# Patient Record
Sex: FEMALE | Race: WHITE | NOT HISPANIC OR LATINO | Employment: FULL TIME | URBAN - METROPOLITAN AREA
[De-identification: names, ages, dates, MRNs, and addresses within clinical notes are randomized per-mention and may not be internally consistent; named-entity substitution may affect disease eponyms.]

---

## 2017-02-06 ENCOUNTER — HOSPITAL ENCOUNTER (EMERGENCY)
Facility: HOSPITAL | Age: 77
Discharge: HOME | End: 2017-02-06
Attending: EMERGENCY MEDICINE
Payer: MEDICARE

## 2017-02-06 VITALS
OXYGEN SATURATION: 97 % | TEMPERATURE: 98 F | RESPIRATION RATE: 18 BRPM | HEART RATE: 74 BPM | WEIGHT: 150 LBS | SYSTOLIC BLOOD PRESSURE: 127 MMHG | HEIGHT: 66 IN | BODY MASS INDEX: 24.11 KG/M2 | DIASTOLIC BLOOD PRESSURE: 60 MMHG

## 2017-02-06 DIAGNOSIS — T50.901A ACCIDENTAL MEDICATION ERROR, INITIAL ENCOUNTER: Primary | ICD-10-CM

## 2017-02-06 PROCEDURE — 99283 EMERGENCY DEPT VISIT LOW MDM: CPT

## 2017-02-06 NOTE — ED PROVIDER NOTES
Encounter Date: 2/6/2017       History     Chief Complaint   Patient presents with    Over medicated     Took BP medication twice     Review of patient's allergies indicates:  No Known Allergies  HPI Comments: Chief complaint: Accidentally took an extra blood pressure medicine    History of present illness:Sissy Hess is a 76 y.o. female who presents with concerns after she inadvertently took an extra 40 mg lisinopril tablet at 10 PM.  She reports recording a systolic blood pressure of 88 at home.  She denies any symptoms and has no dizziness, shortness of breath, confusion or lightheadedness.     The history is provided by the patient.     Past Medical History   Diagnosis Date    Cancer 2005     renal ca, L kidney removed    Fibromyalgia     Hyperlipidemia     Hypertension      No past medical history pertinent negatives.  Past Surgical History   Procedure Laterality Date    Nephrectomy      Hysterectomy       Family History   Problem Relation Age of Onset    Breast cancer Maternal Aunt     Ovarian cancer Neg Hx      Social History   Substance Use Topics    Smoking status: Never Smoker    Smokeless tobacco: None    Alcohol use No     Review of Systems   Constitutional: Negative for activity change, appetite change and fever.   HENT: Negative for voice change.    Eyes: Negative for visual disturbance.   Respiratory: Negative for apnea and shortness of breath.    Cardiovascular: Negative for chest pain.   Gastrointestinal: Negative for abdominal pain and vomiting.   Genitourinary: Negative for decreased urine volume.   Musculoskeletal: Negative for back pain and neck pain.   Skin: Negative for color change.   Neurological: Negative for weakness and headaches.   Hematological: Does not bruise/bleed easily.   Psychiatric/Behavioral: Negative for confusion.       Physical Exam   Initial Vitals   BP Pulse Resp Temp SpO2   02/06/17 0339 02/06/17 0339 02/06/17 0339 02/06/17 0339 02/06/17 0339   133/61 85 16  98.1 °F (36.7 °C) 98 %     Physical Exam    Nursing note and vitals reviewed.  Constitutional: She appears well-developed and well-nourished.   HENT:   Head: Normocephalic and atraumatic.   Eyes: Conjunctivae are normal.   Neck: Normal range of motion. Neck supple.   Cardiovascular: Normal rate.   Pulmonary/Chest: Effort normal and breath sounds normal. No respiratory distress.   Abdominal: Soft. She exhibits no distension. There is no tenderness.   Musculoskeletal: Normal range of motion.   Neurological: She is alert and oriented to person, place, and time.   Skin: Skin is warm and dry. No erythema.   Psychiatric: She has a normal mood and affect.         ED Course   Procedures  Labs Reviewed - No data to display          Medical Decision Making:   ED Management:  Sissy Hess is a 76 y.o. female who presents with  no symptoms after taking an additional dose of lisinopril.  There is been 6 hours that have elapsed since the pill was taken with a systolic pressure in the 120s.  No further intervention is mandated.                   ED Course     Clinical Impression:   The encounter diagnosis was Accidental medication error, initial encounter.          Jani Piedra III, MD  02/06/17 0435

## 2017-02-06 NOTE — ED NOTES
At D/C, AA & O x 3, feeling better, skin warm and dry, follow up care discussed at length with patient/family to include meds and follow-up with MD; patient/family given discharge instructions along with prescriptions as indicated and care sheets

## 2017-02-06 NOTE — ED NOTES
Patient identifiers for Sissy Hess checked and correct.  LOC:  Patient is awake, alert, and aware of environment with an appropriate affect. Patient is oriented x 3 and speaking appropriately.  APPEARANCE:  Patient resting comfortably and in no acute distress. Patient is clean and well groomed, patient's clothing is properly fastened.  SKIN:  The skin is warm and dry. Patient has normal skin turgor and moist mucus membranes. Skin is intact; no bruising or breakdown noted.  MUSCULOSKELETAL:  Patient is moving all extremities well, no obvious deformities noted. Pulses intact.   RESPIRATORY:  Airway is open and patent. Respirations are spontaneous and non-labored with normal effort and rate.  CARDIAC:  Patient has a normal rate and rhythm. No peripheral edema noted. Capillary refill < 3 seconds.  ABDOMEN:  No distention noted.  Soft and non-tender upon palpation.  NEUROLOGICAL:  PERRL. Facial expression is symmetrical. Hand grasps are equal bilaterally. Normal sensation in all extremities when touched with finger.  Allergies reported: Review of patient's allergies indicates:  No Known Allergies  OTHER NOTES:  Accidentally took 2 doses of her BP medication, 1st dose at 1900 and again at 2215.  Got worried when BP hit 88/51, called [employer] and got scared and came in.

## 2017-02-06 NOTE — ED AVS SNAPSHOT
[facility] [address] [place] [address]               Sissy Hess   2017  3:42 AM   ED    Description:  Female : 1940   Department:  [facility]           Your Care was Coordinated By:     Provider Role From To    Jani Piedra III, MD Attending Provider [phone] --      Reason for Visit     Over medicated           Diagnoses this Visit        Comments    Accidental medication error, initial encounter    -  Primary       ED Disposition     None           To Do List           [facility]     [facility] Care Line -  Assistance  Registered nurses in the [facility] provide clinical advisement, health education, appointment booking, and other advisory services.  Call for this free service at [phone].             Medications           Message regarding Medications     Verify the changes and/or additions to your medication regime listed below are the same as discussed with your clinician today.  If any of these changes or additions are incorrect, please notify your healthcare provider.        STOP taking these medications     cloNIDine (CATAPRES) 0.1 MG tablet Take 1 tablet (0.1 mg total) by mouth 3 (three) times daily. HTN    omeprazole (PRILOSEC) 20 MG capsule Take 20 mg by mouth once daily.             Verify that the below list of medications is an accurate representation of the medications you are currently taking.  If none reported, the list may be blank. If incorrect, please contact your healthcare provider. Carry this list with you in case of emergency.           Current Medications     amlodipine (NORVASC) 5 MG tablet TK ONE T PO ONCE A DAY    duloxetine (CYMBALTA) 60 MG capsule     labetalol (NORMODYNE) 100 MG tablet Take 100 mg by mouth 2 (two) times daily.    lisinopril (PRINIVIL,ZESTRIL) 40 MG tablet Take 20 mg by mouth 2 (two) times daily.    pravastatin (PRAVACHOL) 40 MG tablet      "       Clinical Reference Information           Your Vitals Were     BP Pulse Temp Resp Height Weight    122/60 70 98.1 °F (36.7 °C) (Oral) 18 5' 6" (1.676 m) 68 kg (150 lb)    SpO2 BMI             96% 24.21 kg/m2         Allergies as of 2/6/2017     No Known Allergies      Immunizations Administered on Date of Encounter - 2/6/2017     None      ED Micro, Lab, POCT     None      ED Imaging Orders     None        Discharge Instructions         Accidental Ingestion: Nontoxic (Adult)  You have been evaluated and treated for accidentally taking too much of a medicine or swallowing a chemical product. There is no sign of toxic effect at this time. It is not likely that any new symptoms will appear. To be safe, watch for symptoms during the next 24 hours (see below). The symptom will depend on what was swallowed.  Home care  · If liquid charcoal was given to neutralize what was swallowed, it will give a black color to the stools for 1 to 2 days. Usually, a laxative is given with charcoal. This speeds the removal of any toxins from the intestines. This may cause diarrhea for up to 24 hours.  · If you have been given charcoal but no laxative, you may become constipated. If this occurs, you may take an over-the-counter laxative.  Prevention  · Keep medicines, pesticides, and other household chemicals in their original containers.  · Clearly dana all harmful products if a different bottle is used.  Note: In the future, if you or someone you know takes something possibly harmful, and you are not sure what to do, call the American Association of Poison Control Centers. The phone number is 1-839.781.1737. The phone line is staffed 24 hours a day. If you call, you will be connected to the poison control center closest to you.  Follow-up care  Follow up with your health care provider, or as advised.  Call 911  Contact your local emergency services right away if any of these occur.  · Trouble breathing or swallowing, " wheezing  · Severe confusion  · Extreme drowsiness or trouble awakening  · Fainting or loss of consciousness  · Rapid heart rate  · Very slow heart rate  · Very low or very high blood pressure  · Vomiting blood, or large amounts of blood in stool  · Seizure  When to seek medical advice  Call your health care provider right away if any of these occur.  · Shakiness  · Fast breathing (over 25 breaths per minute) or slow breathing (less than 8 breaths per minute)  · Shortness of breath  · Fever of 100.4ºF (38ºC) or higher, or as directed by your healthcare provider  · Vomiting or diarrhea for more than 24 hours  · Abdominal pain  · Dizziness or weakness  Date Last Reviewed: 4/28/2015  © 1474-8103 [employer]. [address] [place] [address], [place] [address]. All rights reserved. This information is not intended as a substitute for professional medical care. Always follow your healthcare professional's instructions.          [facility] Sign-Up     Activating your [facility] account is as easy as 1-2-3!     1) Visit [email or web address], select Sign Up Now, enter this activation code and your date of birth, then select Next.  [email or web address]  Expires: 3/23/2017  4:26 AM      2) Create a username and password to use when you visit [facility] in the future and select a security question in case you lose your password and select Next.    3) Enter your e-mail address and click Sign Up!    Additional Information  If you have questions, please e-mail [email or web address] or call [phone] to talk to our [facility] staff. Remember, [facility] is NOT to be used for urgent needs. For medical emergencies, dial 911.          [facility] complies with applicable Federal civil rights laws and does not discriminate on the basis of race, color, national origin, age, disability, or sex.        Language Assistance Services     ATTENTION: Language assistance services are available, free of charge. Please call  [phone].      ATENCIÓN: Si habla español, tiene a beach disposición servicios gratuitos de asistencia lingüística. Llame al [phone].     CHÚ Ý: N?u b?n nói Ti?ng Vi?t, có các d?ch v? h? tr? ngôn ng? mi?n phí dành cho b?n. G?i s? [phone].

## 2017-02-06 NOTE — DISCHARGE INSTRUCTIONS
Accidental Ingestion: Nontoxic (Adult)  You have been evaluated and treated for accidentally taking too much of a medicine or swallowing a chemical product. There is no sign of toxic effect at this time. It is not likely that any new symptoms will appear. To be safe, watch for symptoms during the next 24 hours (see below). The symptom will depend on what was swallowed.  Home care  · If liquid charcoal was given to neutralize what was swallowed, it will give a black color to the stools for 1 to 2 days. Usually, a laxative is given with charcoal. This speeds the removal of any toxins from the intestines. This may cause diarrhea for up to 24 hours.  · If you have been given charcoal but no laxative, you may become constipated. If this occurs, you may take an over-the-counter laxative.  Prevention  · Keep medicines, pesticides, and other household chemicals in their original containers.  · Clearly dana all harmful products if a different bottle is used.  Note: In the future, if you or someone you know takes something possibly harmful, and you are not sure what to do, call the American Association of Poison Control Centers. The phone number is 1-570.364.4928. The phone line is staffed 24 hours a day. If you call, you will be connected to the poison control center closest to you.  Follow-up care  Follow up with your health care provider, or as advised.  Call [phone]  Contact your local emergency services right away if any of these occur.  · Trouble breathing or swallowing, wheezing  · Severe confusion  · Extreme drowsiness or trouble awakening  · Fainting or loss of consciousness  · Rapid heart rate  · Very slow heart rate  · Very low or very high blood pressure  · Vomiting blood, or large amounts of blood in stool  · Seizure  When to seek medical advice  Call your health care provider right away if any of these occur.  · Shakiness  · Fast breathing (over 25 breaths per minute) or slow breathing (less than 8 breaths per  minute)  · Shortness of breath  · Fever of 100.4ºF (38ºC) or higher, or as directed by your healthcare provider  · Vomiting or diarrhea for more than 24 hours  · Abdominal pain  · Dizziness or weakness  Date Last Reviewed: 4/28/2015  © 6972-6485 [employer]. [address] [place] [address]. All rights reserved. This information is not intended as a substitute for professional medical care. Always follow your healthcare professional's instructions.

## 2018-04-23 ENCOUNTER — TELEPHONE (OUTPATIENT)
Dept: ORTHOPEDICS | Facility: CLINIC | Age: 78
End: 2018-04-23

## 2018-04-23 NOTE — TELEPHONE ENCOUNTER
There is no pharmacy on file for the patient. She did not answer when I called, voice mail left.     ----- Message from Maryjo Haile sent at 4/23/2018  1:48 PM CDT -----  Patient called needs a rx pain meds tramadol [phone]

## 2019-05-13 ENCOUNTER — OFFICE VISIT (OUTPATIENT)
Dept: PAIN MEDICINE | Facility: CLINIC | Age: 79
End: 2019-05-13
Payer: MEDICARE

## 2019-05-13 VITALS
SYSTOLIC BLOOD PRESSURE: 134 MMHG | BODY MASS INDEX: 24.11 KG/M2 | HEIGHT: 66 IN | HEART RATE: 67 BPM | WEIGHT: 150 LBS | DIASTOLIC BLOOD PRESSURE: 69 MMHG

## 2019-05-13 DIAGNOSIS — M16.11 OSTEOARTHRITIS OF RIGHT HIP, UNSPECIFIED OSTEOARTHRITIS TYPE: Primary | ICD-10-CM

## 2019-05-13 DIAGNOSIS — M25.551 RIGHT HIP PAIN: Primary | ICD-10-CM

## 2019-05-13 DIAGNOSIS — M50.30 DDD (DEGENERATIVE DISC DISEASE), CERVICAL: ICD-10-CM

## 2019-05-13 PROCEDURE — 99204 OFFICE O/P NEW MOD 45 MIN: CPT | Mod: S$PBB,,, | Performed by: ANESTHESIOLOGY

## 2019-05-13 PROCEDURE — 99999 PR PBB SHADOW E&M-EST. PATIENT-LVL III: ICD-10-PCS | Mod: PBBFAC,,, | Performed by: ANESTHESIOLOGY

## 2019-05-13 PROCEDURE — 99999 PR PBB SHADOW E&M-EST. PATIENT-LVL III: CPT | Mod: PBBFAC,,, | Performed by: ANESTHESIOLOGY

## 2019-05-13 PROCEDURE — 99204 PR OFFICE/OUTPT VISIT, NEW, LEVL IV, 45-59 MIN: ICD-10-PCS | Mod: S$PBB,,, | Performed by: ANESTHESIOLOGY

## 2019-05-13 PROCEDURE — 99213 OFFICE O/P EST LOW 20 MIN: CPT | Mod: PBBFAC,PN | Performed by: ANESTHESIOLOGY

## 2019-05-13 NOTE — PROGRESS NOTES
This note was completed with dictation software and grammatical errors may exist.    Referring Physician: Yonatan Lambert MD    PCP: Tao Orozco MD      CC:  Right hip pain, neck pain    HPI:   Karlie Hess is a 78 y.o. female referred to us for right hip pain and neck pain.  Right hip pain is currently more bothersome.  She has had long history of right hip pain for over 10 years.  No recent traumatic incident.  She has intermittent aching, throbbing pain in her right hip.  Pain radiates to her right groin.  Pain worsens standing walking.  She has tried physical therapy with mild benefits.  She has been seen by Orthopedics as well as spine surgery.  She states undergoing hip injections, in 2015 and most recently in 2018 which provided moderate benefit.  She desires repeat hip injection.  She also has posterior neck pain.  Pain radiates to her bilateral shoulders.  She states having recent imaging.  No surgery was recommended by Spine surgery.  She denies any worsening weakness.  No bowel bladder changes.    ROS:  CONSTITUTIONAL: No fevers, chills, night sweats, wt. loss, appetite changes  SKIN: no rashes or itching  ENT: No headaches, head trauma, vision changes, or eye pain  LYMPH NODES: None noticed   CV: No chest pain, palpitations.   RESP: No shortness of breath, dyspnea on exertion, cough, wheezing, or hemoptysis  GI: No nausea, emesis, diarrhea, constipation, melena, hematochezia, pain.    : No dysuria, hematuria, urgency, or frequency   HEME: No easy bruising, bleeding problems  PSYCHIATRIC: No depression, anxiety, psychosis, hallucinations.  NEURO: No seizures, memory loss, dizziness or difficulty sleeping  MSK:  Positive HPI      Past Medical History:   Diagnosis Date    Cancer 2005    renal ca, L kidney removed    Fibromyalgia     Hyperlipidemia     Hypertension      Past Surgical History:   Procedure Laterality Date    HYSTERECTOMY      NEPHRECTOMY       Family History   Problem  "Relation Age of Onset    Breast cancer Maternal Aunt     Ovarian cancer Neg Hx      Social History     Socioeconomic History    Marital status: Single     Spouse name: Not on file    Number of children: Not on file    Years of education: Not on file    Highest education level: Not on file   Occupational History    Not on file   Social Needs    Financial resource strain: Not on file    Food insecurity:     Worry: Not on file     Inability: Not on file    Transportation needs:     Medical: Not on file     Non-medical: Not on file   Tobacco Use    Smoking status: Never Smoker   Substance and Sexual Activity    Alcohol use: No    Drug use: No    Sexual activity: Not on file   Lifestyle    Physical activity:     Days per week: Not on file     Minutes per session: Not on file    Stress: Not on file   Relationships    Social connections:     Talks on phone: Not on file     Gets together: Not on file     Attends Sabianism service: Not on file     Active member of club or organization: Not on file     Attends meetings of clubs or organizations: Not on file     Relationship status: Not on file   Other Topics Concern    Not on file   Social History Narrative    Not on file         Medications/Allergies: See med card    Vitals:    05/13/19 0823   BP: 134/69   Pulse: 67   Weight: 68 kg (150 lb)   Height: 5' 6" (1.676 m)   PainSc:   8   PainLoc: Neck         Physical exam:    GENERAL: A and O x3, the patient appears well groomed and is in no acute distress.  Skin: No rashes or obvious lesions  HEENT: normocephalic, atraumatic  CARDIOVASCULAR:  Palpable peripheral pulses  LUNGS: easy work of breathing  ABDOMEN: soft, nontender   UPPER EXTREMITIES: Normal alignment, normal range of motion, no atrophy, no skin changes,  hair growth and nail growth normal and equal bilaterally. No swelling, no tenderness.    LOWER EXTREMITIES:  Normal alignment, normal range of motion, no atrophy, no skin changes,  hair growth and " nail growth normal and equal bilaterally. No swelling, no tenderness.  CERVICAL SPINE:  Cervical spine: ROM is limited in flexion, extension and lateral rotation with moderate increased pain.  Spurling's maneuver causes no neck pain to either side.  Myofascial exam: No Tenderness to palpation across cervical paraspinous region bilaterally.    LUMBAR SPINE  Lumbar spine: ROM is limited with flexion extension and oblique extension with moderate increased pain.    Bryce's test causes no increased pain on either side.    Supine straight leg raise is negative bilaterally.    Internal and external rotation of the hip causes increased pain on right side.  Myofascial exam: No tenderness to palpation across lumbar paraspinous muscles.      MENTAL STATUS: normal orientation, speech, language, and fund of knowledge for social situation.  Emotional state appropriate.    CRANIAL NERVES:  II:  PERRL bilaterally,   III,IV,VI: EOMI.    V:  Facial sensation equal bilaterally  VII:  Facial motor function normal.  VIII:  Hearing equal to finger rub bilaterally  IX/X: Gag normal, palate symmetric  XI:  Shoulder shrug equal, head turn equal  XII:  Tongue midline without fasciculations      MOTOR: Tone and bulk: normal bilateral upper and lower Strength: normal   Delt Bi Tri WE WF     R 5 5 5 5 5 5   L 5 5 5 5 5 5     IP ADD ABD Quad TA Gas HAM  R 5 5 5 5 5 5 5  L 5 5 5 5 5 5 5    SENSATION: Light touch and pinprick intact bilaterally  REFLEXES: normal, symmetric, nonbrisk.  Toes down, no clonus. No hoffmans.  GAIT: normal rise, base, steps, and arm swing.        Imaging:  Requesting    Assessment:  Patient referred for right hip pain  1. Osteoarthritis of right hip, unspecified osteoarthritis type    2. DDD (degenerative disc disease), cervical            Plan:  1. I have stressed the importance of physical activity and exercise to improve overall health  2. Request past records to review  3.  Schedule a right hip injection to  help with right hip and groin pain.  4.  May consider cervical YVONNE to help with her neck pain in the future.  5.  Follow-up after the procedure    Thank you for referring this interesting patient, and I look forward to continuing to collaborate in her care.

## 2019-05-13 NOTE — H&P (VIEW-ONLY)
This note was completed with dictation software and grammatical errors may exist.    Referring Physician: Yonatan Lambert MD    PCP: Tao Orozco MD      CC:  Right hip pain, neck pain    HPI:   Karlie Hess is a 78 y.o. female referred to us for right hip pain and neck pain.  Right hip pain is currently more bothersome.  She has had long history of right hip pain for over 10 years.  No recent traumatic incident.  She has intermittent aching, throbbing pain in her right hip.  Pain radiates to her right groin.  Pain worsens standing walking.  She has tried physical therapy with mild benefits.  She has been seen by Orthopedics as well as spine surgery.  She states undergoing hip injections, in 2015 and most recently in 2018 which provided moderate benefit.  She desires repeat hip injection.  She also has posterior neck pain.  Pain radiates to her bilateral shoulders.  She states having recent imaging.  No surgery was recommended by Spine surgery.  She denies any worsening weakness.  No bowel bladder changes.    ROS:  CONSTITUTIONAL: No fevers, chills, night sweats, wt. loss, appetite changes  SKIN: no rashes or itching  ENT: No headaches, head trauma, vision changes, or eye pain  LYMPH NODES: None noticed   CV: No chest pain, palpitations.   RESP: No shortness of breath, dyspnea on exertion, cough, wheezing, or hemoptysis  GI: No nausea, emesis, diarrhea, constipation, melena, hematochezia, pain.    : No dysuria, hematuria, urgency, or frequency   HEME: No easy bruising, bleeding problems  PSYCHIATRIC: No depression, anxiety, psychosis, hallucinations.  NEURO: No seizures, memory loss, dizziness or difficulty sleeping  MSK:  Positive HPI      Past Medical History:   Diagnosis Date    Cancer 2005    renal ca, L kidney removed    Fibromyalgia     Hyperlipidemia     Hypertension      Past Surgical History:   Procedure Laterality Date    HYSTERECTOMY      NEPHRECTOMY       Family History   Problem  "Relation Age of Onset    Breast cancer Maternal Aunt     Ovarian cancer Neg Hx      Social History     Socioeconomic History    Marital status: Single     Spouse name: Not on file    Number of children: Not on file    Years of education: Not on file    Highest education level: Not on file   Occupational History    Not on file   Social Needs    Financial resource strain: Not on file    Food insecurity:     Worry: Not on file     Inability: Not on file    Transportation needs:     Medical: Not on file     Non-medical: Not on file   Tobacco Use    Smoking status: Never Smoker   Substance and Sexual Activity    Alcohol use: No    Drug use: No    Sexual activity: Not on file   Lifestyle    Physical activity:     Days per week: Not on file     Minutes per session: Not on file    Stress: Not on file   Relationships    Social connections:     Talks on phone: Not on file     Gets together: Not on file     Attends [employer] service: Not on file     Active member of club or organization: Not on file     Attends meetings of clubs or organizations: Not on file     Relationship status: Not on file   Other Topics Concern    Not on file   Social History Narrative    Not on file         Medications/Allergies: See med card    Vitals:    05/13/19 0823   BP: 134/69   Pulse: 67   Weight: 68 kg (150 lb)   Height: 5' 6" (1.676 m)   PainSc:   8   PainLoc: Neck         Physical exam:    GENERAL: A and O x3, the patient appears well groomed and is in no acute distress.  Skin: No rashes or obvious lesions  HEENT: normocephalic, atraumatic  CARDIOVASCULAR:  Palpable peripheral pulses  LUNGS: easy work of breathing  ABDOMEN: soft, nontender   UPPER EXTREMITIES: Normal alignment, normal range of motion, no atrophy, no skin changes,  hair growth and nail growth normal and equal bilaterally. No swelling, no tenderness.    LOWER EXTREMITIES:  Normal alignment, normal range of motion, no atrophy, no skin changes,  hair growth and " nail growth normal and equal bilaterally. No swelling, no tenderness.  CERVICAL SPINE:  Cervical spine: ROM is limited in flexion, extension and lateral rotation with moderate increased pain.  Spurling's maneuver causes no neck pain to either side.  Myofascial exam: No Tenderness to palpation across cervical paraspinous region bilaterally.    LUMBAR SPINE  Lumbar spine: ROM is limited with flexion extension and oblique extension with moderate increased pain.    Bryce's test causes no increased pain on either side.    Supine straight leg raise is negative bilaterally.    Internal and external rotation of the hip causes increased pain on right side.  Myofascial exam: No tenderness to palpation across lumbar paraspinous muscles.      MENTAL STATUS: normal orientation, speech, language, and fund of knowledge for social situation.  Emotional state appropriate.    CRANIAL NERVES:  II:  PERRL bilaterally,   III,IV,VI: EOMI.    V:  Facial sensation equal bilaterally  VII:  Facial motor function normal.  VIII:  Hearing equal to finger rub bilaterally  IX/X: Gag normal, palate symmetric  XI:  Shoulder shrug equal, head turn equal  XII:  Tongue midline without fasciculations      MOTOR: Tone and bulk: normal bilateral upper and lower Strength: normal   Delt Bi Tri WE WF     R 5 5 5 5 5 5   L 5 5 5 5 5 5     IP ADD ABD Quad TA Gas HAM  R 5 5 5 5 5 5 5  L 5 5 5 5 5 5 5    SENSATION: Light touch and pinprick intact bilaterally  REFLEXES: normal, symmetric, nonbrisk.  Toes down, no clonus. No hoffmans.  GAIT: normal rise, base, steps, and arm swing.        Imaging:  Requesting    Assessment:  Patient referred for right hip pain  1. Osteoarthritis of right hip, unspecified osteoarthritis type    2. DDD (degenerative disc disease), cervical            Plan:  1. I have stressed the importance of physical activity and exercise to improve overall health  2. Request past records to review  3.  Schedule a right hip injection to  help with right hip and groin pain.  4.  May consider cervical YVONNE to help with her neck pain in the future.  5.  Follow-up after the procedure    Thank you for referring this interesting patient, and I look forward to continuing to collaborate in her care.

## 2019-05-27 ENCOUNTER — HOSPITAL ENCOUNTER (OUTPATIENT)
Facility: AMBULARY SURGERY CENTER | Age: 79
Discharge: HOME | End: 2019-05-27
Attending: ANESTHESIOLOGY | Admitting: ANESTHESIOLOGY
Payer: MEDICARE

## 2019-05-27 DIAGNOSIS — M25.559 ARTHRALGIA OF HIP, UNSPECIFIED LATERALITY: Primary | ICD-10-CM

## 2019-05-27 DIAGNOSIS — M25.559 HIP PAIN: ICD-10-CM

## 2019-05-27 PROCEDURE — 20610 DRAIN/INJ JOINT/BURSA W/O US: CPT | Performed by: ANESTHESIOLOGY

## 2019-05-27 PROCEDURE — 77002 PR FLUOROSCOPIC GUIDANCE NEEDLE PLACEMENT: ICD-10-PCS | Mod: 26,,, | Performed by: ANESTHESIOLOGY

## 2019-05-27 PROCEDURE — 77002 NEEDLE LOCALIZATION BY XRAY: CPT | Mod: 26,,, | Performed by: ANESTHESIOLOGY

## 2019-05-27 PROCEDURE — 20610 DRAIN/INJ JOINT/BURSA W/O US: CPT | Mod: RT,,, | Performed by: ANESTHESIOLOGY

## 2019-05-27 PROCEDURE — 77002 NEEDLE LOCALIZATION BY XRAY: CPT | Performed by: ANESTHESIOLOGY

## 2019-05-27 PROCEDURE — 20610 PR DRAIN/INJECT LARGE JOINT/BURSA: ICD-10-PCS | Mod: RT,,, | Performed by: ANESTHESIOLOGY

## 2019-05-27 RX ORDER — FLUMAZENIL 0.1 MG/ML
INJECTION INTRAVENOUS
Status: DISCONTINUED
Start: 2019-05-27 | End: 2019-05-27 | Stop reason: HOSPADM

## 2019-05-27 RX ORDER — METHYLPREDNISOLONE ACETATE 80 MG/ML
INJECTION, SUSPENSION INTRA-ARTICULAR; INTRALESIONAL; INTRAMUSCULAR; SOFT TISSUE
Status: DISCONTINUED | OUTPATIENT
Start: 2019-05-27 | End: 2019-05-27 | Stop reason: HOSPADM

## 2019-05-27 RX ORDER — LIDOCAINE HYDROCHLORIDE 10 MG/ML
INJECTION, SOLUTION EPIDURAL; INFILTRATION; INTRACAUDAL; PERINEURAL
Status: DISCONTINUED | OUTPATIENT
Start: 2019-05-27 | End: 2019-05-27 | Stop reason: HOSPADM

## 2019-05-27 RX ORDER — BUPIVACAINE HYDROCHLORIDE 2.5 MG/ML
INJECTION, SOLUTION EPIDURAL; INFILTRATION; INTRACAUDAL
Status: DISCONTINUED | OUTPATIENT
Start: 2019-05-27 | End: 2019-05-27 | Stop reason: HOSPADM

## 2019-05-27 RX ORDER — LIDOCAINE HYDROCHLORIDE 10 MG/ML
INJECTION, SOLUTION EPIDURAL; INFILTRATION; INTRACAUDAL; PERINEURAL
Status: DISCONTINUED
Start: 2019-05-27 | End: 2019-05-27 | Stop reason: HOSPADM

## 2019-05-27 RX ORDER — BUPIVACAINE HYDROCHLORIDE 2.5 MG/ML
INJECTION, SOLUTION EPIDURAL; INFILTRATION; INTRACAUDAL
Status: DISCONTINUED
Start: 2019-05-27 | End: 2019-05-27 | Stop reason: HOSPADM

## 2019-05-27 RX ORDER — METHYLPREDNISOLONE ACETATE 80 MG/ML
INJECTION, SUSPENSION INTRA-ARTICULAR; INTRALESIONAL; INTRAMUSCULAR; SOFT TISSUE
Status: DISCONTINUED
Start: 2019-05-27 | End: 2019-05-27 | Stop reason: HOSPADM

## 2019-05-27 RX ORDER — SODIUM CHLORIDE, SODIUM LACTATE, POTASSIUM CHLORIDE, CALCIUM CHLORIDE 600; 310; 30; 20 MG/100ML; MG/100ML; MG/100ML; MG/100ML
INJECTION, SOLUTION INTRAVENOUS ONCE AS NEEDED
Status: DISCONTINUED | OUTPATIENT
Start: 2019-05-27 | End: 2019-05-27 | Stop reason: HOSPADM

## 2019-05-27 NOTE — DISCHARGE INSTRUCTIONS
Anesthesia information    Anesthesia Safety      You have been given medicine  to sedate you during your procedure today. This may have included both a pain medicine and sleeping medicine. Most of the effects have worn off; however, you may continue to have some drowsiness for the next  24 hours. Anesthesia and pain medicines can cause nausea, sleepiness, dizziness and  constipation.    HOME CARE:  1) For the next EIGHT HOURS, you should be watched by a responsible adult to look for any worsening of your condition.  2) DO NOT DRINK any ALCOHOL for the next 24 HOURS.  3) DO NOT DRIVE or operate dangerous machinery during the next 24 HOURS.  FOLLOW UP with your doctor or this facility if you are not alert and back to your usual level of activity within 24 hrs.  GET PROMPT MEDICAL ATTENTION if any of the following occur:  -- Increased drowsiness  -- Increased weakness or dizziness  -- Repeated vomiting  -- If you cannot be awakened    Pain injection instructions:     This procedure may take a couple weeks to relieve pain    No driving for 24 hrs.   Activity as tolerated- gradually increase activities.  Dont lift over 10 lbs for 24 hrs   No heat at injection sites x 2 days. No heating pads, hot tubs, saunas, or swimming in any body of water or pool for 2 days.  Use ice pack for mild swelling and for comfort , apply for 20 minutes, remove for 20 minute intervals. No direct contact of ice itself  to skin.  May shower today. No tub baths for two days.      Resume Aspirin, Plavix, or Coumadin the day after the procedure unless otherwise instructed.   If diabetic,monitor your glucose carefully as steroids can increase your glucose level    Seek immediate medical help for:   Severe increase in your usual pain or appearance of new pain.  Prolonged (mor than 8 hours) or increasing weakness or numbness in the legs or arms.  .    Fever above 101 ,Drainage,redness,active bleeding, or increased swelling at the injection  site.  Headache, shortness of breath, chest pain, or breathing problems.

## 2019-05-27 NOTE — PLAN OF CARE
Patient is being driven home by Deana. She was called at [phone]. She does not want anythiong to drink at this time. She plans on going to [facility] after the procedure for lunch.

## 2019-05-27 NOTE — OP NOTE
PROCEDURE DATE: 5/27/2019    PROCEDURE:  Right Hip joint injection under fluoroscopy.      Diagnosis: Right hip pain  Post Op diagnosis: Same       PHYSICIAN: Zach Wilder MD                                                                               Local anesthetic:  Lidocaine 1%, 2 ml total                                                                                                              MEDICATIONS INJECTED:  Methylprednisone 80mg and bupivacaine 0.25% 3 mL                                                                             ESTIMATED BLOOD LOSS:  None                                                                                                                             COMPLICATIONS:  None                                                                                                                                    TECHNIQUE: A time-out taken to identify patient and procedure side prior to starting the procedure.  With the patient lying in the supine position, the right greater trochanter, femoral neck and femoral head were visualized. The area was prepped and draped in the usual sterile fashion.  Local anesthetic was used, given by raising a wheal and going down to the hub of the 25-gauge needle 1.5inch needle.  A 3.5inch 22-gauge needle was introduced under fluoroscopy to the lateral portion of the femoral neck and then walked medially to enter the hip joint capsule.  When the tip of the needle was presumed to be in appropriate position, 1ml contrast was injected and noted to spread throughout the joint space.  After negative aspiration for blood, the medication as noted above was then injected slowly.  The patient tolerated the procedure well.                                                                                                                                                                                                The patient was monitored after the procedure and was  given post procedure and discharge instructions to follow at home. The patient was discharged in a stable condition.

## 2019-05-27 NOTE — INTERVAL H&P NOTE
The patient has been examined and the H&P has been reviewed:    I concur with the findings and no changes have occurred since H&P was written.   This patient has been cleared for surgery in an ambulatory surgical facility    ASA 3,  Mallampatti Score 3  No history of anesthetic complications  Plan for RN IV sedation      Anesthesia/Surgery risks, benefits and alternative options discussed and understood by patient/family.          Active Hospital Problems    Diagnosis  POA    Hip pain [M25.559]  Yes      Resolved Hospital Problems   No resolved problems to display.

## 2019-05-27 NOTE — DISCHARGE SUMMARY
[facility]  Discharge Note  Short Stay    Admit Date: 5/27/2019    Discharge Date and Time: 5/27/2019    Attending Physician: Zach Wilder MD     Discharge Provider: Zach Wilder    Diagnoses:  Active Hospital Problems    Diagnosis  POA    *Hip pain [M25.559]  Yes      Resolved Hospital Problems   No resolved problems to display.       Hospital Course: Hip injection  Discharged Condition: Good    Final Diagnoses:   Active Hospital Problems    Diagnosis  POA    *Hip pain [M25.559]  Yes      Resolved Hospital Problems   No resolved problems to display.       Disposition: Home or Self Care    Follow up/Patient Instructions:    Medications:  Reconciled Home Medications:      Medication List      CONTINUE taking these medications    amLODIPine 5 MG tablet  Commonly known as:  NORVASC  TK ONE T PO ONCE A DAY     DULoxetine 60 MG capsule  Commonly known as:  CYMBALTA     labetalol 100 MG tablet  Commonly known as:  NORMODYNE  Take 100 mg by mouth 2 (two) times daily.     lisinopril 40 MG tablet  Commonly known as:  PRINIVIL,ZESTRIL  Take 20 mg by mouth 2 (two) times daily.     pravastatin 40 MG tablet  Commonly known as:  PRAVACHOL          Discharge Procedure Orders   Call MD for:  temperature >100.4     Call MD for:  persistent nausea and vomiting or diarrhea     Call MD for:  severe uncontrolled pain     Call MD for:  redness, tenderness, or signs of infection (pain, swelling, redness, odor or green/yellow discharge around incision site)     Call MD for:  difficulty breathing or increased cough     Call MD for:  severe persistent headache        Follow up with MD in 2-3 weeks    Discharge Procedure Orders (must include Diet, Follow-up, Activity):   Discharge Procedure Orders (must include Diet, Follow-up, Activity)   Call MD for:  temperature >100.4     Call MD for:  persistent nausea and vomiting or diarrhea     Call MD for:  severe uncontrolled pain     Call MD for:  redness, tenderness, or signs of infection  (pain, swelling, redness, odor or green/yellow discharge around incision site)     Call MD for:  difficulty breathing or increased cough     Call MD for:  severe persistent headache

## 2019-05-28 VITALS
TEMPERATURE: 98 F | HEIGHT: 66 IN | OXYGEN SATURATION: 99 % | WEIGHT: 150 LBS | BODY MASS INDEX: 24.11 KG/M2 | HEART RATE: 73 BPM | DIASTOLIC BLOOD PRESSURE: 65 MMHG | RESPIRATION RATE: 19 BRPM | SYSTOLIC BLOOD PRESSURE: 142 MMHG

## 2019-06-24 ENCOUNTER — OFFICE VISIT (OUTPATIENT)
Dept: PAIN MEDICINE | Facility: CLINIC | Age: 79
End: 2019-06-24
Payer: MEDICARE

## 2019-06-24 VITALS
SYSTOLIC BLOOD PRESSURE: 125 MMHG | DIASTOLIC BLOOD PRESSURE: 70 MMHG | HEIGHT: 66 IN | WEIGHT: 150 LBS | BODY MASS INDEX: 24.11 KG/M2 | HEART RATE: 69 BPM

## 2019-06-24 DIAGNOSIS — M25.551 RIGHT HIP PAIN: ICD-10-CM

## 2019-06-24 DIAGNOSIS — M50.30 DDD (DEGENERATIVE DISC DISEASE), CERVICAL: ICD-10-CM

## 2019-06-24 DIAGNOSIS — M16.11 OSTEOARTHRITIS OF RIGHT HIP, UNSPECIFIED OSTEOARTHRITIS TYPE: Primary | ICD-10-CM

## 2019-06-24 PROCEDURE — 99999 PR PBB SHADOW E&M-EST. PATIENT-LVL III: ICD-10-PCS | Mod: PBBFAC,,, | Performed by: PHYSICIAN ASSISTANT

## 2019-06-24 PROCEDURE — 99214 OFFICE O/P EST MOD 30 MIN: CPT | Mod: S$PBB,,, | Performed by: PHYSICIAN ASSISTANT

## 2019-06-24 PROCEDURE — 99214 PR OFFICE/OUTPT VISIT, EST, LEVL IV, 30-39 MIN: ICD-10-PCS | Mod: S$PBB,,, | Performed by: PHYSICIAN ASSISTANT

## 2019-06-24 PROCEDURE — 99213 OFFICE O/P EST LOW 20 MIN: CPT | Mod: PBBFAC,PN | Performed by: PHYSICIAN ASSISTANT

## 2019-06-24 PROCEDURE — 99999 PR PBB SHADOW E&M-EST. PATIENT-LVL III: CPT | Mod: PBBFAC,,, | Performed by: PHYSICIAN ASSISTANT

## 2019-06-24 NOTE — PROGRESS NOTES
Referring Physician: No ref. provider found    PCP: Tao Orozco MD      CC:  Right hip pain, neck pain    Interval History:  Karlie Hess is a 78 y.o. female with a long history of right hip pain who presents today for f/u s/p right hip intra articular injection. Reports no improvement in symptoms. She has had good benefit in the past with right hip injections. Pain is worse with prolonged walking or sitting and getting up. She does have a history of back pain which resolved with surgery. Denies radicular pain. Does report right groin pain with walking. Denies any worsening weakness or b/b changes. Pain today is rated 4/10.  Pt has been seen in the clinic before, however pt is new to me.     History below per Dr. Wilder    HPI:   Karlie Hess is a 78 y.o. female referred to us for right hip pain and neck pain.  Right hip pain is currently more bothersome.  She has had long history of right hip pain for over 10 years.  No recent traumatic incident.  She has intermittent aching, throbbing pain in her right hip.  Pain radiates to her right groin.  Pain worsens standing walking.  She has tried physical therapy with mild benefits.  She has been seen by Orthopedics as well as spine surgery.  She states undergoing hip injections, in 2015 and most recently in 2018 which provided moderate benefit.  She desires repeat hip injection.  She also has posterior neck pain.  Pain radiates to her bilateral shoulders.  She states having recent imaging.  No surgery was recommended by Spine surgery.  She denies any worsening weakness.  No bowel bladder changes.    ROS:  CONSTITUTIONAL: No fevers, chills, night sweats, wt. loss, appetite changes  SKIN: no rashes or itching  ENT: No headaches, head trauma, vision changes, or eye pain  LYMPH NODES: None noticed   CV: No chest pain, palpitations.   RESP: No shortness of breath, dyspnea on exertion, cough, wheezing, or hemoptysis  GI: No nausea, emesis, diarrhea,  constipation, melena, hematochezia, pain.    : No dysuria, hematuria, urgency, or frequency   HEME: No easy bruising, bleeding problems  PSYCHIATRIC: No depression, anxiety, psychosis, hallucinations.  NEURO: No seizures, memory loss, dizziness or difficulty sleeping  MSK:  Positive HPI      Past Medical History:   Diagnosis Date    Cancer 2005    renal ca, L kidney removed    Fibromyalgia     Hyperlipidemia     Hypertension      Past Surgical History:   Procedure Laterality Date    HYSTERECTOMY      Injection, Joint, Shoulder, Hip, Or Knee Right 5/27/2019    [facility]    NEPHRECTOMY       Family History   Problem Relation Age of Onset    Breast cancer Maternal Aunt     Ovarian cancer Neg Hx      Social History     Socioeconomic History    Marital status: Single     Spouse name: Not on file    Number of children: Not on file    Years of education: Not on file    Highest education level: Not on file   Occupational History    Not on file   Social Needs    Financial resource strain: Not on file    Food insecurity:     Worry: Not on file     Inability: Not on file    Transportation needs:     Medical: Not on file     Non-medical: Not on file   Tobacco Use    Smoking status: Never Smoker   Substance and Sexual Activity    Alcohol use: No    Drug use: No    Sexual activity: Not on file   Lifestyle    Physical activity:     Days per week: Not on file     Minutes per session: Not on file    Stress: Not on file   Relationships    Social connections:     Talks on phone: Not on file     Gets together: Not on file     Attends Shinto service: Not on file     Active member of club or organization: Not on file     Attends meetings of clubs or organizations: Not on file     Relationship status: Not on file   Other Topics Concern    Not on file   Social History Narrative    Not on file         Medications/Allergies: See med card    Vitals:    06/24/19 0840   BP: 125/70  "  Pulse: 69   Weight: 68 kg (150 lb)   Height: 5' 6" (1.676 m)   PainSc:   4   PainLoc: Hip         Physical exam:    GENERAL: A and O x3, the patient appears well groomed and is in no acute distress.  Skin: No rashes or obvious lesions  HEENT: normocephalic, atraumatic  CARDIOVASCULAR:  RRR  LUNGS: non labored breathing  ABDOMEN: soft, nontender   UPPER EXTREMITIES: Normal alignment, normal range of motion, no atrophy, no skin changes,  hair growth and nail growth normal and equal bilaterally. No swelling, no tenderness.    LOWER EXTREMITIES:  Normal alignment, normal range of motion, no atrophy, no skin changes,  hair growth and nail growth normal and equal bilaterally. No swelling, no tenderness.  CERVICAL SPINE:  Cervical spine: ROM is limited in flexion, extension and lateral rotation with moderate increased pain.  Spurling's maneuver causes no neck pain to either side.  Myofascial exam: No Tenderness to palpation across cervical paraspinous region bilaterally.    LUMBAR SPINE  Lumbar spine: ROM is limited with flexion extension and oblique extension with moderate increased pain.    Bryce's test causes no increased pain on either side.    Supine straight leg raise is negative bilaterally.    Internal and external rotation of the hip causes increased pain on right side.  Myofascial exam: No tenderness to palpation across lumbar paraspinous muscles.      MENTAL STATUS: normal orientation, speech, language, and fund of knowledge for social situation.  Emotional state appropriate.    CRANIAL NERVES:  II:  PERRL bilaterally,   III,IV,VI: EOMI.    V:  Facial sensation equal bilaterally  VII:  Facial motor function normal.  VIII:  Hearing equal to finger rub bilaterally  IX/X: Gag normal, palate symmetric  XI:  Shoulder shrug equal, head turn equal  XII:  Tongue midline without fasciculations      MOTOR: Tone and bulk: normal bilateral upper and lower Strength: normal "   Delt Bi Tri WE WF     R 5 5 5 5 5 5   L 5 5 5 5 5 5     IP ADD ABD Quad TA Gas HAM  R 5 5 5 5 5 5 5  L 5 5 5 5 5 5 5    SENSATION: Light touch and pinprick intact bilaterally  REFLEXES: normal, symmetric, nonbrisk.  Toes down, no clonus. No hoffmans.  GAIT: normal rise, base, steps, and arm swing.        Imaging:  Requesting    Assessment:  Karlie Hess is a 78 y.o. female with right hip pain  1. Osteoarthritis of right hip, unspecified osteoarthritis type    2. DDD (degenerative disc disease), cervical      Plan:  1. I have stressed the importance of physical activity and exercise to improve overall health  2. Recommend f/u with orthopedics for continued hip pain  3.  May consider cervical YVONNE to help with her neck pain in the future.  4. F/u prn

## 2019-08-14 ENCOUNTER — OFFICE VISIT (OUTPATIENT)
Dept: PULMONOLOGY | Facility: CLINIC | Age: 79
End: 2019-08-14
Payer: MEDICARE

## 2019-08-14 VITALS
OXYGEN SATURATION: 97 % | HEART RATE: 81 BPM | BODY MASS INDEX: 22.66 KG/M2 | SYSTOLIC BLOOD PRESSURE: 110 MMHG | DIASTOLIC BLOOD PRESSURE: 70 MMHG | HEIGHT: 66 IN | WEIGHT: 141 LBS

## 2019-08-14 DIAGNOSIS — Z85.118 HISTORY OF LUNG CANCER: ICD-10-CM

## 2019-08-14 PROCEDURE — 99214 PR OFFICE/OUTPT VISIT, EST, LEVL IV, 30-39 MIN: ICD-10-PCS | Mod: S$GLB,,, | Performed by: INTERNAL MEDICINE

## 2019-08-14 PROCEDURE — 99214 OFFICE O/P EST MOD 30 MIN: CPT | Mod: S$GLB,,, | Performed by: INTERNAL MEDICINE

## 2019-08-14 RX ORDER — OMEPRAZOLE 20 MG/1
CAPSULE, DELAYED RELEASE ORAL
COMMUNITY
Start: 2019-05-22 | End: 2022-07-13 | Stop reason: CLARIF

## 2019-08-14 NOTE — PATIENT INSTRUCTIONS
CT scan of chest  Will call with results  Flu shot in October   Follow up in about 1 year (around 8/14/2020).

## 2019-08-14 NOTE — PROGRESS NOTES
SUBJECTIVE:    Patient ID: Sissy Hess is a 78 y.o. female.    Chief Complaint: Lung Cancer (1 yr follow up )    HPI   Patient here today feeling well. She is due for her yearly CT.  She denies respiratory issues.  She has been dieting to lose weight secondary to her cholesterol.She is s/p JAX lobectomy.  Past Medical History:   Diagnosis Date    Aortic insufficiency     Cancer 2005    renal ca, L kidney removed    Diastolic heart failure     Diffusion capacity of lung (dl), decreased     Fibromyalgia     GERD (gastroesophageal reflux disease)     Hypercholesterolemia     Hyperlipidemia     Hypertension     Lung cancer     adenocarcinoma     Sinusitis      Past Surgical History:   Procedure Laterality Date    BACK SURGERY      HYSTERECTOMY      Injection, Joint, Shoulder, Hip, Or Knee Right 5/27/2019    Per[facility] by Zach [facility] at [facility]    left upper lobectomy      NEPHRECTOMY       Family History   Problem Relation Age of Onset    Breast cancer Maternal Aunt     Hypertension Mother         Social History:   Marital Status: Single  Occupation: Data Unavailable  Alcohol History:  reports that she does not drink alcohol.  Tobacco History:  reports that she has never smoked. She does not have any smokeless tobacco history on file.  Drug History:  reports that she does not use drugs.    Review of patient's allergies indicates:   Allergen Reactions    Oxycodone-acetaminophen        Current Outpatient Medications   Medication Sig Dispense Refill    amlodipine (NORVASC) 5 MG tablet TK ONE T PO ONCE A DAY  2    duloxetine (CYMBALTA) 60 MG capsule       labetalol (NORMODYNE) 100 MG tablet Take 100 mg by mouth 2 (two) times daily.      lisinopril (PRINIVIL,ZESTRIL) 40 MG tablet Take 20 mg by mouth 2 (two) times daily.      omeprazole (PRILOSEC) 20 MG capsule       pravastatin (PRAVACHOL) 40 MG tablet        No current facility-administered medications for this visit.          Last  "CT:8/2018    Review of Systems  General: Feeling Well.  Eyes: Vision is good.  ENT:  No sinusitis or pharyngitis.   Heart:: No chest pain or palpitations.  Lungs: No cough, sputum, or wheezing.  GI: No Nausea, vomiting, constipation, diarrhea, or reflux.  : No dysuria, hesitancy, or nocturia.  Musculoskeletal: No joint pain or myalgias.  Skin: No lesions or rashes.  Neuro: No headaches or neuropathy.  Lymph:legs swell when she takes in lots of salt   Psych: No anxiety or depression.  Endo: weight loss from diet     OBJECTIVE:      /70 (BP Location: Left arm, Patient Position: Sitting)   Pulse 81   Ht 5' 6" (1.676 m)   Wt 64 kg (141 lb)   SpO2 97%   BMI 22.76 kg/m²     Physical Exam  GENERAL: Older patient in no distress.  HEENT: Pupils equal and reactive. Extraocular movements intact. Nose intact.      Pharynx moist.  NECK: Supple.   HEART: Regular rate and rhythm. No murmur or gallop auscultated.  LUNGS: Clear to auscultation and percussion. Lung excursion symmetrical. No change in fremitus. No adventitial noises.  ABDOMEN: Bowel sounds present. Non-tender, no masses palpated.  EXTREMITIES: Normal muscle tone and joint movement, no cyanosis or clubbing.   LYMPHATICS: No adenopathy palpated, no edema.  SKIN: Dry, intact, no lesions.   NEURO: Cranial nerves II-XII intact. Motor strength 5/5 bilaterally, upper and lower extremities.  PSYCH: Appropriate affect.    Greer Armas NP served in the capacity as a "scribe" for this patient encounter.  A "face to face" encounter occurred with Dr. Schuler on this date  The treatment plan and medical decision making is outlined below:         Assessment:       1. History of lung cancer          Plan:       History of lung cancer  -     CT Chest Without Contrast; Future; Expected date: 08/14/2019      CT scan of chest  Will call with results  Follow up in about 1 year (around 8/14/2020).        "

## 2019-08-15 ENCOUNTER — LAB VISIT (OUTPATIENT)
Dept: LAB | Facility: HOSPITAL | Age: 79
End: 2019-08-15
Attending: FAMILY MEDICINE
Payer: MEDICARE

## 2019-08-15 DIAGNOSIS — I10 ESSENTIAL HYPERTENSION, MALIGNANT: ICD-10-CM

## 2019-08-15 DIAGNOSIS — E78.5 HYPERLIPEMIA: ICD-10-CM

## 2019-08-15 DIAGNOSIS — I10 ESSENTIAL HYPERTENSION, MALIGNANT: Primary | ICD-10-CM

## 2019-08-15 LAB
BILIRUB UR QL STRIP: NEGATIVE
CLARITY UR: CLEAR
COLOR UR: YELLOW
GLUCOSE UR QL STRIP: NEGATIVE
HGB UR QL STRIP: NEGATIVE
KETONES UR QL STRIP: NEGATIVE
LEUKOCYTE ESTERASE UR QL STRIP: NEGATIVE
NITRITE UR QL STRIP: NEGATIVE
PH UR STRIP: 7 [PH] (ref 5–8)
PROT UR QL STRIP: NEGATIVE
SP GR UR STRIP: 1.01 (ref 1–1.03)
URN SPEC COLLECT METH UR: NORMAL
UROBILINOGEN UR STRIP-ACNC: NEGATIVE EU/DL

## 2019-08-15 PROCEDURE — 81003 URINALYSIS AUTO W/O SCOPE: CPT

## 2019-09-12 ENCOUNTER — HOSPITAL ENCOUNTER (OUTPATIENT)
Dept: RADIOLOGY | Facility: HOSPITAL | Age: 79
Discharge: HOME | End: 2019-09-12
Attending: NURSE PRACTITIONER
Payer: MEDICARE

## 2019-09-12 ENCOUNTER — TELEPHONE (OUTPATIENT)
Dept: PULMONOLOGY | Facility: CLINIC | Age: 79
End: 2019-09-12

## 2019-09-12 DIAGNOSIS — Z85.118 HISTORY OF LUNG CANCER: ICD-10-CM

## 2019-09-12 PROCEDURE — 71250 CT THORAX DX C-: CPT | Mod: TC,PO

## 2019-09-12 NOTE — TELEPHONE ENCOUNTER
Patient's CT is stable.  Will repeat in 1 year. No answer, left message to call.    Spoke with her.

## 2019-10-29 DIAGNOSIS — C64.2 MALIGNANT NEOPLASM OF LEFT KIDNEY, EXCEPT RENAL PELVIS: Primary | ICD-10-CM

## 2019-11-07 ENCOUNTER — LAB VISIT (OUTPATIENT)
Dept: LAB | Facility: HOSPITAL | Age: 79
End: 2019-11-07
Attending: SPECIALIST
Payer: MEDICARE

## 2019-11-07 DIAGNOSIS — C64.2 MALIGNANT NEOPLASM OF LEFT KIDNEY, EXCEPT RENAL PELVIS: ICD-10-CM

## 2019-11-07 LAB
ANION GAP SERPL CALC-SCNC: 12 MMOL/L (ref 8–16)
BUN SERPL-MCNC: 20 MG/DL (ref 8–23)
CALCIUM SERPL-MCNC: 8.8 MG/DL (ref 8.7–10.5)
CHLORIDE SERPL-SCNC: 105 MMOL/L (ref 95–110)
CO2 SERPL-SCNC: 28 MMOL/L (ref 23–29)
CREAT SERPL-MCNC: 1.2 MG/DL (ref 0.5–1.4)
EST. GFR  (AFRICAN AMERICAN): 49.3 ML/MIN/1.73 M^2
EST. GFR  (NON AFRICAN AMERICAN): 42.8 ML/MIN/1.73 M^2
GLUCOSE SERPL-MCNC: 94 MG/DL (ref 70–110)
POTASSIUM SERPL-SCNC: 4.4 MMOL/L (ref 3.5–5.1)
SODIUM SERPL-SCNC: 145 MMOL/L (ref 136–145)

## 2019-11-07 PROCEDURE — 80048 BASIC METABOLIC PNL TOTAL CA: CPT

## 2019-11-07 PROCEDURE — 36415 COLL VENOUS BLD VENIPUNCTURE: CPT

## 2019-12-11 ENCOUNTER — HOSPITAL ENCOUNTER (EMERGENCY)
Facility: HOSPITAL | Age: 79
Discharge: HOME | End: 2019-12-11
Attending: EMERGENCY MEDICINE
Payer: MEDICARE

## 2019-12-11 VITALS
BODY MASS INDEX: 22.5 KG/M2 | HEIGHT: 66 IN | SYSTOLIC BLOOD PRESSURE: 190 MMHG | OXYGEN SATURATION: 99 % | WEIGHT: 140 LBS | RESPIRATION RATE: 18 BRPM | HEART RATE: 68 BPM | DIASTOLIC BLOOD PRESSURE: 79 MMHG | TEMPERATURE: 98 F

## 2019-12-11 DIAGNOSIS — M25.561 KNEE PAIN, RIGHT: ICD-10-CM

## 2019-12-11 DIAGNOSIS — M25.561 ACUTE PAIN OF RIGHT KNEE: Primary | ICD-10-CM

## 2019-12-11 PROCEDURE — 99283 EMERGENCY DEPT VISIT LOW MDM: CPT | Mod: 25

## 2019-12-11 NOTE — ED NOTES
Pt states that her BP runs high when she gets anxious. Pt states that she did take her BP meds today.

## 2019-12-11 NOTE — ED PROVIDER NOTES
Encounter Date: 12/11/2019       History     Chief Complaint   Patient presents with    Knee Pain     Right     HPI     The patient is an 79F presenting with right knee pain after mechanical fall from standing. Patient tripped over extension cord in her garage. Did not hit head, lose consciousness, or vomit. Is able to bear weight on knee. Has no knee pain except when she extends knee or ambulates.     Review of patient's allergies indicates:   Allergen Reactions    Oxycodone-acetaminophen      Past Medical History:   Diagnosis Date    Aortic insufficiency     Cancer 2005    renal ca, L kidney removed    Diastolic heart failure     Diffusion capacity of lung (dl), decreased     Fibromyalgia     GERD (gastroesophageal reflux disease)     Hypercholesterolemia     Hyperlipidemia     Hypertension     Lung cancer     adenocarcinoma     Sinusitis      Past Surgical History:   Procedure Laterality Date    BACK SURGERY      HYSTERECTOMY      INJECTION OF JOINT Right 5/27/2019    Procedure: Injection, Joint, Shoulder, Hip, Or Knee;  Surgeon: Zach Wilder MD;  Location: [facility];  Service: Pain Management;  Laterality: Right;  right hip intra-articular injection     left upper lobectomy      NEPHRECTOMY       Family History   Problem Relation Age of Onset    Breast cancer Maternal Aunt     Hypertension Mother      Social History     Tobacco Use    Smoking status: Never Smoker   Substance Use Topics    Alcohol use: No    Drug use: No     Review of Systems   HENT: Negative for drooling and facial swelling.    Eyes: Negative for discharge and redness.   Gastrointestinal: Negative for vomiting.   Musculoskeletal: Positive for arthralgias.   Skin: Negative for rash.   Neurological: Negative for facial asymmetry and speech difficulty.   Psychiatric/Behavioral: Negative for behavioral problems.       Physical Exam     Initial Vitals [12/11/19 1503]   BP Pulse Resp Temp SpO2   (!) 179/77 72 16 97.9 °F (36.6 °C)  99 %      MAP       --         Physical Exam    Constitutional: She appears well-developed and well-nourished.   HENT:   Head: Normocephalic and atraumatic.   Eyes: Right eye exhibits no chemosis, no discharge and no exudate. Left eye exhibits no chemosis, no discharge and no exudate.   Neck: Normal range of motion. No stridor present.   Cardiovascular: Normal rate.   Pulmonary/Chest: No respiratory distress.   Musculoskeletal: Normal range of motion.   Right LE:  Strong PT pulse  Sensation intact   Motor intact  Full ROM of knee  No laxity with valgus, varus stress testing  Negative Jackson's  Patient able to bear full weight   Neurological: She is alert.   Skin: Skin is warm and dry.   Psychiatric: She has a normal mood and affect. Her speech is normal and behavior is normal.         ED Course   Procedures  Labs Reviewed - No data to display       Imaging Results          X-Ray Knee Complete 4 or more Views Right (Final result)  Result time 12/11/19 16:34:41   Procedure changed from X-Ray Knee 3 View Right     Final result by Jase Bergman MD (12/11/19 16:34:41)                 Impression:      Normal right knee.      Electronically signed by: Jase Bergman MD  Date:    12/11/2019  Time:    16:34             Narrative:    EXAMINATION:  XR KNEE COMP 4 OR MORE VIEWS RIGHT    CLINICAL HISTORY:  pain; Pain in right knee    FINDINGS:  Four views of right knee show no fracture, dislocation, or destructive osseous lesion. Soft tissues are unremarkable.                                       APC / Resident Notes:   PGY-3 MDM:     Assessment: Patient is a 79 y.o. presenting with a chief complaint of right knee pain after trip and fall from standing. Xray negative. Low suspicion for ligament injury. Stable for discharge with outpatient follow up.     Nicci Kim  \[facility]\"" Emergency Medicine, PGY3   12/11/2019 5:01 PM                                      Clinical Impression:       ICD-10-CM ICD-9-CM   1. Acute pain of right  knee M25.561 719.46   2. Knee pain, right M25.561 719.46                             Nicci Kim MD  Resident  12/11/19 3171

## 2020-04-19 ENCOUNTER — HOSPITAL ENCOUNTER (EMERGENCY)
Facility: HOSPITAL | Age: 80
Discharge: HOME | End: 2020-04-19
Attending: EMERGENCY MEDICINE
Payer: MEDICARE

## 2020-04-19 VITALS
RESPIRATION RATE: 20 BRPM | DIASTOLIC BLOOD PRESSURE: 69 MMHG | WEIGHT: 135 LBS | BODY MASS INDEX: 21.69 KG/M2 | SYSTOLIC BLOOD PRESSURE: 151 MMHG | TEMPERATURE: 98 F | OXYGEN SATURATION: 99 % | HEART RATE: 79 BPM | HEIGHT: 66 IN

## 2020-04-19 DIAGNOSIS — F41.9 ANXIETY: Primary | ICD-10-CM

## 2020-04-19 DIAGNOSIS — I10 UNCONTROLLED HYPERTENSION: ICD-10-CM

## 2020-04-19 PROCEDURE — 99283 EMERGENCY DEPT VISIT LOW MDM: CPT

## 2020-04-19 RX ORDER — CLONAZEPAM 0.5 MG/1
0.5 TABLET ORAL 3 TIMES DAILY PRN
Qty: 15 TABLET | Refills: 0 | Status: SHIPPED | OUTPATIENT
Start: 2020-04-19 | End: 2022-07-13 | Stop reason: CLARIF

## 2020-04-19 NOTE — ED NOTES
Upon discharge, patient is AAOx4, no cardiac or respiratory complications. Follow up care and  Medications have been reviewed with patient and has been instructed to return to the ER if needed. Patient verbalized understanding and ambulated to the lobby without difficulty. JANES COLMENARES.

## 2020-04-19 NOTE — ED NOTES
Karlie Hess presents to the ED with c/o hypertension. Patient reports that she took all of her nightly BP meds despite her pressure still being high. Associated complaints are headache at home that has resolved since arrival to the ED. AAOx3. Mucous membranes are pink and moist. Skin is warm, dry and intact.

## 2020-04-19 NOTE — ED PROVIDER NOTES
Encounter Date: 4/19/2020       History     Chief Complaint   Patient presents with    Hypertension     Stressed out with family.  Denies symptoms      Chief complaint:  High blood pressure    HPI:  79-year-old female presents with concerns of an elevated blood pressure reading.  She reports that she became upset with the family member and measured her pressure with systolic pressures in excess of 230.  She reports that she took all of her nightly medications and an additional amlodipine.  She had a transient headache which is currently absent.  Otherwise she has no symptoms with no visual changes, weakness or numbness.  She denies any chest pain or shortness of breath.  She reports that she continues to feel anxious.        Review of patient's allergies indicates:   Allergen Reactions    Oxycodone-acetaminophen      Past Medical History:   Diagnosis Date    Aortic insufficiency     Cancer 2005    renal ca, L kidney removed    Diastolic heart failure     Diffusion capacity of lung (dl), decreased     Fibromyalgia     GERD (gastroesophageal reflux disease)     Hypercholesterolemia     Hyperlipidemia     Hypertension     Lung cancer     adenocarcinoma     Sinusitis      Past Surgical History:   Procedure Laterality Date    BACK SURGERY      HYSTERECTOMY      INJECTION OF JOINT Right 5/27/2019    Procedure: Injection, Joint, Shoulder, Hip, Or Knee;  Surgeon: Zach Wilder MD;  Location: [facility] OR;  Service: Pain Management;  Laterality: Right;  right hip intra-articular injection     left upper lobectomy      NEPHRECTOMY       Family History   Problem Relation Age of Onset    Breast cancer Maternal Aunt     Hypertension Mother      Social History     Tobacco Use    Smoking status: Never Smoker   Substance Use Topics    Alcohol use: No    Drug use: No     Review of Systems   Constitutional: Negative for activity change, appetite change, chills, fatigue and fever.   Eyes: Negative for visual  disturbance.   Respiratory: Negative for apnea and shortness of breath.    Cardiovascular: Negative for chest pain and palpitations.   Gastrointestinal: Negative for abdominal distention and abdominal pain.   Genitourinary: Negative for difficulty urinating.   Musculoskeletal: Negative for neck pain.   Skin: Negative for pallor and rash.   Neurological: Negative for headaches.   Hematological: Does not bruise/bleed easily.   Psychiatric/Behavioral: Negative for agitation.       Physical Exam     Initial Vitals [04/19/20 0207]   BP Pulse Resp Temp SpO2   (!) 222/90 79 20 98.4 °F (36.9 °C) 99 %      MAP       --         Physical Exam    Nursing note and vitals reviewed.  Constitutional: She appears well-developed and well-nourished.   HENT:   Head: Normocephalic and atraumatic.   Eyes: Conjunctivae are normal.   Neck: Normal range of motion. Neck supple.   Cardiovascular: Normal rate, regular rhythm and normal heart sounds. Exam reveals no gallop and no friction rub.    No murmur heard.  Pulmonary/Chest: Effort normal and breath sounds normal. No respiratory distress. She has no wheezes. She has no rhonchi. She has no rales.   Abdominal: Soft. She exhibits no distension. There is no tenderness.   Musculoskeletal: Normal range of motion.   Neurological: She is alert and oriented to person, place, and time.   Skin: Skin is warm and dry. No erythema.   Psychiatric: She has a normal mood and affect.         ED Course   Procedures  Labs Reviewed - No data to display       Imaging Results    None          Medical Decision Making:   ED Management:  79-year-old female presents with elevated blood pressure.  The symptoms appear to be related to anxiety.  She reports that ordinarily her blood pressure is well controlled with her current regimen.  With the absence of symptoms and a spontaneous reduction of pressure with a systolic pressure now overly 182 I do not feel that immediate intervention is necessary.  She is given  clonazepam for anxiety and encouraged to return for persistent symptoms.  She is also encouraged to follow up with primary care for persistently elevated blood pressure.                                 Clinical Impression:       ICD-10-CM ICD-9-CM   1. Anxiety F41.9 300.00   2. Uncontrolled hypertension I10 401.9             ED Disposition Condition    Discharge Stable        ED Prescriptions     Medication Sig Dispense Start Date End Date Auth. Provider    clonazePAM (KLONOPIN) 0.5 MG tablet Take 1 tablet (0.5 mg total) by mouth 3 (three) times daily as needed for Anxiety. 15 tablet 4/19/2020 4/19/2021 Jani Piedra III, MD        Follow-up Information     Follow up With Specialties Details Why Contact Info    Tao Orozco MD Family Medicine In 1 week  [address] [facility] [address]  [phone]                                       Jani Piedra III, MD  04/19/20 4223

## 2020-04-20 DIAGNOSIS — Z12.31 ENCOUNTER FOR SCREENING MAMMOGRAM FOR MALIGNANT NEOPLASM OF BREAST: Primary | ICD-10-CM

## 2020-05-04 ENCOUNTER — HOSPITAL ENCOUNTER (OUTPATIENT)
Dept: RADIOLOGY | Facility: HOSPITAL | Age: 80
Discharge: HOME | End: 2020-05-04
Attending: FAMILY MEDICINE
Payer: MEDICARE

## 2020-05-04 DIAGNOSIS — Z12.31 ENCOUNTER FOR SCREENING MAMMOGRAM FOR MALIGNANT NEOPLASM OF BREAST: ICD-10-CM

## 2020-05-04 PROCEDURE — 77067 SCR MAMMO BI INCL CAD: CPT | Mod: TC,PO

## 2020-09-02 ENCOUNTER — LAB VISIT (OUTPATIENT)
Dept: LAB | Facility: HOSPITAL | Age: 80
End: 2020-09-02
Attending: FAMILY MEDICINE
Payer: MEDICARE

## 2020-09-02 DIAGNOSIS — E78.5 HYPERLIPEMIA: Primary | ICD-10-CM

## 2020-09-02 LAB
ALBUMIN SERPL BCP-MCNC: 4.1 G/DL (ref 3.5–5.2)
ALP SERPL-CCNC: 59 U/L (ref 55–135)
ALT SERPL W/O P-5'-P-CCNC: 14 U/L (ref 10–44)
ANION GAP SERPL CALC-SCNC: 7 MMOL/L (ref 8–16)
AST SERPL-CCNC: 16 U/L (ref 10–40)
BILIRUB SERPL-MCNC: 0.7 MG/DL (ref 0.1–1)
BUN SERPL-MCNC: 16 MG/DL (ref 8–23)
CALCIUM SERPL-MCNC: 8.9 MG/DL (ref 8.7–10.5)
CHLORIDE SERPL-SCNC: 107 MMOL/L (ref 95–110)
CHOLEST SERPL-MCNC: 157 MG/DL (ref 120–199)
CHOLEST/HDLC SERPL: 3.7 {RATIO} (ref 2–5)
CO2 SERPL-SCNC: 28 MMOL/L (ref 23–29)
CREAT SERPL-MCNC: 1.2 MG/DL (ref 0.5–1.4)
EST. GFR  (AFRICAN AMERICAN): 49.3 ML/MIN/1.73 M^2
EST. GFR  (NON AFRICAN AMERICAN): 42.8 ML/MIN/1.73 M^2
GLUCOSE SERPL-MCNC: 107 MG/DL (ref 70–110)
HDLC SERPL-MCNC: 42 MG/DL (ref 40–75)
HDLC SERPL: 26.8 % (ref 20–50)
LDLC SERPL CALC-MCNC: 84.8 MG/DL (ref 63–159)
NONHDLC SERPL-MCNC: 115 MG/DL
POTASSIUM SERPL-SCNC: 4 MMOL/L (ref 3.5–5.1)
PROT SERPL-MCNC: 6.4 G/DL (ref 6–8.4)
SODIUM SERPL-SCNC: 142 MMOL/L (ref 136–145)
TRIGL SERPL-MCNC: 151 MG/DL (ref 30–150)

## 2020-09-02 PROCEDURE — 80053 COMPREHEN METABOLIC PANEL: CPT

## 2020-09-02 PROCEDURE — 36415 COLL VENOUS BLD VENIPUNCTURE: CPT

## 2020-09-02 PROCEDURE — 80061 LIPID PANEL: CPT

## 2020-09-21 ENCOUNTER — TELEPHONE (OUTPATIENT)
Dept: PULMONOLOGY | Facility: CLINIC | Age: 80
End: 2020-09-21

## 2020-09-21 DIAGNOSIS — Z85.118 HISTORY OF LUNG CANCER: Primary | ICD-10-CM

## 2020-09-24 ENCOUNTER — HOSPITAL ENCOUNTER (OUTPATIENT)
Dept: RADIOLOGY | Facility: HOSPITAL | Age: 80
Discharge: HOME | End: 2020-09-24
Attending: NURSE PRACTITIONER
Payer: MEDICARE

## 2020-09-24 DIAGNOSIS — Z85.118 HISTORY OF LUNG CANCER: ICD-10-CM

## 2020-09-24 PROCEDURE — 71250 CT THORAX DX C-: CPT | Mod: TC,PO

## 2020-09-28 ENCOUNTER — TELEPHONE (OUTPATIENT)
Dept: PULMONOLOGY | Facility: CLINIC | Age: 80
End: 2020-09-28

## 2020-09-28 NOTE — TELEPHONE ENCOUNTER
CT scan    IMPRESSION:  Prior partial left upper lobectomy with stable noncalcified nodules within the lung bases     Stable tiny pericardial effusion    Will repeat in a year

## 2020-11-18 DIAGNOSIS — C34.90 MALIGNANT NEOPLASM OF BRONCHUS AND LUNG: ICD-10-CM

## 2020-11-18 DIAGNOSIS — C64.2 MALIGNANT NEOPLASM OF LEFT KIDNEY, EXCEPT RENAL PELVIS: Primary | ICD-10-CM

## 2020-11-18 DIAGNOSIS — R31.0 GROSS HEMATURIA: ICD-10-CM

## 2020-11-30 ENCOUNTER — HOSPITAL ENCOUNTER (OUTPATIENT)
Dept: RADIOLOGY | Facility: HOSPITAL | Age: 80
Discharge: HOME | End: 2020-11-30
Attending: SPECIALIST
Payer: MEDICARE

## 2020-11-30 DIAGNOSIS — C34.90 MALIGNANT NEOPLASM OF BRONCHUS AND LUNG: ICD-10-CM

## 2020-11-30 DIAGNOSIS — C64.2 MALIGNANT NEOPLASM OF LEFT KIDNEY, EXCEPT RENAL PELVIS: ICD-10-CM

## 2020-11-30 DIAGNOSIS — R31.0 GROSS HEMATURIA: ICD-10-CM

## 2020-11-30 PROCEDURE — 76770 US EXAM ABDO BACK WALL COMP: CPT | Mod: TC,PO

## 2020-11-30 PROCEDURE — 71045 X-RAY EXAM CHEST 1 VIEW: CPT | Mod: TC,PO

## 2021-01-12 ENCOUNTER — HOSPITAL ENCOUNTER (OUTPATIENT)
Dept: RADIOLOGY | Facility: HOSPITAL | Age: 81
Discharge: HOME | End: 2021-01-12
Attending: NURSE PRACTITIONER
Payer: MEDICARE

## 2021-01-12 DIAGNOSIS — R10.9 STOMACH ACHE: Primary | ICD-10-CM

## 2021-01-12 DIAGNOSIS — R10.9 STOMACH ACHE: ICD-10-CM

## 2021-01-12 PROCEDURE — 74176 CT ABD & PELVIS W/O CONTRAST: CPT | Mod: TC,PO

## 2021-01-14 ENCOUNTER — IMMUNIZATION (OUTPATIENT)
Dept: FAMILY MEDICINE | Facility: CLINIC | Age: 81
End: 2021-01-14
Payer: MEDICARE

## 2021-01-14 DIAGNOSIS — Z23 NEED FOR VACCINATION: ICD-10-CM

## 2021-01-14 PROCEDURE — 0011A COVID-19, MRNA, LNP-S, PF, 100 MCG/0.5 ML DOSE VACCINE: ICD-10-PCS | Mod: ,,, | Performed by: FAMILY MEDICINE

## 2021-01-14 PROCEDURE — 91301 COVID-19, MRNA, LNP-S, PF, 100 MCG/0.5 ML DOSE VACCINE: ICD-10-PCS | Mod: ,,, | Performed by: FAMILY MEDICINE

## 2021-01-14 PROCEDURE — 0011A COVID-19, MRNA, LNP-S, PF, 100 MCG/0.5 ML DOSE VACCINE: CPT | Mod: ,,, | Performed by: FAMILY MEDICINE

## 2021-01-14 PROCEDURE — 91301 COVID-19, MRNA, LNP-S, PF, 100 MCG/0.5 ML DOSE VACCINE: CPT | Mod: ,,, | Performed by: FAMILY MEDICINE

## 2021-02-11 ENCOUNTER — IMMUNIZATION (OUTPATIENT)
Dept: FAMILY MEDICINE | Facility: CLINIC | Age: 81
End: 2021-02-11
Payer: MEDICARE

## 2021-02-11 DIAGNOSIS — Z23 NEED FOR VACCINATION: Primary | ICD-10-CM

## 2021-02-11 PROCEDURE — 91301 COVID-19, MRNA, LNP-S, PF, 100 MCG/0.5 ML DOSE VACCINE: CPT | Mod: S$GLB,,, | Performed by: FAMILY MEDICINE

## 2021-02-11 PROCEDURE — 0012A COVID-19, MRNA, LNP-S, PF, 100 MCG/0.5 ML DOSE VACCINE: CPT | Mod: CV19,S$GLB,, | Performed by: FAMILY MEDICINE

## 2021-02-11 PROCEDURE — 0012A COVID-19, MRNA, LNP-S, PF, 100 MCG/0.5 ML DOSE VACCINE: ICD-10-PCS | Mod: CV19,S$GLB,, | Performed by: FAMILY MEDICINE

## 2021-02-11 PROCEDURE — 91301 COVID-19, MRNA, LNP-S, PF, 100 MCG/0.5 ML DOSE VACCINE: ICD-10-PCS | Mod: S$GLB,,, | Performed by: FAMILY MEDICINE

## 2021-09-02 ENCOUNTER — HOSPITAL ENCOUNTER (EMERGENCY)
Facility: HOSPITAL | Age: 81
Discharge: HOME | End: 2021-09-02
Attending: EMERGENCY MEDICINE
Payer: MEDICARE

## 2021-09-02 VITALS
SYSTOLIC BLOOD PRESSURE: 148 MMHG | BODY MASS INDEX: 24.16 KG/M2 | OXYGEN SATURATION: 100 % | HEART RATE: 81 BPM | WEIGHT: 145 LBS | DIASTOLIC BLOOD PRESSURE: 66 MMHG | RESPIRATION RATE: 18 BRPM | HEIGHT: 65 IN | TEMPERATURE: 98 F

## 2021-09-02 DIAGNOSIS — E86.0 DEHYDRATION: ICD-10-CM

## 2021-09-02 DIAGNOSIS — R11.2 NAUSEA & VOMITING: ICD-10-CM

## 2021-09-02 DIAGNOSIS — K52.9 GASTROENTERITIS: Primary | ICD-10-CM

## 2021-09-02 LAB
ALBUMIN SERPL BCP-MCNC: 3.8 G/DL (ref 3.5–5.2)
ALP SERPL-CCNC: 56 U/L (ref 55–135)
ALT SERPL W/O P-5'-P-CCNC: 17 U/L (ref 10–44)
ANION GAP SERPL CALC-SCNC: 8 MMOL/L (ref 8–16)
AST SERPL-CCNC: 20 U/L (ref 10–40)
BASOPHILS # BLD AUTO: 0.04 K/UL (ref 0–0.2)
BASOPHILS NFR BLD: 0.4 % (ref 0–1.9)
BILIRUB SERPL-MCNC: 0.6 MG/DL (ref 0.1–1)
BILIRUB UR QL STRIP: NEGATIVE
BUN SERPL-MCNC: 24 MG/DL (ref 8–23)
CALCIUM SERPL-MCNC: 8.8 MG/DL (ref 8.7–10.5)
CHLORIDE SERPL-SCNC: 111 MMOL/L (ref 95–110)
CLARITY UR: CLEAR
CO2 SERPL-SCNC: 27 MMOL/L (ref 23–29)
COLOR UR: YELLOW
CREAT SERPL-MCNC: 1.3 MG/DL (ref 0.5–1.4)
DIFFERENTIAL METHOD: ABNORMAL
EOSINOPHIL # BLD AUTO: 0.1 K/UL (ref 0–0.5)
EOSINOPHIL NFR BLD: 0.8 % (ref 0–8)
ERYTHROCYTE [DISTWIDTH] IN BLOOD BY AUTOMATED COUNT: 12.9 % (ref 11.5–14.5)
EST. GFR  (AFRICAN AMERICAN): 44.5 ML/MIN/1.73 M^2
EST. GFR  (NON AFRICAN AMERICAN): 38.6 ML/MIN/1.73 M^2
GLUCOSE SERPL-MCNC: 124 MG/DL (ref 70–110)
GLUCOSE UR QL STRIP: NEGATIVE
HCT VFR BLD AUTO: 36.4 % (ref 37–48.5)
HGB BLD-MCNC: 11.4 G/DL (ref 12–16)
HGB UR QL STRIP: NEGATIVE
IMM GRANULOCYTES # BLD AUTO: 0.05 K/UL (ref 0–0.04)
IMM GRANULOCYTES NFR BLD AUTO: 0.4 % (ref 0–0.5)
KETONES UR QL STRIP: NEGATIVE
LEUKOCYTE ESTERASE UR QL STRIP: NEGATIVE
LIPASE SERPL-CCNC: 50 U/L (ref 4–60)
LYMPHOCYTES # BLD AUTO: 1.3 K/UL (ref 1–4.8)
LYMPHOCYTES NFR BLD: 11.5 % (ref 18–48)
MCH RBC QN AUTO: 28.5 PG (ref 27–31)
MCHC RBC AUTO-ENTMCNC: 31.3 G/DL (ref 32–36)
MCV RBC AUTO: 91 FL (ref 82–98)
MONOCYTES # BLD AUTO: 0.5 K/UL (ref 0.3–1)
MONOCYTES NFR BLD: 4.6 % (ref 4–15)
NEUTROPHILS # BLD AUTO: 9.2 K/UL (ref 1.8–7.7)
NEUTROPHILS NFR BLD: 82.3 % (ref 38–73)
NITRITE UR QL STRIP: NEGATIVE
NRBC BLD-RTO: 0 /100 WBC
PH UR STRIP: 6 [PH] (ref 5–8)
PLATELET # BLD AUTO: 237 K/UL (ref 150–450)
PMV BLD AUTO: 9.6 FL (ref 9.2–12.9)
POTASSIUM SERPL-SCNC: 4.4 MMOL/L (ref 3.5–5.1)
PROT SERPL-MCNC: 6.4 G/DL (ref 6–8.4)
PROT UR QL STRIP: ABNORMAL
RBC # BLD AUTO: 4 M/UL (ref 4–5.4)
SARS-COV-2 RDRP RESP QL NAA+PROBE: NEGATIVE
SODIUM SERPL-SCNC: 146 MMOL/L (ref 136–145)
SP GR UR STRIP: 1.02 (ref 1–1.03)
TROPONIN I SERPL DL<=0.01 NG/ML-MCNC: <0.03 NG/ML
URN SPEC COLLECT METH UR: ABNORMAL
UROBILINOGEN UR STRIP-ACNC: NEGATIVE EU/DL
WBC # BLD AUTO: 11.15 K/UL (ref 3.9–12.7)

## 2021-09-02 PROCEDURE — 80053 COMPREHEN METABOLIC PANEL: CPT | Performed by: EMERGENCY MEDICINE

## 2021-09-02 PROCEDURE — 63600175 PHARM REV CODE 636 W HCPCS: Performed by: EMERGENCY MEDICINE

## 2021-09-02 PROCEDURE — 84484 ASSAY OF TROPONIN QUANT: CPT | Performed by: EMERGENCY MEDICINE

## 2021-09-02 PROCEDURE — 96374 THER/PROPH/DIAG INJ IV PUSH: CPT

## 2021-09-02 PROCEDURE — C9113 INJ PANTOPRAZOLE SODIUM, VIA: HCPCS | Performed by: EMERGENCY MEDICINE

## 2021-09-02 PROCEDURE — 81003 URINALYSIS AUTO W/O SCOPE: CPT | Performed by: EMERGENCY MEDICINE

## 2021-09-02 PROCEDURE — 96375 TX/PRO/DX INJ NEW DRUG ADDON: CPT

## 2021-09-02 PROCEDURE — 96361 HYDRATE IV INFUSION ADD-ON: CPT

## 2021-09-02 PROCEDURE — 93010 EKG 12-LEAD: ICD-10-PCS | Mod: ,,, | Performed by: INTERNAL MEDICINE

## 2021-09-02 PROCEDURE — 83690 ASSAY OF LIPASE: CPT | Performed by: EMERGENCY MEDICINE

## 2021-09-02 PROCEDURE — U0002 COVID-19 LAB TEST NON-CDC: HCPCS | Performed by: EMERGENCY MEDICINE

## 2021-09-02 PROCEDURE — 99284 EMERGENCY DEPT VISIT MOD MDM: CPT | Mod: 25

## 2021-09-02 PROCEDURE — 93005 ELECTROCARDIOGRAM TRACING: CPT | Performed by: INTERNAL MEDICINE

## 2021-09-02 PROCEDURE — 93010 ELECTROCARDIOGRAM REPORT: CPT | Mod: ,,, | Performed by: INTERNAL MEDICINE

## 2021-09-02 PROCEDURE — 85025 COMPLETE CBC W/AUTO DIFF WBC: CPT | Performed by: EMERGENCY MEDICINE

## 2021-09-02 RX ORDER — HYDRALAZINE HYDROCHLORIDE 20 MG/ML
10 INJECTION INTRAMUSCULAR; INTRAVENOUS
Status: COMPLETED | OUTPATIENT
Start: 2021-09-02 | End: 2021-09-02

## 2021-09-02 RX ORDER — ONDANSETRON 2 MG/ML
4 INJECTION INTRAMUSCULAR; INTRAVENOUS
Status: COMPLETED | OUTPATIENT
Start: 2021-09-02 | End: 2021-09-02

## 2021-09-02 RX ORDER — ONDANSETRON 4 MG/1
4 TABLET, FILM COATED ORAL EVERY 6 HOURS PRN
Qty: 20 TABLET | Refills: 1 | Status: SHIPPED | OUTPATIENT
Start: 2021-09-02 | End: 2022-07-13 | Stop reason: CLARIF

## 2021-09-02 RX ORDER — PANTOPRAZOLE SODIUM 40 MG/10ML
40 INJECTION, POWDER, LYOPHILIZED, FOR SOLUTION INTRAVENOUS
Status: COMPLETED | OUTPATIENT
Start: 2021-09-02 | End: 2021-09-02

## 2021-09-02 RX ADMIN — ONDANSETRON 4 MG: 2 INJECTION INTRAMUSCULAR; INTRAVENOUS at 12:09

## 2021-09-02 RX ADMIN — SODIUM CHLORIDE, SODIUM LACTATE, POTASSIUM CHLORIDE, AND CALCIUM CHLORIDE 1000 ML: .6; .31; .03; .02 INJECTION, SOLUTION INTRAVENOUS at 12:09

## 2021-09-02 RX ADMIN — PANTOPRAZOLE SODIUM 40 MG: 40 INJECTION, POWDER, LYOPHILIZED, FOR SOLUTION INTRAVENOUS at 12:09

## 2021-09-02 RX ADMIN — HYDRALAZINE HYDROCHLORIDE 10 MG: 20 INJECTION, SOLUTION INTRAMUSCULAR; INTRAVENOUS at 12:09

## 2021-09-27 ENCOUNTER — TELEPHONE (OUTPATIENT)
Dept: PULMONOLOGY | Facility: CLINIC | Age: 81
End: 2021-09-27

## 2021-09-30 DIAGNOSIS — Z12.31 ENCOUNTER FOR SCREENING MAMMOGRAM FOR MALIGNANT NEOPLASM OF BREAST: Primary | ICD-10-CM

## 2021-10-01 ENCOUNTER — OFFICE VISIT (OUTPATIENT)
Dept: PULMONOLOGY | Facility: CLINIC | Age: 81
End: 2021-10-01
Payer: MEDICARE

## 2021-10-01 VITALS
DIASTOLIC BLOOD PRESSURE: 72 MMHG | OXYGEN SATURATION: 96 % | BODY MASS INDEX: 24.66 KG/M2 | HEIGHT: 65 IN | WEIGHT: 148 LBS | HEART RATE: 82 BPM | SYSTOLIC BLOOD PRESSURE: 175 MMHG

## 2021-10-01 DIAGNOSIS — Z85.118 HISTORY OF LUNG CANCER: Primary | ICD-10-CM

## 2021-10-01 PROCEDURE — 99213 OFFICE O/P EST LOW 20 MIN: CPT | Mod: S$GLB,,, | Performed by: NURSE PRACTITIONER

## 2021-10-01 PROCEDURE — 99213 PR OFFICE/OUTPT VISIT, EST, LEVL III, 20-29 MIN: ICD-10-PCS | Mod: S$GLB,,, | Performed by: NURSE PRACTITIONER

## 2021-10-01 RX ORDER — ROSUVASTATIN CALCIUM 10 MG/1
10 TABLET, COATED ORAL NIGHTLY
COMMUNITY

## 2021-10-04 ENCOUNTER — HOSPITAL ENCOUNTER (OUTPATIENT)
Dept: RADIOLOGY | Facility: HOSPITAL | Age: 81
Discharge: HOME | End: 2021-10-04
Attending: NURSE PRACTITIONER
Payer: MEDICARE

## 2021-10-04 ENCOUNTER — TELEPHONE (OUTPATIENT)
Dept: PULMONOLOGY | Facility: CLINIC | Age: 81
End: 2021-10-04

## 2021-10-04 DIAGNOSIS — R91.8 MULTIPLE PULMONARY NODULES DETERMINED BY COMPUTED TOMOGRAPHY OF LUNG: Primary | ICD-10-CM

## 2021-10-04 DIAGNOSIS — Z85.118 HISTORY OF LUNG CANCER: ICD-10-CM

## 2021-10-04 DIAGNOSIS — Z85.528 HISTORY OF KIDNEY CANCER: ICD-10-CM

## 2021-10-04 DIAGNOSIS — Z85.118 HISTORY OF ADENOCARCINOMA OF LUNG: ICD-10-CM

## 2021-10-04 PROCEDURE — 71250 CT THORAX DX C-: CPT | Mod: TC,PO

## 2021-10-05 ENCOUNTER — HOSPITAL ENCOUNTER (OUTPATIENT)
Dept: RADIOLOGY | Facility: HOSPITAL | Age: 81
Discharge: HOME | End: 2021-10-05
Attending: NURSE PRACTITIONER
Payer: MEDICARE

## 2021-10-05 DIAGNOSIS — Z12.31 ENCOUNTER FOR SCREENING MAMMOGRAM FOR MALIGNANT NEOPLASM OF BREAST: ICD-10-CM

## 2021-10-05 PROCEDURE — 77067 SCR MAMMO BI INCL CAD: CPT | Mod: TC,PO

## 2021-10-07 ENCOUNTER — PATIENT MESSAGE (OUTPATIENT)
Dept: PULMONOLOGY | Facility: CLINIC | Age: 81
End: 2021-10-07

## 2021-10-21 ENCOUNTER — TELEPHONE (OUTPATIENT)
Dept: PULMONOLOGY | Facility: CLINIC | Age: 81
End: 2021-10-21

## 2021-10-21 ENCOUNTER — HOSPITAL ENCOUNTER (OUTPATIENT)
Dept: RADIOLOGY | Facility: HOSPITAL | Age: 81
Discharge: HOME | End: 2021-10-21
Attending: NURSE PRACTITIONER
Payer: MEDICARE

## 2021-10-21 VITALS — HEIGHT: 66 IN | WEIGHT: 145 LBS | BODY MASS INDEX: 23.3 KG/M2

## 2021-10-21 DIAGNOSIS — Z85.118 HISTORY OF ADENOCARCINOMA OF LUNG: ICD-10-CM

## 2021-10-21 DIAGNOSIS — Z85.528 HISTORY OF KIDNEY CANCER: ICD-10-CM

## 2021-10-21 DIAGNOSIS — R91.8 MULTIPLE PULMONARY NODULES DETERMINED BY COMPUTED TOMOGRAPHY OF LUNG: ICD-10-CM

## 2021-10-21 LAB — GLUCOSE SERPL-MCNC: 103 MG/DL (ref 70–110)

## 2021-10-21 PROCEDURE — 78815 PET IMAGE W/CT SKULL-THIGH: CPT | Mod: TC,PO,PS

## 2021-10-22 ENCOUNTER — PATIENT MESSAGE (OUTPATIENT)
Dept: PULMONOLOGY | Facility: CLINIC | Age: 81
End: 2021-10-22
Payer: MEDICARE

## 2022-01-08 ENCOUNTER — LAB VISIT (OUTPATIENT)
Dept: PRIMARY CARE CLINIC | Facility: OTHER | Age: 82
End: 2022-01-08
Attending: INTERNAL MEDICINE
Payer: MEDICARE

## 2022-01-08 DIAGNOSIS — Z20.822 ENCOUNTER FOR LABORATORY TESTING FOR COVID-19 VIRUS: ICD-10-CM

## 2022-01-08 PROCEDURE — U0003 INFECTIOUS AGENT DETECTION BY NUCLEIC ACID (DNA OR RNA); SEVERE ACUTE RESPIRATORY SYNDROME CORONAVIRUS 2 (SARS-COV-2) (CORONAVIRUS DISEASE [COVID-19]), AMPLIFIED PROBE TECHNIQUE, MAKING USE OF HIGH THROUGHPUT TECHNOLOGIES AS DESCRIBED BY CMS-2020-01-R: HCPCS | Performed by: INTERNAL MEDICINE

## 2022-01-12 DIAGNOSIS — U07.1 COVID-19 VIRUS DETECTED: ICD-10-CM

## 2022-01-12 LAB — SARS-COV-2 RNA RESP QL NAA+PROBE: DETECTED

## 2022-01-26 ENCOUNTER — TELEPHONE (OUTPATIENT)
Dept: PULMONOLOGY | Facility: CLINIC | Age: 82
End: 2022-01-26
Payer: MEDICARE

## 2022-01-26 DIAGNOSIS — R91.1 PULMONARY NODULE: Primary | ICD-10-CM

## 2022-02-15 ENCOUNTER — PATIENT MESSAGE (OUTPATIENT)
Dept: PULMONOLOGY | Facility: CLINIC | Age: 82
End: 2022-02-15
Payer: MEDICARE

## 2022-02-15 ENCOUNTER — TELEPHONE (OUTPATIENT)
Dept: PULMONOLOGY | Facility: CLINIC | Age: 82
End: 2022-02-15
Payer: MEDICARE

## 2022-02-15 DIAGNOSIS — R91.1 PULMONARY NODULE: Primary | ICD-10-CM

## 2022-02-24 ENCOUNTER — TELEPHONE (OUTPATIENT)
Dept: PULMONOLOGY | Facility: CLINIC | Age: 82
End: 2022-02-24
Payer: MEDICARE

## 2022-02-24 ENCOUNTER — HOSPITAL ENCOUNTER (OUTPATIENT)
Dept: RADIOLOGY | Facility: HOSPITAL | Age: 82
Discharge: HOME | End: 2022-02-24
Attending: NURSE PRACTITIONER
Payer: MEDICARE

## 2022-02-24 DIAGNOSIS — R91.1 PULMONARY NODULE: ICD-10-CM

## 2022-02-24 PROCEDURE — 71250 CT THORAX DX C-: CPT | Mod: TC,PO

## 2022-02-24 NOTE — TELEPHONE ENCOUNTER
CT chest   IMPRESSION:  Unchanged bilateral pulmonary nodules as discussed above. The perihilar nodules would be best characterized with CT thorax with IV contrast. Continued CT thorax follow-up is recommended in 3 months to document more prolonged stability

## 2022-03-03 ENCOUNTER — PATIENT MESSAGE (OUTPATIENT)
Dept: PULMONOLOGY | Facility: CLINIC | Age: 82
End: 2022-03-03
Payer: MEDICARE

## 2022-04-18 ENCOUNTER — LAB VISIT (OUTPATIENT)
Dept: LAB | Facility: HOSPITAL | Age: 82
End: 2022-04-18
Attending: INTERNAL MEDICINE
Payer: MEDICARE

## 2022-04-18 DIAGNOSIS — R10.9 STOMACH ACHE: ICD-10-CM

## 2022-04-18 DIAGNOSIS — R19.7 DIARRHEA: ICD-10-CM

## 2022-04-18 LAB — WBC #/AREA STL HPF: NORMAL /[HPF]

## 2022-04-18 PROCEDURE — 89055 LEUKOCYTE ASSESSMENT FECAL: CPT | Performed by: INTERNAL MEDICINE

## 2022-04-18 PROCEDURE — 82656 EL-1 FECAL QUAL/SEMIQ: CPT | Performed by: INTERNAL MEDICINE

## 2022-04-18 PROCEDURE — 87046 STOOL CULTR AEROBIC BACT EA: CPT | Mod: 59 | Performed by: INTERNAL MEDICINE

## 2022-04-18 PROCEDURE — 87329 GIARDIA AG IA: CPT | Performed by: INTERNAL MEDICINE

## 2022-04-18 PROCEDURE — 87045 FECES CULTURE AEROBIC BACT: CPT | Performed by: INTERNAL MEDICINE

## 2022-04-18 PROCEDURE — 87209 SMEAR COMPLEX STAIN: CPT | Performed by: INTERNAL MEDICINE

## 2022-04-18 PROCEDURE — 87328 CRYPTOSPORIDIUM AG IA: CPT | Performed by: INTERNAL MEDICINE

## 2022-04-20 LAB
GIARDIA ANTIGEN - EIA: NEGATIVE
GIARDIA LAMBLIA AG SOURCE: NORMAL
STOOL CULTURE: NORMAL
STOOL CULTURE: NORMAL

## 2022-04-21 LAB
CRYPTOSP AG STL QL IA: NEGATIVE
O+P SPEC MICRO: NORMAL
O+P STL CONC: NORMAL

## 2022-04-23 LAB — ELASTASE PANC STL-MCNT: 263 UG ELAST./G

## 2022-05-24 ENCOUNTER — TELEPHONE (OUTPATIENT)
Dept: CARDIOLOGY | Facility: CLINIC | Age: 82
End: 2022-05-24
Payer: MEDICARE

## 2022-05-24 NOTE — TELEPHONE ENCOUNTER
----- Message from Pippa Montemayor sent at 5/24/2022  2:50 PM CDT -----  Regarding: appt request  Contact: sonny  Appointment Request      Name of Caller:  sonny  When is the first available appointment?  N/a  Symptoms:  n/a  Best Call Back Number:  [phone]    Additional Information:  was a pt of dr grossman, asking for a call to schedule appt with new provider

## 2022-06-15 ENCOUNTER — PATIENT MESSAGE (OUTPATIENT)
Dept: CARDIOLOGY | Facility: CLINIC | Age: 82
End: 2022-06-15
Payer: MEDICARE

## 2022-06-15 ENCOUNTER — PATIENT MESSAGE (OUTPATIENT)
Dept: PULMONOLOGY | Facility: CLINIC | Age: 82
End: 2022-06-15

## 2022-06-15 DIAGNOSIS — R91.1 PULMONARY NODULE: Primary | ICD-10-CM

## 2022-06-17 ENCOUNTER — HOSPITAL ENCOUNTER (OUTPATIENT)
Dept: RADIOLOGY | Facility: HOSPITAL | Age: 82
Discharge: HOME | End: 2022-06-17
Attending: NURSE PRACTITIONER
Payer: MEDICARE

## 2022-06-17 DIAGNOSIS — R91.1 PULMONARY NODULE: ICD-10-CM

## 2022-06-17 PROCEDURE — 71250 CT THORAX DX C-: CPT | Mod: TC,PO

## 2022-06-20 ENCOUNTER — TELEPHONE (OUTPATIENT)
Dept: PULMONOLOGY | Facility: HOSPITAL | Age: 82
End: 2022-06-20

## 2022-06-20 NOTE — TELEPHONE ENCOUNTER
Patient's CT shows nodules are slightly larger.  A PET scan is recommended.  Will need to get the patient in the office as she is not been here since October of 2021.

## 2022-06-28 ENCOUNTER — OFFICE VISIT (OUTPATIENT)
Dept: PULMONOLOGY | Facility: CLINIC | Age: 82
End: 2022-06-28
Payer: MEDICARE

## 2022-06-28 VITALS
HEART RATE: 88 BPM | SYSTOLIC BLOOD PRESSURE: 130 MMHG | BODY MASS INDEX: 23.63 KG/M2 | OXYGEN SATURATION: 94 % | HEIGHT: 66 IN | WEIGHT: 147 LBS | DIASTOLIC BLOOD PRESSURE: 60 MMHG

## 2022-06-28 DIAGNOSIS — Z85.118 HISTORY OF LUNG CANCER: Primary | ICD-10-CM

## 2022-06-28 DIAGNOSIS — Z85.528 HISTORY OF RENAL CELL CANCER: ICD-10-CM

## 2022-06-28 DIAGNOSIS — R91.8 PULMONARY NODULES: ICD-10-CM

## 2022-06-28 DIAGNOSIS — Z90.5 HISTORY OF NEPHRECTOMY, LEFT: ICD-10-CM

## 2022-06-28 PROCEDURE — 99214 PR OFFICE/OUTPT VISIT, EST, LEVL IV, 30-39 MIN: ICD-10-PCS | Mod: S$GLB,,, | Performed by: NURSE PRACTITIONER

## 2022-06-28 PROCEDURE — 99214 OFFICE O/P EST MOD 30 MIN: CPT | Mod: S$GLB,,, | Performed by: NURSE PRACTITIONER

## 2022-06-28 RX ORDER — LISINOPRIL 20 MG/1
1 TABLET ORAL 2 TIMES DAILY
COMMUNITY
End: 2023-10-12 | Stop reason: SDUPTHER

## 2022-06-28 RX ORDER — PANTOPRAZOLE SODIUM 40 MG/1
TABLET, DELAYED RELEASE ORAL
COMMUNITY
Start: 2022-04-12 | End: 2022-07-13 | Stop reason: CLARIF

## 2022-06-28 RX ORDER — AMLODIPINE BESYLATE 5 MG/1
1 TABLET ORAL DAILY
COMMUNITY
End: 2022-07-13 | Stop reason: CLARIF

## 2022-06-28 NOTE — PROGRESS NOTES
SUBJECTIVE:    Patient ID: Karlie Hess is a 81 y.o. female.    Chief Complaint: Follow-up    HPI   Patient here today with CT scan showing bilateral pulmonary nodules mildly  increased in size compared to PET in October of 2021.  Patient has a history of renal cancer, had a left nephrectomy many years ago, and adenocarcinoma of lung, s/p left upper lobectomy in 2015.  CT scan shows 19mm nodule along the posterior hilum that was 17mm in October, felt could be vascular but contrasted CT not ordered due to one kidney, it was not hot on PET in October. Follow up CT in Feb was stable, now in June is is slightly larger. She has followed with her renal MD regarding the tissue nodule to the right kidney and she states he did u/s in office and was told it was fine.  She denies weight loss, dyspnea, pain, hemoptysis, or cough.  The patient still currently works and is very active.   Past Medical History:   Diagnosis Date    Aortic insufficiency     Cancer 2005    renal ca, L kidney removed    COVID-19 2022    Diastolic heart failure     Diffusion capacity of lung (dl), decreased     Fibromyalgia     GERD (gastroesophageal reflux disease)     Hypercholesterolemia     Hyperlipidemia     Hypertension     Lung cancer     adenocarcinoma     Sinusitis      Past Surgical History:   Procedure Laterality Date    BACK SURGERY      HYSTERECTOMY      INJECTION OF JOINT Right 5/27/2019    Procedure: Injection, Joint, Shoulder, Hip, Or Knee;  Surgeon: Zach Wilder MD;  Location: [facility];  Service: Pain Management;  Laterality: Right;  right hip intra-articular injection     left upper lobectomy      NEPHRECTOMY       Family History   Problem Relation Age of Onset    Breast cancer Maternal Aunt     Hypertension Mother         Social History:   Marital Status:   Occupation: Data Unavailable  Alcohol History:  reports no history of alcohol use.  Tobacco History:  reports that she has never smoked. She has  never used smokeless tobacco.  Drug History:  reports no history of drug use.    Review of patient's allergies indicates:   Allergen Reactions    Oxycodone-acetaminophen        Current Outpatient Medications   Medication Sig Dispense Refill    amLODIPine (NORVASC) 5 MG tablet Take 1 tablet by mouth once daily.      duloxetine (CYMBALTA) 60 MG capsule       fluocinolone (DERMA-SMOOTHE) 0.01 % external oil Apply twice daily only as needed to affected areas x 2-3 wks then take a break. 118 mL 5    labetalol (NORMODYNE) 100 MG tablet Take 100 mg by mouth 2 (two) times daily.      lisinopriL (PRINIVIL,ZESTRIL) 20 MG tablet Take 1 tablet by mouth 2 (two) times daily.      omeprazole (PRILOSEC) 20 MG capsule       rosuvastatin (CRESTOR) 10 MG tablet Crestor 10 mg tablet   Take 1 tablet every day by oral route.      amlodipine (NORVASC) 5 MG tablet TK ONE T PO ONCE A DAY  2    clonazePAM (KLONOPIN) 0.5 MG tablet Take 1 tablet (0.5 mg total) by mouth 3 (three) times daily as needed for Anxiety. (Patient not taking: Reported on 6/28/2022) 15 tablet 0    lisinopril (PRINIVIL,ZESTRIL) 40 MG tablet Take 20 mg by mouth 2 (two) times daily.      ondansetron (ZOFRAN) 4 MG tablet Take 1 tablet (4 mg total) by mouth every 6 (six) hours as needed. (Patient not taking: Reported on 6/28/2022) 20 tablet 1    pantoprazole (PROTONIX) 40 MG tablet       pravastatin (PRAVACHOL) 40 MG tablet        No current facility-administered medications for this visit.     CT chest 06/2022  IMPRESSION:  Bilateral pulmonary nodules minimally increased in size when compared to the prior study follow-up PET/CT in October 2022 is recommended to document stability     Prior left upper lobectomy    PET 10/2021  Impression:     1. No abnormal FDG avid pulmonary nodule of concern for definitive malignancy or metastatic disease.  2. Pulmonary nodules as discussed above.  Regarding the 18 mm nodularity posterior to left lower lobe bronchus, previous  "recommendation for CT thorax with IV contrast remains unchanged, as this could definitively determine if this represents a vascular structure.  Otherwise, continued CT thorax with out IV contrast follow-up is recommended in 3 months.  3. Slight enlargement of 7 mm soft tissue nodule in the right retroperitoneum inferior to right kidney with no significant FDG activity.  While low-grade malignancy/metastatic disease could give this appearance, benign etiology is also conceivable.  Options for further evaluation include attempted percutaneous biopsy with CT guidance or continued imaging follow-up with CT abdomen with IV contrast in 3-6 months  Review of Systems  General: Feeling Well.  Eyes: Vision is good.  ENT:  No sinusitis or pharyngitis.   Heart:: No chest pain or palpitations.  Lungs: No cough, sputum, or wheezing.  GI: No Nausea, vomiting, constipation, diarrhea, or reflux.  : No dysuria, hesitancy, or nocturia.  Musculoskeletal: No joint pain or myalgias.  Skin: No lesions or rashes.  Neuro: No headaches or neuropathy.  Lymph: No edema or adenopathy.  Psych: No anxiety or depression.  Endo: No weight change.    OBJECTIVE:      /60 (BP Location: Left arm, Patient Position: Sitting, BP Method: Medium (Manual))   Pulse 88   Ht 5' 6" (1.676 m)   Wt 66.7 kg (147 lb)   SpO2 (!) 94%   BMI 23.73 kg/m²     Physical Exam  GENERAL: Older patient in no distress.  HEENT: Pupils equal and reactive. Extraocular movements intact. Nose intact.      Pharynx moist.  NECK: Supple.   HEART: Regular rate and rhythm. No murmur or gallop auscultated.  LUNGS: Clear to auscultation and percussion. Lung excursion symmetrical. No change in fremitus. No adventitial noises.  ABDOMEN: Bowel sounds present. Non-tender, no masses palpated.  EXTREMITIES: Normal muscle tone and joint movement, no cyanosis or clubbing.   LYMPHATICS: No adenopathy palpated, no edema.  SKIN: Dry, intact, no lesions.   NEURO: Cranial nerves II-XII " intact. Motor strength 5/5 bilaterally, upper and lower extremities.  PSYCH: Appropriate affect.    Assessment:       1. History of lung cancer    2. History of renal cell cancer    3. History of nephrectomy, left    4. Pulmonary nodules          Plan:       History of lung cancer    History of renal cell cancer    History of nephrectomy, left    Pulmonary nodules       Will refer to for EBUS of the 19mm left hilum   Call me or portal message me on who you would like me to send referral to either Dr. Barrera or Dr. Freitas    Follow up in about 3 months (around 9/28/2022).

## 2022-06-28 NOTE — PATIENT INSTRUCTIONS
Will refer to for EBUS of the 19mm left hilum   Call me or portal message me on who you would like me to send referral to either Dr. Barrera or Dr. Freitas

## 2022-06-29 ENCOUNTER — TELEPHONE (OUTPATIENT)
Dept: PULMONOLOGY | Facility: CLINIC | Age: 82
End: 2022-06-29

## 2022-06-29 DIAGNOSIS — R91.1 PULMONARY NODULE: Primary | ICD-10-CM

## 2022-07-06 PROBLEM — Z85.528 HISTORY OF RENAL CELL CANCER: Status: ACTIVE | Noted: 2022-07-06

## 2022-07-06 PROBLEM — R91.8 MASS OF LEFT LUNG: Status: ACTIVE | Noted: 2022-07-06

## 2022-07-18 ENCOUNTER — HOSPITAL ENCOUNTER (EMERGENCY)
Facility: HOSPITAL | Age: 82
Discharge: HOME | End: 2022-07-18
Attending: EMERGENCY MEDICINE
Payer: MEDICARE

## 2022-07-18 VITALS
DIASTOLIC BLOOD PRESSURE: 96 MMHG | WEIGHT: 145 LBS | TEMPERATURE: 98 F | RESPIRATION RATE: 18 BRPM | HEART RATE: 76 BPM | OXYGEN SATURATION: 98 % | BODY MASS INDEX: 23.3 KG/M2 | HEIGHT: 66 IN | SYSTOLIC BLOOD PRESSURE: 202 MMHG

## 2022-07-18 DIAGNOSIS — R06.02 SHORTNESS OF BREATH: ICD-10-CM

## 2022-07-18 DIAGNOSIS — J98.01 BRONCHOSPASM: Primary | ICD-10-CM

## 2022-07-18 DIAGNOSIS — Z85.118 HISTORY OF LUNG CANCER: ICD-10-CM

## 2022-07-18 LAB
ALBUMIN SERPL BCP-MCNC: 3.9 G/DL (ref 3.5–5.2)
ALP SERPL-CCNC: 51 U/L (ref 55–135)
ALT SERPL W/O P-5'-P-CCNC: 17 U/L (ref 10–44)
ANION GAP SERPL CALC-SCNC: 6 MMOL/L (ref 8–16)
AST SERPL-CCNC: 20 U/L (ref 10–40)
BASOPHILS # BLD AUTO: 0.04 K/UL (ref 0–0.2)
BASOPHILS NFR BLD: 0.4 % (ref 0–1.9)
BILIRUB SERPL-MCNC: 0.6 MG/DL (ref 0.1–1)
BNP SERPL-MCNC: 57 PG/ML (ref 0–99)
BUN SERPL-MCNC: 22 MG/DL (ref 8–23)
CALCIUM SERPL-MCNC: 8.8 MG/DL (ref 8.7–10.5)
CHLORIDE SERPL-SCNC: 101 MMOL/L (ref 95–110)
CO2 SERPL-SCNC: 32 MMOL/L (ref 23–29)
CREAT SERPL-MCNC: 1 MG/DL (ref 0.5–1.4)
DIFFERENTIAL METHOD: ABNORMAL
EOSINOPHIL # BLD AUTO: 0.2 K/UL (ref 0–0.5)
EOSINOPHIL NFR BLD: 2.1 % (ref 0–8)
ERYTHROCYTE [DISTWIDTH] IN BLOOD BY AUTOMATED COUNT: 12.8 % (ref 11.5–14.5)
EST. GFR  (AFRICAN AMERICAN): >60 ML/MIN/1.73 M^2
EST. GFR  (NON AFRICAN AMERICAN): 52.6 ML/MIN/1.73 M^2
GLUCOSE SERPL-MCNC: 100 MG/DL (ref 70–110)
HCT VFR BLD AUTO: 35.5 % (ref 37–48.5)
HGB BLD-MCNC: 11.3 G/DL (ref 12–16)
IMM GRANULOCYTES # BLD AUTO: 0.02 K/UL (ref 0–0.04)
IMM GRANULOCYTES NFR BLD AUTO: 0.2 % (ref 0–0.5)
INR PPP: 1
LYMPHOCYTES # BLD AUTO: 1.8 K/UL (ref 1–4.8)
LYMPHOCYTES NFR BLD: 20.7 % (ref 18–48)
MCH RBC QN AUTO: 28.3 PG (ref 27–31)
MCHC RBC AUTO-ENTMCNC: 31.8 G/DL (ref 32–36)
MCV RBC AUTO: 89 FL (ref 82–98)
MONOCYTES # BLD AUTO: 0.8 K/UL (ref 0.3–1)
MONOCYTES NFR BLD: 8.5 % (ref 4–15)
NEUTROPHILS # BLD AUTO: 6.1 K/UL (ref 1.8–7.7)
NEUTROPHILS NFR BLD: 68.1 % (ref 38–73)
NRBC BLD-RTO: 0 /100 WBC
PLATELET # BLD AUTO: 240 K/UL (ref 150–450)
PMV BLD AUTO: 9.8 FL (ref 9.2–12.9)
POTASSIUM SERPL-SCNC: 3.9 MMOL/L (ref 3.5–5.1)
PROT SERPL-MCNC: 6.4 G/DL (ref 6–8.4)
PROTHROMBIN TIME: 12.7 SEC (ref 11.4–13.7)
RBC # BLD AUTO: 3.99 M/UL (ref 4–5.4)
SARS-COV-2 RDRP RESP QL NAA+PROBE: NEGATIVE
SODIUM SERPL-SCNC: 139 MMOL/L (ref 136–145)
TROPONIN I SERPL DL<=0.01 NG/ML-MCNC: <0.03 NG/ML
WBC # BLD AUTO: 8.9 K/UL (ref 3.9–12.7)

## 2022-07-18 PROCEDURE — 85610 PROTHROMBIN TIME: CPT | Performed by: NURSE PRACTITIONER

## 2022-07-18 PROCEDURE — 80053 COMPREHEN METABOLIC PANEL: CPT | Performed by: NURSE PRACTITIONER

## 2022-07-18 PROCEDURE — 84484 ASSAY OF TROPONIN QUANT: CPT | Performed by: NURSE PRACTITIONER

## 2022-07-18 PROCEDURE — U0002 COVID-19 LAB TEST NON-CDC: HCPCS | Performed by: NURSE PRACTITIONER

## 2022-07-18 PROCEDURE — 83880 ASSAY OF NATRIURETIC PEPTIDE: CPT | Performed by: NURSE PRACTITIONER

## 2022-07-18 PROCEDURE — 93010 ELECTROCARDIOGRAM REPORT: CPT | Mod: ,,, | Performed by: INTERNAL MEDICINE

## 2022-07-18 PROCEDURE — 93005 ELECTROCARDIOGRAM TRACING: CPT | Performed by: INTERNAL MEDICINE

## 2022-07-18 PROCEDURE — 25000003 PHARM REV CODE 250: Performed by: EMERGENCY MEDICINE

## 2022-07-18 PROCEDURE — 99284 EMERGENCY DEPT VISIT MOD MDM: CPT | Mod: 25

## 2022-07-18 PROCEDURE — 93010 EKG 12-LEAD: ICD-10-PCS | Mod: ,,, | Performed by: INTERNAL MEDICINE

## 2022-07-18 PROCEDURE — 85025 COMPLETE CBC W/AUTO DIFF WBC: CPT | Performed by: NURSE PRACTITIONER

## 2022-07-18 RX ORDER — PANTOPRAZOLE SODIUM 40 MG/1
40 TABLET, DELAYED RELEASE ORAL DAILY
COMMUNITY

## 2022-07-18 RX ORDER — LISINOPRIL 5 MG/1
20 TABLET ORAL
Status: COMPLETED | OUTPATIENT
Start: 2022-07-18 | End: 2022-07-18

## 2022-07-18 RX ADMIN — LISINOPRIL 20 MG: 5 TABLET ORAL at 03:07

## 2022-07-18 NOTE — DISCHARGE INSTRUCTIONS
If trouble wheezing or shortness of breath occurs return to the hospital for re-evaluation.  Otherwise follow up with Dr. Eldridge.

## 2022-07-18 NOTE — ED PROVIDER NOTES
"Encounter Date: 7/18/2022       History     Chief Complaint   Patient presents with    Cough    Wheezing     Pt c/o wheezing this morning that has resolved, a "shallow cough". Pt recently had an US of her lungs and found an inoperable nodule. Pt has a referral for Dr. Schuler. Pt is concerned because of the new onset of wheeze that was relieved with drinking coffee.      81-year-old female who has a history of renal cancer, lung cancer for which she had a left upper lobectomy for in 2015, fibromyalgia, GERD, hypercholesterolemia, CHF, this morning between 615 and 745 stated that she was wheezing and had some occasional coughing.  Cough is nonproductive.  No history any prior bronchospastic disease.  She does not normally use bronchodilators.  No history of CHF.  No history of asthma or COPD.  No associated palpitations.  No history of being anemic.  Patient states she feels if she is at a baseline at this time.  No chest pain.        Review of patient's allergies indicates:   Allergen Reactions    Oxycodone-acetaminophen Hallucinations     Past Medical History:   Diagnosis Date    Aortic insufficiency     Cancer 2005    renal ca, L kidney removed    COVID-19 2022    Diastolic heart failure     Diffusion capacity of lung (dl), decreased     Encounter for blood transfusion 1963    with miscarriage    Fibromyalgia     GERD (gastroesophageal reflux disease)     Hypercholesterolemia     Hyperlipidemia     Hypertension     Lung cancer 2015    adenocarcinoma ; upper left lobe    Renal disorder 2005    Left Kidney cancer    Sinusitis      Past Surgical History:   Procedure Laterality Date    BACK SURGERY  2013    laminectomy    ENDOBRONCHIAL ULTRASOUND Left 7/15/2022    Procedure: ENDOBRONCHIAL ULTRASOUND (EBUS);  Surgeon: Cm Freitas MD;  Location: [facility];  Service: Pulmonary;  Laterality: Left;    HYSTERECTOMY      INJECTION OF JOINT Right 05/27/2019    Procedure: Injection, Joint, Shoulder, " Hip, Or Knee;  Surgeon: Zach Wilder MD;  Location: [facility] OR;  Service: Pain Management;  Laterality: Right;  right hip intra-articular injection     left upper lobectomy      NEPHRECTOMY Left      Family History   Problem Relation Age of Onset    Breast cancer Maternal Aunt     Hypertension Mother      Social History     Tobacco Use    Smoking status: Never Smoker    Smokeless tobacco: Never Used   Substance Use Topics    Alcohol use: No    Drug use: No     Review of Systems   Constitutional: Negative for appetite change, chills, diaphoresis and fever.   HENT: Negative for congestion, drooling, facial swelling, postnasal drip, rhinorrhea, sinus pressure, sinus pain, sore throat and trouble swallowing.    Eyes: Negative for visual disturbance.   Respiratory: Positive for cough and wheezing. Negative for chest tightness and shortness of breath.    Cardiovascular: Negative for chest pain, palpitations and leg swelling.   Gastrointestinal: Negative for abdominal pain, constipation, diarrhea, nausea and vomiting.   Genitourinary: Negative for difficulty urinating, dysuria and hematuria.   Musculoskeletal: Negative for back pain.   Skin: Negative.  Negative for rash.   Neurological: Negative for dizziness and weakness.   Hematological: Does not bruise/bleed easily.   All other systems reviewed and are negative.      Physical Exam     Initial Vitals [07/18/22 0755]   BP Pulse Resp Temp SpO2   (!) 167/88 79 17 98.2 °F (36.8 °C) 97 %      MAP       --         Physical Exam    Vitals reviewed.  Constitutional: She appears well-developed and well-nourished. She is not diaphoretic. No distress.   HENT:   Head: Normocephalic and atraumatic.   Nose: Nose normal.   Mouth/Throat: Oropharynx is clear and moist. No oropharyngeal exudate.   Eyes: Conjunctivae are normal. Pupils are equal, round, and reactive to light.   Neck: Neck supple. No JVD present.   Normal range of motion.  Cardiovascular: Normal rate, regular rhythm,  normal heart sounds and intact distal pulses. Exam reveals no gallop and no friction rub.    No murmur heard.  Pulmonary/Chest: Breath sounds normal. No respiratory distress. She has no wheezes. She has no rhonchi. She has no rales. She exhibits no tenderness.   Abdominal: Abdomen is soft. Bowel sounds are normal. She exhibits no distension. There is no abdominal tenderness. There is no rebound and no guarding.   Musculoskeletal:         General: No tenderness or edema. Normal range of motion.      Cervical back: Normal range of motion and neck supple.     Lymphadenopathy:     She has no cervical adenopathy.   Neurological: She is alert and oriented to person, place, and time. She has normal strength. GCS score is 15. GCS eye subscore is 4. GCS verbal subscore is 5. GCS motor subscore is 6.   Skin: Skin is warm and dry. Capillary refill takes less than 2 seconds. No rash noted. No erythema. No pallor.   Psychiatric: She has a normal mood and affect. Her behavior is normal. Judgment and thought content normal.         ED Course   Procedures  Labs Reviewed   CBC W/ AUTO DIFFERENTIAL - Abnormal; Notable for the following components:       Result Value    RBC 3.99 (*)     Hemoglobin 11.3 (*)     Hematocrit 35.5 (*)     MCHC 31.8 (*)     All other components within normal limits   COMPREHENSIVE METABOLIC PANEL - Abnormal; Notable for the following components:    CO2 32 (*)     Alkaline Phosphatase 51 (*)     Anion Gap 6 (*)     eGFR if non  52.6 (*)     All other components within normal limits   TROPONIN I   B-TYPE NATRIURETIC PEPTIDE   PROTIME-INR   SARS-COV-2 RNA AMPLIFICATION, QUAL        ECG Results          EKG 12-lead (Final result)  Result time 07/18/22 11:41:24    Final result by Interface, Lab In [facility] (07/18/22 11:41:24)                 Narrative:    Test Reason : R06.02,    Vent. Rate : 068 BPM     Atrial Rate : 068 BPM     P-R Int : 144 ms          QRS Dur : 082 ms      QT Int : 416 ms        P-R-T Axes : 067 061 054 degrees     QTc Int : 442 ms    Normal sinus rhythm  Normal ECG  When compared with ECG of 02-SEP-2021 12:36,  No significant change was found  Confirmed by Keenan Sanabria MD ([id number]) on 7/18/2022 11:41:11 AM    Referred By: ALEC   SELF           Confirmed By:Keenan Sanabria MD                            Imaging Results          X-Ray Chest AP Portable (Final result)  Result time 07/18/22 09:27:42    Final result by Adolfo Alberto MD (07/18/22 09:27:42)                 Narrative:    Chest single view    CLINICAL DATA: Cough, shortness of breath    FINDINGS: AP view is compared to November 2020. Heart size is normal. The mediastinum is unremarkable. No active infiltrate or effusion is identified. Osseous structures are unremarkable.    IMPRESSION:  1. No acute process.    Electronically signed by:  Adolfo Alberto MD  7/18/2022 9:27 AM CDT Workstation: [id number]                               Medications - No data to display             Attending Attestation:             Attending ED Notes:   This 81-year-old female who had a history of lung cancer with initial left upper lobe lobectomy in 2015 and who recently underwent endobronchial ultrasound and thought to have had a suspicious lesion again in left lung.  Patient states she had wheezing today that lasted for 1-2 hours but the time my assessment was completely clear.  Chest x-ray was normal.  Screening labs reviewed are normal.  H&H is acceptable and her BNP is normal.  During the ED course I did speak to Dr. Schuler and she agreed office follow-up would be acceptable.  At this time the patient's oxygen saturation is excellent at 99% on room air and she has no evidence of any tachypnea or respiratory distress.    Differential diagnosis includes asthma, COPD exacerbation, pneumonia, upper airway obstruction, CHF.               Clinical Impression:   Final diagnoses:  [R06.02] Shortness of breath  [J98.01] Bronchospasm  (Primary)  [Z85.118] History of lung cancer          ED Disposition Condition    Discharge Stable        ED Prescriptions     None        Follow-up Information     Follow up With Specialties Details Why Contact Info    Eulalia Schuler MD Pulmonary Disease, Sleep Medicine Schedule an appointment as soon as possible for a visit  For recheck [address] [address]  [phone]             Michael Ferrer Jr., MD  07/18/22 3603

## 2022-07-18 NOTE — ED NOTES
"WHEEZING SINCE THIS AM. PRIVATE ROOM. EVEN AND NON LABORED RESPIRATIONS.  AIRWAY CLEAR.  BILATERAL WHEEZING. HIOB ELEVATED FOR BREATHING COMFORT. PULSES REGULAR.  < 3" CAPILLARY REFILL. SKIN WDI.  MAEW.  NON DISTENDED ABDOMEN. ALERT, ORIENTED AND AMBULATORY. NAD AT THIS TIME.  CALL LIGHT IN REACH.  "

## 2022-07-19 ENCOUNTER — NURSE TRIAGE (OUTPATIENT)
Dept: ADMINISTRATIVE | Facility: CLINIC | Age: 82
End: 2022-07-19
Payer: MEDICARE

## 2022-07-20 NOTE — TELEPHONE ENCOUNTER
"Pt reports on Friday had a cancer spot removed from her lung, yesterday was seen in the ED for wheezing and discharged home. Tonight coughing and spitting up a quarter sized amount of  blood, Pt advised to see a MD within 3 days per protocol, Pt informed that a message will be routed to the office and pt plans to call the office in the morning as well. Pt encouraged to call back with any worsening symptoms or questions and verbalized understanding.    Reason for Disposition   [1] More than one episode of coughing up blood AND [2] < 1 tablespoon (15 ml) of pure red blood    Additional Information   Negative: SEVERE difficulty breathing (e.g., struggling for each breath, speaks in single words)   Negative: [1] Chest pain AND [2] difficulty breathing   Negative: Bluish (or gray) lips or face now   Negative: Passed out (i.e., lost consciousness, collapsed and was not responding)   Negative: Shock suspected (e.g., cold/pale/clammy skin, too weak to stand, low BP, rapid pulse)   Negative: Difficult to awaken or acting confused (e.g., disoriented, slurred speech)   Negative: Recent chest injury (i.e., past 24 hours)   Negative: [1] Coughed up blood AND [2] large amount (example: "a cup of blood")   Negative: Sounds like a life-threatening emergency to the triager   Negative: [1] MODERATE difficulty breathing (e.g., speaks in phrases, SOB even at rest, pulse 100-120) AND [2] still present when not coughing   Negative: Chest pain   Negative: Unclear to triager if the patient is coughing up blood or vomiting blood   Negative: History of prior "blood clot" in leg or lungs (i.e., deep vein thrombosis, pulmonary embolism)   Negative: History of inherited increased risk of blood clots (e.g., Factor 5 Leiden, Anti-thrombin 3, Protein C or Protein S deficiency, Prothrombin mutation)   Negative: Pregnant or pregnant in past month   Negative: Hip or leg fracture (broken bone) in past month (or had cast on leg or " ankle in past month)   Negative: Prolonged travel within the last month  (e.g., long bus trip or plane trip)   Negative: Bedridden (e.g., nursing home patient, CVA, chronic illness, recovering from surgery)   Negative: Patient sounds very sick or weak to the triager   Negative: [1] MILD difficulty breathing (e.g., minimal/no SOB at rest, SOB with walking, pulse <100) AND [2] still present when not coughing (Exception: no change from usual, chronic shortness of breath)   Negative: [1] Coughed up blood AND [2] > 1 tablespoon (15 ml) (Exception: blood-tinged sputum)   Negative: Fever > 103 F (39.4 C)   Negative: [1] Fever > 101 F (38.3 C) AND [2] age > 60 years   Negative: [1] Fever > 100.0 F (37.8 C) AND [2] diabetes mellitus or weak immune system (e.g., HIV positive, cancer chemo, splenectomy, organ transplant, chronic steroids)   Negative: [1] Has underlying lung disease (e.g., COPD, chronic bronchitis or emphysema) AND [2] sputum has turned yellow or green in color   Negative: Coughing up nadeem-colored sputum   Negative: [1] Nasal discharge AND [2] present > 10 days   Negative: [1] Coughing up yellow or green sputum AND [2] present > 5 days   Negative: Fever present > 3 days (72 hours)   Negative: Taking Coumadin (warfarin) or other strong blood thinner, or known bleeding disorder (e.g., thrombocytopenia)   Negative: Cough has been present for > 3 weeks    Protocols used: COUGHING UP BLOOD-A-AH

## 2022-08-10 DIAGNOSIS — M85.88 OTHER SPECIFIED DISORDERS OF BONE DENSITY AND STRUCTURE, OTHER SITE: ICD-10-CM

## 2022-08-10 DIAGNOSIS — Z78.0 ASYMPTOMATIC MENOPAUSAL STATE: ICD-10-CM

## 2022-08-10 DIAGNOSIS — Z12.31 ENCOUNTER FOR SCREENING MAMMOGRAM FOR MALIGNANT NEOPLASM OF BREAST: Primary | ICD-10-CM

## 2022-10-06 ENCOUNTER — HOSPITAL ENCOUNTER (OUTPATIENT)
Dept: RADIOLOGY | Facility: HOSPITAL | Age: 82
Discharge: HOME | End: 2022-10-06
Attending: NURSE PRACTITIONER
Payer: MEDICARE

## 2022-10-06 DIAGNOSIS — Z78.0 ASYMPTOMATIC MENOPAUSAL STATE: ICD-10-CM

## 2022-10-06 DIAGNOSIS — M85.88 OTHER SPECIFIED DISORDERS OF BONE DENSITY AND STRUCTURE, OTHER SITE: ICD-10-CM

## 2022-10-06 DIAGNOSIS — Z12.31 ENCOUNTER FOR SCREENING MAMMOGRAM FOR MALIGNANT NEOPLASM OF BREAST: ICD-10-CM

## 2022-10-06 PROCEDURE — 77063 BREAST TOMOSYNTHESIS BI: CPT | Mod: TC,PO

## 2022-10-06 PROCEDURE — 77080 DXA BONE DENSITY AXIAL: CPT | Mod: TC,PO

## 2022-10-11 ENCOUNTER — OFFICE VISIT (OUTPATIENT)
Dept: CARDIOLOGY | Facility: CLINIC | Age: 82
End: 2022-10-11
Payer: MEDICARE

## 2022-10-11 VITALS
WEIGHT: 146.94 LBS | HEART RATE: 73 BPM | OXYGEN SATURATION: 98 % | SYSTOLIC BLOOD PRESSURE: 138 MMHG | BODY MASS INDEX: 23.72 KG/M2 | DIASTOLIC BLOOD PRESSURE: 72 MMHG

## 2022-10-11 DIAGNOSIS — I35.1 NONRHEUMATIC AORTIC VALVE INSUFFICIENCY: ICD-10-CM

## 2022-10-11 DIAGNOSIS — Z85.118 HISTORY OF LUNG CANCER: ICD-10-CM

## 2022-10-11 DIAGNOSIS — R06.09 DOE (DYSPNEA ON EXERTION): Primary | ICD-10-CM

## 2022-10-11 DIAGNOSIS — R06.02 SOB (SHORTNESS OF BREATH): ICD-10-CM

## 2022-10-11 DIAGNOSIS — Z85.528 HISTORY OF RENAL CELL CANCER: ICD-10-CM

## 2022-10-11 DIAGNOSIS — I50.32 CHRONIC DIASTOLIC HEART FAILURE: ICD-10-CM

## 2022-10-11 DIAGNOSIS — I10 PRIMARY HYPERTENSION: ICD-10-CM

## 2022-10-11 DIAGNOSIS — Z90.2 S/P LOBECTOMY OF LUNG: ICD-10-CM

## 2022-10-11 PROCEDURE — 1159F MED LIST DOCD IN RCRD: CPT | Mod: CPTII,S$GLB,, | Performed by: GENERAL PRACTICE

## 2022-10-11 PROCEDURE — 1126F PR PAIN SEVERITY QUANTIFIED, NO PAIN PRESENT: ICD-10-PCS | Mod: CPTII,S$GLB,, | Performed by: GENERAL PRACTICE

## 2022-10-11 PROCEDURE — 99204 PR OFFICE/OUTPT VISIT, NEW, LEVL IV, 45-59 MIN: ICD-10-PCS | Mod: S$PBB,,, | Performed by: GENERAL PRACTICE

## 2022-10-11 PROCEDURE — 1126F AMNT PAIN NOTED NONE PRSNT: CPT | Mod: CPTII,S$GLB,, | Performed by: GENERAL PRACTICE

## 2022-10-11 PROCEDURE — 3078F PR MOST RECENT DIASTOLIC BLOOD PRESSURE < 80 MM HG: ICD-10-PCS | Mod: CPTII,S$GLB,, | Performed by: GENERAL PRACTICE

## 2022-10-11 PROCEDURE — 1159F PR MEDICATION LIST DOCUMENTED IN MEDICAL RECORD: ICD-10-PCS | Mod: CPTII,S$GLB,, | Performed by: GENERAL PRACTICE

## 2022-10-11 PROCEDURE — 3078F DIAST BP <80 MM HG: CPT | Mod: CPTII,S$GLB,, | Performed by: GENERAL PRACTICE

## 2022-10-11 PROCEDURE — 3288F PR FALLS RISK ASSESSMENT DOCUMENTED: ICD-10-PCS | Mod: CPTII,S$GLB,, | Performed by: GENERAL PRACTICE

## 2022-10-11 PROCEDURE — 93000 EKG 12-LEAD: ICD-10-PCS | Mod: S$GLB,,, | Performed by: GENERAL PRACTICE

## 2022-10-11 PROCEDURE — 3075F PR MOST RECENT SYSTOLIC BLOOD PRESS GE 130-139MM HG: ICD-10-PCS | Mod: CPTII,S$GLB,, | Performed by: GENERAL PRACTICE

## 2022-10-11 PROCEDURE — 1101F PT FALLS ASSESS-DOCD LE1/YR: CPT | Mod: CPTII,S$GLB,, | Performed by: GENERAL PRACTICE

## 2022-10-11 PROCEDURE — 1101F PR PT FALLS ASSESS DOC 0-1 FALLS W/OUT INJ PAST YR: ICD-10-PCS | Mod: CPTII,S$GLB,, | Performed by: GENERAL PRACTICE

## 2022-10-11 PROCEDURE — 93000 ELECTROCARDIOGRAM COMPLETE: CPT | Mod: S$GLB,,, | Performed by: GENERAL PRACTICE

## 2022-10-11 PROCEDURE — 3075F SYST BP GE 130 - 139MM HG: CPT | Mod: CPTII,S$GLB,, | Performed by: GENERAL PRACTICE

## 2022-10-11 PROCEDURE — 99204 OFFICE O/P NEW MOD 45 MIN: CPT | Mod: S$PBB,,, | Performed by: GENERAL PRACTICE

## 2022-10-11 PROCEDURE — 3288F FALL RISK ASSESSMENT DOCD: CPT | Mod: CPTII,S$GLB,, | Performed by: GENERAL PRACTICE

## 2022-10-11 NOTE — PROGRESS NOTES
Subjective:    Patient ID:  Karlie Hess is a 82 y.o. female who presents for evaluation of   Chief Complaint   Patient presents with    Shortness of Breath     estab    Establish Care       HPI:  He is self referred for new onset of dyspnea on exertion.  Her sister is a patient in this office..    She has a history of uncontrolled hypertension after laparoscopic nephrectomy of the left kidney secondary to kidney cancer 2005.  She did see Dr. Armendariz around that time and has been on Normodyne 100 mg b.i.d. for since that time.  Her blood pressure runs about 1 20 systolic in the morning 140 in the evening usually.  He had a diagnosis of aortic insufficiency.      He had of lung cancer discovered in 2015 as a spot on her lung and underwent left upper lobe lobectomy with axillary lymph node dissection.  She had several hilar lymph nodes which were being followed for sometime and she requested a PET scan and a had a hilar biopsy which turned out to be negative.  She now complains of new onset of dyspnea on exertion going up her stairs to her house which is about 10 steps.  She gets winded before she gets to the top which is new.  However she is very active with able to go to [place] and walk up and down hills without much difficulty recently.  She has never smoked.  She has positive cholesterol is on Crestor with the LDL level of 84 2020.    The patient has noticed that she had swelling in her lower extremities over the last 2 weeks her so and took a fluid pill belong to her sister.  Which seemed to relieve the edema.    Her EKG today reveals normal sinus rhythm normal EKG.    Review of patient's allergies indicates:   Allergen Reactions    Oxycodone-acetaminophen Hallucinations       Past Medical History:   Diagnosis Date    Aortic insufficiency     Cancer 2005    renal ca, L kidney removed    COVID-19 2022    Diastolic heart failure     Diffusion capacity of lung (dl), decreased     Encounter for blood  transfusion 1963    with miscarriage    Fibromyalgia     GERD (gastroesophageal reflux disease)     Hypercholesterolemia     Hyperlipidemia     Hypertension     Lung cancer 2015    adenocarcinoma ; upper left lobe    Renal disorder 2005    Left Kidney cancer    Sinusitis      Past Surgical History:   Procedure Laterality Date    BACK SURGERY  2013    laminectomy    ENDOBRONCHIAL ULTRASOUND Left 7/15/2022    Procedure: ENDOBRONCHIAL ULTRASOUND (EBUS);  Surgeon: Cm Freitas MD;  Location: [facility];  Service: Pulmonary;  Laterality: Left;    HYSTERECTOMY      INJECTION OF JOINT Right 05/27/2019    Procedure: Injection, Joint, Shoulder, Hip, Or Knee;  Surgeon: Zach Wilder MD;  Location: [facility] OR;  Service: Pain Management;  Laterality: Right;  right hip intra-articular injection     left upper lobectomy      NEPHRECTOMY Left      Social History     Tobacco Use    Smoking status: Never    Smokeless tobacco: Never   Substance Use Topics    Alcohol use: No    Drug use: No     Family History   Problem Relation Age of Onset    Breast cancer Maternal Aunt     Hypertension Mother         Review of Systems:   Constitution: Negative for diaphoresis and fever.   HEENT: Negative for nosebleeds.    Cardiovascular: Negative for chest pain  Positive dyspnea on exertion       Positive leg swelling        No palpitations  Respiratory:  Positive for shortness of breath and no wheezing.    Hematologic/Lymphatic: Negative for bleeding problem. Does not bruise/bleed easily.  Mild edema both extremities  Skin: Negative for color change and rash.   Musculoskeletal: Negative for falls and myalgias.   Gastrointestinal: Negative for hematemesis and hematochezia.   Genitourinary: Negative for hematuria.   Neurological: Negative for dizziness and light-headedness.   Psychiatric/Behavioral: Negative for altered mental status and memory loss.          Objective:        Vitals:    10/11/22 1359   BP: 138/72   Pulse: 73       Lab Results    Component Value Date    WBC 8.90 07/18/2022    HGB 11.3 (L) 07/18/2022    HCT 35.5 (L) 07/18/2022     07/18/2022    CHOL 157 09/02/2020    TRIG 151 (H) 09/02/2020    HDL 42 09/02/2020    ALT 17 07/18/2022    AST 20 07/18/2022     07/18/2022    K 3.9 07/18/2022     07/18/2022    CREATININE 1.0 07/18/2022    BUN 22 07/18/2022    CO2 32 (H) 07/18/2022    INR 1.0 07/18/2022        ECHOCARDIOGRAM RESULTS  No results found for this or any previous visit.        CURRENT/PREVIOUS VISIT EKG  Results for orders placed or performed during the hospital encounter of 07/18/22   EKG 12-lead    Collection Time: 07/18/22  8:33 AM    Narrative    Test Reason : R06.02,    Vent. Rate : 068 BPM     Atrial Rate : 068 BPM     P-R Int : 144 ms          QRS Dur : 082 ms      QT Int : 416 ms       P-R-T Axes : 067 061 054 degrees     QTc Int : 442 ms    Normal sinus rhythm  Normal ECG  When compared with ECG of 02-SEP-2021 12:36,  No significant change was found  Confirmed by Keenan Sanabria MD ([id number]) on 7/18/2022 11:41:11 AM    Referred By: [email or web address]   SELF           Confirmed By:Keenan Sanabria MD     No valid procedures specified.   No results found for this or any previous visit.      Physical Exam:  CONSTITUTIONAL: No fever, no chills  HEENT: Normocephalic, atraumatic,pupils reactive to light                 NECK:  No JVD no carotid bruit  CVS: S1S2+, RRR, no murmurs,   LUNGS: Clear  ABDOMEN: Soft, NT, BS+  EXTREMITIES: No cyanosis, TRACE edema  : No gomez catheter  NEURO: AAO X 3  PSY: Normal affect      Medication List with Changes/Refills   Current Medications    ACETAMINOPHEN (TYLENOL) 500 MG TABLET    Take 1,000 mg by mouth 2 (two) times a day.    AMLODIPINE (NORVASC) 5 MG TABLET    Take 5 mg by mouth nightly.    DULOXETINE (CYMBALTA) 60 MG CAPSULE    Take 60 mg by mouth once daily.    LABETALOL (NORMODYNE) 100 MG TABLET    Take 100 mg by mouth 2 (two) times daily.    LISINOPRIL (PRINIVIL,ZESTRIL) 20 MG  TABLET    Take 1 tablet by mouth 2 (two) times daily.    PANTOPRAZOLE (PROTONIX) 40 MG TABLET    Take 40 mg by mouth once daily.    ROSUVASTATIN (CRESTOR) 10 MG TABLET    Take 10 mg by mouth every evening.    UNABLE TO FIND    Take 1 capsule by mouth once daily. prevagen             Assessment:       1. SLAUGHTER (dyspnea on exertion)    2. SOB (shortness of breath)    3. History of lung cancer    4. History of renal cell cancer    5. Chronic diastolic heart failure    6. Primary hypertension    7. Nonrheumatic aortic valve insufficiency    8. S/P lobectomy of lung         Plan:     Problem List Items Addressed This Visit          Oncology    History of lung cancer    Overview     adenoCA         History of renal cell cancer     Other Visit Diagnoses       SLAUGHTER (dyspnea on exertion)    -  Primary    Relevant Orders    BNP    Echo Saline Bubble? No    Nuclear Stress - Cardiology Interpreted    SOB (shortness of breath)        Relevant Orders    IN OFFICE EKG 12-LEAD (to Muse)    BNP    Echo Saline Bubble? No    Nuclear Stress - Cardiology Interpreted    Chronic diastolic heart failure        Primary hypertension        Relevant Orders    BNP    Echo Saline Bubble? No    Nuclear Stress - Cardiology Interpreted    Nonrheumatic aortic valve insufficiency        Relevant Orders    BNP    Echo Saline Bubble? No    Nuclear Stress - Cardiology Interpreted    S/P lobectomy of lung              Patient has new onset of dyspnea on exertion is going upper stairway of 10 steps.  She has no lung disease except for the mediastinal lymph up with the and the history of upper lobe lobectomy which does not appear to be related.  Hypertension which is controlled..  Been on the same dose of Normodyne 100 mg b.i.d. for more than 15 years.    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    Stress test and echo will be appropriate cardiovascular screening because the new onset of symptoms.  She will have to hold the Normodyne 24 hours before the procedure.  Check the BNP level.  She will discuss the problem with Dr. Schuler next time she has a visit    Follow up in about 4 weeks (around 11/8/2022).    The patients questions were answered, they verbalized understanding, and agreed with the treatment plan.     MALIKA ORTIZ MD  [facility]

## 2022-10-17 ENCOUNTER — TELEPHONE (OUTPATIENT)
Dept: CARDIOLOGY | Facility: HOSPITAL | Age: 82
End: 2022-10-17

## 2022-10-17 NOTE — TELEPHONE ENCOUNTER
Patient advised, test [facility] [facility]   Will need to register on the first floor at the main entrance.   Patient advised that arrival time is 6:20am.  Patient advised that she may be here about 3.5-4 hours, and may want to bring something to occupy their time, as there will be periods of waiting.    Patient advised, may take her medications prior to testing if you need to.  Patient should HOLD Lobetolol. Advised if she needs to eat to take her medications, please keep it light, like toast and juice.    Patient advised to avoid all caffeine 12 hours prior to testing.  This includes decaf tea and coffee.    Will provide peanut butter crackers for a snack after stress test.  If patient would prefer something else, please bring a snack from home.    Wear comfortable clothing.   No lotions, oils, or powders to the upper chest area. May wear deodorant.    No metal jewelry, buttons, or zippers to the upper body.  Patient verbalizes understanding of instructions.

## 2022-10-18 ENCOUNTER — HOSPITAL ENCOUNTER (OUTPATIENT)
Dept: RADIOLOGY | Facility: HOSPITAL | Age: 82
Discharge: HOME | End: 2022-10-18
Attending: GENERAL PRACTICE
Payer: MEDICARE

## 2022-10-18 ENCOUNTER — HOSPITAL ENCOUNTER (OUTPATIENT)
Dept: CARDIOLOGY | Facility: HOSPITAL | Age: 82
Discharge: HOME | End: 2022-10-18
Attending: GENERAL PRACTICE
Payer: COMMERCIAL

## 2022-10-18 ENCOUNTER — HOSPITAL ENCOUNTER (OUTPATIENT)
Dept: CARDIOLOGY | Facility: HOSPITAL | Age: 82
Discharge: HOME | End: 2022-10-18
Attending: GENERAL PRACTICE
Payer: MEDICARE

## 2022-10-18 VITALS — BODY MASS INDEX: 23.46 KG/M2 | WEIGHT: 146 LBS | HEIGHT: 66 IN

## 2022-10-18 DIAGNOSIS — R06.02 SOB (SHORTNESS OF BREATH): ICD-10-CM

## 2022-10-18 DIAGNOSIS — R06.09 DOE (DYSPNEA ON EXERTION): ICD-10-CM

## 2022-10-18 DIAGNOSIS — I10 PRIMARY HYPERTENSION: ICD-10-CM

## 2022-10-18 DIAGNOSIS — I35.1 NONRHEUMATIC AORTIC VALVE INSUFFICIENCY: ICD-10-CM

## 2022-10-18 DIAGNOSIS — I10 PRIMARY HYPERTENSION: Primary | ICD-10-CM

## 2022-10-18 LAB
CV PHARM DOSE: 0.4 MG
CV STRESS BASE HR: 71 BPM
DIASTOLIC BLOOD PRESSURE: 88 MMHG
EJECTION FRACTION- HIGH: 59 %
END DIASTOLIC INDEX-HIGH: 155 ML/M2
END DIASTOLIC INDEX-LOW: 91 ML/M2
END SYSTOLIC INDEX-HIGH: 78 ML/M2
END SYSTOLIC INDEX-LOW: 40 ML/M2
NUC STRESS DIASTOLIC VOLUME INDEX: 54
NUC STRESS EJECTION FRACTION: 85 %
NUC STRESS SYSTOLIC VOLUME INDEX: 8
OHS CV CPX 1 MINUTE RECOVERY HEART RATE: 100 BPM
OHS CV CPX 85 PERCENT MAX PREDICTED HEART RATE MALE: 113
OHS CV CPX MAX PREDICTED HEART RATE: 133
OHS CV CPX PATIENT IS FEMALE: 1
OHS CV CPX PATIENT IS MALE: 0
OHS CV CPX PEAK DIASTOLIC BLOOD PRESSURE: 88 MMHG
OHS CV CPX PEAK HEAR RATE: 118 BPM
OHS CV CPX PEAK RATE PRESSURE PRODUCT: NORMAL
OHS CV CPX PEAK SYSTOLIC BLOOD PRESSURE: 210 MMHG
OHS CV CPX PERCENT MAX PREDICTED HEART RATE ACHIEVED: 89
OHS CV CPX RATE PRESSURE PRODUCT PRESENTING: NORMAL
RETIRED EF AND QEF - SEE NOTES: 47 %
SYSTOLIC BLOOD PRESSURE: 204 MMHG

## 2022-10-18 PROCEDURE — 25000003 PHARM REV CODE 250: Performed by: NURSE PRACTITIONER

## 2022-10-18 PROCEDURE — 78452 NUCLEAR STRESS - CARDIOLOGY INTERPRETED (CUPID ONLY): ICD-10-PCS | Mod: 26,,, | Performed by: GENERAL PRACTICE

## 2022-10-18 PROCEDURE — 93016 CV STRESS TEST SUPVJ ONLY: CPT | Mod: ,,, | Performed by: NURSE PRACTITIONER

## 2022-10-18 PROCEDURE — 78452 HT MUSCLE IMAGE SPECT MULT: CPT | Mod: 26,,, | Performed by: GENERAL PRACTICE

## 2022-10-18 PROCEDURE — 93306 TTE W/DOPPLER COMPLETE: CPT

## 2022-10-18 PROCEDURE — 93016 PR CARDIAC STRESS TST,DR SUPERV ONLY: ICD-10-PCS | Mod: ,,, | Performed by: NURSE PRACTITIONER

## 2022-10-18 PROCEDURE — 93306 ECHO (CUPID ONLY): ICD-10-PCS | Mod: 26,,, | Performed by: GENERAL PRACTICE

## 2022-10-18 PROCEDURE — 93018 NUCLEAR STRESS - CARDIOLOGY INTERPRETED (CUPID ONLY): ICD-10-PCS | Mod: ,,, | Performed by: GENERAL PRACTICE

## 2022-10-18 PROCEDURE — A9502 TC99M TETROFOSMIN: HCPCS

## 2022-10-18 PROCEDURE — 93306 TTE W/DOPPLER COMPLETE: CPT | Mod: 26,,, | Performed by: GENERAL PRACTICE

## 2022-10-18 PROCEDURE — 93018 CV STRESS TEST I&R ONLY: CPT | Mod: ,,, | Performed by: GENERAL PRACTICE

## 2022-10-18 RX ORDER — REGADENOSON 0.08 MG/ML
0.4 INJECTION, SOLUTION INTRAVENOUS ONCE
Status: COMPLETED | OUTPATIENT
Start: 2022-10-18 | End: 2022-10-18

## 2022-10-18 RX ORDER — AMLODIPINE BESYLATE 5 MG/1
5 TABLET ORAL
Status: COMPLETED | OUTPATIENT
Start: 2022-10-18 | End: 2022-10-18

## 2022-10-18 RX ADMIN — REGADENOSON 0.4 MG: 0.08 INJECTION, SOLUTION INTRAVENOUS at 08:10

## 2022-10-18 RX ADMIN — AMLODIPINE BESYLATE 5 MG: 5 TABLET ORAL at 09:10

## 2022-10-18 NOTE — NURSING
Patient here today for stress test.  BP is 220/90.  Per Sherry Dangelo NP, administered Amlodipine 5mg PO at 9:15am.    After 30 minutes observation, BP is 188/80.

## 2022-10-19 LAB
AORTIC ROOT ANNULUS: 3.1 CM
AORTIC VALVE CUSP SEPERATION: 2 CM
AV INDEX (PROSTH): 1.07
AV MEAN GRADIENT: 5 MMHG
AV PEAK GRADIENT: 9 MMHG
AV REGURGITATION PRESSURE HALF TIME: 548 MS
AV VALVE AREA: 3.72 CM2
AV VELOCITY RATIO: 0.83
BSA FOR ECHO PROCEDURE: 1.76 M2
CV ECHO LV RWT: 0.58 CM
DOP CALC AO PEAK VEL: 1.51 M/S
DOP CALC AO VTI: 30 CM
DOP CALC LVOT AREA: 3.5 CM2
DOP CALC LVOT DIAMETER: 2.1 CM
DOP CALC LVOT PEAK VEL: 1.26 M/S
DOP CALC LVOT STROKE VOLUME: 111.47 CM3
DOP CALCLVOT PEAK VEL VTI: 32.2 CM
E WAVE DECELERATION TIME: 253 MS
E/A RATIO: 0.69
E/E' RATIO: 11.64 M/S
ECHO LV POSTERIOR WALL: 1.2 CM (ref 0.6–1.1)
EJECTION FRACTION: 65 %
FRACTIONAL SHORTENING: 36 % (ref 28–44)
INTERVENTRICULAR SEPTUM: 1.36 CM (ref 0.6–1.1)
IVRT: 90 MS
LA MAJOR: 3.9 CM
LEFT ATRIUM SIZE: 4.2 CM
LEFT INTERNAL DIMENSION IN SYSTOLE: 2.65 CM (ref 2.1–4)
LEFT VENTRICLE MASS INDEX: 109 G/M2
LEFT VENTRICULAR INTERNAL DIMENSION IN DIASTOLE: 4.12 CM (ref 3.5–6)
LEFT VENTRICULAR MASS: 190.42 G
LV LATERAL E/E' RATIO: 10.67 M/S
LV SEPTAL E/E' RATIO: 12.8 M/S
MV PEAK A VEL: 0.93 M/S
MV PEAK E VEL: 0.64 M/S
PISA TR MAX VEL: 2.32 M/S
RA PRESSURE: 3 MMHG
RIGHT VENTRICULAR END-DIASTOLIC DIMENSION: 2.28 CM
TDI LATERAL: 0.06 M/S
TDI SEPTAL: 0.05 M/S
TDI: 0.06 M/S
TR MAX PG: 22 MMHG
TV REST PULMONARY ARTERY PRESSURE: 25 MMHG

## 2022-11-11 ENCOUNTER — LAB VISIT (OUTPATIENT)
Dept: LAB | Facility: HOSPITAL | Age: 82
End: 2022-11-11
Attending: SPECIALIST
Payer: MEDICARE

## 2022-11-11 DIAGNOSIS — C64.2 MALIGNANT NEOPLASM OF LEFT KIDNEY, EXCEPT RENAL PELVIS: Primary | ICD-10-CM

## 2022-11-11 LAB
ANION GAP SERPL CALC-SCNC: 7 MMOL/L (ref 8–16)
BUN SERPL-MCNC: 21 MG/DL (ref 8–23)
CALCIUM SERPL-MCNC: 9 MG/DL (ref 8.7–10.5)
CHLORIDE SERPL-SCNC: 106 MMOL/L (ref 95–110)
CO2 SERPL-SCNC: 29 MMOL/L (ref 23–29)
CREAT SERPL-MCNC: 1.1 MG/DL (ref 0.5–1.4)
EST. GFR  (NO RACE VARIABLE): 49.9 ML/MIN/1.73 M^2
GLUCOSE SERPL-MCNC: 73 MG/DL (ref 70–110)
POTASSIUM SERPL-SCNC: 4 MMOL/L (ref 3.5–5.1)
SODIUM SERPL-SCNC: 142 MMOL/L (ref 136–145)

## 2022-11-11 PROCEDURE — 80048 BASIC METABOLIC PNL TOTAL CA: CPT | Performed by: SPECIALIST

## 2022-11-11 PROCEDURE — 36415 COLL VENOUS BLD VENIPUNCTURE: CPT | Performed by: SPECIALIST

## 2022-11-17 ENCOUNTER — OFFICE VISIT (OUTPATIENT)
Dept: CARDIOLOGY | Facility: CLINIC | Age: 82
End: 2022-11-17
Payer: MEDICARE

## 2022-11-17 VITALS
WEIGHT: 148.5 LBS | RESPIRATION RATE: 16 BRPM | DIASTOLIC BLOOD PRESSURE: 80 MMHG | OXYGEN SATURATION: 97 % | SYSTOLIC BLOOD PRESSURE: 144 MMHG | HEIGHT: 66 IN | BODY MASS INDEX: 23.87 KG/M2 | HEART RATE: 62 BPM

## 2022-11-17 DIAGNOSIS — I11.0 HYPERTENSIVE LEFT VENTRICULAR HYPERTROPHY WITH HEART FAILURE: ICD-10-CM

## 2022-11-17 DIAGNOSIS — I10 PRIMARY HYPERTENSION: Primary | ICD-10-CM

## 2022-11-17 DIAGNOSIS — Z90.2 S/P LOBECTOMY OF LUNG: ICD-10-CM

## 2022-11-17 DIAGNOSIS — R06.02 SOB (SHORTNESS OF BREATH): ICD-10-CM

## 2022-11-17 DIAGNOSIS — I35.1 NONRHEUMATIC AORTIC VALVE INSUFFICIENCY: ICD-10-CM

## 2022-11-17 DIAGNOSIS — I51.89 DIASTOLIC DYSFUNCTION: ICD-10-CM

## 2022-11-17 DIAGNOSIS — R06.09 DOE (DYSPNEA ON EXERTION): ICD-10-CM

## 2022-11-17 DIAGNOSIS — Z85.118 HISTORY OF LUNG CANCER: ICD-10-CM

## 2022-11-17 DIAGNOSIS — Z85.528 HISTORY OF RENAL CELL CANCER: ICD-10-CM

## 2022-11-17 PROCEDURE — 99213 OFFICE O/P EST LOW 20 MIN: CPT | Mod: S$PBB,,, | Performed by: GENERAL PRACTICE

## 2022-11-17 PROCEDURE — 99999 PR PBB SHADOW E&M-EST. PATIENT-LVL III: CPT | Mod: PBBFAC,,, | Performed by: GENERAL PRACTICE

## 2022-11-17 PROCEDURE — 99213 PR OFFICE/OUTPT VISIT, EST, LEVL III, 20-29 MIN: ICD-10-PCS | Mod: S$PBB,,, | Performed by: GENERAL PRACTICE

## 2022-11-17 PROCEDURE — 99213 OFFICE O/P EST LOW 20 MIN: CPT | Mod: PBBFAC,PN | Performed by: GENERAL PRACTICE

## 2022-11-17 PROCEDURE — 99999 PR PBB SHADOW E&M-EST. PATIENT-LVL III: ICD-10-PCS | Mod: PBBFAC,,, | Performed by: GENERAL PRACTICE

## 2022-11-17 RX ORDER — LABETALOL 200 MG/1
200 TABLET, FILM COATED ORAL 2 TIMES DAILY
Qty: 180 TABLET | Refills: 3 | Status: SHIPPED | OUTPATIENT
Start: 2022-11-17 | End: 2023-09-29

## 2022-11-17 RX ORDER — LABETALOL 100 MG/1
200 TABLET, FILM COATED ORAL 2 TIMES DAILY
Qty: 120 TABLET | Refills: 11 | Status: SHIPPED | OUTPATIENT
Start: 2022-11-17 | End: 2022-11-17 | Stop reason: SDUPTHER

## 2022-11-17 NOTE — PROGRESS NOTES
Subjective:    Patient ID:  Karlie Hess is a 82 y.o. female who presents for follow-up of   Chief Complaint   Patient presents with    Results     Stress and echo results         HPI:  He is self referred for new onset of dyspnea on exertion.  Her sister is a patient in this office..     She has a history of uncontrolled hypertension after laparoscopic nephrectomy of the left kidney secondary to kidney cancer 2005.  She did see Dr. Armendariz around that time and has been on Normodyne 100 mg b.i.d. for since that time.  Her blood pressure runs about 1 20 systolic in the morning 140 in the evening usually.  He had a diagnosis of aortic insufficiency.        He had of lung cancer discovered in 2015 as a spot on her lung and underwent left upper lobe lobectomy with axillary lymph node dissection.  She had several hilar lymph nodes which were being followed for sometime and she requested a PET scan and a had a hilar biopsy which turned out to be negative.  She now complains of new onset of dyspnea on exertion going up her stairs to her house which is about 10 steps.  She gets winded before she gets to the top which is new.  However she is very active with able to go to [place] and walk up and down hills without much difficulty recently.  She has never smoked.  She has positive cholesterol is on Crestor with the LDL level of 84 2020.     The patient has noticed that she had swelling in her lower extremities over the last 2 weeks her so and took a fluid pill belong to her sister.  Which seemed to relieve the edema.     Her EKG today reveals normal sinus rhythm normal EKG.       Patient has new onset of dyspnea on exertion is going upper stairway of 10 steps.  She has no lung disease except for the mediastinal lymph up with the and the history of upper lobe lobectomy which does not appear to be related.  Hypertension which is controlled..  Been on the same dose of Normodyne 100 mg b.i.d. for more than 15 years.     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     Stress test and echo will be appropriate cardiovascular screening because the new onset of symptoms.  She will have to hold the Normodyne 24 hours before the procedure.  Check the BNP level.  She will discuss the problem with Dr. Schuler next time she has a visit     Follow up in about 4 weeks (around 11/8/2022).     1117/22    Summary    The left ventricle is normal in size with mild concentric hypertrophy and normal systolic function.  Grade I left ventricular diastolic dysfunction.  The estimated PA systolic pressure is 25 mmHg.  Normal right ventricular size with normal right ventricular systolic function.  Normal central venous pressure (3 mmHg).  The estimated ejection fraction is 65%.  Mild tricuspid regurgitation.  Mild mitral regurgitation.  Mild aortic regurgitation.       Normal myocardial perfusion scan. There is no evidence of myocardial ischemia or infarction.    The gated perfusion images showed an ejection fraction of 85% post stress. Normal ejection fraction is greater than 47%.    There is normal wall motion post stress.    The EKG portion of this study is negative for ischemia.    The patient reported no chest pain during the stress test.    There were no arrhythmias during stress.    T.i.d. 0.93     She is here for followup.  Has some SLAUGHTER GOING UP STAIRS., but that's all. Doesn't bother her much.  No edema in legs.  Works as  for [employer].    BP has been running high 166 in evening. Average 140-150 in morning.    Review of patient's allergies indicates:   Allergen Reactions    Oxycodone-acetaminophen Hallucinations       Past Medical History:   Diagnosis Date    Aortic insufficiency     Cancer 2005    renal ca, L kidney removed     COVID-19 2022    Diastolic heart failure     Diffusion capacity of lung (dl), decreased     Encounter for blood transfusion 1963    with miscarriage    Fibromyalgia     GERD (gastroesophageal reflux disease)     Hypercholesterolemia     Hyperlipidemia     Hypertension     Lung cancer 2015    adenocarcinoma ; upper left lobe    Renal disorder 2005    Left Kidney cancer    Sinusitis      Past Surgical History:   Procedure Laterality Date    BACK SURGERY  2013    laminectomy    ENDOBRONCHIAL ULTRASOUND Left 7/15/2022    Procedure: ENDOBRONCHIAL ULTRASOUND (EBUS);  Surgeon: Cm Freitas MD;  Location: [facility];  Service: Pulmonary;  Laterality: Left;    HYSTERECTOMY      INJECTION OF JOINT Right 05/27/2019    Procedure: Injection, Joint, Shoulder, Hip, Or Knee;  Surgeon: Zach Wilder MD;  [facility];  Service: Pain Management;  Laterality: Right;  right hip intra-articular injection     left upper lobectomy      NEPHRECTOMY Left      Social History     Tobacco Use    Smoking status: Never    Smokeless tobacco: Never   Substance Use Topics    Alcohol use: No    Drug use: No     Family History   Problem Relation Age of Onset    Breast cancer Maternal Aunt     Hypertension Mother         Review of Systems:   Constitution: Negative for diaphoresis and fever.   HEENT: Negative for nosebleeds.    Cardiovascular: Negative for chest pain       No dyspnea on exertion       No leg swelling        No palpitations  Respiratory: Negative for shortness of breath and wheezing.    Hematologic/Lymphatic: Negative for bleeding problem. Does not bruise/bleed easily.   Skin: Negative for color change and rash.   Musculoskeletal: Negative for falls and myalgias.   Gastrointestinal: Negative for hematemesis and hematochezia.   Genitourinary: Negative for hematuria.   Neurological: Negative for dizziness and light-headedness.   Psychiatric/Behavioral: Negative for altered mental status and memory loss.          Objective:         Vitals:    11/17/22 1028   BP: (!) 144/80   Pulse: 62   Resp: 16       Lab Results   Component Value Date    WBC 8.90 07/18/2022    HGB 11.3 (L) 07/18/2022    HCT 35.5 (L) 07/18/2022     07/18/2022    CHOL 157 09/02/2020    TRIG 151 (H) 09/02/2020    HDL 42 09/02/2020    ALT 17 07/18/2022    AST 20 07/18/2022     11/11/2022    K 4.0 11/11/2022     11/11/2022    CREATININE 1.1 11/11/2022    BUN 21 11/11/2022    CO2 29 11/11/2022    INR 1.0 07/18/2022        ECHOCARDIOGRAM RESULTS  Results for orders placed during the hospital encounter of 10/18/22    Echo Saline Bubble? No    Interpretation Summary  · The left ventricle is normal in size with mild concentric hypertrophy and normal systolic function.  · Grade I left ventricular diastolic dysfunction.  · The estimated PA systolic pressure is 25 mmHg.  · Normal right ventricular size with normal right ventricular systolic function.  · Normal central venous pressure (3 mmHg).  · The estimated ejection fraction is 65%.  · Mild tricuspid regurgitation.  · Mild mitral regurgitation.  · Mild aortic regurgitation.        CURRENT/PREVIOUS VISIT EKG  Results for orders placed or performed in visit on 10/11/22   IN OFFICE EKG 12-LEAD (to Rosamond)    Collection Time: 10/11/22  2:03 PM    Narrative    Test Reason : R06.02,    Vent. Rate : 076 BPM     Atrial Rate : 076 BPM     P-R Int : 144 ms          QRS Dur : 086 ms      QT Int : 398 ms       P-R-T Axes : 083 085 067 degrees     QTc Int : 447 ms    Normal sinus rhythm  Normal ECG  When compared with ECG of 18-JUL-2022 08:33,  No significant change was found  Confirmed by Yovana AZUL, Niraj GUALLPA ([id number]) on 10/23/2022 8:33:45 PM    Referred By: [email or web address]   SELF           Confirmed By:Niraj Yen MD     No valid procedures specified.   Results for orders placed during the hospital encounter of 10/18/22    Nuclear Stress - Cardiology Interpreted    Interpretation Summary    Normal myocardial perfusion scan.  There is no evidence of myocardial ischemia or infarction.    The gated perfusion images showed an ejection fraction of 85% post stress. Normal ejection fraction is greater than 47%.    There is normal wall motion post stress.    The EKG portion of this study is negative for ischemia.    The patient reported no chest pain during the stress test.    There were no arrhythmias during stress.    T.i.d. 0.93      Physical Exam:  CONSTITUTIONAL: No fever, no chills  HEENT: Normocephalic, atraumatic,pupils reactive to light                 NECK:  No JVD no carotid bruit  CVS: S1S2+, RRR, no murmurs,   LUNGS: Clear  ABDOMEN: Soft, NT, BS+  EXTREMITIES: No cyanosis, edema  : No gomez catheter  NEURO: AAO X 3  PSY: Normal affect      Medication List with Changes/Refills   Current Medications    ACETAMINOPHEN (TYLENOL) 500 MG TABLET    Take 1,000 mg by mouth 2 (two) times a day.    AMLODIPINE (NORVASC) 5 MG TABLET    Take 5 mg by mouth nightly.    DULOXETINE (CYMBALTA) 60 MG CAPSULE    Take 60 mg by mouth once daily.    LISINOPRIL (PRINIVIL,ZESTRIL) 20 MG TABLET    Take 1 tablet by mouth 2 (two) times daily.    PANTOPRAZOLE (PROTONIX) 40 MG TABLET    Take 40 mg by mouth once daily.    ROSUVASTATIN (CRESTOR) 10 MG TABLET    Take 10 mg by mouth every evening.    UNABLE TO FIND    Take 1 capsule by mouth once daily. prevagen   Changed and/or Refilled Medications    Modified Medication Previous Medication    LABETALOL (NORMODYNE) 100 MG TABLET labetalol (NORMODYNE) 100 MG tablet       Take 2 tablets (200 mg total) by mouth 2 (two) times daily.    Take 100 mg by mouth 2 (two) times daily.             Assessment:       1. Primary hypertension    2. Diastolic dysfunction    3. Nonrheumatic aortic valve insufficiency    4. History of lung cancer    5. History of renal cell cancer    6. S/P lobectomy of lung    7. SOB (shortness of breath)    8. Hypertensive left ventricular hypertrophy with heart failure    9. 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  This could contribute to her dyspnea on exertion 1 flight of stairs.  Will the labetalol to 200 mg b.i.d. for better blood pressure control and see if it helps her symptoms.  This is not a life-threatening problem but we do want to prevent further left ventricular hypertrophy with the blood pressure control.      Follow up in about 6 months (around 5/17/2023).    The patients questions were answered, they verbalized understanding, and agreed with the treatment plan.     MALIKA ORTIZ MD  [facility]

## 2022-12-08 ENCOUNTER — HOSPITAL ENCOUNTER (EMERGENCY)
Facility: HOSPITAL | Age: 82
Discharge: HOME | End: 2022-12-08
Attending: EMERGENCY MEDICINE
Payer: MEDICARE

## 2022-12-08 VITALS
RESPIRATION RATE: 19 BRPM | HEART RATE: 74 BPM | DIASTOLIC BLOOD PRESSURE: 70 MMHG | TEMPERATURE: 98 F | OXYGEN SATURATION: 99 % | SYSTOLIC BLOOD PRESSURE: 160 MMHG

## 2022-12-08 DIAGNOSIS — S05.01XA ABRASION OF RIGHT CORNEA, INITIAL ENCOUNTER: Primary | ICD-10-CM

## 2022-12-08 DIAGNOSIS — M47.22 CERVICAL RADICULOPATHY DUE TO DEGENERATIVE JOINT DISEASE OF SPINE: ICD-10-CM

## 2022-12-08 DIAGNOSIS — I10 UNCONTROLLED HYPERTENSION: ICD-10-CM

## 2022-12-08 PROCEDURE — 25000003 PHARM REV CODE 250: Performed by: EMERGENCY MEDICINE

## 2022-12-08 PROCEDURE — 99284 EMERGENCY DEPT VISIT MOD MDM: CPT | Mod: 25

## 2022-12-08 RX ORDER — ERYTHROMYCIN 5 MG/G
OINTMENT OPHTHALMIC
Qty: 1 EACH | Refills: 0 | Status: SHIPPED | OUTPATIENT
Start: 2022-12-08 | End: 2023-11-08 | Stop reason: CLARIF

## 2022-12-08 RX ORDER — GABAPENTIN 300 MG/1
300 CAPSULE ORAL
Status: COMPLETED | OUTPATIENT
Start: 2022-12-08 | End: 2022-12-08

## 2022-12-08 RX ORDER — ACETAMINOPHEN 500 MG
1000 TABLET ORAL
Status: COMPLETED | OUTPATIENT
Start: 2022-12-08 | End: 2022-12-08

## 2022-12-08 RX ORDER — TETRACAINE HYDROCHLORIDE 5 MG/ML
2 SOLUTION OPHTHALMIC
Status: COMPLETED | OUTPATIENT
Start: 2022-12-08 | End: 2022-12-08

## 2022-12-08 RX ORDER — ERYTHROMYCIN 5 MG/G
OINTMENT OPHTHALMIC
Status: DISCONTINUED | OUTPATIENT
Start: 2022-12-08 | End: 2022-12-08 | Stop reason: HOSPADM

## 2022-12-08 RX ADMIN — GABAPENTIN 300 MG: 300 CAPSULE ORAL at 07:12

## 2022-12-08 RX ADMIN — ACETAMINOPHEN 1000 MG: 500 TABLET ORAL at 07:12

## 2022-12-08 RX ADMIN — FLUORESCEIN SODIUM 1 EACH: 1 STRIP OPHTHALMIC at 07:12

## 2022-12-08 RX ADMIN — TETRACAINE HYDROCHLORIDE 2 DROP: 5 SOLUTION OPHTHALMIC at 07:12

## 2022-12-08 NOTE — DISCHARGE INSTRUCTIONS
Please use eye ointment as prescribed.  See your eye doctor by tomorrow.      Your blood pressure is extremely elevated in the emergency department.  Take her blood pressure medication as prescribed and follow a low-sodium diet.  Follow up closely with your cardiologist.  Additionally you have degenerative changes in your neck.  Keep your appointment with the [facility] Clinic.  Return for worsening symptoms.

## 2022-12-08 NOTE — ED PROVIDER NOTES
Encounter Date: 12/8/2022       History     Chief Complaint   Patient presents with    Eye Pain     Right eye started today.      82-year-old female with a history of hypertension, hyperlipidemia, fibromyalgia, adenocarcinoma of left upper lobe of lung, left renal cell carcinoma presents emergency department with right eye pain.  The patient woke up in the middle the night with right eye pain.  She endorses tearing and a greatest sensation.  She also has chronic neck pain that she reports has been present since she is 34 years old.  She states that the pain typically radiates into her head or her ear.  She is concerned that her neck pain is causing her eye pain.  She denies any dale trauma.  She does not wear contact lens. The patient denies fevers or chills, no history of IVDU, no extremity weakness or numbness, no saddle anesthesia, no urinary retention or incontinence        Review of patient's allergies indicates:   Allergen Reactions    Oxycodone-acetaminophen Hallucinations     Past Medical History:   Diagnosis Date    Aortic insufficiency     Cancer 2005    renal ca, L kidney removed    COVID-19 2022    Diastolic heart failure     Diffusion capacity of lung (dl), decreased     Encounter for blood transfusion 1963    with miscarriage    Fibromyalgia     GERD (gastroesophageal reflux disease)     Hypercholesterolemia     Hyperlipidemia     Hypertension     Lung cancer 2015    adenocarcinoma ; upper left lobe    Renal disorder 2005    Left Kidney cancer    Sinusitis      Past Surgical History:   Procedure Laterality Date    BACK SURGERY  2013    laminectomy    ENDOBRONCHIAL ULTRASOUND Left 7/15/2022    Procedure: ENDOBRONCHIAL ULTRASOUND (EBUS);  Surgeon: Cm Freitas MD;  Location: [facility];  Service: Pulmonary;  Laterality: Left;    HYSTERECTOMY      INJECTION OF JOINT Right 05/27/2019    Procedure: Injection, Joint, Shoulder, Hip, Or Knee;  Surgeon: Zach Wilder MD;  [facility];  Service: Pain  Management;  Laterality: Right;  right hip intra-articular injection     left upper lobectomy      NEPHRECTOMY Left      Family History   Problem Relation Age of Onset    Breast cancer Maternal Aunt     Hypertension Mother      Social History     Tobacco Use    Smoking status: Never    Smokeless tobacco: Never   Substance Use Topics    Alcohol use: No    Drug use: No     Review of Systems   Constitutional:  Negative for fever.   HENT:  Negative for sore throat.    Eyes:  Positive for pain and redness. Negative for visual disturbance.   Respiratory:  Negative for shortness of breath.    Cardiovascular:  Negative for chest pain.   Gastrointestinal:  Negative for nausea.   Genitourinary:  Negative for dysuria.   Musculoskeletal:  Negative for back pain.   Skin:  Negative for rash.   Neurological:  Negative for weakness, light-headedness, numbness and headaches.   Hematological:  Does not bruise/bleed easily.   All other systems reviewed and are negative.    Physical Exam     Initial Vitals   BP Pulse Resp Temp SpO2   12/08/22 0515 12/08/22 0514 12/08/22 0514 12/08/22 0514 12/08/22 0514   (!) 207/95 83 19 97.9 °F (36.6 °C) 98 %      MAP       --                Physical Exam    Nursing note and vitals reviewed.  Constitutional: She appears well-developed and well-nourished. No distress.   HENT:   Head: Normocephalic and atraumatic.   Eyes: EOM are normal.   [facility] note reviewed  OD: PERRL, Lids/lashes intact, no foreign body, injected conjunctiva, 4 mm superficial conjunctival abrasion at 6:00 a.m. position, intra-ocular pressure 10  OS: PERRL, Lids/lashes intact, no foreign body, no uptake fluorescein, normal appearing conj   Neck:   Normal range of motion.  Cardiovascular:  Normal rate, regular rhythm, normal heart sounds and intact distal pulses.           Pulmonary/Chest: Breath sounds normal. She has no wheezes. She has no rhonchi. She has no rales.   Abdominal: Abdomen is soft. There is no abdominal tenderness.    Musculoskeletal:         General: Normal range of motion.      Cervical back: Normal range of motion.     Neurological: She is alert and oriented to person, place, and time. She has normal strength. No cranial nerve deficit or sensory deficit. GCS score is 15. GCS eye subscore is 4. GCS verbal subscore is 5. GCS motor subscore is 6.   5/5 strength in all 4 extremities with sensation to light touch diffusely intact   Skin: Skin is warm.   Psychiatric: Thought content normal.       ED Course   Procedures  Labs Reviewed - No data to display       Imaging Results              CT Cervical Spine Without Contrast (Final result)  Result time 12/08/22 07:01:09      Final result by Jase Bergman MD (12/08/22 07:01:09)                   Narrative:    CMS MANDATED QUALITY DATA - CT RADIATION - 436    All CT scans at this facility utilize dose modulation, iterative reconstruction, and/or weight based dosing when appropriate to reduce radiation dose to as low as reasonably achievable.        Reason: Cervical radiculopathy, known malignancy    TECHNIQUE: Cervical spine CT without IV contrast obtained with coronal and sagittal reformations.    COMPARISON:None    FINDINGS:  Negative for fracture. No epidural hematoma or prevertebral soft tissue swelling.    Right carotid artery calcifications are present. Visualized lung apices are clear.    At C2-C3, mild left facet joint osteoarthrosis.    At C3-C4, 3 mm anterolisthesis is due to severe left and moderate right facet joint osteoarthrosis causing moderate bilateral neural foramen narrowing.    At C4-C5, 4 mm anterolisthesis is due to severe bilateral facet joint osteoarthrosis.    At C5-C6, 4 mm anterolisthesis is due to moderate left and severe right facet joint osteoarthrosis. Moderate loss of disc space height is evident.    At C6-C7, moderate posterior osteophytic ridge causes no significant narrowing. Moderate loss of disc space height is evident.    At C7-T1, no  narrowing.    T2-T3 facet joint osteoarthrosis occurs bilaterally.    Coronal and sagittal reformations show findings as discussed above, without abnormal facet widening.    IMPRESSION:  Cervical spine degenerative changes, without acute abnormality.    Electronically signed by:  Jase Bergman MD  12/8/2022 7:01 AM [facility] Workstation: [id number]                                     Medications   erythromycin 5 mg/gram (0.5 %) ophthalmic ointment (has no administration in time range)   fluorescein ophthalmic strip 1 each (1 each Both Eyes Given by Provider 12/8/22 0705)   TETRAcaine HCl (PF) 0.5 % Drop 2 drop (2 drops Both Eyes Given by Provider 12/8/22 0705)   gabapentin capsule 300 mg (300 mg Oral Given 12/8/22 0705)   acetaminophen tablet 1,000 mg (1,000 mg Oral Given 12/8/22 0704)     Medical Decision Making:   ED Management:  82-year-old female presents emergency department with acute onset of right eye pain with acute on chronic neck pain.  She is a normal neurological exam but she does have history of malignancy and as such as CT scan was obtained.  She has extensive degenerative joint disease but no acute process.  Specifically no cord involvement at this time.  She already has follow-up in the bone and joint Clinic.  On her ophthalmological exam she does have a right corneal abrasion.  Will cover with erythromycin ointment.  The patient reports she has an ophthalmologist she can follow up with closely and has been encouraged to do so. The patient's blood pressure is elevated here in the emergency department.  The patient has a history of hypertension and and this is likely long-standing uncontrolled hypertension.  Based on the patient's presentation today I do not see any signs of endorgan damage representing hypertensive urgency or emergency.  I do not think the patient's presentation necessitates further intervention in the Emergency Department to acutely decrease their blood pressure.  I have given the  patient and/or their family instructions to monitor their blood pressure at home, follow up with their regular physician as well as specific return precautions.  Furthermore she has reported that she is not increased her labetalol dose as Dr. Yen advised several weeks ago.  I have encouraged her to take labetalol as prescribed and to schedule a close follow-up appointment with Dr. Yen.    The patient is aware of and agrees with the plan of care. Detailed return precautions discussed.    Sena Griffith MD  Emergency Medicine  12/08/2022 7:40 AM                                    Clinical Impression:   Final diagnoses:  [M47.22] Cervical radiculopathy due to degenerative joint disease of spine  [S05.01XA] Abrasion of right cornea, initial encounter (Primary)  [I10] Uncontrolled hypertension      ED Disposition Condition    Discharge Stable          ED Prescriptions       Medication Sig Dispense Start Date End Date Auth. Provider    erythromycin (ROMYCIN) ophthalmic ointment Place a 1/2 inch ribbon of ointment into the lower eyelid 4 times daily for 7 days 1 each 12/8/2022 -- Sena Griffith MD          Follow-up Information       Follow up With Specialties Details Why Contact Info Additional Information    [facility] hospital - Emergency Dept Emergency Medicine  As needed, If symptoms worsen [address]  [facility] [id number] [phone] 1st floor             Sena Griffith MD  12/08/22 6480

## 2023-01-27 ENCOUNTER — TELEPHONE (OUTPATIENT)
Dept: CARDIOLOGY | Facility: CLINIC | Age: 83
End: 2023-01-27
Payer: MEDICARE

## 2023-02-15 ENCOUNTER — OFFICE VISIT (OUTPATIENT)
Dept: CARDIOLOGY | Facility: CLINIC | Age: 83
End: 2023-02-15
Payer: MEDICARE

## 2023-02-15 VITALS
HEART RATE: 81 BPM | DIASTOLIC BLOOD PRESSURE: 67 MMHG | HEIGHT: 66 IN | OXYGEN SATURATION: 96 % | BODY MASS INDEX: 23.95 KG/M2 | WEIGHT: 149.06 LBS | SYSTOLIC BLOOD PRESSURE: 126 MMHG

## 2023-02-15 DIAGNOSIS — Z90.2 S/P LOBECTOMY OF LUNG: ICD-10-CM

## 2023-02-15 DIAGNOSIS — R06.02 SOB (SHORTNESS OF BREATH): ICD-10-CM

## 2023-02-15 DIAGNOSIS — N18.30 STAGE 3 CHRONIC KIDNEY DISEASE, UNSPECIFIED WHETHER STAGE 3A OR 3B CKD: ICD-10-CM

## 2023-02-15 DIAGNOSIS — I50.32 CHRONIC DIASTOLIC HEART FAILURE: ICD-10-CM

## 2023-02-15 DIAGNOSIS — I10 PRIMARY HYPERTENSION: ICD-10-CM

## 2023-02-15 DIAGNOSIS — Z85.528 HISTORY OF RENAL CELL CANCER: ICD-10-CM

## 2023-02-15 DIAGNOSIS — I11.0 HYPERTENSIVE LEFT VENTRICULAR HYPERTROPHY WITH HEART FAILURE: ICD-10-CM

## 2023-02-15 DIAGNOSIS — I35.1 NONRHEUMATIC AORTIC VALVE INSUFFICIENCY: ICD-10-CM

## 2023-02-15 DIAGNOSIS — I51.89 DIASTOLIC DYSFUNCTION: Primary | ICD-10-CM

## 2023-02-15 DIAGNOSIS — R06.09 DOE (DYSPNEA ON EXERTION): ICD-10-CM

## 2023-02-15 DIAGNOSIS — Z85.118 HISTORY OF LUNG CANCER: ICD-10-CM

## 2023-02-15 PROCEDURE — 99999 PR PBB SHADOW E&M-EST. PATIENT-LVL III: CPT | Mod: PBBFAC,,, | Performed by: GENERAL PRACTICE

## 2023-02-15 PROCEDURE — 99999 PR PBB SHADOW E&M-EST. PATIENT-LVL III: ICD-10-PCS | Mod: PBBFAC,,, | Performed by: GENERAL PRACTICE

## 2023-02-15 PROCEDURE — 99214 PR OFFICE/OUTPT VISIT, EST, LEVL IV, 30-39 MIN: ICD-10-PCS | Mod: S$PBB,,, | Performed by: GENERAL PRACTICE

## 2023-02-15 PROCEDURE — 99214 OFFICE O/P EST MOD 30 MIN: CPT | Mod: S$PBB,,, | Performed by: GENERAL PRACTICE

## 2023-02-15 PROCEDURE — 99213 OFFICE O/P EST LOW 20 MIN: CPT | Mod: PBBFAC,PN | Performed by: GENERAL PRACTICE

## 2023-02-15 RX ORDER — AMLODIPINE BESYLATE 5 MG/1
2.5 TABLET ORAL 2 TIMES DAILY
Qty: 90 TABLET | Refills: 30 | Status: SHIPPED | OUTPATIENT
Start: 2023-02-15 | End: 2023-10-12 | Stop reason: SDUPTHER

## 2023-02-15 NOTE — PROGRESS NOTES
Subjective:    Patient ID:  Karlie Hess is a 82 y.o. female who presents for follow-up of   Chief Complaint   Patient presents with    Hypertension       HPI:    2/15/22  SLAUGHTER IMPROVED  UP AND DOWN STEPS BETTER      Did not tolerate normodyne 200 mg BID.  NOW TAKES 150 bid.    BP RUNS HIGH ONLY WHEN GET HOME FROM WORK.AROUND 5 pm  Has to take amlodipine at 10PM OR DROPS TOO LOW      11/7/22  Discussed the echo results which reveals left ventricular hypertrophy and mild diastolic dysfunction which is probably related to hypertension.  This could contribute to her dyspnea on exertion 1 flight of stairs.  Will the labetalol to 200 mg b.i.d. for better blood pressure control and see if it helps her symptoms.  This is not a life-threatening problem but we do want to prevent further left ventricular hypertrophy with the blood pressure control.       10/11/22  He is self referred for new onset of dyspnea on exertion.  Her sister is a patient in this office..     She has a history of uncontrolled hypertension after laparoscopic nephrectomy of the left kidney secondary to kidney cancer 2005.  She did see Dr. Armendariz around that time and has been on Normodyne 100 mg b.i.d. for since that time.  Her blood pressure runs about 1 20 systolic in the morning 140 in the evening usually.  He had a diagnosis of aortic insufficiency.        He had of lung cancer discovered in 2015 as a spot on her lung and underwent left upper lobe lobectomy with axillary lymph node dissection.  She had several hilar lymph nodes which were being followed for sometime and she requested a PET scan and a had a hilar biopsy which turned out to be negative.  She now complains of new onset of dyspnea on exertion going up her stairs to her house which is about 10 steps.  She gets winded before she gets to the top which is new.  However she is very active with able to go to [place] and walk up and down hills without much difficulty recently.  She has  never smoked.  She has positive cholesterol is on Crestor with the LDL level of 84 2020.     The patient has noticed that she had swelling in her lower extremities over the last 2 weeks her so and took a fluid pill belong to her sister.  Which seemed to relieve the edema.     Her EKG today reveals normal sinus rhythm normal EKG.       Patient has new onset of dyspnea on exertion is going upper stairway of 10 steps.  She has no lung disease except for the mediastinal lymph up with the and the history of upper lobe lobectomy which does not appear to be related.  Hypertension which is controlled..  Been on the same dose of Normodyne 100 mg b.i.d. for more than 15 years.     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     Stress test and echo will be appropriate cardiovascular screening because the new onset of symptoms.  She will have to hold the Normodyne 24 hours before the procedure.  Check the BNP level.  She will discuss the problem with Dr. Schuler next time she has a visit     Follow up in about 4 weeks (around 11/8/2022).       Review of patient's allergies indicates:   Allergen Reactions    Oxycodone-acetaminophen Hallucinations and Other (See Comments)       Past Medical History:   Diagnosis Date    Aortic insufficiency     Cancer 2005    renal ca, L kidney removed    COVID-19 2022    Diastolic heart failure     Diffusion capacity of lung (dl), decreased     Encounter for blood transfusion 1963    with miscarriage    Fibromyalgia     GERD (gastroesophageal reflux disease)     Hypercholesterolemia     Hyperlipidemia     Hypertension     Lung cancer 2015    adenocarcinoma ; upper left lobe    Renal disorder 2005    Left Kidney cancer    Sinusitis      Past Surgical History:   Procedure Laterality Date    BACK  SURGERY  2013    laminectomy    ENDOBRONCHIAL ULTRASOUND Left 7/15/2022    Procedure: ENDOBRONCHIAL ULTRASOUND (EBUS);  Surgeon: Cm Freitas MD;  Location: [facility];  Service: Pulmonary;  Laterality: Left;    HYSTERECTOMY      INJECTION OF JOINT Right 05/27/2019    Procedure: Injection, Joint, Shoulder, Hip, Or Knee;  Surgeon: Zach Wilder MD;  Location: [facility] OR;  Service: Pain Management;  Laterality: Right;  right hip intra-articular injection     left upper lobectomy      NEPHRECTOMY Left      Social History     Tobacco Use    Smoking status: Never    Smokeless tobacco: Never   Substance Use Topics    Alcohol use: No    Drug use: No     Family History   Problem Relation Age of Onset    Breast cancer Maternal Aunt     Hypertension Mother         Review of Systems:   Constitution: Negative for diaphoresis and fever.   HEENT: Negative for nosebleeds.    Cardiovascular: Negative for chest pain       No dyspnea on exertion       No leg swelling        No palpitations  Respiratory: Negative for shortness of breath and wheezing.    Hematologic/Lymphatic: Negative for bleeding problem. Does not bruise/bleed easily.   Skin: Negative for color change and rash.   Musculoskeletal: Negative for falls and myalgias.   Gastrointestinal: Negative for hematemesis and hematochezia.   Genitourinary: Negative for hematuria.   Neurological: Negative for dizziness and light-headedness.   Psychiatric/Behavioral: Negative for altered mental status and memory loss.          Objective:        Vitals:    02/15/23 1100   BP: 126/67   Pulse: 81       Lab Results   Component Value Date    WBC 8.90 07/18/2022    HGB 11.3 (L) 07/18/2022    HCT 35.5 (L) 07/18/2022     07/18/2022    CHOL 157 09/02/2020    TRIG 151 (H) 09/02/2020    HDL 42 09/02/2020    ALT 17 07/18/2022    AST 20 07/18/2022     11/11/2022    K 4.0 11/11/2022     11/11/2022    CREATININE 1.1 11/11/2022    BUN 21 11/11/2022    CO2 29 11/11/2022    INR 1.0  07/18/2022        ECHOCARDIOGRAM RESULTS  Results for orders placed during the hospital encounter of 10/18/22    Echo Saline Bubble? No    Interpretation Summary  · The left ventricle is normal in size with mild concentric hypertrophy and normal systolic function.  · Grade I left ventricular diastolic dysfunction.  · The estimated PA systolic pressure is 25 mmHg.  · Normal right ventricular size with normal right ventricular systolic function.  · Normal central venous pressure (3 mmHg).  · The estimated ejection fraction is 65%.  · Mild tricuspid regurgitation.  · Mild mitral regurgitation.  · Mild aortic regurgitation.        CURRENT/PREVIOUS VISIT EKG  Results for orders placed or performed in visit on 10/11/22   IN OFFICE EKG 12-LEAD (to [facility])    Collection Time: 10/11/22  2:03 PM    Narrative    Test Reason : R06.02,    Vent. Rate : 076 BPM     Atrial Rate : 076 BPM     P-R Int : 144 ms          QRS Dur : 086 ms      QT Int : 398 ms       P-R-T Axes : 083 085 067 degrees     QTc Int : 447 ms    Normal sinus rhythm  Normal ECG  When compared with ECG of 18-JUL-2022 08:33,  No significant change was found  Confirmed by Yovana AZUL, Niraj GUALLPA ([id number]) on 10/23/2022 8:33:45 PM    Referred By: [email or web address]   SELF           Confirmed By:Niraj Yen MD     No valid procedures specified.   Results for orders placed during the hospital encounter of 10/18/22    Nuclear Stress - Cardiology Interpreted    Interpretation Summary    Normal myocardial perfusion scan. There is no evidence of myocardial ischemia or infarction.    The gated perfusion images showed an ejection fraction of 85% post stress. Normal ejection fraction is greater than 47%.    There is normal wall motion post stress.    The EKG portion of this study is negative for ischemia.    The patient reported no chest pain during the stress test.    There were no arrhythmias during stress.    T.i.d. 0.93      Physical Exam:  CONSTITUTIONAL: No fever, no  chills  HEENT: Normocephalic, atraumatic,pupils reactive to light                 NECK:  No JVD no carotid bruit  CVS: S1S2+, RRR, no murmurs,   LUNGS: Clear  ABDOMEN: Soft, NT, BS+  EXTREMITIES: No cyanosis, edema  : No gomez catheter  NEURO: AAO X 3  PSY: Normal affect      Medication List with Changes/Refills   Current Medications    ACETAMINOPHEN (TYLENOL) 500 MG TABLET    Take 1,000 mg by mouth 2 (two) times a day.    ALENDRONATE (FOSAMAX) 70 MG TABLET    1 tablet 30 minutes before the first food, beverage or medicine of the day dissolved in 4 ounces of water    DULOXETINE (CYMBALTA) 60 MG CAPSULE    Take 60 mg by mouth once daily.    ERYTHROMYCIN (ROMYCIN) OPHTHALMIC OINTMENT    Place a 1/2 inch ribbon of ointment into the lower eyelid 4 times daily for 7 days    LABETALOL (NORMODYNE) 200 MG TABLET    Take 1 tablet (200 mg total) by mouth 2 (two) times daily.    LISINOPRIL (PRINIVIL,ZESTRIL) 20 MG TABLET    Take 1 tablet by mouth 2 (two) times daily.    PANTOPRAZOLE (PROTONIX) 40 MG TABLET    Take 40 mg by mouth once daily.    ROSUVASTATIN (CRESTOR) 10 MG TABLET    Take 10 mg by mouth every evening.    UNABLE TO FIND    Take 1 capsule by mouth once daily. prevagen   Changed and/or Refilled Medications    Modified Medication Previous Medication    AMLODIPINE (NORVASC) 5 MG TABLET amlodipine (NORVASC) 5 MG tablet       Take 0.5 tablets (2.5 mg total) by mouth 2 (two) times daily.    Take 5 mg by mouth nightly.             Assessment:       1. Diastolic dysfunction    2. Primary hypertension    3. SLAUGHTER (dyspnea on exertion)    4. History of lung cancer    5. History of renal cell cancer    6. S/P lobectomy of lung    7. Nonrheumatic aortic valve insufficiency    8. SOB (shortness of breath)    9. Hypertensive left ventricular hypertrophy with heart failure    10. Chronic diastolic heart failure         Plan:     Problem List Items Addressed This Visit          Pulmonary    SOB (shortness of breath)        Cardiac/Vascular    Primary hypertension    Nonrheumatic aortic valve insufficiency    SLAUGHTER (dyspnea on exertion)       Oncology    History of lung cancer    Overview     adenoCA         History of renal cell cancer     Other Visit Diagnoses       Diastolic dysfunction    -  Primary    S/P lobectomy of lung        Hypertensive left ventricular hypertrophy with heart failure        Chronic diastolic heart failure              Continue to moniter BP.  Take amlodipine 2.5 at noon, and at HS.    Cont BP DIARY  Symptoms of SLAUGHTER. PROBABLY from HTN.    IMPRESSION:  Bilateral pulmonary nodules minimally increased in size when compared to the prior study follow-up PET/CT in October 2022 is recommended to document stability     Prior left upper lobectomy     Electronically signed by:  Patti Gandhi MD  6/17/2022 1:19 PM CDT Workstation: [id number]    Follow up in about 3 months (around 5/15/2023).    The patients questions were answered, they verbalized understanding, and agreed with the treatment plan.     MALIKA ORTIZ MD  [facility]

## 2023-05-16 ENCOUNTER — OFFICE VISIT (OUTPATIENT)
Dept: OBSTETRICS AND GYNECOLOGY | Facility: CLINIC | Age: 83
End: 2023-05-16
Payer: MEDICARE

## 2023-05-16 VITALS
WEIGHT: 147.5 LBS | DIASTOLIC BLOOD PRESSURE: 76 MMHG | SYSTOLIC BLOOD PRESSURE: 175 MMHG | BODY MASS INDEX: 23.7 KG/M2 | HEIGHT: 66 IN | HEART RATE: 75 BPM

## 2023-05-16 DIAGNOSIS — N95.2 VAGINAL ATROPHY: ICD-10-CM

## 2023-05-16 DIAGNOSIS — L72.3 SEBACEOUS CYST: Primary | ICD-10-CM

## 2023-05-16 PROCEDURE — 99213 OFFICE O/P EST LOW 20 MIN: CPT | Mod: PBBFAC,PO | Performed by: STUDENT IN AN ORGANIZED HEALTH CARE EDUCATION/TRAINING PROGRAM

## 2023-05-16 PROCEDURE — 99203 OFFICE O/P NEW LOW 30 MIN: CPT | Mod: S$PBB,,, | Performed by: STUDENT IN AN ORGANIZED HEALTH CARE EDUCATION/TRAINING PROGRAM

## 2023-05-16 PROCEDURE — 99999 PR PBB SHADOW E&M-EST. PATIENT-LVL III: ICD-10-PCS | Mod: PBBFAC,,, | Performed by: STUDENT IN AN ORGANIZED HEALTH CARE EDUCATION/TRAINING PROGRAM

## 2023-05-16 PROCEDURE — 99999 PR PBB SHADOW E&M-EST. PATIENT-LVL III: CPT | Mod: PBBFAC,,, | Performed by: STUDENT IN AN ORGANIZED HEALTH CARE EDUCATION/TRAINING PROGRAM

## 2023-05-16 PROCEDURE — 99203 PR OFFICE/OUTPT VISIT, NEW, LEVL III, 30-44 MIN: ICD-10-PCS | Mod: S$PBB,,, | Performed by: STUDENT IN AN ORGANIZED HEALTH CARE EDUCATION/TRAINING PROGRAM

## 2023-05-16 NOTE — PROGRESS NOTES
[facility] Note    Subjective:     Chief Complaint: Gynecologic Exam (Itching and bumps)      HPI:  2023    Year old female presents as a new patient with complaints of external pruritic rash associated with small white pimple appearing bumps.  Denies any recent sexual activity.  She reports external and internal itching.  Denies any abnormal vaginal discharge.  She had a total abdominal hysterectomy and bilateral salpingo-oophorectomy due to endometriosis in .  She reports seeing Dr. Garber previously and had biopsies performed but patient was never made aware of results.  There is no way to obtain records from Dr. Garber told practice.  She reports that the appearance of these white small pimple-like bumps come and go.  She reports that some white bumps are still present but not very many.  She reports that she gets quite a few of them and they seem to last for a few days but then go away on their own.  Denies any pelvic pain or blood sugar issues.  She is unsure if they were transmitted sexually or how she got them.  Denies any fever, recent travel, douching, or any recent illnesses.      GYN & OB History  No LMP recorded. Patient has had a hysterectomy.     Date of Last Pap: No result found    OB History    Para Term  AB Living   2 2 2     2   SAB IAB Ectopic Multiple Live Births           2      # Outcome Date GA Lbr Francois/2nd Weight Sex Delivery Anes PTL Lv   2 Term     M Vag-Spont   ANA   1 Term     F Vag-Spont   ANA       Past Medical History:   Diagnosis Date    Aortic insufficiency     Cancer 2005    renal ca, L kidney removed    COVID-19     Diastolic heart failure     Diffusion capacity of lung (dl), decreased     Encounter for blood transfusion     with miscarriage    Fibromyalgia     GERD (gastroesophageal reflux disease)     Hypercholesterolemia     Hyperlipidemia     Hypertension     Lung cancer 2015    adenocarcinoma ; upper left  lobe    Renal disorder 2005    Left Kidney cancer    Sinusitis      Past Surgical History:   Procedure Laterality Date    BACK SURGERY  2013    laminectomy    ENDOBRONCHIAL ULTRASOUND Left 07/15/2022    Procedure: ENDOBRONCHIAL ULTRASOUND (EBUS);  Surgeon: Cm Freitas MD;  Location: [facility];  Service: Pulmonary;  Laterality: Left;    HYSTERECTOMY  1975    CARY/BSO. Due to endometriosis.    INJECTION OF JOINT Right 05/27/2019    Procedure: Injection, Joint, Shoulder, Hip, Or Knee;  Surgeon: Zach Wilder MD;  Location: [facility] OR;  Service: Pain Management;  Laterality: Right;  right hip intra-articular injection     left upper lobectomy      NEPHRECTOMY Left 2005    Entire kidney due to cancer.     Review of patient's allergies indicates:   Allergen Reactions    Oxycodone-acetaminophen Hallucinations and Other (See Comments)       Social History     Socioeconomic History    Marital status:    Tobacco Use    Smoking status: Never    Smokeless tobacco: Never   Substance and Sexual Activity    Alcohol use: No    Drug use: No    Sexual activity: Not Currently       Family History   Problem Relation Age of Onset    Hypertension Mother     Breast cancer Maternal Aunt     Breast cancer Maternal Aunt        Medications  Current Outpatient Medications on File Prior to Visit   Medication Sig Dispense Refill Last Dose    acetaminophen (TYLENOL) 500 MG tablet Take 1,000 mg by mouth 2 (two) times a day.   Taking    amLODIPine (NORVASC) 5 MG tablet Take 0.5 tablets (2.5 mg total) by mouth 2 (two) times daily. 90 tablet 30 Taking    duloxetine (CYMBALTA) 60 MG capsule Take 60 mg by mouth once daily.   Taking    labetaloL (NORMODYNE) 200 MG tablet Take 1 tablet (200 mg total) by mouth 2 (two) times daily. 180 tablet 3 Taking    lisinopriL (PRINIVIL,ZESTRIL) 40 MG tablet Take 40 mg by mouth.   Taking    pantoprazole (PROTONIX) 40 MG tablet Take 40 mg by mouth once daily.   Taking    rosuvastatin (CRESTOR) 10 MG tablet Take 10  "mg by mouth every evening.   Taking    UNABLE TO FIND Take 1 capsule by mouth once daily. prevagen   Taking    alendronate (FOSAMAX) 70 MG tablet 1 tablet 30 minutes before the first food, beverage or medicine of the day dissolved in 4 ounces of water   Not Taking    erythromycin (ROMYCIN) ophthalmic ointment Place a 1/2 inch ribbon of ointment into the lower eyelid 4 times daily for 7 days (Patient not taking: Reported on 2/15/2023) 1 each 0 Not Taking    lisinopriL (PRINIVIL,ZESTRIL) 20 MG tablet Take 1 tablet by mouth 2 (two) times daily.   Not Taking    omeprazole (PRILOSEC) 20 MG capsule Take 20 mg by mouth.   Not Taking       Review of Systems   Constitutional: Negative for appetite change, fever and unexpected weight change.   Respiratory: Negative for cough and shortness of breath.    Cardiovascular: Negative for chest pain and palpitations.   Gastrointestinal: Negative for abdominal distention, constipation, nausea and vomiting.   Genitourinary: Negative for dyspareunia, dysuria, hematuria and pelvic pain.        GYN ROS per HPI.   Musculoskeletal: Negative for gait problem and myalgias.   Skin: Negative for rash.   Neurological: Negative for dizziness, light-headedness and headaches.   Psychiatric/Behavioral: The patient is not nervous/anxious.      Objective:     BP (!) 175/76 (BP Location: Left arm, Patient Position: Sitting, BP Method: Medium (Automatic))   Pulse 75   Ht 5' 6" (1.676 m)   Wt 66.9 kg (147 lb 7.8 oz)   BMI 23.81 kg/m²     Physical Exam  Exam conducted with a chaperone present.   Constitutional:       General: She is not in acute distress.  HENT:      Head: Normocephalic.   Eyes:      General: No scleral icterus.  Cardiovascular:      Rate and Rhythm: Normal rate and regular rhythm.   Genitourinary:     General: Normal vulva.      Pubic Area: No rash.       Labia:         Right: No rash or tenderness.         Left: No rash or tenderness.       Vagina: No tenderness or bleeding.      " Cervix: No cervical motion tenderness.      Uterus: Not tender.       Adnexa:         Right: No mass or tenderness.          Left: No mass or tenderness.        Comments: Vaginal atrophy.    Approximally 5 small pinpoint size white bumps that appear like sebaceous cyst on labia bilaterally.  No masses or area of fluctuance noted.  No extreme tenderness or warmth appreciated.  No discharge appreciated.  Neurological:      General: No focal deficit present.      Mental Status: She is alert.   Psychiatric:         Mood and Affect: Mood normal.       Assessment:     1. Sebaceous cyst    2. Vaginal atrophy      Plan:     1. Sebaceous cyst    2. Vaginal atrophy      Follow up if symptoms worsen or fail to improve.  Follow-up sooner if needed.   Patient will be notified when labs return and further recommendations will be given at that time.     Discussed physical exam findings with the patient.  I suspect the whitish bumps she is talking about could be just sebaceous cyst.  Consider doing a biopsy today but decided to wait until she has the appearance of more of these bumps if she has anymore breakouts.  They do not appear cancerous or contagious.      Discussed the physical exam findings with the patient.  We discussed the etiology of atrophic vaginitis and potential issues including vaginal discomfort and potential findings of hematuria on urinalysis if inappropriate clean-catch was not performed.  Conservative versus medical management discussed and at this time, she declined further treatment. She was instructed to contact the clinic if she becomes symptomatic or changes her mind.        She was instructed to contact the clinic or use patient portal to schedule an appointment if she experiences a breakout of these lesions again.  We may consider a biopsy next visit if necessary.    Abi Kent PA-C  05/16/2023

## 2023-08-20 ENCOUNTER — HOSPITAL ENCOUNTER (EMERGENCY)
Facility: HOSPITAL | Age: 83
Discharge: HOME | End: 2023-08-20
Attending: EMERGENCY MEDICINE
Payer: MEDICARE

## 2023-08-20 VITALS
SYSTOLIC BLOOD PRESSURE: 170 MMHG | BODY MASS INDEX: 23.08 KG/M2 | HEART RATE: 72 BPM | TEMPERATURE: 99 F | RESPIRATION RATE: 17 BRPM | DIASTOLIC BLOOD PRESSURE: 77 MMHG | WEIGHT: 143 LBS | OXYGEN SATURATION: 97 %

## 2023-08-20 DIAGNOSIS — U07.1 COVID-19 VIRUS INFECTION: Primary | ICD-10-CM

## 2023-08-20 DIAGNOSIS — U07.1 COVID-19 VIRUS DETECTED: ICD-10-CM

## 2023-08-20 DIAGNOSIS — R79.89 ELEVATED D-DIMER: ICD-10-CM

## 2023-08-20 DIAGNOSIS — R05.9 COUGH: ICD-10-CM

## 2023-08-20 LAB
ADENOVIRUS: NOT DETECTED
ALBUMIN SERPL BCP-MCNC: 3.6 G/DL (ref 3.5–5.2)
ALP SERPL-CCNC: 49 U/L (ref 55–135)
ALT SERPL W/O P-5'-P-CCNC: 12 U/L (ref 10–44)
ANION GAP SERPL CALC-SCNC: 7 MMOL/L (ref 8–16)
APTT PPP: 24.8 SEC (ref 21–32)
AST SERPL-CCNC: 16 U/L (ref 10–40)
BASOPHILS # BLD AUTO: 0.03 K/UL (ref 0–0.2)
BASOPHILS NFR BLD: 0.4 % (ref 0–1.9)
BILIRUB SERPL-MCNC: 0.6 MG/DL (ref 0.1–1)
BNP SERPL-MCNC: 59 PG/ML (ref 0–99)
BORDETELLA PARAPERTUSSIS (IS1001): NOT DETECTED
BORDETELLA PERTUSSIS (PTXP): NOT DETECTED
BUN SERPL-MCNC: 18 MG/DL (ref 8–23)
CALCIUM SERPL-MCNC: 8.4 MG/DL (ref 8.7–10.5)
CHLAMYDIA PNEUMONIAE: NOT DETECTED
CHLORIDE SERPL-SCNC: 109 MMOL/L (ref 95–110)
CK SERPL-CCNC: 74 U/L (ref 20–180)
CO2 SERPL-SCNC: 25 MMOL/L (ref 23–29)
CORONAVIRUS 229E, COMMON COLD VIRUS: NOT DETECTED
CORONAVIRUS HKU1, COMMON COLD VIRUS: NOT DETECTED
CORONAVIRUS NL63, COMMON COLD VIRUS: NOT DETECTED
CORONAVIRUS OC43, COMMON COLD VIRUS: NOT DETECTED
CREAT SERPL-MCNC: 1.3 MG/DL (ref 0.5–1.4)
CREAT SERPL-MCNC: 1.5 MG/DL (ref 0.5–1.4)
D DIMER PPP IA.FEU-MCNC: 1.78 MG/L FEU
DIFFERENTIAL METHOD: ABNORMAL
EOSINOPHIL # BLD AUTO: 0.2 K/UL (ref 0–0.5)
EOSINOPHIL NFR BLD: 2.2 % (ref 0–8)
ERYTHROCYTE [DISTWIDTH] IN BLOOD BY AUTOMATED COUNT: 12.5 % (ref 11.5–14.5)
EST. GFR  (NO RACE VARIABLE): 40.8 ML/MIN/1.73 M^2
FLUBV RNA NPH QL NAA+NON-PROBE: NOT DETECTED
GLUCOSE SERPL-MCNC: 92 MG/DL (ref 70–110)
GROUP A STREP, MOLECULAR: NEGATIVE
HCT VFR BLD AUTO: 33.3 % (ref 37–48.5)
HGB BLD-MCNC: 11 G/DL (ref 12–16)
HPIV1 RNA NPH QL NAA+NON-PROBE: NOT DETECTED
HPIV2 RNA NPH QL NAA+NON-PROBE: NOT DETECTED
HPIV3 RNA NPH QL NAA+NON-PROBE: NOT DETECTED
HPIV4 RNA NPH QL NAA+NON-PROBE: NOT DETECTED
HUMAN METAPNEUMOVIRUS: NOT DETECTED
IMM GRANULOCYTES # BLD AUTO: 0.02 K/UL (ref 0–0.04)
IMM GRANULOCYTES NFR BLD AUTO: 0.3 % (ref 0–0.5)
INFLUENZA A (SUBTYPES H1,H1-2009,H3): NOT DETECTED
INFLUENZA A, MOLECULAR: NEGATIVE
INFLUENZA B, MOLECULAR: NEGATIVE
INR PPP: 1 (ref 0.8–1.2)
LACTATE SERPL-SCNC: 0.8 MMOL/L (ref 0.5–1.9)
LYMPHOCYTES # BLD AUTO: 1.6 K/UL (ref 1–4.8)
LYMPHOCYTES NFR BLD: 22.7 % (ref 18–48)
MAGNESIUM SERPL-MCNC: 1.9 MG/DL (ref 1.6–2.6)
MCH RBC QN AUTO: 29.4 PG (ref 27–31)
MCHC RBC AUTO-ENTMCNC: 33 G/DL (ref 32–36)
MCV RBC AUTO: 89 FL (ref 82–98)
MONOCYTES # BLD AUTO: 0.8 K/UL (ref 0.3–1)
MONOCYTES NFR BLD: 11.2 % (ref 4–15)
MYCOPLASMA PNEUMONIAE: NOT DETECTED
NEUTROPHILS # BLD AUTO: 4.5 K/UL (ref 1.8–7.7)
NEUTROPHILS NFR BLD: 63.2 % (ref 38–73)
NRBC BLD-RTO: 0 /100 WBC
PLATELET # BLD AUTO: 233 K/UL (ref 150–450)
PMV BLD AUTO: 9.2 FL (ref 9.2–12.9)
POTASSIUM SERPL-SCNC: 3.7 MMOL/L (ref 3.5–5.1)
PROT SERPL-MCNC: 6.3 G/DL (ref 6–8.4)
PROTHROMBIN TIME: 10.7 SEC (ref 9–12.5)
RBC # BLD AUTO: 3.74 M/UL (ref 4–5.4)
RESPIRATORY INFECTION PANEL SOURCE: ABNORMAL
RSV RNA NPH QL NAA+NON-PROBE: NOT DETECTED
RV+EV RNA NPH QL NAA+NON-PROBE: NOT DETECTED
SAMPLE: ABNORMAL
SARS-COV-2 RDRP RESP QL NAA+PROBE: NEGATIVE
SARS-COV-2 RNA RESP QL NAA+PROBE: DETECTED
SODIUM SERPL-SCNC: 141 MMOL/L (ref 136–145)
SPECIMEN SOURCE: NORMAL
TROPONIN I SERPL HS-MCNC: 11.9 PG/ML (ref 0–14.9)
TROPONIN I SERPL HS-MCNC: 7.2 PG/ML (ref 0–14.9)
TSH SERPL DL<=0.005 MIU/L-ACNC: 2.66 UIU/ML (ref 0.34–5.6)
WBC # BLD AUTO: 7.17 K/UL (ref 3.9–12.7)

## 2023-08-20 PROCEDURE — 83735 ASSAY OF MAGNESIUM: CPT | Performed by: EMERGENCY MEDICINE

## 2023-08-20 PROCEDURE — 87798 DETECT AGENT NOS DNA AMP: CPT | Performed by: EMERGENCY MEDICINE

## 2023-08-20 PROCEDURE — 83880 ASSAY OF NATRIURETIC PEPTIDE: CPT | Performed by: EMERGENCY MEDICINE

## 2023-08-20 PROCEDURE — 82550 ASSAY OF CK (CPK): CPT | Performed by: EMERGENCY MEDICINE

## 2023-08-20 PROCEDURE — U0002 COVID-19 LAB TEST NON-CDC: HCPCS | Performed by: EMERGENCY MEDICINE

## 2023-08-20 PROCEDURE — 85610 PROTHROMBIN TIME: CPT | Performed by: EMERGENCY MEDICINE

## 2023-08-20 PROCEDURE — 36415 COLL VENOUS BLD VENIPUNCTURE: CPT | Performed by: EMERGENCY MEDICINE

## 2023-08-20 PROCEDURE — 85730 THROMBOPLASTIN TIME PARTIAL: CPT | Performed by: EMERGENCY MEDICINE

## 2023-08-20 PROCEDURE — 87502 INFLUENZA DNA AMP PROBE: CPT | Mod: 59 | Performed by: EMERGENCY MEDICINE

## 2023-08-20 PROCEDURE — 80053 COMPREHEN METABOLIC PANEL: CPT | Performed by: EMERGENCY MEDICINE

## 2023-08-20 PROCEDURE — 83605 ASSAY OF LACTIC ACID: CPT | Performed by: EMERGENCY MEDICINE

## 2023-08-20 PROCEDURE — 84443 ASSAY THYROID STIM HORMONE: CPT | Performed by: EMERGENCY MEDICINE

## 2023-08-20 PROCEDURE — 25000003 PHARM REV CODE 250: Performed by: EMERGENCY MEDICINE

## 2023-08-20 PROCEDURE — 87040 BLOOD CULTURE FOR BACTERIA: CPT | Mod: 59 | Performed by: EMERGENCY MEDICINE

## 2023-08-20 PROCEDURE — 87651 STREP A DNA AMP PROBE: CPT | Performed by: EMERGENCY MEDICINE

## 2023-08-20 PROCEDURE — 85025 COMPLETE CBC W/AUTO DIFF WBC: CPT | Performed by: EMERGENCY MEDICINE

## 2023-08-20 PROCEDURE — 82565 ASSAY OF CREATININE: CPT | Mod: 59

## 2023-08-20 PROCEDURE — 99285 EMERGENCY DEPT VISIT HI MDM: CPT | Mod: 25

## 2023-08-20 PROCEDURE — 85379 FIBRIN DEGRADATION QUANT: CPT | Performed by: EMERGENCY MEDICINE

## 2023-08-20 PROCEDURE — 84484 ASSAY OF TROPONIN QUANT: CPT | Performed by: EMERGENCY MEDICINE

## 2023-08-20 RX ORDER — AMLODIPINE BESYLATE 5 MG/1
5 TABLET ORAL
Status: COMPLETED | OUTPATIENT
Start: 2023-08-20 | End: 2023-08-20

## 2023-08-20 RX ORDER — FLUTICASONE PROPIONATE 50 MCG
2 SPRAY, SUSPENSION (ML) NASAL 2 TIMES DAILY
Qty: 15 G | Refills: 0 | Status: SHIPPED | OUTPATIENT
Start: 2023-08-20 | End: 2023-08-30

## 2023-08-20 RX ORDER — ACETAMINOPHEN 500 MG
1000 TABLET ORAL
Status: COMPLETED | OUTPATIENT
Start: 2023-08-20 | End: 2023-08-20

## 2023-08-20 RX ADMIN — AMLODIPINE BESYLATE 5 MG: 5 TABLET ORAL at 04:08

## 2023-08-20 RX ADMIN — ACETAMINOPHEN 1000 MG: 500 TABLET ORAL at 04:08

## 2023-08-20 NOTE — DISCHARGE INSTRUCTIONS
Please read and follow discharge instructions and return precautions.  Rest, avoid any strenuous activity, over exertion or overheating.  Keep well hydrated.  Alternate water and Pedialyte for hydration.  Mucinex over-the-counter as directed if tolerated.  Please make certain that the preparation your buying is safe with hypertension.  Tylenol or ibuprofen over-the-counter as directed if needed for fever body aches or chills.  Flonase nasal spray as directed.  Important to see your physician in the next 1-3 days.  Return immediately if you develop new or worsening symptoms or if you have new problems or concerns.

## 2023-08-20 NOTE — ED PROVIDER NOTES
"Encounter Date: 8/20/2023       History     Chief Complaint   Patient presents with    Cough     X3 weeks     This is an 82-year-old female who presents with complaints of cough and congestion.  The patient reports that about 3 weeks ago she had rhinorrhea and a dry cough.  She did not have any fever.  She was seen at an urgent care clinic and was treated with a steroid injection and an antibiotic medication.  She is not sure what it was.  She was also prescribed oral steroid medications.  She did not take the oral steroid medications as directed.  She states that she took the antibiotic but had some small "spots" on her leg and was not certain if this was an allergic reaction.  She went back to the clinic and they stated they did not think it was an allergic reaction but her symptoms had improved and she did not continue the antibiotics.  She presents now as the cough which never did quite go away has worsened over the past 2-3 days and has become more frequent.  The cough is nonproductive.  She is also had increasing rhinorrhea, sinus congestion and postnasal drip.  She denies fever, headache, neck pain or stiffness.  She denies chest pain or shortness of breath.  She does feel that she has been wheezing at times.  She is not have any known history of COPD.  She is never been a smoker.  She is had some malaise and fatigue but denies lightheadedness syncope or near-syncope.  She denies orthopnea, PND, dyspnea with exertion or any leg pain swelling or edema.  She denies any other problems or complaints.  Her granddaughter who lives with her has been diagnosed with COVID a week ago.        Review of patient's allergies indicates:   Allergen Reactions    Oxycodone-acetaminophen Hallucinations and Other (See Comments)    Amoxicillin Rash     Past Medical History:   Diagnosis Date    Aortic insufficiency     Cancer 2005    renal ca, L kidney removed    COVID-19 2022    Diastolic heart failure     Diffusion capacity of " lung (dl), decreased     Encounter for blood transfusion 1963    with miscarriage    Fibromyalgia     GERD (gastroesophageal reflux disease)     Hypercholesterolemia     Hyperlipidemia     Hypertension     Lung cancer 2015    adenocarcinoma ; upper left lobe    Renal disorder 2005    Left Kidney cancer    Sinusitis      Past Surgical History:   Procedure Laterality Date    BACK SURGERY  2013    laminectomy    ENDOBRONCHIAL ULTRASOUND Left 07/15/2022    Procedure: ENDOBRONCHIAL ULTRASOUND (EBUS);  Surgeon: Cm Freitas MD;  Location: [facility];  Service: Pulmonary;  Laterality: Left;    HYSTERECTOMY  1975    CARY/BSO. Due to endometriosis.    INJECTION OF JOINT Right 05/27/2019    Procedure: Injection, Joint, Shoulder, Hip, Or Knee;  Surgeon: Zach Wilder MD;  Location: [facility];  Service: Pain Management;  Laterality: Right;  right hip intra-articular injection     left upper lobectomy      NEPHRECTOMY Left 2005    Entire kidney due to cancer.     Family History   Problem Relation Age of Onset    Hypertension Mother     Breast cancer Maternal Aunt     Breast cancer Maternal Aunt      Social History     Tobacco Use    Smoking status: Never    Smokeless tobacco: Never   Substance Use Topics    Alcohol use: No    Drug use: No     Review of Systems   Constitutional:  Positive for fatigue. Negative for activity change, appetite change, chills and fever.   HENT:  Positive for congestion, postnasal drip, rhinorrhea, sinus pressure, sinus pain and sore throat. Negative for dental problem, drooling, ear discharge, ear pain, facial swelling, mouth sores, nosebleeds, trouble swallowing and voice change.    Eyes: Negative.  Negative for photophobia, pain, redness and visual disturbance.   Respiratory:  Positive for cough and wheezing. Negative for chest tightness and shortness of breath.    Cardiovascular: Negative.  Negative for chest pain, palpitations and leg swelling.   Gastrointestinal: Negative.  Negative for abdominal  distention, abdominal pain, blood in stool, constipation, diarrhea, nausea and vomiting.   Endocrine: Negative.    Genitourinary: Negative.  Negative for decreased urine volume, difficulty urinating, dysuria, flank pain, frequency, pelvic pain and urgency.   Musculoskeletal: Negative.  Negative for arthralgias, back pain, gait problem, myalgias, neck pain and neck stiffness.   Skin: Negative.  Negative for rash.   Neurological: Negative.  Negative for dizziness, syncope, facial asymmetry, speech difficulty, weakness, light-headedness, numbness and headaches.   Hematological:  Does not bruise/bleed easily.   Psychiatric/Behavioral: Negative.  Negative for confusion.    All other systems reviewed and are negative.      Physical Exam     Initial Vitals [08/20/23 1108]   BP Pulse Resp Temp SpO2   135/64 77 18 98.5 °F (36.9 °C) 99 %      MAP       --         Physical Exam    Nursing note and vitals reviewed.  Constitutional: She is not diaphoretic. She is active and cooperative.  Non-toxic appearance. She does not have a sickly appearance. She does not appear ill. No distress.   HENT:   Head: Normocephalic and atraumatic.   Right Ear: No tenderness. No mastoid tenderness. Tympanic membrane is not erythematous. A middle ear effusion is present.   Left Ear: No tenderness. No mastoid tenderness. Tympanic membrane is not erythematous. A middle ear effusion is present.   Nose: Mucosal edema and rhinorrhea present. Right sinus exhibits no maxillary sinus tenderness and no frontal sinus tenderness. Left sinus exhibits no maxillary sinus tenderness.   Mouth/Throat: Uvula is midline and mucous membranes are normal. No oral lesions. No trismus in the jaw. No uvula swelling. Posterior oropharyngeal erythema present. No oropharyngeal exudate, posterior oropharyngeal edema or tonsillar abscesses.   Eyes: Conjunctivae, EOM and lids are normal. Pupils are equal, round, and reactive to light. No scleral icterus.   Neck: Trachea normal  and phonation normal. Neck supple. No thyroid mass and no thyromegaly present. No stridor present. No JVD present.   Normal range of motion.   Full passive range of motion without pain.     Cardiovascular:  Normal rate, regular rhythm, normal heart sounds, intact distal pulses and normal pulses.     Exam reveals no gallop, no distant heart sounds, no friction rub and no decreased pulses.       No murmur heard.  Pulmonary/Chest: Effort normal and breath sounds normal. No accessory muscle usage or stridor. No tachypnea. No respiratory distress. She has no wheezes. She has no rhonchi. She has no rales.   Abdominal: Abdomen is soft. Bowel sounds are normal. She exhibits no distension, no pulsatile midline mass and no mass. There is no abdominal tenderness. There is no rigidity and no guarding.   Musculoskeletal:         General: No tenderness or edema. Normal range of motion.      Right hand: Normal. Normal range of motion. Normal strength. Normal sensation. Normal capillary refill. Normal pulse.      Left hand: Normal. Normal range of motion. Normal strength. Normal sensation. Normal capillary refill. Normal pulse.      Cervical back: Normal, full passive range of motion without pain, normal range of motion and neck supple. No edema, erythema, rigidity or bony tenderness. No spinous process tenderness or muscular tenderness. Normal range of motion.      Thoracic back: Normal. No bony tenderness. Normal range of motion.      Lumbar back: Normal. No bony tenderness. Normal range of motion.      Right foot: Normal. Normal range of motion and normal capillary refill. No tenderness or bony tenderness. Normal pulse.      Left foot: Normal. Normal range of motion and normal capillary refill. No tenderness or bony tenderness. Normal pulse.      Comments: Pulses are 2+ throughout, cap refill is less than 2 sec throughout, extremities are nontender throughout with full range of motion. There is no spinal tenderness to palpation.      Neurological: She is alert and oriented to person, place, and time. She has normal strength. She displays normal reflexes. No cranial nerve deficit or sensory deficit. Gait normal.   No focal deficits.   Skin: Skin is warm, dry and intact. Capillary refill takes less than 2 seconds. No ecchymosis, no petechiae and no rash noted. No erythema. No pallor.   Psychiatric: She has a normal mood and affect. Her speech is normal and behavior is normal. Judgment and thought content normal. Cognition and memory are normal.         ED Course   Procedures  Labs Reviewed   RESPIRATORY INFECTION PANEL (PCR), NASOPHARYNGEAL - Abnormal; Notable for the following components:       Result Value    SARS-CoV2 (COVID-19) Qualitative PCR Detected (*)     All other components within normal limits    Narrative:     Respiratory Infection Panel source->NP Swab   CBC W/ AUTO DIFFERENTIAL - Abnormal; Notable for the following components:    RBC 3.74 (*)     Hemoglobin 11.0 (*)     Hematocrit 33.3 (*)     All other components within normal limits   COMPREHENSIVE METABOLIC PANEL - Abnormal; Notable for the following components:    Calcium 8.4 (*)     Alkaline Phosphatase 49 (*)     eGFR 40.8 (*)     Anion Gap 7 (*)     All other components within normal limits   D DIMER, QUANTITATIVE - Abnormal; Notable for the following components:    D-Dimer 1.78 (*)     All other components within normal limits    Narrative:     Ddimr critical result(s) repeated. Called and verbal readback   obtained from Indu Kapadia RN/ed by GANESH 08/20/2023 14:14   ISTAT CREATININE - Abnormal; Notable for the following components:    POC Creatinine 1.5 (*)     All other components within normal limits   GROUP A STREP, MOLECULAR   CULTURE, BLOOD    Narrative:     Collection has been rescheduled by [facility] at 08/20/2023 13:20 Reason:   Done  Collection has been rescheduled by [facility] at 08/20/2023 13:20 Reason:   Done   CULTURE, BLOOD    Narrative:     Collection has been  rescheduled by [facility] at 08/20/2023 13:20 Reason:   Done  Collection has been rescheduled by [facility] at 08/20/2023 13:20 Reason:   Done   SARS-COV-2 RNA AMPLIFICATION, QUAL   INFLUENZA A AND B ANTIGEN    Narrative:     Specimen Source->Nasopharyngeal Swab   MAGNESIUM   TROPONIN I HIGH SENSITIVITY   TSH   APTT   PROTIME-INR   CK   LACTIC ACID, PLASMA   B-TYPE NATRIURETIC PEPTIDE   D DIMER, QUANTITATIVE   D DIMER, QUANTITATIVE   TROPONIN I HIGH SENSITIVITY          Imaging Results              NM Lung Scan Perfusion Particulate (Final result)  Result time 08/20/23 16:05:52   Procedure changed from NM Lung Scan Ventilation Perfusion     Final result by Donald Russo MD (08/20/23 16:05:52)                   Narrative:    HISTORY: Shortness of breath. Positive d-dimer. History of lung cancer. Covid positive. History of previous left lower lobectomy.    TECHNIQUE: Ventilation imaging was not performed. Following intravenous administration of 5.2 mCi of technetium-99m macroaggregated albumin, planar images of the lungs were obtained in multiple projections.    COMPARISON: There is no prior study available for comparison. Correlation is made with chest x-ray dated August 20, 2023.    FINDINGS:  Ventilation imaging was not performed. Perfusion imaging only demonstrates no evidence of perfusion defect to suggest pulmonary embolus. Volume loss of the left lung is noted consistent with history of previous left lower lobectomy.    IMPRESSION:  1. No evidence of pulmonary embolus on perfusion only imaging.    Electronically signed by:  Donald Russo MD  8/20/2023 4:05 PM CDT Workstation: [id number]                                      Lower Extremity Veins Bilateral (Final result)  Result time 08/20/23 15:13:44      Final result by Bradley Holden Jr., MD (08/20/23 15:13:44)                   Impression:      No evidence of deep venous thrombosis bilaterally.      Electronically signed by: Bradley Holden  MD  Date:    08/20/2023  Time:    15:13               Narrative:    EXAMINATION:  US LOWER EXTREMITY VEINS BILATERAL    CLINICAL HISTORY:  Other specified abnormal findings of blood chemistry    TECHNIQUE:  Duplex and color flow Doppler evaluation of the bilateral lower extremity veins was performed.    COMPARISON:  None    FINDINGS:  Right - There is no evidence of acute venous thrombosis in the common femoral, superficial femoral, greater saphenous, popliteal, peroneal, anterior tibial and posterior tibial veins.  There is no reflux to suggest valvular incompetence.    Left - There is no evidence of acute venous thrombosis in the common femoral, superficial femoral, greater saphenous, popliteal, peroneal, anterior tibial and posterior tibial veins.  There is no reflux to suggest valvular incompetence.                                       CT Chest Without Contrast (Final result)  Result time 08/20/23 14:41:52   Procedure changed from CTA Chest Non-Coronary (PE Studies)     Final result by Bradley Holden Jr., MD (08/20/23 14:41:52)                   Narrative:    CT CHEST WITHOUT CONTRAST    HISTORY: Dyspnea of unclear etiology.    Technical factors:   Helical images through the neck from the thoracic inlet to the upper abdomen were performed at increments of 1.0 mm in the absence of intravenous contrast. Coronal and sagittal reconstruction images were also utilized. The study was accomplished in the unenhanced and enhanced setting.    FINDINGS:  Stable appearing mild apical pleural scarring is again redemonstrated with no evidence of definable change upon comparison, minimal subpleural scarring arising from the posterior edge the right upper lobe.    Well-defined rounded nodule in the anterior right hilum is stable in appearance measuring 1.2 x 1.3 cm prior measurements at 1.2 x 1.2 cm.  There is evidence of a large cyst/mass along the posterior hilar region measuring 2.4 x 1.9 cm causing significant narrowing  of the origin point of the left mainstem bronchus without evidence of any further change since previous.  There is a small 2 mm pulmonary nodule in the periphery of the right lower lobe is noted on series 4 image #207.  Small subpleural nodular irregular density in the patient's left upper lobe lingular division near the diaphragmatic surface is likewise stable approaching 4 mm contacting the superior diaphragmatic surface.    There is no evidence of significant adenopathy within the mediastinum.    There is minimal anterior pericardial thickening/fluid reaching 1.1 cm in size.    The thoracic aorta is tortuous with extensive atheromatous calcifications.    There is a small sliding-type hiatus hernia.    There is normal in appearance involving the bilateral adrenal glands in the upper aspects of the abdominal cavity. Unremarkable.    Mild degenerative changes of the thoracic spine are noted.    IMPRESSION: Stable of exam with evidence of bilateral hilar nodules show no evidence of the significant change upon comparison. Recommend continued follow-up within a 6 month interval to establish continuity of care.    Electronically signed by:  Bradley Holden MD  8/20/2023 2:41 PM CDT Workstation: [id number]                                     X-Ray Chest PA And Lateral (Final result)  Result time 08/20/23 13:08:22      Final result by Bradley Holden Jr., MD (08/20/23 13:08:22)                   Impression:      No acute abnormality.      Electronically signed by: Bradley Holden MD  Date:    08/20/2023  Time:    13:08               Narrative:    EXAMINATION:  XR CHEST PA AND LATERAL    CLINICAL HISTORY:  Cough, unspecified    TECHNIQUE:  PA and lateral views of the chest were performed.    COMPARISON:  07/18/2022    FINDINGS:  The lungs are clear, with normal appearance of pulmonary vasculature and no pleural effusion or pneumothorax.    The cardiac silhouette is normal in size. The hilar and mediastinal contours are  unremarkable.    Bones are intact.                                       Medications   amLODIPine tablet 5 mg (5 mg Oral Given 23)   acetaminophen tablet 1,000 mg (1,000 mg Oral Given 23)     Medical Decision Making  Amount and/or Complexity of Data Reviewed  Labs: ordered.  Radiology: ordered.    Risk  OTC drugs.  Prescription drug management.                          Medical Decision Making:   Clinical Tests:   Lab Tests: Ordered and Reviewed  Radiological Study: Ordered and Reviewed  Medical Tests: Ordered and Reviewed  ED Management:  Emergent evaluation of an 82-year-old female with complaints as per HPI.  The patient is hemodynamically stable.  She is not exhibiting any evidence of sepsis shock or hypoperfusion.  She is not hypoxic.  D-dimer was elevated.  Ultrasound of bilateral lower extremities demonstrates no evidence of DVT and lung perfusion scan is negative for any evidence to suggest pulmonary embolism.  Symptomatic supportive care discussed, any incidental findings have been discussed with need for follow-up regarding these findings discussed in detail.  Return precautions have been discussed in detail and strict importance of close outpatient evaluation has been discussed.  Hypertension precautions given.  Patient voices understanding, feels better and feels ready to go home.      Clinical Impression:   Final diagnoses:  [R05.9] Cough  [R79.89] Elevated d-dimer  [U07.1] COVID-19 virus infection (Primary)        ED Disposition Condition    Discharge Stable          ED Prescriptions       Medication Sig Dispense Start Date End Date Auth. Provider    fluticasone propionate (FLONASE) 50 mcg/actuation nasal spray () 2 sprays (100 mcg total) by Each Nostril route 2 (two) times a day. for 10 days 15 g 2023 Laverne Hansen MD          Follow-up Information       Follow up With Specialties Details Why Contact Tao Salter MD Family Medicine Schedule an  appointment as soon as possible for a visit in 1 day  [address] [facility] [address]  [phone]               Laverne Hansen MD  09/22/23 [id number]

## 2023-08-25 LAB
BACTERIA BLD CULT: NORMAL
BACTERIA BLD CULT: NORMAL

## 2023-09-16 ENCOUNTER — HOSPITAL ENCOUNTER (EMERGENCY)
Facility: HOSPITAL | Age: 83
Discharge: HOME | End: 2023-09-16
Attending: EMERGENCY MEDICINE
Payer: MEDICARE

## 2023-09-16 VITALS
DIASTOLIC BLOOD PRESSURE: 63 MMHG | SYSTOLIC BLOOD PRESSURE: 142 MMHG | HEART RATE: 75 BPM | BODY MASS INDEX: 22.98 KG/M2 | TEMPERATURE: 98 F | HEIGHT: 66 IN | OXYGEN SATURATION: 98 % | WEIGHT: 143 LBS | RESPIRATION RATE: 17 BRPM

## 2023-09-16 DIAGNOSIS — I35.1 NONRHEUMATIC AORTIC VALVE INSUFFICIENCY: Primary | ICD-10-CM

## 2023-09-16 DIAGNOSIS — I10 PRIMARY HYPERTENSION: ICD-10-CM

## 2023-09-16 DIAGNOSIS — K57.92 ACUTE DIVERTICULITIS: ICD-10-CM

## 2023-09-16 DIAGNOSIS — R19.7 DIARRHEA, UNSPECIFIED TYPE: Primary | ICD-10-CM

## 2023-09-16 LAB
ALBUMIN SERPL BCP-MCNC: 3.5 G/DL (ref 3.5–5.2)
ALP SERPL-CCNC: 53 U/L (ref 55–135)
ALT SERPL W/O P-5'-P-CCNC: 9 U/L (ref 10–44)
ANION GAP SERPL CALC-SCNC: 2 MMOL/L (ref 8–16)
AST SERPL-CCNC: 14 U/L (ref 10–40)
BASOPHILS # BLD AUTO: 0.01 K/UL (ref 0–0.2)
BASOPHILS NFR BLD: 0.1 % (ref 0–1.9)
BILIRUB SERPL-MCNC: 0.8 MG/DL (ref 0.1–1)
BILIRUB UR QL STRIP: NEGATIVE
BUN SERPL-MCNC: 17 MG/DL (ref 8–23)
C DIFF GDH STL QL: NEGATIVE
C DIFF TOX A+B STL QL IA: NEGATIVE
CALCIUM SERPL-MCNC: 8.3 MG/DL (ref 8.7–10.5)
CHLORIDE SERPL-SCNC: 110 MMOL/L (ref 95–110)
CLARITY UR: CLEAR
CO2 SERPL-SCNC: 24 MMOL/L (ref 23–29)
COLOR UR: YELLOW
CREAT SERPL-MCNC: 1.3 MG/DL (ref 0.5–1.4)
CREAT SERPL-MCNC: 1.4 MG/DL (ref 0.5–1.4)
DIFFERENTIAL METHOD: ABNORMAL
EOSINOPHIL # BLD AUTO: 0.2 K/UL (ref 0–0.5)
EOSINOPHIL NFR BLD: 1.8 % (ref 0–8)
ERYTHROCYTE [DISTWIDTH] IN BLOOD BY AUTOMATED COUNT: 13.1 % (ref 11.5–14.5)
EST. GFR  (NO RACE VARIABLE): 40.8 ML/MIN/1.73 M^2
GLUCOSE SERPL-MCNC: 99 MG/DL (ref 70–110)
GLUCOSE UR QL STRIP: NEGATIVE
HCT VFR BLD AUTO: 35.2 % (ref 37–48.5)
HGB BLD-MCNC: 11 G/DL (ref 12–16)
HGB UR QL STRIP: NEGATIVE
IMM GRANULOCYTES # BLD AUTO: 0.02 K/UL (ref 0–0.04)
IMM GRANULOCYTES NFR BLD AUTO: 0.2 % (ref 0–0.5)
KETONES UR QL STRIP: NEGATIVE
LEUKOCYTE ESTERASE UR QL STRIP: NEGATIVE
LIPASE SERPL-CCNC: 33 U/L (ref 4–60)
LYMPHOCYTES # BLD AUTO: 1.2 K/UL (ref 1–4.8)
LYMPHOCYTES NFR BLD: 13.1 % (ref 18–48)
MCH RBC QN AUTO: 28.6 PG (ref 27–31)
MCHC RBC AUTO-ENTMCNC: 31.3 G/DL (ref 32–36)
MCV RBC AUTO: 91 FL (ref 82–98)
MONOCYTES # BLD AUTO: 0.8 K/UL (ref 0.3–1)
MONOCYTES NFR BLD: 9.4 % (ref 4–15)
NEUTROPHILS # BLD AUTO: 6.7 K/UL (ref 1.8–7.7)
NEUTROPHILS NFR BLD: 75.4 % (ref 38–73)
NITRITE UR QL STRIP: NEGATIVE
NRBC BLD-RTO: 0 /100 WBC
OB PNL STL: NEGATIVE
PH UR STRIP: 5 [PH] (ref 5–8)
PLATELET # BLD AUTO: 196 K/UL (ref 150–450)
PMV BLD AUTO: 9.6 FL (ref 9.2–12.9)
POTASSIUM SERPL-SCNC: 3.5 MMOL/L (ref 3.5–5.1)
PROT SERPL-MCNC: 6.2 G/DL (ref 6–8.4)
PROT UR QL STRIP: NEGATIVE
RBC # BLD AUTO: 3.85 M/UL (ref 4–5.4)
SAMPLE: NORMAL
SODIUM SERPL-SCNC: 136 MMOL/L (ref 136–145)
SP GR UR STRIP: 1 (ref 1–1.03)
URN SPEC COLLECT METH UR: NORMAL
UROBILINOGEN UR STRIP-ACNC: NEGATIVE EU/DL
WBC # BLD AUTO: 8.92 K/UL (ref 3.9–12.7)
WBC #/AREA STL HPF: ABNORMAL /[HPF]

## 2023-09-16 PROCEDURE — 96360 HYDRATION IV INFUSION INIT: CPT

## 2023-09-16 PROCEDURE — 87449 NOS EACH ORGANISM AG IA: CPT | Performed by: EMERGENCY MEDICINE

## 2023-09-16 PROCEDURE — 25000003 PHARM REV CODE 250: Performed by: EMERGENCY MEDICINE

## 2023-09-16 PROCEDURE — 83690 ASSAY OF LIPASE: CPT | Performed by: EMERGENCY MEDICINE

## 2023-09-16 PROCEDURE — 99284 EMERGENCY DEPT VISIT MOD MDM: CPT | Mod: 25

## 2023-09-16 PROCEDURE — 96361 HYDRATE IV INFUSION ADD-ON: CPT

## 2023-09-16 PROCEDURE — 89055 LEUKOCYTE ASSESSMENT FECAL: CPT | Performed by: EMERGENCY MEDICINE

## 2023-09-16 PROCEDURE — 87209 SMEAR COMPLEX STAIN: CPT | Performed by: EMERGENCY MEDICINE

## 2023-09-16 PROCEDURE — 87177 OVA AND PARASITES SMEARS: CPT | Performed by: EMERGENCY MEDICINE

## 2023-09-16 PROCEDURE — 87046 STOOL CULTR AEROBIC BACT EA: CPT | Mod: 59 | Performed by: EMERGENCY MEDICINE

## 2023-09-16 PROCEDURE — 81003 URINALYSIS AUTO W/O SCOPE: CPT | Performed by: EMERGENCY MEDICINE

## 2023-09-16 PROCEDURE — 82565 ASSAY OF CREATININE: CPT

## 2023-09-16 PROCEDURE — 87449 NOS EACH ORGANISM AG IA: CPT | Mod: 91 | Performed by: EMERGENCY MEDICINE

## 2023-09-16 PROCEDURE — 80053 COMPREHEN METABOLIC PANEL: CPT | Performed by: EMERGENCY MEDICINE

## 2023-09-16 PROCEDURE — 87045 FECES CULTURE AEROBIC BACT: CPT | Performed by: EMERGENCY MEDICINE

## 2023-09-16 PROCEDURE — 82272 OCCULT BLD FECES 1-3 TESTS: CPT | Performed by: EMERGENCY MEDICINE

## 2023-09-16 PROCEDURE — 85025 COMPLETE CBC W/AUTO DIFF WBC: CPT | Performed by: EMERGENCY MEDICINE

## 2023-09-16 RX ORDER — CIPROFLOXACIN 500 MG/1
500 TABLET ORAL 2 TIMES DAILY
Qty: 20 TABLET | Refills: 0 | Status: SHIPPED | OUTPATIENT
Start: 2023-09-16 | End: 2023-09-26

## 2023-09-16 RX ORDER — ONDANSETRON 4 MG/1
4 TABLET, ORALLY DISINTEGRATING ORAL ONCE
Qty: 1 TABLET | Refills: 0 | Status: SHIPPED | OUTPATIENT
Start: 2023-09-16 | End: 2023-09-16

## 2023-09-16 RX ORDER — SODIUM CHLORIDE 9 MG/ML
1000 INJECTION, SOLUTION INTRAVENOUS
Status: COMPLETED | OUTPATIENT
Start: 2023-09-16 | End: 2023-09-16

## 2023-09-16 RX ORDER — METRONIDAZOLE 500 MG/1
500 TABLET ORAL 3 TIMES DAILY
Qty: 30 TABLET | Refills: 0 | Status: SHIPPED | OUTPATIENT
Start: 2023-09-16 | End: 2023-09-26

## 2023-09-16 RX ADMIN — SODIUM CHLORIDE 1000 ML: 0.9 INJECTION, SOLUTION INTRAVENOUS at 12:09

## 2023-09-16 NOTE — ED PROVIDER NOTES
Encounter Date: 9/16/2023       History     Chief Complaint   Patient presents with    Diarrhea     X 2-3 DAYS, WATERY. NO ABD PAIN     Patient presents complaining of diarrhea that has been ongoing for the last 3 days.  She has some crampy abdominal discomfort but no real pain.  No bloody diarrhea.  No hematemesis.  No recent travel.  No recent antibiotic therapy.  No recent illness.  At the worst symptoms are mild-to-moderate.      Review of patient's allergies indicates:   Allergen Reactions    Oxycodone-acetaminophen Hallucinations and Other (See Comments)    Amoxicillin Rash     Past Medical History:   Diagnosis Date    Aortic insufficiency     Cancer 2005    renal ca, L kidney removed    COVID-19 2022    Diastolic heart failure     Diffusion capacity of lung (dl), decreased     Encounter for blood transfusion 1963    with miscarriage    Fibromyalgia     GERD (gastroesophageal reflux disease)     Hypercholesterolemia     Hyperlipidemia     Hypertension     Lung cancer 2015    adenocarcinoma ; upper left lobe    Renal disorder 2005    Left Kidney cancer    Sinusitis      Past Surgical History:   Procedure Laterality Date    BACK SURGERY  2013    laminectomy    ENDOBRONCHIAL ULTRASOUND Left 07/15/2022    Procedure: ENDOBRONCHIAL ULTRASOUND (EBUS);  Surgeon: Cm Freitas MD;  Location: [facility];  Service: Pulmonary;  Laterality: Left;    HYSTERECTOMY  1975    CARY/BSO. Due to endometriosis.    INJECTION OF JOINT Right 05/27/2019    Procedure: Injection, Joint, Shoulder, Hip, Or Knee;  Surgeon: Zach Wilder MD;  Location: [facility];  Service: Pain Management;  Laterality: Right;  right hip intra-articular injection     left upper lobectomy      NEPHRECTOMY Left 2005    Entire kidney due to cancer.     Family History   Problem Relation Age of Onset    Hypertension Mother     Breast cancer Maternal Aunt     Breast cancer Maternal Aunt      Social History     Tobacco Use    Smoking status: Never    Smokeless tobacco:  Never   Substance Use Topics    Alcohol use: No    Drug use: No     Review of Systems   All other systems reviewed and are negative.      Physical Exam     Initial Vitals [09/16/23 1118]   BP Pulse Resp Temp SpO2   (!) 175/72 76 18 98.8 °F (37.1 °C) 100 %      MAP       --         Physical Exam    Nursing note and vitals reviewed.  Constitutional: She appears well-developed and well-nourished.   Pleasant, polite   HENT:   Head: Normocephalic and atraumatic.   Eyes: EOM are normal.   Neck: Neck supple.   Normal range of motion.  Cardiovascular:  Normal rate, regular rhythm, normal heart sounds and intact distal pulses.           Pulmonary/Chest: Breath sounds normal. No respiratory distress.   Abdominal: Abdomen is soft.   Musculoskeletal:         General: Normal range of motion.      Cervical back: Normal range of motion and neck supple.     Neurological: She is alert and oriented to person, place, and time. She has normal strength.   Skin: Skin is warm and dry. Capillary refill takes less than 2 seconds.   Psychiatric: She has a normal mood and affect. Her behavior is normal. Judgment and thought content normal.         ED Course   Procedures  Labs Reviewed   CBC W/ AUTO DIFFERENTIAL - Abnormal; Notable for the following components:       Result Value    RBC 3.85 (*)     Hemoglobin 11.0 (*)     Hematocrit 35.2 (*)     MCHC 31.3 (*)     Gran % 75.4 (*)     Lymph % 13.1 (*)     All other components within normal limits   COMPREHENSIVE METABOLIC PANEL - Abnormal; Notable for the following components:    Calcium 8.3 (*)     Alkaline Phosphatase 53 (*)     ALT 9 (*)     eGFR 40.8 (*)     Anion Gap 2 (*)     All other components within normal limits   WBC, STOOL - Abnormal; Notable for the following components:    Stool WBC Mod neutrophils seen (*)     All other components within normal limits   CLOSTRIDIUM DIFFICILE   CULTURE, STOOL   LIPASE   URINALYSIS, REFLEX TO URINE CULTURE    Narrative:     Specimen Source->Urine    OCCULT BLOOD X 1, STOOL   STOOL EXAM-OVA,CYSTS,PARASITES   ISTAT CREATININE   POCT CREATININE          Imaging Results              CT Abdomen Pelvis  Without Contrast (Final result)  Result time 09/16/23 14:39:37   Procedure changed from CT Abdomen Pelvis With Contrast     Final result by Adolfo Alberto MD (09/16/23 14:39:37)                   Narrative:    CT ABDOMEN AND PELVIS WITHOUT CONTRAST    HISTORY: Abdominal pain, diarrhea    CMS MANDATED QUALITY DATA - CT RADIATION  436    All CT scans at this facility utilize dose modulation, iterative reconstruction, and/or weight based dosing when appropriate to reduce radiation dose to as low as reasonably achievable    FINDINGS: Noncontrast axial images were obtained. The lack of intravenous contrast limits assessment, most notably in regards to solid organs and vascular structures.    Comparison is made to January 2021.    There is mild linear scarring at the left lung base. The right lung base is normal.    The liver has a normal noncontrast appearance. The gallbladder and biliary tree are unremarkable. The spleen is normal in size and appearance. The pancreas and adrenal glands are normal. The right kidney and collecting system are within normal limits. The left kidney is surgically absent. Mild aortic atherosclerosis is noted without evidence of aneurysm.    Multiple colonic diverticula are noted. In the left lower quadrant, the sigmoid colon appears slightly thick-walled with mild surrounding inflammatory reaction, consistent with mild or early acute diverticulitis. There is no free air or free fluid.    1.3 cm nodular opacity is noted in the posterior pararenal space on the right. This was present on the prior study from 2021, however has increased in size, previously measuring 6 mm. This was not present on a prior study from 2013. Most likely possibility is a prominent lymph node. Short-term CT follow-up is recommended.    Images of the pelvis  demonstrate multiple sigmoid diverticula. The urinary bladder is normal. There is no free fluid.    No acute osseous abnormality is identified.    IMPRESSION:      1. Mild or early acute sigmoid diverticulitis without evidence of perforation or abscess.  2. Gradually enlarging soft tissue nodule in the posterior pararenal space on the right, possibly a lymph node. Short-term follow-up with repeat CT abdomen in 3-6 months is recommended.  3. Additional chronic findings as above.    Electronically signed by:  Adolfo Alberto MD  9/16/2023 2:39 PM CDT Workstation: [id number]                                     Medications   0.9%  NaCl infusion (0 mLs Intravenous Stopped 9/16/23 2232)     Medical Decision Making  No apparent distress     Considerations include but are not limited to diarrhea illness, electrolyte abnormalities, dehydration, diverticulitis, colitis    [facility]    Patient presents for emergent evaluation of acute diarrhea that poses a threat to life and/or bodily function.    In the ED patient found to have acute mild diverticulitis.    I ordered labs and personally reviewed them.  Labs significant for normal labs.    I ordered CT scan and personally reviewed it and reviewed the radiologist interpretation.  CT significant for no acute intra-abdominal abnormality.      Discharge MDM    Patient was managed in the ED with IV normal saline.    The response to treatment was improved.  Patient with findings of mild diverticulitis with a nonacute abdomen and normal white blood cell count good patient candidate for outpatient therapy  Patient was discharged in stable condition.  Detailed return precautions discussed.    Patient previously aware of abnormal lymph node and is following this up with her doctor.    Amount and/or Complexity of Data Reviewed  Labs: ordered. Decision-making details documented in ED Course.  Radiology: ordered.    Risk  Prescription drug management.               ED Course as of 09/16/23  1554   Sat Sep 16, 2023   1536 Stool WBC(!): Mod neutrophils seen [AP]   1536 C. diff Antigen: Negative [AP]   1536 POC Creatinine: 1.4 [AP]   1536 Sodium: 136 [AP]   1536 Potassium: 3.5 [AP]   1536 CO2: 24 [AP]   1536 Glucose: 99 [AP]   1536 BUN: 17 [AP]   1536 Creatinine: 1.3 [AP]   1536 PROTEIN TOTAL: 6.2 [AP]   1536 Albumin: 3.5 [AP]   1536 ALT(!): 9 [AP]   1536 eGFR(!): 40.8 [AP]   1536 Anion Gap(!): 2 [AP]   1536 Hemoglobin(!): 11.0 [AP]   1536 Hematocrit(!): 35.2 [AP]   1536 Lipase: 33 [AP]      ED Course User Index  [AP] Dawson Ziegler MD                    Clinical Impression:   Final diagnoses:  [R19.7] Diarrhea, unspecified type (Primary)  [K57.92] Acute diverticulitis        ED Disposition Condition    Discharge Stable          ED Prescriptions       Medication Sig Dispense Start Date End Date Auth. Provider    metroNIDAZOLE (FLAGYL) 500 MG tablet Take 1 tablet (500 mg total) by mouth 3 (three) times daily. for 10 days 30 tablet 9/16/2023 9/26/2023 Dawson Ziegler MD    ciprofloxacin HCl (CIPRO) 500 MG tablet Take 1 tablet (500 mg total) by mouth 2 (two) times daily. for 10 days 20 tablet 9/16/2023 9/26/2023 Dawson Ziegler MD    ondansetron (ZOFRAN-ODT) 4 MG TbDL (Expires today) Take 1 tablet (4 mg total) by mouth once. for 1 dose 1 tablet 9/16/2023 9/16/2023 Dawson Ziegler MD          Follow-up Information       Follow up With Specialties Details Why Contact Info    Tao Orozco MD Family Medicine Schedule an appointment as soon as possible for a visit in 2 days  [address] [facility] [address]  [phone]               Dawson Ziegler MD  09/16/23 2522       Dawson Ziegler MD  09/16/23 3774

## 2023-09-18 LAB
STOOL CULTURE: NORMAL
STOOL CULTURE: NORMAL

## 2023-09-22 LAB
O+P SPEC MICRO: NORMAL
O+P STL CONC: NORMAL

## 2023-09-27 DIAGNOSIS — R59.0 LOCALIZED ENLARGED LYMPH NODES: ICD-10-CM

## 2023-09-27 DIAGNOSIS — Z85.528 HISTORY OF KIDNEY CANCER: ICD-10-CM

## 2023-09-27 DIAGNOSIS — K57.32 DIVERTICULITIS LARGE INTESTINE: Primary | ICD-10-CM

## 2023-09-29 RX ORDER — LABETALOL 200 MG/1
200 TABLET, FILM COATED ORAL 2 TIMES DAILY
Qty: 180 TABLET | Refills: 3 | Status: SHIPPED | OUTPATIENT
Start: 2023-09-29

## 2023-10-12 ENCOUNTER — OFFICE VISIT (OUTPATIENT)
Dept: CARDIOLOGY | Facility: CLINIC | Age: 83
End: 2023-10-12
Payer: MEDICARE

## 2023-10-12 VITALS
BODY MASS INDEX: 23.06 KG/M2 | OXYGEN SATURATION: 99 % | DIASTOLIC BLOOD PRESSURE: 78 MMHG | HEART RATE: 85 BPM | HEIGHT: 66 IN | SYSTOLIC BLOOD PRESSURE: 130 MMHG | WEIGHT: 143.5 LBS

## 2023-10-12 DIAGNOSIS — R06.02 SOB (SHORTNESS OF BREATH): ICD-10-CM

## 2023-10-12 DIAGNOSIS — R60.0 LEG EDEMA: ICD-10-CM

## 2023-10-12 DIAGNOSIS — Z90.2 S/P LOBECTOMY OF LUNG: ICD-10-CM

## 2023-10-12 DIAGNOSIS — I51.89 DIASTOLIC DYSFUNCTION: Primary | ICD-10-CM

## 2023-10-12 DIAGNOSIS — Z85.118 HISTORY OF LUNG CANCER: ICD-10-CM

## 2023-10-12 DIAGNOSIS — Z85.528 HISTORY OF RENAL CELL CANCER: ICD-10-CM

## 2023-10-12 DIAGNOSIS — I10 PRIMARY HYPERTENSION: ICD-10-CM

## 2023-10-12 PROCEDURE — 99213 OFFICE O/P EST LOW 20 MIN: CPT | Mod: PBBFAC,PN | Performed by: INTERNAL MEDICINE

## 2023-10-12 PROCEDURE — 99214 OFFICE O/P EST MOD 30 MIN: CPT | Mod: S$PBB,,, | Performed by: INTERNAL MEDICINE

## 2023-10-12 PROCEDURE — 99214 PR OFFICE/OUTPT VISIT, EST, LEVL IV, 30-39 MIN: ICD-10-PCS | Mod: S$PBB,,, | Performed by: INTERNAL MEDICINE

## 2023-10-12 PROCEDURE — 99999 PR PBB SHADOW E&M-EST. PATIENT-LVL III: ICD-10-PCS | Mod: PBBFAC,,, | Performed by: INTERNAL MEDICINE

## 2023-10-12 PROCEDURE — 99999 PR PBB SHADOW E&M-EST. PATIENT-LVL III: CPT | Mod: PBBFAC,,, | Performed by: INTERNAL MEDICINE

## 2023-10-12 RX ORDER — AMLODIPINE BESYLATE 5 MG/1
5 TABLET ORAL NIGHTLY
Qty: 90 TABLET | Refills: 3 | Status: SHIPPED | OUTPATIENT
Start: 2023-10-12 | End: 2024-10-06

## 2023-10-12 RX ORDER — LISINOPRIL 20 MG/1
20 TABLET ORAL 2 TIMES DAILY
Qty: 180 TABLET | Refills: 3 | Status: SHIPPED | OUTPATIENT
Start: 2023-10-12

## 2023-10-12 RX ORDER — HYDROCHLOROTHIAZIDE 12.5 MG/1
12.5 TABLET ORAL DAILY PRN
Qty: 30 TABLET | Refills: 11 | Status: SHIPPED | OUTPATIENT
Start: 2023-10-12 | End: 2024-10-11

## 2023-10-12 NOTE — PROGRESS NOTES
Patient ID:  Karlie Hess is a 83 y.o. female who presents for follow-up of Edema (Legs,ankle, & feet)      She has been doing fairly well.  She noted that she was having swelling of her feet.  They were quite impressive.  She took 20 mg of Lasix for 2 days with good diuresing and the swelling went away.  She denies any chest pains any shortness of breath.  She had a noninvasive cardiac workup done a year ago by Dr. Yen.  She has history of lung cancer she had a left lower lobe lobectomy done she also had kidney cancer and had a left nephrectomy.        Past Medical History:   Diagnosis Date    Aortic insufficiency     Cancer 2005    renal ca, L kidney removed    COVID-19 2022    Diastolic heart failure     Diffusion capacity of lung (dl), decreased     Encounter for blood transfusion 1963    with miscarriage    Fibromyalgia     GERD (gastroesophageal reflux disease)     Hypercholesterolemia     Hyperlipidemia     Hypertension     Lung cancer 2015    adenocarcinoma ; upper left lobe    Renal disorder 2005    Left Kidney cancer    Sinusitis         Past Surgical History:   Procedure Laterality Date    BACK SURGERY  2013    laminectomy    ENDOBRONCHIAL ULTRASOUND Left 07/15/2022    Procedure: ENDOBRONCHIAL ULTRASOUND (EBUS);  Surgeon: Cm Freitas MD;  Location: [facility];  Service: Pulmonary;  Laterality: Left;    HYSTERECTOMY  1975    CARY/BSO. Due to endometriosis.    INJECTION OF JOINT Right 05/27/2019    Procedure: Injection, Joint, Shoulder, Hip, Or Knee;  Surgeon: Zach Wilder MD;  [facility]  Service: Pain Management;  Laterality: Right;  right hip intra-articular injection     left upper lobectomy      NEPHRECTOMY Left 2005    Entire kidney due to cancer.          Current Outpatient Medications   Medication Instructions    acetaminophen (TYLENOL) 1,000 mg, Oral, 2 times daily    alendronate (FOSAMAX) 70 MG tablet 1 tablet 30 minutes before the first food, beverage or medicine of the day  "dissolved in 4 ounces of water    amLODIPine (NORVASC) 5 mg, Oral, Nightly    DULoxetine (CYMBALTA) 60 mg, Oral, Daily    erythromycin (ROMYCIN) ophthalmic ointment Place a 1/2 inch ribbon of ointment into the lower eyelid 4 times daily for 7 days    hydroCHLOROthiazide (HYDRODIURIL) 12.5 mg, Oral, Daily PRN    labetaloL (NORMODYNE) 200 mg, Oral, 2 times daily    lisinopriL (PRINIVIL,ZESTRIL) 20 mg, Oral, 2 times daily    pantoprazole (PROTONIX) 40 mg, Oral, Daily    rosuvastatin (CRESTOR) 10 mg, Oral, Nightly    UNABLE TO FIND 1 capsule, Oral, Daily, prevagen        Review of patient's allergies indicates:   Allergen Reactions    Hydrocodone Hallucinations    Oxycodone-acetaminophen Hallucinations and Other (See Comments)    Amoxicillin Rash        Review of Systems   Cardiovascular:  Positive for leg swelling. Negative for chest pain and dyspnea on exertion.   Respiratory:  Negative for cough and shortness of breath.         Objective:     Vitals:    10/12/23 1402   BP: 130/78   BP Location: Left arm   Patient Position: Sitting   BP Method: Medium (Manual)   Pulse: 85   SpO2: 99%   Weight: 65.1 kg (143 lb 8.3 oz)   Height: 5' 6" (1.676 m)       Physical Exam  Vitals and nursing note reviewed.   Constitutional:       Appearance: She is well-developed.   HENT:      Head: Normocephalic and atraumatic.   Eyes:      Conjunctiva/sclera: Conjunctivae normal.   Cardiovascular:      Rate and Rhythm: Normal rate and regular rhythm.      Heart sounds: Normal heart sounds.   Pulmonary:      Effort: Pulmonary effort is normal.      Breath sounds: Normal breath sounds.   Abdominal:      General: Bowel sounds are normal.      Palpations: Abdomen is soft.   Musculoskeletal:         General: Normal range of motion.   Skin:     General: Skin is warm and dry.   Neurological:      Mental Status: She is alert and oriented to person, place, and time.   Psychiatric:         Behavior: Behavior normal.         Thought Content: Thought " content normal.         Judgment: Judgment normal.       CMP  Sodium   Date Value Ref Range Status   09/16/2023 136 136 - 145 mmol/L Final     Potassium   Date Value Ref Range Status   09/16/2023 3.5 3.5 - 5.1 mmol/L Final     Chloride   Date Value Ref Range Status   09/16/2023 110 95 - 110 mmol/L Final     CO2   Date Value Ref Range Status   09/16/2023 24 23 - 29 mmol/L Final     Glucose   Date Value Ref Range Status   09/16/2023 99 70 - 110 mg/dL Final     BUN   Date Value Ref Range Status   09/16/2023 17 8 - 23 mg/dL Final     Creatinine   Date Value Ref Range Status   09/16/2023 1.3 0.5 - 1.4 mg/dL Final   12/31/2012 1.2 0.5 - 1.4 mg/dL Final     Calcium   Date Value Ref Range Status   09/16/2023 8.3 (L) 8.7 - 10.5 mg/dL Final   12/31/2012 9.7 8.7 - 10.5 mg/dL Final     Total Protein   Date Value Ref Range Status   09/16/2023 6.2 6.0 - 8.4 g/dL Final     Albumin   Date Value Ref Range Status   09/16/2023 3.5 3.5 - 5.2 g/dL Final     Total Bilirubin   Date Value Ref Range Status   09/16/2023 0.8 0.1 - 1.0 mg/dL Final     Comment:     For infants and newborns, interpretation of results should be based  on gestational age, weight and in agreement with clinical  observations.    Premature Infant recommended reference ranges:  Up to 24 hours.............<8.0 mg/dL  Up to 48 hours............<12.0 mg/dL  3-5 days..................<15.0 mg/dL  6-29 days.................<15.0 mg/dL       Alkaline Phosphatase   Date Value Ref Range Status   09/16/2023 53 (L) 55 - 135 U/L Final     AST   Date Value Ref Range Status   09/16/2023 14 10 - 40 U/L Final     ALT   Date Value Ref Range Status   09/16/2023 9 (L) 10 - 44 U/L Final     Anion Gap   Date Value Ref Range Status   09/16/2023 2 (L) 8 - 16 mmol/L Final   12/31/2012 14 5 - 15 meq/L Final     eGFR if    Date Value Ref Range Status   07/18/2022 >60.0 >60 mL/min/1.73 m^2 Final     eGFR if non    Date Value Ref Range Status   07/18/2022 52.6  "(A) >60 mL/min/1.73 m^2 Final     Comment:     Calculation used to obtain the estimated glomerular filtration  rate (eGFR) is the CKD-EPI equation.         BMP  Lab Results   Component Value Date     09/16/2023    K 3.5 09/16/2023     09/16/2023    CO2 24 09/16/2023    BUN 17 09/16/2023    CREATININE 1.3 09/16/2023    CALCIUM 8.3 (L) 09/16/2023    ANIONGAP 2 (L) 09/16/2023    ESTGFRAFRICA >60.0 07/18/2022    EGFRNONAA 52.6 (A) 07/18/2022      BNP  @LABRCNTIP(BNP,BNPTRIAGEBLO)@   Lab Results   Component Value Date    CHOL 157 09/02/2020    CHOL 285 (H) 05/04/2020    CHOL 264 (H) 08/15/2019     Lab Results   Component Value Date    HDL 42 09/02/2020    HDL 37 (L) 05/04/2020    HDL 47 08/15/2019     Lab Results   Component Value Date    LDLCALC 84.8 09/02/2020    LDLCALC 192.0 (H) 05/04/2020    LDLCALC 186.6 (H) 08/15/2019     Lab Results   Component Value Date    TRIG 151 (H) 09/02/2020    TRIG 280 (H) 05/04/2020    TRIG 152 (H) 08/15/2019     Lab Results   Component Value Date    CHOLHDL 26.8 09/02/2020    CHOLHDL 13.0 (L) 05/04/2020    CHOLHDL 17.8 (L) 08/15/2019      Lab Results   Component Value Date    TSH 2.660 08/20/2023     No results found for: "LABA1C", "HGBA1C"  Lab Results   Component Value Date    WBC 8.92 09/16/2023    HGB 11.0 (L) 09/16/2023    HCT 35.2 (L) 09/16/2023    MCV 91 09/16/2023     09/16/2023         Results for orders placed during the hospital encounter of 10/18/22    Echo Saline Bubble? No    Interpretation Summary  · The left ventricle is normal in size with mild concentric hypertrophy and normal systolic function.  · Grade I left ventricular diastolic dysfunction.  · The estimated PA systolic pressure is 25 mmHg.  · Normal right ventricular size with normal right ventricular systolic function.  · Normal central venous pressure (3 mmHg).  · The estimated ejection fraction is 65%.  · Mild tricuspid regurgitation.  · Mild mitral regurgitation.  · Mild aortic " regurgitation.     No results found for this or any previous visit.   10/18/22    Normal myocardial perfusion scan. There is no evidence of myocardial ischemia or infarction.    The gated perfusion images showed an ejection fraction of 85% post stress. Normal ejection fraction is greater than 47%.    There is normal wall motion post stress.    The EKG portion of this study is negative for ischemia.    The patient reported no chest pain during the stress test.    There were no arrhythmias during stress.    T.i.d. 0.93         Assessment:       Primary hypertension  Controlled on labetalol, amlodipine 5 mg, lisinopril 20 mg    History of lung cancer  Aware status post left lower lobe lobectomy    History of renal cell cancer  Aware.  GFR of 40.8 for solitary kidney    Leg edema  She had bilateral ankle edema that resolved with 20 mg of Lasix       Plan:       HCTZ 12.5 mg p.o. daily p.r.n..  She is to be seen in the office in 3 months with a BMP and a BNP

## 2023-10-16 DIAGNOSIS — Z85.528 HISTORY OF KIDNEY CANCER: ICD-10-CM

## 2023-10-16 DIAGNOSIS — R59.0 LOCALIZED ENLARGED LYMPH NODES: ICD-10-CM

## 2023-10-16 DIAGNOSIS — K57.32 DIVERTICULITIS LARGE INTESTINE: Primary | ICD-10-CM

## 2023-10-16 DIAGNOSIS — R93.5 ABNORMAL FINDINGS ON DIAGNOSTIC IMAGING OF OTHER ABDOMINAL REGIONS, INCLUDING RETROPERITONEUM: ICD-10-CM

## 2023-10-18 ENCOUNTER — HOSPITAL ENCOUNTER (OUTPATIENT)
Dept: RADIOLOGY | Facility: HOSPITAL | Age: 83
Discharge: HOME | End: 2023-10-18
Attending: INTERNAL MEDICINE
Payer: MEDICARE

## 2023-10-18 VITALS — BODY MASS INDEX: 23.32 KG/M2 | WEIGHT: 140 LBS | HEIGHT: 65 IN

## 2023-10-18 DIAGNOSIS — Z85.528 HISTORY OF KIDNEY CANCER: ICD-10-CM

## 2023-10-18 DIAGNOSIS — R59.0 LOCALIZED ENLARGED LYMPH NODES: ICD-10-CM

## 2023-10-18 DIAGNOSIS — K57.32 DIVERTICULITIS LARGE INTESTINE: ICD-10-CM

## 2023-10-18 DIAGNOSIS — R93.5 ABNORMAL FINDINGS ON DIAGNOSTIC IMAGING OF OTHER ABDOMINAL REGIONS, INCLUDING RETROPERITONEUM: ICD-10-CM

## 2023-10-18 LAB — GLUCOSE SERPL-MCNC: 105 MG/DL (ref 70–110)

## 2023-10-18 PROCEDURE — A9552 F18 FDG: HCPCS | Mod: PO

## 2023-10-18 PROCEDURE — 78816 PET IMAGE W/CT FULL BODY: CPT | Mod: TC,PS,PO

## 2023-11-01 PROBLEM — R91.8 RIGHT LOWER LOBE LUNG MASS: Status: ACTIVE | Noted: 2023-11-01

## 2023-11-13 ENCOUNTER — TELEPHONE (OUTPATIENT)
Dept: HEMATOLOGY/ONCOLOGY | Facility: CLINIC | Age: 83
End: 2023-11-13
Payer: MEDICARE

## 2023-11-13 NOTE — TELEPHONE ENCOUNTER
----- Message from Vivian Washington sent at 11/13/2023  1:22 PM CST -----  Contact: Pt's grand daughter @ [phone]  Type:  Needs Medical Advice    Who Called: Deana  Symptoms (please be specific): Lung Cancer   How long has patient had these symptoms:  11/13/2023    Would the patient rather a call back or a response via [facility]? Call pt's grand daughterDeana  Best Call Back Number: [phone]  Additional Information: Pt was diagnosed with Cancer this morning 11/13/2023. Pt's grand daughter would like to schedule pt with an oncologist. Please call pt's grand daughter back to advise.

## 2023-11-13 NOTE — NURSING
Granddaughter called to inquire about appointment in [place].  Informed her the RN Navigator in [place] would be notified to contact her.  She agreed and verbalized understanding of plan.

## 2023-11-20 ENCOUNTER — OUTPATIENT CASE MANAGEMENT (OUTPATIENT)
Dept: ADMINISTRATIVE | Facility: OTHER | Age: 83
End: 2023-11-20
Payer: MEDICARE

## 2023-11-21 ENCOUNTER — OFFICE VISIT (OUTPATIENT)
Dept: HEMATOLOGY/ONCOLOGY | Facility: CLINIC | Age: 83
End: 2023-11-21
Payer: MEDICARE

## 2023-11-21 VITALS
DIASTOLIC BLOOD PRESSURE: 68 MMHG | BODY MASS INDEX: 23.65 KG/M2 | WEIGHT: 142.13 LBS | TEMPERATURE: 98 F | SYSTOLIC BLOOD PRESSURE: 157 MMHG | OXYGEN SATURATION: 97 % | HEART RATE: 81 BPM

## 2023-11-21 DIAGNOSIS — C78.01 SECONDARY RENAL CELL CARCINOMA OF RIGHT LUNG: ICD-10-CM

## 2023-11-21 DIAGNOSIS — C64.9 METASTATIC RENAL CELL CARCINOMA TO LUNG, RIGHT: Primary | ICD-10-CM

## 2023-11-21 DIAGNOSIS — C78.01 METASTATIC RENAL CELL CARCINOMA TO LUNG, RIGHT: Primary | ICD-10-CM

## 2023-11-21 DIAGNOSIS — C64.9 SECONDARY RENAL CELL CARCINOMA OF RIGHT LUNG: ICD-10-CM

## 2023-11-21 PROCEDURE — 99205 PR OFFICE/OUTPT VISIT, NEW, LEVL V, 60-74 MIN: ICD-10-PCS | Mod: S$PBB,,, | Performed by: INTERNAL MEDICINE

## 2023-11-21 PROCEDURE — 99205 OFFICE O/P NEW HI 60 MIN: CPT | Mod: S$PBB,,, | Performed by: INTERNAL MEDICINE

## 2023-11-21 PROCEDURE — 99213 OFFICE O/P EST LOW 20 MIN: CPT | Performed by: INTERNAL MEDICINE

## 2023-11-21 NOTE — PROGRESS NOTES
INITIAL [facility] HEM/ONC CONSULTATION      Subjective:       Patient ID: Karlie Hess is a 83 y.o. female.    History of Left RCC, treated [id number] further treatment    History of JAX lung cancer-treated with lobectomy-7/2/2015-no chemo/xrt-Path c/w mucin producing adenocarcinoma. [id number]    History of left hilar mass, 9/2020 EBUS negative    PET 10/18/2023:  Nodular densities are as outlined below:  -21 x 17 mm right infrahilar nodule with SUV max 2.5 (image 119), previously measuring 17 x 16 mm (August 20, 2023)  -27 x 27 mm marginated nodule in the posterior inferior left hilum with SUV max 2.7 (image 109)  -13 x 7 mm nodular density along the right posterior pararenal space with SUV max 0.8 (image 179), seen on studies dating back to 1/12/2021.    11/9/2023-EBUS  1. LUNG, RIGHT LOWER LOBE MASS, FINE NEEDLE ASPIRATION   - NEGATIVE FOR MALIGNANT CELLS.     2. LUNG, RIGHT LOWER LOBE MASS, BRUSHING   - RARE ATYPICAL CELLS PRESENT     3. LUNG, RIGHT LOWER LOBE MASS, BIOPSY TOUCH PREP   - POSITIVE FOR CARCINOMA.   - SEE CONCURRENT SURGICAL BIOPSY ([id number])     4. LUNG, RIGHT LOWER LOBE, BRONCHOALVEOLAR LAVAGE   - RARE ATYPICAL CELLS PRESENT.     5. LUNG, LEFT LOWER LOBE MASS, FINE NEEDLE ASPIRATION   - RARE ATYPICAL CELLS PRESENT     6. LYMPH NODE, STATION 11RS, FINE NEEDLE ASPIRATION   - NEGATIVE FOR MALIGNANT CELLS.   - SCANT KATARZYNA MATERIAL PRESENT     Karnofsky performance status score <80   Time from original diagnosis to initiation of targeted therapy <1 year   Hemoglobin less than the lower limit of normal   Serum calcium greater than the upper limit of normal   Neutrophil count greater than the upper limit of normal   Platelet count greater than the upper limit of normal   Favorable risk: None of the above risk factors present.  Intermediate risk: 1 or 2 of the above risk factors present.  Poor risk: 3 or more risk factors present.    Chief Complaint: No chief complaint on file.  Stage IV renal  cell carcinoma    Ms. Hess is a 84yo female who was treated for left RCC carcinoma in 2005 with nephrectomy.  She did well until 2015 when a JAX nodule, which has been observed for a few years, began to increase in size.  She underwent JAX lobectomy which showed this as a primary lung cancer-Adenocarcinoma.  She received no further chemo or radiation for this.      More recently, she was found to have a growing lesion in the right hilum of the lung.  Scan results shown above.  She underwent EBUS which demonstrated this area as metastatic RCC.  It should be stated that she does have an apparent non-avid LN near the right kidney, the cause of which is not clear.          Past Medical History:   Diagnosis Date    Aortic insufficiency     Cancer 2005    renal ca, L kidney removed    COVID-19 2022    Diastolic heart failure     Diffusion capacity of lung (dl), decreased     Encounter for blood transfusion 1963    with miscarriage    Fibromyalgia     GERD (gastroesophageal reflux disease)     Hypercholesterolemia     Hyperlipidemia     Hypertension     Lung cancer 2015    adenocarcinoma ; upper left lobe    Renal disorder 2005    Left Kidney cancer    Sinusitis        Past Surgical History:   Procedure Laterality Date    BACK SURGERY  2013    laminectomy    ENDOBRONCHIAL ULTRASOUND Left 07/15/2022    Procedure: ENDOBRONCHIAL ULTRASOUND (EBUS);  Surgeon: Cm Freitas MD;  Location: [facility];  Service: Pulmonary;  Laterality: Left;    ENDOBRONCHIAL ULTRASOUND Bilateral 11/9/2023    Procedure: ENDOBRONCHIAL ULTRASOUND (EBUS);  Surgeon: Cm Freitas MD;  Location: [facility] OR;  Service: Pulmonary;  Laterality: Bilateral;    HYSTERECTOMY  1975    CARY/BSO. Due to endometriosis.    INJECTION OF JOINT Right 05/27/2019    Procedure: Injection, Joint, Shoulder, Hip, Or Knee;  Surgeon: Zach Wilder MD;  Location: [facility] OR;  Service: Pain Management;  Laterality: Right;  right hip intra-articular injection     LUNG LOBECTOMY  Left     LOWER LOBE    NEPHRECTOMY Left 2005    Entire kidney due to cancer.    ROBOTIC BRONCHOSCOPY Bilateral 11/9/2023    Procedure: ROBOTIC BRONCHOSCOPY;  Surgeon: Cm Freitas MD;  Location: [facility] OR;  Service: Pulmonary;  Laterality: Bilateral;       Social History     Socioeconomic History    Marital status:    Tobacco Use    Smoking status: Never    Smokeless tobacco: Never   Substance and Sexual Activity    Alcohol use: No    Drug use: No    Sexual activity: Not Currently       Family History   Problem Relation Age of Onset    Hypertension Mother     Breast cancer Maternal Aunt     Breast cancer Maternal Aunt        Review of patient's allergies indicates:   Allergen Reactions    Hydrocodone Hallucinations    Oxycodone-acetaminophen Hallucinations and Other (See Comments)    Amoxicillin Rash       Current Outpatient Medications:     acetaminophen (TYLENOL) 500 MG tablet, Take 1,000 mg by mouth 2 (two) times daily as needed., Disp: , Rfl:     amLODIPine (NORVASC) 5 MG tablet, Take 1 tablet (5 mg total) by mouth every evening., Disp: 90 tablet, Rfl: 3    aspirin (ECOTRIN) 81 MG EC tablet, Take 81 mg by mouth once daily., Disp: , Rfl:     cyanocobalamin (VITAMIN B-12) 1000 MCG tablet, Take 100 mcg by mouth once daily., Disp: , Rfl:     duloxetine (CYMBALTA) 60 MG capsule, Take 60 mg by mouth once daily., Disp: , Rfl:     hydroCHLOROthiazide (HYDRODIURIL) 12.5 MG Tab, Take 1 tablet (12.5 mg total) by mouth daily as needed (Leg swelling)., Disp: 30 tablet, Rfl: 11    ipratropium (ATROVENT) 42 mcg (0.06 %) nasal spray, by Each Nostril route., Disp: , Rfl:     labetaloL (NORMODYNE) 200 MG tablet, TAKE 1 TABLET BY MOUTH TWICE  DAILY, Disp: 180 tablet, Rfl: 3    lisinopriL (PRINIVIL,ZESTRIL) 20 MG tablet, Take 1 tablet (20 mg total) by mouth 2 (two) times daily., Disp: 180 tablet, Rfl: 3    pantoprazole (PROTONIX) 40 MG tablet, Take 40 mg by mouth once daily., Disp: , Rfl:     rosuvastatin (CRESTOR) 10 MG  tablet, Take 10 mg by mouth every evening., Disp: , Rfl:     UNABLE TO FIND, Take 1 capsule by mouth once daily. prevagen, Disp: , Rfl:   No current facility-administered medications for this visit.    Facility-Administered Medications Ordered in Other Visits:     LIDOcaine (PF) 10 mg/ml (1%) injection 10 mg, 1 mL, Intradermal, Once, Gabriel Loaiza MD    All medications and past history have been reviewed.    Review of Systems    Objective:        BP (!) 157/68   Pulse 81   Temp 98.4 °F (36.9 °C)   Wt 64.5 kg (142 lb 1.6 oz)   SpO2 97%   BMI 23.65 kg/m²     Physical Exam  Constitutional:       Appearance: Normal appearance.   HENT:      Head: Normocephalic and atraumatic.   Eyes:      General: No scleral icterus.     Conjunctiva/sclera: Conjunctivae normal.   Cardiovascular:      Rate and Rhythm: Normal rate.   Pulmonary:      Effort: Pulmonary effort is normal.   Abdominal:      General: Abdomen is flat.   Neurological:      General: No focal deficit present.      Mental Status: She is alert and oriented to person, place, and time.   Psychiatric:         Mood and Affect: Mood normal.         Behavior: Behavior normal.           Lab  No results found for this or any previous visit (from the past 336 hour(s)).  CMP  Sodium   Date Value Ref Range Status   11/09/2023 143 136 - 145 mmol/L Final     Potassium   Date Value Ref Range Status   11/09/2023 4.0 3.5 - 5.1 mmol/L Final     Comment:     Anion Gap reference range revised on 4/28/2023     Chloride   Date Value Ref Range Status   11/09/2023 107 95 - 110 mmol/L Final     CO2   Date Value Ref Range Status   11/09/2023 30 22 - 31 mmol/L Final     Glucose   Date Value Ref Range Status   11/09/2023 112 (H) 70 - 110 mg/dL Final     Comment:     The ADA recommends the following guidelines for fasting glucose:    Normal:       less than 100 mg/dL    Prediabetes:  100 mg/dL to 125 mg/dL    Diabetes:     126 mg/dL or higher       BUN   Date Value Ref Range Status    11/09/2023 20 (H) 7 - 18 mg/dL Final     Creatinine   Date Value Ref Range Status   11/09/2023 1.11 0.50 - 1.40 mg/dL Final   12/31/2012 1.2 0.5 - 1.4 mg/dL Final     Calcium   Date Value Ref Range Status   11/09/2023 8.8 8.4 - 10.2 mg/dL Final   12/31/2012 9.7 8.7 - 10.5 mg/dL Final     Total Protein   Date Value Ref Range Status   09/16/2023 6.2 6.0 - 8.4 g/dL Final     Albumin   Date Value Ref Range Status   09/16/2023 3.5 3.5 - 5.2 g/dL Final     Total Bilirubin   Date Value Ref Range Status   09/16/2023 0.8 0.1 - 1.0 mg/dL Final     Comment:     For infants and newborns, interpretation of results should be based  on gestational age, weight and in agreement with clinical  observations.    Premature Infant recommended reference ranges:  Up to 24 hours.............<8.0 mg/dL  Up to 48 hours............<12.0 mg/dL  3-5 days..................<15.0 mg/dL  6-29 days.................<15.0 mg/dL       Alkaline Phosphatase   Date Value Ref Range Status   09/16/2023 53 (L) 55 - 135 U/L Final     AST   Date Value Ref Range Status   09/16/2023 14 10 - 40 U/L Final     ALT   Date Value Ref Range Status   09/16/2023 9 (L) 10 - 44 U/L Final     Anion Gap   Date Value Ref Range Status   11/09/2023 6 5 - 12 mmol/L Final     Comment:     Anion Gap reference range revised on 4/28/2023 12/31/2012 14 5 - 15 meq/L Final     eGFR if    Date Value Ref Range Status   07/18/2022 >60.0 >60 mL/min/1.73 m^2 Final     eGFR if non    Date Value Ref Range Status   07/18/2022 52.6 (A) >60 mL/min/1.73 m^2 Final     Comment:     Calculation used to obtain the estimated glomerular filtration  rate (eGFR) is the CKD-EPI equation.            Specimen (24h ago, onward)      None                  All lab results and imaging results have been reviewed and discussed with the patient.     Assessment:       1. Metastatic renal cell carcinoma to lung, right    2. Secondary renal cell carcinoma of right lung      Problem  List Items Addressed This Visit       Metastatic renal cell carcinoma to lung, right - Primary     I had a long discussion with Ms. Hess about this unique situation.  She had kidney cancer 18 years ago and is only now being diagnosed with metastatic disease.  I also evaluated her risk profile and find there to have only anemia as a dana against her.  Given this anemia is likely caused by something else, she appears to be low risk.      I reviewed the options of surveillance of this disease process but one concern I have if this is truly oligometastatic disease.  The LN near the kidney concerns me but it is hard to know what this really is and quite difficulty to biopsy it.  Would consider SBRT to lung lesion but a surgery seems challenging at best given the location and her prior lobectomy.      I will present her case at  to see what the board thinks of this situation and will have her back with me after to discuss. Spent over 60 min in total care today including pre-visit review and charting, visit and post-visit planning.             Other Visit Diagnoses       Secondary renal cell carcinoma of right lung               Cancer Staging   No matching staging information was found for the patient.      Plan:         Follow up in about 3 weeks (around 12/12/2023).       The plan was discussed with the patient and all questions/concerns have been answered to the patient's satisfaction.

## 2023-11-21 NOTE — ASSESSMENT & PLAN NOTE
I had a long discussion with Ms. Hess about this unique situation.  She had kidney cancer 18 years ago and is only now being diagnosed with metastatic disease.  I also evaluated her risk profile and find there to have only anemia as a dana against her.  Given this anemia is likely caused by something else, she appears to be low risk.      I reviewed the options of surveillance of this disease process but one concern I have if this is truly oligometastatic disease.  The LN near the kidney concerns me but it is hard to know what this really is and quite difficulty to biopsy it.  Would consider SBRT to lung lesion but a surgery seems challenging at best given the location and her prior lobectomy.      I will present her case at  to see what the board thinks of this situation and will have her back with me after to discuss. Spent over 60 min in total care today including pre-visit review and charting, visit and post-visit planning.

## 2023-11-28 ENCOUNTER — TUMOR BOARD CONFERENCE (OUTPATIENT)
Dept: ONCOLOGY | Facility: CLINIC | Age: 83
End: 2023-11-28

## 2023-11-28 DIAGNOSIS — C78.01 METASTATIC RENAL CELL CARCINOMA TO LUNG, RIGHT: ICD-10-CM

## 2023-11-28 DIAGNOSIS — R91.8 MASS OF LEFT LUNG: Primary | ICD-10-CM

## 2023-11-28 DIAGNOSIS — C64.9 METASTATIC RENAL CELL CARCINOMA TO LUNG, RIGHT: ICD-10-CM

## 2023-12-05 DIAGNOSIS — Z12.31 BREAST CANCER SCREENING BY MAMMOGRAM: Primary | ICD-10-CM

## 2023-12-11 ENCOUNTER — HOSPITAL ENCOUNTER (OUTPATIENT)
Dept: RADIOLOGY | Facility: HOSPITAL | Age: 83
Discharge: HOME | End: 2023-12-11
Attending: FAMILY MEDICINE
Payer: MEDICARE

## 2023-12-11 DIAGNOSIS — Z12.31 BREAST CANCER SCREENING BY MAMMOGRAM: ICD-10-CM

## 2023-12-11 PROCEDURE — 77067 SCR MAMMO BI INCL CAD: CPT | Mod: TC,PO

## 2023-12-11 NOTE — PROGRESS NOTES
[facility] HEM/ONC CONSULTATION      Subjective:       Patient ID: Karlie Hess is a 83 y.o. female.    History of Left RCC, treated [id number] further treatment    History of JAX lung cancer-treated with lobectomy-7/2/2015-no chemo/xrt-Path c/w mucin producing adenocarcinoma. [id number]    History of left hilar mass, 9/2020 EBUS negative    PET 10/18/2023:  Nodular densities are as outlined below:  -21 x 17 mm right infrahilar nodule with SUV max 2.5 (image 119), previously measuring 17 x 16 mm (August 20, 2023)  -27 x 27 mm marginated nodule in the posterior inferior left hilum with SUV max 2.7 (image 109)  -13 x 7 mm nodular density along the right posterior pararenal space with SUV max 0.8 (image 179), seen on studies dating back to 1/12/2021.    11/9/2023-EBUS  1. LUNG, RIGHT LOWER LOBE MASS, FINE NEEDLE ASPIRATION   - NEGATIVE FOR MALIGNANT CELLS.     2. LUNG, RIGHT LOWER LOBE MASS, BRUSHING   - RARE ATYPICAL CELLS PRESENT     3. LUNG, RIGHT LOWER LOBE MASS, BIOPSY TOUCH PREP   - POSITIVE FOR CARCINOMA.   - SEE CONCURRENT SURGICAL BIOPSY ([id number])     4. LUNG, RIGHT LOWER LOBE, BRONCHOALVEOLAR LAVAGE   - RARE ATYPICAL CELLS PRESENT.     5. LUNG, LEFT LOWER LOBE MASS, FINE NEEDLE ASPIRATION   - RARE ATYPICAL CELLS PRESENT     6. LYMPH NODE, STATION 11RS, FINE NEEDLE ASPIRATION   - NEGATIVE FOR MALIGNANT CELLS.   - SCANT KATARZYNA MATERIAL PRESENT     Karnofsky performance status score <80   Time from original diagnosis to initiation of targeted therapy <1 year   Hemoglobin less than the lower limit of normal   Serum calcium greater than the upper limit of normal   Neutrophil count greater than the upper limit of normal   Platelet count greater than the upper limit of normal   Favorable risk: None of the above risk factors present.  Intermediate risk: 1 or 2 of the above risk factors present.  Poor risk: 3 or more risk factors present.    Chief Complaint: Stage IV renal cell carcinoma    Met with the  patient today to discuss tumor board recommendations. Patient met with radiation oncology this morning and is getting her CT Sim on Friday. She will be getting radiation 5 Fractions.    Discussed the possibility of a pararenal biopsy with Dr. Bergman and he is able to biopsy that lesion. Discussed with the patient and she is agreeable. Also recs from tumor board include MRI Brain.    Previously per Dr. Wasserman:  Ms. Hess is a 84yo female who was treated for left RCC carcinoma in 2005 with nephrectomy.  She did well until 2015 when a JAX nodule, which has been observed for a few years, began to increase in size.  She underwent JAX lobectomy which showed this as a primary lung cancer-Adenocarcinoma.  She received no further chemo or radiation for this.      More recently, she was found to have a growing lesion in the right hilum of the lung.  Scan results shown above.  She underwent EBUS which demonstrated this area as metastatic RCC.  It should be stated that she does have an apparent non-avid LN near the right kidney, the cause of which is not clear.          Past Medical History:   Diagnosis Date    Aortic insufficiency     Cancer 2005    renal ca, L kidney removed    COVID-19 2022    Diastolic heart failure     Diffusion capacity of lung (dl), decreased     Encounter for blood transfusion 1963    with miscarriage    Fibromyalgia     GERD (gastroesophageal reflux disease)     Hypercholesterolemia     Hyperlipidemia     Hypertension     Lung cancer 2015    adenocarcinoma ; upper left lobe    Renal disorder 2005    Left Kidney cancer    Sinusitis        Past Surgical History:   Procedure Laterality Date    BACK SURGERY  2013    laminectomy    ENDOBRONCHIAL ULTRASOUND Left 07/15/2022    Procedure: ENDOBRONCHIAL ULTRASOUND (EBUS);  Surgeon: Cm Freitas MD;  Location: [facility];  Service: Pulmonary;  Laterality: Left;    ENDOBRONCHIAL ULTRASOUND Bilateral 11/9/2023    Procedure: ENDOBRONCHIAL ULTRASOUND (EBUS);   Surgeon: Cm Freitas MD;  Location: [facility] OR;  Service: Pulmonary;  Laterality: Bilateral;    HYSTERECTOMY  1975    CARY/BSO. Due to endometriosis.    INJECTION OF JOINT Right 05/27/2019    Procedure: Injection, Joint, Shoulder, Hip, Or Knee;  Surgeon: Zach Wilder MD;  Location: [facility] OR;  Service: Pain Management;  Laterality: Right;  right hip intra-articular injection     LUNG LOBECTOMY Left     LOWER LOBE    NEPHRECTOMY Left 2005    Entire kidney due to cancer.    ROBOTIC BRONCHOSCOPY Bilateral 11/9/2023    Procedure: ROBOTIC BRONCHOSCOPY;  Surgeon: Cm Freitas MD;  Location: [facility] OR;  Service: Pulmonary;  Laterality: Bilateral;       Social History     Socioeconomic History    Marital status:    Tobacco Use    Smoking status: Never    Smokeless tobacco: Never   Substance and Sexual Activity    Alcohol use: No    Drug use: No    Sexual activity: Not Currently       Family History   Problem Relation Age of Onset    Hypertension Mother     Breast cancer Maternal Aunt     Breast cancer Maternal Aunt        Review of patient's allergies indicates:   Allergen Reactions    Hydrocodone Hallucinations    Oxycodone-acetaminophen Hallucinations and Other (See Comments)    Amoxicillin Rash       Current Outpatient Medications:     acetaminophen (TYLENOL) 500 MG tablet, Take 1,000 mg by mouth 2 (two) times daily as needed., Disp: , Rfl:     amLODIPine (NORVASC) 5 MG tablet, Take 1 tablet (5 mg total) by mouth every evening., Disp: 90 tablet, Rfl: 3    aspirin (ECOTRIN) 81 MG EC tablet, Take 81 mg by mouth once daily., Disp: , Rfl:     cyanocobalamin (VITAMIN B-12) 1000 MCG tablet, Take 100 mcg by mouth once daily., Disp: , Rfl:     duloxetine (CYMBALTA) 60 MG capsule, Take 60 mg by mouth once daily., Disp: , Rfl:     hydroCHLOROthiazide (HYDRODIURIL) 12.5 MG Tab, Take 1 tablet (12.5 mg total) by mouth daily as needed (Leg swelling)., Disp: 30 tablet, Rfl: 11    ipratropium (ATROVENT) 42 mcg (0.06 %) nasal  "spray, by Each Nostril route., Disp: , Rfl:     labetaloL (NORMODYNE) 200 MG tablet, TAKE 1 TABLET BY MOUTH TWICE  DAILY, Disp: 180 tablet, Rfl: 3    lisinopriL (PRINIVIL,ZESTRIL) 20 MG tablet, Take 1 tablet (20 mg total) by mouth 2 (two) times daily., Disp: 180 tablet, Rfl: 3    pantoprazole (PROTONIX) 40 MG tablet, Take 40 mg by mouth once daily., Disp: , Rfl:     rosuvastatin (CRESTOR) 10 MG tablet, Take 10 mg by mouth every evening., Disp: , Rfl:     UNABLE TO FIND, Take 1 capsule by mouth once daily. prevagen, Disp: , Rfl:   No current facility-administered medications for this visit.    Facility-Administered Medications Ordered in Other Visits:     LIDOcaine (PF) 10 mg/ml (1%) injection 10 mg, 1 mL, Intradermal, Once, Gabriel Loaiza MD    All medications and past history have been reviewed.    Review of Systems   Constitutional:  Negative for fever and unexpected weight change.   HENT:  Negative for postnasal drip and sore throat.    Eyes:  Negative for visual disturbance.   Respiratory:  Negative for cough and shortness of breath.    Cardiovascular:  Negative for chest pain and leg swelling.   Gastrointestinal:  Negative for abdominal pain, blood in stool, constipation, diarrhea, nausea and vomiting.   Genitourinary:  Negative for dysuria and hematuria.   Musculoskeletal:  Negative for neck stiffness.   Skin:  Negative for color change and rash.   Neurological:  Negative for headaches.   Hematological:  Does not bruise/bleed easily.       Objective:        BP (!) 173/73   Pulse 75   Temp 97.6 °F (36.4 °C)   Resp 16   Ht 5' 6" (1.676 m)   Wt 64.4 kg (142 lb)   BMI 22.92 kg/m²     Physical Exam  Constitutional:       General: She is not in acute distress.     Appearance: Normal appearance. She is well-developed.   HENT:      Head: Normocephalic and atraumatic.      Right Ear: Hearing, tympanic membrane, ear canal and external ear normal.      Left Ear: Hearing, tympanic membrane, ear canal and " external ear normal.      Nose: Nose normal.      Mouth/Throat:      Pharynx: Uvula midline.   Eyes:      General: No scleral icterus.     Conjunctiva/sclera: Conjunctivae normal.      Pupils: Pupils are equal, round, and reactive to light.   Neck:      Thyroid: No thyroid mass or thyromegaly.      Trachea: Trachea normal.   Cardiovascular:      Rate and Rhythm: Normal rate and regular rhythm.      Pulses: Normal pulses.      Heart sounds: Normal heart sounds, S1 normal and S2 normal.   Pulmonary:      Effort: Pulmonary effort is normal.      Breath sounds: Normal breath sounds. No wheezing, rhonchi or rales.   Abdominal:      General: Abdomen is flat. Bowel sounds are normal. There is no distension.      Tenderness: There is no guarding or rebound.   Musculoskeletal:      Right shoulder: No deformity or crepitus. Normal range of motion.      Left shoulder: No deformity or crepitus. Normal range of motion.      Cervical back: Neck supple.   Lymphadenopathy:      Cervical: No cervical adenopathy.      Upper Body:      Right upper body: No supraclavicular adenopathy.      Left upper body: No supraclavicular adenopathy.   Skin:     General: Skin is warm and dry.      Capillary Refill: Capillary refill takes less than 2 seconds.      Findings: No lesion or rash.      Nails: There is no clubbing.   Neurological:      General: No focal deficit present.      Mental Status: She is alert and oriented to person, place, and time.      Sensory: No sensory deficit.      Motor: No tremor.   Psychiatric:         Mood and Affect: Mood normal.         Speech: Speech normal.         Behavior: Behavior normal.           Lab  No results found for this or any previous visit (from the past 336 hour(s)).  CMP  Sodium   Date Value Ref Range Status   11/09/2023 143 136 - 145 mmol/L Final     Potassium   Date Value Ref Range Status   11/09/2023 4.0 3.5 - 5.1 mmol/L Final     Comment:     Anion Gap reference range revised on 4/28/2023      Chloride   Date Value Ref Range Status   11/09/2023 107 95 - 110 mmol/L Final     CO2   Date Value Ref Range Status   11/09/2023 30 22 - 31 mmol/L Final     Glucose   Date Value Ref Range Status   11/09/2023 112 (H) 70 - 110 mg/dL Final     Comment:     The ADA recommends the following guidelines for fasting glucose:    Normal:       less than 100 mg/dL    Prediabetes:  100 mg/dL to 125 mg/dL    Diabetes:     126 mg/dL or higher       BUN   Date Value Ref Range Status   11/09/2023 20 (H) 7 - 18 mg/dL Final     Creatinine   Date Value Ref Range Status   11/09/2023 1.11 0.50 - 1.40 mg/dL Final   12/31/2012 1.2 0.5 - 1.4 mg/dL Final     Calcium   Date Value Ref Range Status   11/09/2023 8.8 8.4 - 10.2 mg/dL Final   12/31/2012 9.7 8.7 - 10.5 mg/dL Final     Total Protein   Date Value Ref Range Status   09/16/2023 6.2 6.0 - 8.4 g/dL Final     Albumin   Date Value Ref Range Status   09/16/2023 3.5 3.5 - 5.2 g/dL Final     Total Bilirubin   Date Value Ref Range Status   09/16/2023 0.8 0.1 - 1.0 mg/dL Final     Comment:     For infants and newborns, interpretation of results should be based  on gestational age, weight and in agreement with clinical  observations.    Premature Infant recommended reference ranges:  Up to 24 hours.............<8.0 mg/dL  Up to 48 hours............<12.0 mg/dL  3-5 days..................<15.0 mg/dL  6-29 days.................<15.0 mg/dL       Alkaline Phosphatase   Date Value Ref Range Status   09/16/2023 53 (L) 55 - 135 U/L Final     AST   Date Value Ref Range Status   09/16/2023 14 10 - 40 U/L Final     ALT   Date Value Ref Range Status   09/16/2023 9 (L) 10 - 44 U/L Final     Anion Gap   Date Value Ref Range Status   11/09/2023 6 5 - 12 mmol/L Final     Comment:     Anion Gap reference range revised on 4/28/2023 12/31/2012 14 5 - 15 meq/L Final     eGFR if    Date Value Ref Range Status   07/18/2022 >60.0 >60 mL/min/1.73 m^2 Final     eGFR if non    Date  Value Ref Range Status   07/18/2022 52.6 (A) >60 mL/min/1.73 m^2 Final     Comment:     Calculation used to obtain the estimated glomerular filtration  rate (eGFR) is the CKD-EPI equation.            Specimen (24h ago, onward)      None            All lab results and imaging results have been reviewed and discussed with the patient.     Assessment:       1. Metastatic renal cell carcinoma to lung, right    2. Secondary renal cell carcinoma of right lung    3. Right upper quadrant abdominal mass        Problem List Items Addressed This Visit          Oncology    Metastatic renal cell carcinoma to lung, right - Primary    Relevant Orders    MRI BRAIN W WO CONTRAST     Other Visit Diagnoses       Secondary renal cell carcinoma of right lung        Relevant Orders    MRI BRAIN W WO CONTRAST    CT Biopsy Retroperitoneal    Right upper quadrant abdominal mass        Relevant Orders    CT Biopsy Retroperitoneal             Cancer Staging   No matching staging information was found for the patient.        Lung Lesions- Radiation and MRI brain; Continue with radiations as scheduled.  2. Pararenal lesion- Biopsy lesion  Plan:         Follow up in about 4 weeks (around 1/10/2024) for with Dr. Wasserman.       The plan was discussed with the patient and all questions/concerns have been answered to the patient's satisfaction.    Electronically signed by Chiquita sanon, MSN, APRN, AGNP-C, OCN

## 2023-12-11 NOTE — H&P (VIEW-ONLY)
[facility], formerly [facility] HEM/ONC CONSULTATION      Subjective:       Patient ID: Karlie Hess is a 83 y.o. female.    History of Left RCC, treated [id number] further treatment    History of JAX lung cancer-treated with lobectomy-7/2/2015-no chemo/xrt-Path c/w mucin producing adenocarcinoma. [id number]    History of left hilar mass, 9/2020 EBUS negative    PET 10/18/2023:  Nodular densities are as outlined below:  -21 x 17 mm right infrahilar nodule with SUV max 2.5 (image 119), previously measuring 17 x 16 mm (August 20, 2023)  -27 x 27 mm marginated nodule in the posterior inferior left hilum with SUV max 2.7 (image 109)  -13 x 7 mm nodular density along the right posterior pararenal space with SUV max 0.8 (image 179), seen on studies dating back to 1/12/2021.    11/9/2023-EBUS  1. LUNG, RIGHT LOWER LOBE MASS, FINE NEEDLE ASPIRATION   - NEGATIVE FOR MALIGNANT CELLS.     2. LUNG, RIGHT LOWER LOBE MASS, BRUSHING   - RARE ATYPICAL CELLS PRESENT     3. LUNG, RIGHT LOWER LOBE MASS, BIOPSY TOUCH PREP   - POSITIVE FOR CARCINOMA.   - SEE CONCURRENT SURGICAL BIOPSY ([id number])     4. LUNG, RIGHT LOWER LOBE, BRONCHOALVEOLAR LAVAGE   - RARE ATYPICAL CELLS PRESENT.     5. LUNG, LEFT LOWER LOBE MASS, FINE NEEDLE ASPIRATION   - RARE ATYPICAL CELLS PRESENT     6. LYMPH NODE, STATION 11RS, FINE NEEDLE ASPIRATION   - NEGATIVE FOR MALIGNANT CELLS.   - SCANT KATARZYNA MATERIAL PRESENT     Karnofsky performance status score <80   Time from original diagnosis to initiation of targeted therapy <1 year   Hemoglobin less than the lower limit of normal   Serum calcium greater than the upper limit of normal   Neutrophil count greater than the upper limit of normal   Platelet count greater than the upper limit of normal   Favorable risk: None of the above risk factors present.  Intermediate risk: 1 or 2 of the above risk factors present.  Poor risk: 3 or more risk factors present.    Chief Complaint: Stage IV renal cell carcinoma    Met with the  patient today to discuss tumor board recommendations. Patient met with radiation oncology this morning and is getting her CT Sim on Friday. She will be getting radiation 5 Fractions.    Discussed the possibility of a pararenal biopsy with Dr. Bergman and he is able to biopsy that lesion. Discussed with the patient and she is agreeable. Also recs from tumor board include MRI Brain.    Previously per Dr. Wasserman:  Ms. Hess is a 84yo female who was treated for left RCC carcinoma in 2005 with nephrectomy.  She did well until 2015 when a JAX nodule, which has been observed for a few years, began to increase in size.  She underwent JAX lobectomy which showed this as a primary lung cancer-Adenocarcinoma.  She received no further chemo or radiation for this.      More recently, she was found to have a growing lesion in the right hilum of the lung.  Scan results shown above.  She underwent EBUS which demonstrated this area as metastatic RCC.  It should be stated that she does have an apparent non-avid LN near the right kidney, the cause of which is not clear.          Past Medical History:   Diagnosis Date    Aortic insufficiency     Cancer 2005    renal ca, L kidney removed    COVID-19 2022    Diastolic heart failure     Diffusion capacity of lung (dl), decreased     Encounter for blood transfusion 1963    with miscarriage    Fibromyalgia     GERD (gastroesophageal reflux disease)     Hypercholesterolemia     Hyperlipidemia     Hypertension     Lung cancer 2015    adenocarcinoma ; upper left lobe    Renal disorder 2005    Left Kidney cancer    Sinusitis        Past Surgical History:   Procedure Laterality Date    BACK SURGERY  2013    laminectomy    ENDOBRONCHIAL ULTRASOUND Left 07/15/2022    Procedure: ENDOBRONCHIAL ULTRASOUND (EBUS);  Surgeon: Cm Freitas MD;  Location: [facility];  Service: Pulmonary;  Laterality: Left;    ENDOBRONCHIAL ULTRASOUND Bilateral 11/9/2023    Procedure: ENDOBRONCHIAL ULTRASOUND (EBUS);   Surgeon: Cm Freitas MD;  Location: [facility] OR;  Service: Pulmonary;  Laterality: Bilateral;    HYSTERECTOMY  1975    CARY/BSO. Due to endometriosis.    INJECTION OF JOINT Right 05/27/2019    Procedure: Injection, Joint, Shoulder, Hip, Or Knee;  Surgeon: Zach Wilder MD;  Location: [facility] OR;  Service: Pain Management;  Laterality: Right;  right hip intra-articular injection     LUNG LOBECTOMY Left     LOWER LOBE    NEPHRECTOMY Left 2005    Entire kidney due to cancer.    ROBOTIC BRONCHOSCOPY Bilateral 11/9/2023    Procedure: ROBOTIC BRONCHOSCOPY;  Surgeon: Cm Freitas MD;  Location: [facility] OR;  Service: Pulmonary;  Laterality: Bilateral;       Social History     Socioeconomic History    Marital status:    Tobacco Use    Smoking status: Never    Smokeless tobacco: Never   Substance and Sexual Activity    Alcohol use: No    Drug use: No    Sexual activity: Not Currently       Family History   Problem Relation Age of Onset    Hypertension Mother     Breast cancer Maternal Aunt     Breast cancer Maternal Aunt        Review of patient's allergies indicates:   Allergen Reactions    Hydrocodone Hallucinations    Oxycodone-acetaminophen Hallucinations and Other (See Comments)    Amoxicillin Rash       Current Outpatient Medications:     acetaminophen (TYLENOL) 500 MG tablet, Take 1,000 mg by mouth 2 (two) times daily as needed., Disp: , Rfl:     amLODIPine (NORVASC) 5 MG tablet, Take 1 tablet (5 mg total) by mouth every evening., Disp: 90 tablet, Rfl: 3    aspirin (ECOTRIN) 81 MG EC tablet, Take 81 mg by mouth once daily., Disp: , Rfl:     cyanocobalamin (VITAMIN B-12) 1000 MCG tablet, Take 100 mcg by mouth once daily., Disp: , Rfl:     duloxetine (CYMBALTA) 60 MG capsule, Take 60 mg by mouth once daily., Disp: , Rfl:     hydroCHLOROthiazide (HYDRODIURIL) 12.5 MG Tab, Take 1 tablet (12.5 mg total) by mouth daily as needed (Leg swelling)., Disp: 30 tablet, Rfl: 11    ipratropium (ATROVENT) 42 mcg (0.06 %) nasal  "spray, by Each Nostril route., Disp: , Rfl:     labetaloL (NORMODYNE) 200 MG tablet, TAKE 1 TABLET BY MOUTH TWICE  DAILY, Disp: 180 tablet, Rfl: 3    lisinopriL (PRINIVIL,ZESTRIL) 20 MG tablet, Take 1 tablet (20 mg total) by mouth 2 (two) times daily., Disp: 180 tablet, Rfl: 3    pantoprazole (PROTONIX) 40 MG tablet, Take 40 mg by mouth once daily., Disp: , Rfl:     rosuvastatin (CRESTOR) 10 MG tablet, Take 10 mg by mouth every evening., Disp: , Rfl:     UNABLE TO FIND, Take 1 capsule by mouth once daily. prevagen, Disp: , Rfl:   No current facility-administered medications for this visit.    Facility-Administered Medications Ordered in Other Visits:     LIDOcaine (PF) 10 mg/ml (1%) injection 10 mg, 1 mL, Intradermal, Once, Gabriel Loaiza MD    All medications and past history have been reviewed.    Review of Systems   Constitutional:  Negative for fever and unexpected weight change.   HENT:  Negative for postnasal drip and sore throat.    Eyes:  Negative for visual disturbance.   Respiratory:  Negative for cough and shortness of breath.    Cardiovascular:  Negative for chest pain and leg swelling.   Gastrointestinal:  Negative for abdominal pain, blood in stool, constipation, diarrhea, nausea and vomiting.   Genitourinary:  Negative for dysuria and hematuria.   Musculoskeletal:  Negative for neck stiffness.   Skin:  Negative for color change and rash.   Neurological:  Negative for headaches.   Hematological:  Does not bruise/bleed easily.       Objective:        BP (!) 173/73   Pulse 75   Temp 97.6 °F (36.4 °C)   Resp 16   Ht 5' 6" (1.676 m)   Wt 64.4 kg (142 lb)   BMI 22.92 kg/m²     Physical Exam  Constitutional:       General: She is not in acute distress.     Appearance: Normal appearance. She is well-developed.   HENT:      Head: Normocephalic and atraumatic.      Right Ear: Hearing, tympanic membrane, ear canal and external ear normal.      Left Ear: Hearing, tympanic membrane, ear canal and " external ear normal.      Nose: Nose normal.      Mouth/Throat:      Pharynx: Uvula midline.   Eyes:      General: No scleral icterus.     Conjunctiva/sclera: Conjunctivae normal.      Pupils: Pupils are equal, round, and reactive to light.   Neck:      Thyroid: No thyroid mass or thyromegaly.      Trachea: Trachea normal.   Cardiovascular:      Rate and Rhythm: Normal rate and regular rhythm.      Pulses: Normal pulses.      Heart sounds: Normal heart sounds, S1 normal and S2 normal.   Pulmonary:      Effort: Pulmonary effort is normal.      Breath sounds: Normal breath sounds. No wheezing, rhonchi or rales.   Abdominal:      General: Abdomen is flat. Bowel sounds are normal. There is no distension.      Tenderness: There is no guarding or rebound.   Musculoskeletal:      Right shoulder: No deformity or crepitus. Normal range of motion.      Left shoulder: No deformity or crepitus. Normal range of motion.      Cervical back: Neck supple.   Lymphadenopathy:      Cervical: No cervical adenopathy.      Upper Body:      Right upper body: No supraclavicular adenopathy.      Left upper body: No supraclavicular adenopathy.   Skin:     General: Skin is warm and dry.      Capillary Refill: Capillary refill takes less than 2 seconds.      Findings: No lesion or rash.      Nails: There is no clubbing.   Neurological:      General: No focal deficit present.      Mental Status: She is alert and oriented to person, place, and time.      Sensory: No sensory deficit.      Motor: No tremor.   Psychiatric:         Mood and Affect: Mood normal.         Speech: Speech normal.         Behavior: Behavior normal.           Lab  No results found for this or any previous visit (from the past 336 hour(s)).  CMP  Sodium   Date Value Ref Range Status   11/09/2023 143 136 - 145 mmol/L Final     Potassium   Date Value Ref Range Status   11/09/2023 4.0 3.5 - 5.1 mmol/L Final     Comment:     Anion Gap reference range revised on 4/28/2023      Chloride   Date Value Ref Range Status   11/09/2023 107 95 - 110 mmol/L Final     CO2   Date Value Ref Range Status   11/09/2023 30 22 - 31 mmol/L Final     Glucose   Date Value Ref Range Status   11/09/2023 112 (H) 70 - 110 mg/dL Final     Comment:     The ADA recommends the following guidelines for fasting glucose:    Normal:       less than 100 mg/dL    Prediabetes:  100 mg/dL to 125 mg/dL    Diabetes:     126 mg/dL or higher       BUN   Date Value Ref Range Status   11/09/2023 20 (H) 7 - 18 mg/dL Final     Creatinine   Date Value Ref Range Status   11/09/2023 1.11 0.50 - 1.40 mg/dL Final   12/31/2012 1.2 0.5 - 1.4 mg/dL Final     Calcium   Date Value Ref Range Status   11/09/2023 8.8 8.4 - 10.2 mg/dL Final   12/31/2012 9.7 8.7 - 10.5 mg/dL Final     Total Protein   Date Value Ref Range Status   09/16/2023 6.2 6.0 - 8.4 g/dL Final     Albumin   Date Value Ref Range Status   09/16/2023 3.5 3.5 - 5.2 g/dL Final     Total Bilirubin   Date Value Ref Range Status   09/16/2023 0.8 0.1 - 1.0 mg/dL Final     Comment:     For infants and newborns, interpretation of results should be based  on gestational age, weight and in agreement with clinical  observations.    Premature Infant recommended reference ranges:  Up to 24 hours.............<8.0 mg/dL  Up to 48 hours............<12.0 mg/dL  3-5 days..................<15.0 mg/dL  6-29 days.................<15.0 mg/dL       Alkaline Phosphatase   Date Value Ref Range Status   09/16/2023 53 (L) 55 - 135 U/L Final     AST   Date Value Ref Range Status   09/16/2023 14 10 - 40 U/L Final     ALT   Date Value Ref Range Status   09/16/2023 9 (L) 10 - 44 U/L Final     Anion Gap   Date Value Ref Range Status   11/09/2023 6 5 - 12 mmol/L Final     Comment:     Anion Gap reference range revised on 4/28/2023 12/31/2012 14 5 - 15 meq/L Final     eGFR if    Date Value Ref Range Status   07/18/2022 >60.0 >60 mL/min/1.73 m^2 Final     eGFR if non    Date  Value Ref Range Status   07/18/2022 52.6 (A) >60 mL/min/1.73 m^2 Final     Comment:     Calculation used to obtain the estimated glomerular filtration  rate (eGFR) is the CKD-EPI equation.            Specimen (24h ago, onward)      None            All lab results and imaging results have been reviewed and discussed with the patient.     Assessment:       1. Metastatic renal cell carcinoma to lung, right    2. Secondary renal cell carcinoma of right lung    3. Right upper quadrant abdominal mass        Problem List Items Addressed This Visit          Oncology    Metastatic renal cell carcinoma to lung, right - Primary    Relevant Orders    MRI BRAIN W WO CONTRAST     Other Visit Diagnoses       Secondary renal cell carcinoma of right lung        Relevant Orders    MRI BRAIN W WO CONTRAST    CT Biopsy Retroperitoneal    Right upper quadrant abdominal mass        Relevant Orders    CT Biopsy Retroperitoneal             Cancer Staging   No matching staging information was found for the patient.        Lung Lesions- Radiation and MRI brain; Continue with radiations as scheduled.  2. Pararenal lesion- Biopsy lesion  Plan:         Follow up in about 4 weeks (around 1/10/2024) for with Dr. Wasserman.       The plan was discussed with the patient and all questions/concerns have been answered to the patient's satisfaction.    Electronically signed by Chiquita sanon, MSN, APRN, AGNP-C, OCN

## 2023-12-13 ENCOUNTER — OFFICE VISIT (OUTPATIENT)
Dept: HEMATOLOGY/ONCOLOGY | Facility: CLINIC | Age: 83
End: 2023-12-13
Payer: MEDICARE

## 2023-12-13 ENCOUNTER — OFFICE VISIT (OUTPATIENT)
Dept: RADIATION ONCOLOGY | Facility: CLINIC | Age: 83
End: 2023-12-13
Payer: MEDICARE

## 2023-12-13 ENCOUNTER — DOCUMENTATION ONLY (OUTPATIENT)
Dept: HEMATOLOGY/ONCOLOGY | Facility: CLINIC | Age: 83
End: 2023-12-13

## 2023-12-13 VITALS
BODY MASS INDEX: 22.82 KG/M2 | WEIGHT: 142 LBS | TEMPERATURE: 98 F | RESPIRATION RATE: 16 BRPM | HEIGHT: 66 IN | SYSTOLIC BLOOD PRESSURE: 173 MMHG | DIASTOLIC BLOOD PRESSURE: 73 MMHG | HEART RATE: 75 BPM

## 2023-12-13 DIAGNOSIS — C64.9 METASTATIC RENAL CELL CARCINOMA TO LUNG, RIGHT: Primary | ICD-10-CM

## 2023-12-13 DIAGNOSIS — C78.01 SECONDARY RENAL CELL CARCINOMA OF RIGHT LUNG: ICD-10-CM

## 2023-12-13 DIAGNOSIS — C64.9 METASTATIC RENAL CELL CARCINOMA TO LUNG, RIGHT: ICD-10-CM

## 2023-12-13 DIAGNOSIS — C78.01 METASTATIC RENAL CELL CARCINOMA TO LUNG, RIGHT: ICD-10-CM

## 2023-12-13 DIAGNOSIS — C78.01 METASTATIC RENAL CELL CARCINOMA TO LUNG, RIGHT: Primary | ICD-10-CM

## 2023-12-13 DIAGNOSIS — C64.9 SECONDARY RENAL CELL CARCINOMA OF RIGHT LUNG: ICD-10-CM

## 2023-12-13 DIAGNOSIS — R91.8 MASS OF LEFT LUNG: ICD-10-CM

## 2023-12-13 DIAGNOSIS — R19.01 RIGHT UPPER QUADRANT ABDOMINAL MASS: ICD-10-CM

## 2023-12-13 PROCEDURE — 99205 OFFICE O/P NEW HI 60 MIN: CPT | Mod: S$GLB,,, | Performed by: RADIOLOGY

## 2023-12-13 PROCEDURE — 99214 PR OFFICE/OUTPT VISIT, EST, LEVL IV, 30-39 MIN: ICD-10-PCS | Mod: S$PBB,,, | Performed by: NURSE PRACTITIONER

## 2023-12-13 PROCEDURE — 99214 OFFICE O/P EST MOD 30 MIN: CPT | Performed by: NURSE PRACTITIONER

## 2023-12-13 PROCEDURE — 99214 OFFICE O/P EST MOD 30 MIN: CPT | Mod: S$PBB,,, | Performed by: NURSE PRACTITIONER

## 2023-12-13 PROCEDURE — 99205 PR OFFICE/OUTPT VISIT, NEW, LEVL V, 60-74 MIN: ICD-10-PCS | Mod: S$GLB,,, | Performed by: RADIOLOGY

## 2023-12-13 NOTE — PROGRESS NOTES
Karlie Hess  [id number]  1940 12/13/2023  Del Nathan Md  [address]  [address]  [place] [address]    REASON FOR CONSULTATION: oligometastatic clear cell RCC L kidney    TREATMENT GOAL: primary    HISTORY OF PRESENT ILLNESS:   Karlie Hess is a 83 y.o. 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 Freitas).  Staging studies have been stable until CT abdomen pelvis from September of this year appreciated soft tissue nodule right pararenal space, slowly enlarged to 1.3 cm (0.6 cm in 2021).  CT chest bilateral kathleen stable.  PET-CT demonstrated SUV 0.8 at 1.3 cm right pararenal, SUV 2.5 at enlarging 2.1 x 1.7 cm right infrahilar nodule and SUV 2.7 at inferior left hilum suspicious for benign etiology.  She underwent repeat EBUS with Dr. Freitas that returned metastatic clear cell renal cell carcinoma from right lung mass with rare atypical cells appreciated from left lower lobe FNA; 11R(-).    Case was reviewed at Multidisciplinary Tumor Conference with recommendation for core needle biopsy of pararenal lesion.  She presents to discuss radiotherapy.     Denies fever, chills, chest pain, shortness of breath, cough or hemoptysis.  Presents with granddaughter who is offering support.    Review of systems otherwise negative unless indicated in HPI.    Past Medical History:   Diagnosis Date    Aortic insufficiency     Cancer 2005    renal ca, L kidney removed    COVID-19 2022    Diastolic heart failure     Diffusion capacity of lung (dl), decreased     Encounter for blood transfusion 1963    with miscarriage    Fibromyalgia     GERD (gastroesophageal reflux disease)     Hypercholesterolemia     Hyperlipidemia     Hypertension     Lung cancer 2015    adenocarcinoma ; upper left lobe    Renal  disorder 2005    Left Kidney cancer    Sinusitis      Past Surgical History:   Procedure Laterality Date    BACK SURGERY  2013    laminectomy    ENDOBRONCHIAL ULTRASOUND Left 07/15/2022    Procedure: ENDOBRONCHIAL ULTRASOUND (EBUS);  Surgeon: Cm Freitas MD;  Location: [facility];  Service: Pulmonary;  Laterality: Left;    ENDOBRONCHIAL ULTRASOUND Bilateral 11/9/2023    Procedure: ENDOBRONCHIAL ULTRASOUND (EBUS);  Surgeon: Cm Freitas MD;  Location: [facility] OR;  Service: Pulmonary;  Laterality: Bilateral;    HYSTERECTOMY  1975    CARY/BSO. Due to endometriosis.    INJECTION OF JOINT Right 05/27/2019    Procedure: Injection, Joint, Shoulder, Hip, Or Knee;  Surgeon: Zach Wilder MD;  Location: [facility] OR;  Service: Pain Management;  Laterality: Right;  right hip intra-articular injection     LUNG LOBECTOMY Left     LOWER LOBE    NEPHRECTOMY Left 2005    Entire kidney due to cancer.    ROBOTIC BRONCHOSCOPY Bilateral 11/9/2023    Procedure: ROBOTIC BRONCHOSCOPY;  Surgeon: Cm Freitas MD;  Location: [facility] OR;  Service: Pulmonary;  Laterality: Bilateral;     Social History     Socioeconomic History    Marital status:    Tobacco Use    Smoking status: Never    Smokeless tobacco: Never   Substance and Sexual Activity    Alcohol use: No    Drug use: No    Sexual activity: Not Currently     Family History   Problem Relation Age of Onset    Hypertension Mother     Breast cancer Maternal Aunt     Breast cancer Maternal Aunt        PRIOR HISTORY OF CHEMOTHERAPY OR RADIOTHERAPY: Please see HPI for patients prior oncologic history.    Medication List with Changes/Refills   Current Medications    ACETAMINOPHEN (TYLENOL) 500 MG TABLET    Take 1,000 mg by mouth 2 (two) times daily as needed.    AMLODIPINE (NORVASC) 5 MG TABLET    Take 1 tablet (5 mg total) by mouth every evening.    ASPIRIN (ECOTRIN) 81 MG EC TABLET    Take 81 mg by mouth once daily.    CYANOCOBALAMIN (VITAMIN B-12) 1000 MCG TABLET    Take 100 mcg by  mouth once daily.    DULOXETINE (CYMBALTA) 60 MG CAPSULE    Take 60 mg by mouth once daily.    HYDROCHLOROTHIAZIDE (HYDRODIURIL) 12.5 MG TAB    Take 1 tablet (12.5 mg total) by mouth daily as needed (Leg swelling).    IPRATROPIUM (ATROVENT) 42 MCG (0.06 %) NASAL SPRAY    by Each Nostril route.    LABETALOL (NORMODYNE) 200 MG TABLET    TAKE 1 TABLET BY MOUTH TWICE  DAILY    LISINOPRIL (PRINIVIL,ZESTRIL) 20 MG TABLET    Take 1 tablet (20 mg total) by mouth 2 (two) times daily.    PANTOPRAZOLE (PROTONIX) 40 MG TABLET    Take 40 mg by mouth once daily.    ROSUVASTATIN (CRESTOR) 10 MG TABLET    Take 10 mg by mouth every evening.    UNABLE TO FIND    Take 1 capsule by mouth once daily. prevagen     Review of patient's allergies indicates:   Allergen Reactions    Hydrocodone Hallucinations    Oxycodone-acetaminophen Hallucinations and Other (See Comments)    Amoxicillin Rash       QUALITY OF LIFE: 100%- Normal, No Complaints, No Evidence of Disease    There were no vitals filed for this visit.  There is no height or weight on file to calculate BMI.    PHYSICAL EXAM:   GENERAL: alert; in no apparent distress.  Looks younger than stated age  HEAD: normocephalic, atraumatic.  EYES: pupils are equal, round, reactive to light and accommodation. Sclera anicteric. Conjunctiva not injected.   NOSE/THROAT: no nasal erythema or rhinorrhea. Oropharynx pink, without erythema, ulcerations or thrush.   NECK: no cervical motion rigidity; supple with no masses.    CHEST: Patient is speaking comfortably on room air with normal work of breathing without using accessory muscles of respiration.  CARDIOVASCULAR: regular rate and rhythm  ABDOMEN: soft, nontender, nondistended.   MUSCULOSKELETAL: no tenderness to palpation along the spine or scapulae. Normal range of motion.  NEUROLOGIC: cranial nerves II-XII intact bilaterally. Strength 5/5 in bilateral upper and lower extremities. No sensory deficits appreciated. Normal gait.  EXTREMITIES: no  clubbing, cyanosis, edema.  SKIN: no erythema, rashes, ulcerations noted.     REVIEW OF IMAGING/PATHOLOGY/LABS: Please see HPI. All images reviewed personally by dictating physician.     ASSESSMENT: Karlie Hess is a 83 y.o. 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  There is also a very slowly enlarging right pararenal nodule that is not FDG avid with recommendation by multidisciplinary tumor conference for CT-guided biopsy.  No other suspicious sites. Will confer with Dr. Nathan regarding biopsy orders.  Given the slow natural history and low burden of her disease taken with her age, do not anticipate systemic therapy.  Due to her age and prior lobectomy, she is a poor surgical candidate.  In this circumstance, I recommend proceeding with SBRT metastatectomy to the right upper lobe.  Today we discussed definitive stereotactic body radiation therapy which results in excellent local control.  I described the process of rigid immobilization, abdominal compression to minimize respiratory excursion and 4D CT simulation to monitor respiratory motion followed by IMRT-based planning with intent to deliver high dose per fraction treatments every other day using advanced daily image guidance. My recommendation is tentatively 50 Gy in 5 fractions pending dosimetry.  If biopsy-proven disease at right pararenal space, can similarly consider SBRT metastatectomy.  At completion, recommend continue with routine imaging surveillance.    I carefully explained the process of simulation and treatment delivery with weekly physician visits. Patient wishes to proceed.     We  discussed the risks and benefits of the above treatment and have gone over in detail the acute and late toxicities of radiation therapy to the R lung. The patient expressed  understanding and has signed a consent form which is included in the patients chart.      The patient has our contact information and understands that they are free to contact us at any time with questions or concerns regarding radiation therapy.     DISPOSITION: RTC FOR CT SIM     I have personally seen and evaluated this patient with a high complexity diagnosis.      Greater than 60 minutes were dedicated to reviewing/interpreting pertinent laboratory/imaging/pathology as well as prior consultations; reviewing and performing history and physical; counseling patient on oncologic recommendations; documentation in the electronic medical record including ordering of additional tests and/or radiation treatment protocol; and coordination of care with physicians with referrals placed as appropriate.    PHYSICIAN: Andi Jj Jr, MD    Thank you for the opportunity to meet and consult with Karlie Hess.   Please feel free to contact me to discuss the above recommendation further.

## 2023-12-13 NOTE — Clinical Note
Tomasz-- Sees you later today; did ya'll order her R pararenal bx? Going to SBRT R lung in meantime.

## 2023-12-15 ENCOUNTER — TREATMENT (OUTPATIENT)
Dept: RADIATION ONCOLOGY | Facility: CLINIC | Age: 83
End: 2023-12-15
Payer: MEDICARE

## 2023-12-15 PROCEDURE — 77399 PR IMAGE FUSION, RADIATION THERAPY: ICD-10-PCS | Mod: S$GLB,,, | Performed by: RADIOLOGY

## 2023-12-15 PROCEDURE — 77334 PR  RADN TREATMENT AID(S) COMPLX: ICD-10-PCS | Mod: S$GLB,,, | Performed by: RADIOLOGY

## 2023-12-15 PROCEDURE — 77399 UNLISTED PX MED RADJ PHYSICS: CPT | Mod: S$GLB,,, | Performed by: RADIOLOGY

## 2023-12-15 PROCEDURE — 77263 THER RADIOLOGY TX PLNG CPLX: CPT | Mod: S$GLB,,, | Performed by: RADIOLOGY

## 2023-12-15 PROCEDURE — 77334 RADIATION TREATMENT AID(S): CPT | Mod: S$GLB,,, | Performed by: RADIOLOGY

## 2023-12-15 PROCEDURE — 77263 PR  RADIATION THERAPY PLAN COMPLEX: ICD-10-PCS | Mod: S$GLB,,, | Performed by: RADIOLOGY

## 2023-12-18 ENCOUNTER — TELEPHONE (OUTPATIENT)
Dept: RADIOLOGY | Facility: HOSPITAL | Age: 83
End: 2023-12-18

## 2023-12-18 NOTE — NURSING
Pre procedure instructions for pararenal lesion biopsy given to pt, verbalized understanding , to arrive at 7 am on 1/5/23, npo after mn to avoid aspirin 7 days prior to her procedure, npo after mn may take her labetalol and lisinopril that am with water only, will have family to drive her home.

## 2023-12-20 PROCEDURE — 77338 DESIGN MLC DEVICE FOR IMRT: CPT | Mod: S$GLB,,, | Performed by: RADIOLOGY

## 2023-12-20 PROCEDURE — 77301 PR  INTEN MOD RADIOTHER PLAN W/DOSE VOL HIST: ICD-10-PCS | Mod: S$GLB,,, | Performed by: RADIOLOGY

## 2023-12-20 PROCEDURE — 77338 PR  MLC IMRT DESIGN & CONSTRUCTION PER IMRT PLAN: ICD-10-PCS | Mod: S$GLB,,, | Performed by: RADIOLOGY

## 2023-12-20 PROCEDURE — 77300 PR RADIATION THERAPY,DOSIMETRY PLAN: ICD-10-PCS | Mod: S$GLB,,, | Performed by: RADIOLOGY

## 2023-12-20 PROCEDURE — 77293 PR RESPIRATORY MOTION MGMT SIMULATION: ICD-10-PCS | Mod: S$GLB,,, | Performed by: RADIOLOGY

## 2023-12-20 PROCEDURE — 77300 RADIATION THERAPY DOSE PLAN: CPT | Mod: S$GLB,,, | Performed by: RADIOLOGY

## 2023-12-20 PROCEDURE — 77293 RESPIRATOR MOTION MGMT SIMUL: CPT | Mod: S$GLB,,, | Performed by: RADIOLOGY

## 2023-12-20 PROCEDURE — 77301 RADIOTHERAPY DOSE PLAN IMRT: CPT | Mod: S$GLB,,, | Performed by: RADIOLOGY

## 2023-12-21 ENCOUNTER — HOSPITAL ENCOUNTER (OUTPATIENT)
Dept: RADIOLOGY | Facility: HOSPITAL | Age: 83
Discharge: HOME | End: 2023-12-21
Attending: NURSE PRACTITIONER
Payer: MEDICARE

## 2023-12-21 DIAGNOSIS — C78.01 METASTATIC RENAL CELL CARCINOMA TO LUNG, RIGHT: ICD-10-CM

## 2023-12-21 DIAGNOSIS — C64.9 METASTATIC RENAL CELL CARCINOMA TO LUNG, RIGHT: ICD-10-CM

## 2023-12-21 DIAGNOSIS — C78.01 SECONDARY RENAL CELL CARCINOMA OF RIGHT LUNG: ICD-10-CM

## 2023-12-21 DIAGNOSIS — C64.9 SECONDARY RENAL CELL CARCINOMA OF RIGHT LUNG: ICD-10-CM

## 2023-12-21 PROCEDURE — 70551 MRI BRAIN STEM W/O DYE: CPT | Mod: TC,PO

## 2024-01-02 ENCOUNTER — TREATMENT (OUTPATIENT)
Dept: RADIATION ONCOLOGY | Facility: CLINIC | Age: 84
End: 2024-01-02
Payer: MEDICARE

## 2024-01-02 PROCEDURE — 77435 SBRT MANAGEMENT: CPT | Mod: S$GLB,,, | Performed by: RADIOLOGY

## 2024-01-02 PROCEDURE — 77373 STRTCTC BDY RAD THER TX DLVR: CPT | Mod: S$GLB,,, | Performed by: RADIOLOGY

## 2024-01-03 ENCOUNTER — TELEPHONE (OUTPATIENT)
Dept: OBSTETRICS AND GYNECOLOGY | Facility: CLINIC | Age: 84
End: 2024-01-03
Payer: MEDICARE

## 2024-01-03 NOTE — TELEPHONE ENCOUNTER
----- Message from Lb Monten[place]ro sent at 1/3/2024  8:39 AM CST -----  Type:  Sooner Apoointment Request    Caller is requesting a sooner appointment.  Caller declined first available appointment listed below.  Caller will not accept being placed on the waitlist and is requesting a message be sent to doctor.  Name of Caller:WENDY PRICE [[phone]]    When is the first available appointment?No available appointments for Donte     Symptoms:Problem/Concern    Would the patient rather a call back or a response via [facility]?  Call back     Best Call Back Number:[phone] (mobile)     Additional Information:Pt is requesting Dr Kent . I offered Carrie for 1/3/24 and she denied requested         complains of pain/discomfort

## 2024-01-03 NOTE — TELEPHONE ENCOUNTER
Explained to pt Abi is out of the office this week, and her next available day is Tuesday 1/9. Attempted to schedule pt with Dr. Butler today at 2:20, but pt declined and states she would wait for Abi. Scheduled 1/9 at 11:20 am; pt voiced understanding.

## 2024-01-05 ENCOUNTER — HOSPITAL ENCOUNTER (OUTPATIENT)
Dept: RADIOLOGY | Facility: HOSPITAL | Age: 84
Discharge: HOME | End: 2024-01-05
Attending: NURSE PRACTITIONER
Payer: MEDICARE

## 2024-01-05 ENCOUNTER — TREATMENT (OUTPATIENT)
Dept: RADIATION ONCOLOGY | Facility: CLINIC | Age: 84
End: 2024-01-05
Payer: MEDICARE

## 2024-01-05 VITALS
WEIGHT: 142 LBS | BODY MASS INDEX: 22.82 KG/M2 | HEART RATE: 67 BPM | HEIGHT: 66 IN | DIASTOLIC BLOOD PRESSURE: 73 MMHG | TEMPERATURE: 98 F | SYSTOLIC BLOOD PRESSURE: 158 MMHG | RESPIRATION RATE: 16 BRPM | OXYGEN SATURATION: 99 %

## 2024-01-05 DIAGNOSIS — R19.01 RIGHT UPPER QUADRANT ABDOMINAL MASS: ICD-10-CM

## 2024-01-05 DIAGNOSIS — C78.01 SECONDARY RENAL CELL CARCINOMA OF RIGHT LUNG: ICD-10-CM

## 2024-01-05 DIAGNOSIS — C64.9 SECONDARY RENAL CELL CARCINOMA OF RIGHT LUNG: ICD-10-CM

## 2024-01-05 LAB
APTT PPP: 25.5 SEC (ref 21–32)
BASOPHILS # BLD AUTO: 0.03 K/UL (ref 0–0.2)
BASOPHILS NFR BLD: 0.6 % (ref 0–1.9)
DIFFERENTIAL METHOD BLD: ABNORMAL
EOSINOPHIL # BLD AUTO: 0.2 K/UL (ref 0–0.5)
EOSINOPHIL NFR BLD: 5 % (ref 0–8)
ERYTHROCYTE [DISTWIDTH] IN BLOOD BY AUTOMATED COUNT: 13 % (ref 11.5–14.5)
HCT VFR BLD AUTO: 35.7 % (ref 37–48.5)
HGB BLD-MCNC: 11.6 G/DL (ref 12–16)
IMM GRANULOCYTES # BLD AUTO: 0.01 K/UL (ref 0–0.04)
IMM GRANULOCYTES NFR BLD AUTO: 0.2 % (ref 0–0.5)
INR PPP: 0.9 (ref 0.8–1.2)
LYMPHOCYTES # BLD AUTO: 1 K/UL (ref 1–4.8)
LYMPHOCYTES NFR BLD: 21.3 % (ref 18–48)
MCH RBC QN AUTO: 28.9 PG (ref 27–31)
MCHC RBC AUTO-ENTMCNC: 32.5 G/DL (ref 32–36)
MCV RBC AUTO: 89 FL (ref 82–98)
MONOCYTES # BLD AUTO: 0.4 K/UL (ref 0.3–1)
MONOCYTES NFR BLD: 9 % (ref 4–15)
NEUTROPHILS # BLD AUTO: 3.1 K/UL (ref 1.8–7.7)
NEUTROPHILS NFR BLD: 63.9 % (ref 38–73)
NRBC BLD-RTO: 0 /100 WBC
PLATELET # BLD AUTO: 261 K/UL (ref 150–450)
PMV BLD AUTO: 9.5 FL (ref 9.2–12.9)
PROTHROMBIN TIME: 10.4 SEC (ref 9–12.5)
RBC # BLD AUTO: 4.02 M/UL (ref 4–5.4)
WBC # BLD AUTO: 4.8 K/UL (ref 3.9–12.7)

## 2024-01-05 PROCEDURE — 63600175 PHARM REV CODE 636 W HCPCS: Performed by: RADIOLOGY

## 2024-01-05 PROCEDURE — 85025 COMPLETE CBC W/AUTO DIFF WBC: CPT | Performed by: RADIOLOGY

## 2024-01-05 PROCEDURE — 85730 THROMBOPLASTIN TIME PARTIAL: CPT | Performed by: RADIOLOGY

## 2024-01-05 PROCEDURE — 99152 MOD SED SAME PHYS/QHP 5/>YRS: CPT

## 2024-01-05 PROCEDURE — 85610 PROTHROMBIN TIME: CPT | Performed by: RADIOLOGY

## 2024-01-05 PROCEDURE — 25000003 PHARM REV CODE 250: Performed by: RADIOLOGY

## 2024-01-05 PROCEDURE — 99153 MOD SED SAME PHYS/QHP EA: CPT

## 2024-01-05 PROCEDURE — 77373 STRTCTC BDY RAD THER TX DLVR: CPT | Mod: S$GLB,,, | Performed by: RADIOLOGY

## 2024-01-05 PROCEDURE — 88305 TISSUE EXAM BY PATHOLOGIST: CPT | Mod: TC | Performed by: PATHOLOGY

## 2024-01-05 PROCEDURE — 77336 RADIATION PHYSICS CONSULT: CPT | Mod: S$GLB,,, | Performed by: RADIOLOGY

## 2024-01-05 PROCEDURE — 77012 CT SCAN FOR NEEDLE BIOPSY: CPT | Mod: TC

## 2024-01-05 RX ORDER — FENTANYL CITRATE 50 UG/ML
INJECTION, SOLUTION INTRAMUSCULAR; INTRAVENOUS
Status: COMPLETED | OUTPATIENT
Start: 2024-01-05 | End: 2024-01-05

## 2024-01-05 RX ORDER — SODIUM CHLORIDE 9 MG/ML
INJECTION, SOLUTION INTRAVENOUS
Status: COMPLETED | OUTPATIENT
Start: 2024-01-05 | End: 2024-01-05

## 2024-01-05 RX ORDER — MIDAZOLAM HYDROCHLORIDE 1 MG/ML
INJECTION INTRAMUSCULAR; INTRAVENOUS
Status: COMPLETED | OUTPATIENT
Start: 2024-01-05 | End: 2024-01-05

## 2024-01-05 RX ORDER — LIDOCAINE HYDROCHLORIDE 10 MG/ML
INJECTION, SOLUTION EPIDURAL; INFILTRATION; INTRACAUDAL; PERINEURAL
Status: COMPLETED | OUTPATIENT
Start: 2024-01-05 | End: 2024-01-05

## 2024-01-05 RX ADMIN — SODIUM CHLORIDE 250 ML/HR: 900 INJECTION, SOLUTION INTRAVENOUS at 09:01

## 2024-01-05 RX ADMIN — MIDAZOLAM HYDROCHLORIDE 2 MG: 1 INJECTION, SOLUTION INTRAMUSCULAR; INTRAVENOUS at 09:01

## 2024-01-05 RX ADMIN — LIDOCAINE HYDROCHLORIDE 5 ML: 10 INJECTION, SOLUTION EPIDURAL; INFILTRATION; INTRACAUDAL at 09:01

## 2024-01-05 RX ADMIN — FENTANYL CITRATE 50 MCG: 50 INJECTION INTRAMUSCULAR; INTRAVENOUS at 09:01

## 2024-01-05 NOTE — PLAN OF CARE
Pt to ct room via stretcher, AAO3, MAEx4, reports HA 1/10 due to not eating, denies any other pain, all questions answered, NAD noted.

## 2024-01-05 NOTE — PLAN OF CARE
Pt tolerated procedure well, dsg of asif and paper tape to right flank area CDI, back to asu via stretcher with carlos Nelsno, AAO3, VSS, report given to CARLOS Lee, NAD noted.

## 2024-01-09 ENCOUNTER — OFFICE VISIT (OUTPATIENT)
Dept: OBSTETRICS AND GYNECOLOGY | Facility: CLINIC | Age: 84
End: 2024-01-09
Payer: MEDICARE

## 2024-01-09 VITALS
BODY MASS INDEX: 22.04 KG/M2 | SYSTOLIC BLOOD PRESSURE: 138 MMHG | HEIGHT: 66 IN | DIASTOLIC BLOOD PRESSURE: 62 MMHG | WEIGHT: 137.13 LBS | HEART RATE: 89 BPM

## 2024-01-09 DIAGNOSIS — L72.3 SEBACEOUS CYST: Primary | ICD-10-CM

## 2024-01-09 PROCEDURE — 99213 OFFICE O/P EST LOW 20 MIN: CPT | Mod: S$PBB,,, | Performed by: STUDENT IN AN ORGANIZED HEALTH CARE EDUCATION/TRAINING PROGRAM

## 2024-01-09 PROCEDURE — 99213 OFFICE O/P EST LOW 20 MIN: CPT | Mod: PBBFAC,PO | Performed by: STUDENT IN AN ORGANIZED HEALTH CARE EDUCATION/TRAINING PROGRAM

## 2024-01-09 PROCEDURE — 99999 PR PBB SHADOW E&M-EST. PATIENT-LVL III: CPT | Mod: PBBFAC,,, | Performed by: STUDENT IN AN ORGANIZED HEALTH CARE EDUCATION/TRAINING PROGRAM

## 2024-01-09 NOTE — PROGRESS NOTES
[facility] Note    Subjective:     Chief Complaint: Gynecologic Exam (Vaginal boil)      HPI:  2024    83-year-old female presents with a external Walker in the pelvic region that has been present since last Thursday.  She noticed a lesion while bathing.  It has progressively gotten smaller.  Denies any training or bleeding or tenderness of the lesion.  This lesion occurred 1 other time many years ago and resolved on its own.  Denies any fever, chills, or GI upset.       GYN & OB History  No LMP recorded. Patient has had a hysterectomy.     Date of Last Pap: N/A, CARY/BSO - endometriosis.    OB History    Para Term  AB Living   2 2 2     2   SAB IAB Ectopic Multiple Live Births           2      # Outcome Date GA Lbr Francois/2nd Weight Sex Delivery Anes PTL Lv   2 Term     M Vag-Spont   ANA   1 Term     F Vag-Spont   ANA       Past Medical History:   Diagnosis Date    Aortic insufficiency     Cancer     renal ca, L kidney removed    COVID-19     Diastolic heart failure     Diffusion capacity of lung (dl), decreased     Encounter for blood transfusion     with miscarriage    Fibromyalgia     GERD (gastroesophageal reflux disease)     Hypercholesterolemia     Hyperlipidemia     Hypertension     Lung cancer 2015    adenocarcinoma ; upper left lobe    Renal disorder     Left Kidney cancer    Sinusitis      Past Surgical History:   Procedure Laterality Date    BACK SURGERY      laminectomy    ENDOBRONCHIAL ULTRASOUND Left 07/15/2022    Procedure: ENDOBRONCHIAL ULTRASOUND (EBUS);  Surgeon: Cm Freitas MD;  Location: [facility];  Service: Pulmonary;  Laterality: Left;    ENDOBRONCHIAL ULTRASOUND Bilateral 2023    Procedure: ENDOBRONCHIAL ULTRASOUND (EBUS);  Surgeon: Cm Freitas MD;  Location: [facility];  Service: Pulmonary;  Laterality: Bilateral;    HYSTERECTOMY      CARY/BSO. Due to endometriosis.    INJECTION OF JOINT Right 2019     Procedure: Injection, Joint, Shoulder, Hip, Or Knee;  Surgeon: Zach Wilder MD;  Location: [facility] OR;  Service: Pain Management;  Laterality: Right;  right hip intra-articular injection     LUNG LOBECTOMY Left     LOWER LOBE    NEPHRECTOMY Left 2005    Entire kidney due to cancer.    ROBOTIC BRONCHOSCOPY Bilateral 11/9/2023    Procedure: ROBOTIC BRONCHOSCOPY;  Surgeon: Cm Freitas MD;  Location: [facility] OR;  Service: Pulmonary;  Laterality: Bilateral;     Review of patient's allergies indicates:   Allergen Reactions    Hydrocodone Hallucinations    Oxycodone-acetaminophen Hallucinations and Other (See Comments)    Amoxicillin Rash       Social History     Socioeconomic History    Marital status:    Tobacco Use    Smoking status: Never    Smokeless tobacco: Never   Substance and Sexual Activity    Alcohol use: No    Drug use: No    Sexual activity: Not Currently       Family History   Problem Relation Age of Onset    Hypertension Mother     Breast cancer Maternal Aunt     Breast cancer Maternal Aunt        Medications  Current Outpatient Medications on File Prior to Visit   Medication Sig Dispense Refill Last Dose    acetaminophen (TYLENOL) 500 MG tablet Take 1,000 mg by mouth 2 (two) times daily as needed.   Taking    amLODIPine (NORVASC) 5 MG tablet Take 1 tablet (5 mg total) by mouth every evening. 90 tablet 3 Taking    aspirin (ECOTRIN) 81 MG EC tablet Take 81 mg by mouth once daily.   Taking    cyanocobalamin (VITAMIN B-12) 1000 MCG tablet Take 100 mcg by mouth once daily.   Taking    duloxetine (CYMBALTA) 60 MG capsule Take 60 mg by mouth once daily.   Taking    hydroCHLOROthiazide (HYDRODIURIL) 12.5 MG Tab Take 1 tablet (12.5 mg total) by mouth daily as needed (Leg swelling). 30 tablet 11 Taking    ipratropium (ATROVENT) 42 mcg (0.06 %) nasal spray by Each Nostril route.   Taking    labetaloL (NORMODYNE) 200 MG tablet TAKE 1 TABLET BY MOUTH TWICE  DAILY 180 tablet 3 Taking    lisinopriL  "(PRINIVIL,ZESTRIL) 20 MG tablet Take 1 tablet (20 mg total) by mouth 2 (two) times daily. 180 tablet 3 Taking    pantoprazole (PROTONIX) 40 MG tablet Take 40 mg by mouth once daily.   Taking    rosuvastatin (CRESTOR) 10 MG tablet Take 10 mg by mouth every evening.   Taking    UNABLE TO FIND Take 1 capsule by mouth once daily. prevagen   Taking       Review of Systems   Constitutional: Negative for appetite change, fever and unexpected weight change.   Respiratory: Negative for cough and shortness of breath.    Cardiovascular: Negative for chest pain and palpitations.   Genitourinary: Negative for dyspareunia, dysuria, hematuria and pelvic pain.        GYN ROS per HPI.   Psychiatric/Behavioral: The patient is not nervous/anxious.      Objective:     /62 (BP Location: Left arm, Patient Position: Sitting, BP Method: Medium (Automatic))   Pulse 89   Ht 5' 6" (1.676 m)   Wt 62.2 kg (137 lb 2 oz)   BMI 22.13 kg/m²     Physical Exam  Exam conducted with a chaperone present.   Constitutional:       General: She is not in acute distress.  HENT:      Head: Normocephalic.   Eyes:      General: No scleral icterus.  Abdominal:       Genitourinary:     General: Normal vulva.      Pubic Area: No rash.       Labia:         Right: No rash or tenderness.         Left: No rash or tenderness.       Uterus: Not tender.       Adnexa:         Right: No mass or tenderness.          Left: No mass or tenderness.        Comments: Visualization of cervix limited due to pain during speculum insertion from severe atrophy (pale and dry vaginal walls).     No white bumps appreciated on labia.   Neurological:      General: No focal deficit present.      Mental Status: She is alert.   Psychiatric:         Mood and Affect: Mood normal.         Assessment:     1. Sebaceous cyst      Plan:     1. Sebaceous cyst      Follow up if symptoms worsen or fail to improve.  Follow-up sooner if needed.     Sebaceous cysts are benign collections of oils " underneath the skin. Sometimes these lesions get inflamed. Since her lesion is healing and getting smaller, we will refrain from further medical management. If lesion has not healed by Jan 18, 2024, she was instructed to return to the clinic for I&D if necessary.   Recommended warm compresses to help with healing.       Abi Kent PA-C  01/09/2024

## 2024-01-12 ENCOUNTER — OFFICE VISIT (OUTPATIENT)
Dept: CARDIOLOGY | Facility: CLINIC | Age: 84
End: 2024-01-12
Payer: MEDICARE

## 2024-01-12 VITALS
HEIGHT: 66 IN | HEART RATE: 85 BPM | OXYGEN SATURATION: 97 % | WEIGHT: 143.63 LBS | SYSTOLIC BLOOD PRESSURE: 122 MMHG | BODY MASS INDEX: 23.08 KG/M2 | DIASTOLIC BLOOD PRESSURE: 62 MMHG

## 2024-01-12 DIAGNOSIS — C64.9 METASTATIC RENAL CELL CARCINOMA TO LUNG, RIGHT: ICD-10-CM

## 2024-01-12 DIAGNOSIS — C78.01 METASTATIC RENAL CELL CARCINOMA TO LUNG, RIGHT: ICD-10-CM

## 2024-01-12 DIAGNOSIS — I35.1 NONRHEUMATIC AORTIC VALVE INSUFFICIENCY: ICD-10-CM

## 2024-01-12 DIAGNOSIS — I35.1 AORTIC VALVE INSUFFICIENCY, ETIOLOGY OF CARDIAC VALVE DISEASE UNSPECIFIED: ICD-10-CM

## 2024-01-12 DIAGNOSIS — R60.0 LEG EDEMA: Primary | ICD-10-CM

## 2024-01-12 DIAGNOSIS — I50.32 CHRONIC DIASTOLIC HEART FAILURE: ICD-10-CM

## 2024-01-12 DIAGNOSIS — I70.0 AORTIC ATHEROSCLEROSIS: ICD-10-CM

## 2024-01-12 DIAGNOSIS — I10 PRIMARY HYPERTENSION: ICD-10-CM

## 2024-01-12 PROCEDURE — 99213 OFFICE O/P EST LOW 20 MIN: CPT | Mod: S$PBB,,, | Performed by: INTERNAL MEDICINE

## 2024-01-12 PROCEDURE — 99214 OFFICE O/P EST MOD 30 MIN: CPT | Mod: PBBFAC,PN | Performed by: INTERNAL MEDICINE

## 2024-01-12 PROCEDURE — 99999 PR PBB SHADOW E&M-EST. PATIENT-LVL IV: CPT | Mod: PBBFAC,,, | Performed by: INTERNAL MEDICINE

## 2024-01-12 NOTE — PROGRESS NOTES
Patient ID:  Karlie Hess is a 83 y.o. female who presents for follow-up of diastolic dysfunction and Hypertension      Primary hypertension  Controlled on labetalol, amlodipine 5 mg, lisinopril 20 mg     History of lung cancer  Aware status post left lower lobe lobectomy     History of renal cell cancer  Aware.  GFR of 40.8 for solitary kidney     Leg edema  She had bilateral ankle edema that resolved with 20 mg of Lasix    Her blood pressure has been doing fine she denies any leg edema.  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  She denies any chest pains any shortness of breath.  She has history of fibromyalgia that she has been on Cymbalta for it.  She does describe occasional muscle pain and muscle cramps that comes and goes.  On physical examination she had a grade 2/6 systolic murmur at the base the echocardiogram revealed mild aortic insufficiency.  Will repeat an echocardiogram to follow the aortic insufficiency.  The results of the CT scan and the biopsies pathology were review.  She has clear cell renal cell carcinoma.        Past Medical History:   Diagnosis Date    Aortic insufficiency     Cancer 2005    renal ca, L kidney removed    COVID-19 2022    Diastolic heart failure     Diffusion capacity of lung (dl), decreased     Encounter for blood transfusion 1963    with miscarriage    Fibromyalgia     GERD (gastroesophageal reflux disease)     Hypercholesterolemia     Hyperlipidemia     Hypertension     Lung cancer 2015    adenocarcinoma ; upper left lobe    Renal disorder 2005    Left Kidney cancer    Sinusitis         Past Surgical History:   Procedure Laterality Date    BACK SURGERY  2013    laminectomy    ENDOBRONCHIAL ULTRASOUND Left 07/15/2022    Procedure: ENDOBRONCHIAL ULTRASOUND (EBUS);  Surgeon: Cm Freitas MD;  Location: [facility];  Service: Pulmonary;  Laterality: Left;    ENDOBRONCHIAL ULTRASOUND Bilateral 11/9/2023    Procedure:  "ENDOBRONCHIAL ULTRASOUND (EBUS);  Surgeon: Cm Freitas MD;  Location: [facility] OR;  Service: Pulmonary;  Laterality: Bilateral;    HYSTERECTOMY  1975    CARY/BSO. Due to endometriosis.    INJECTION OF JOINT Right 05/27/2019    Procedure: Injection, Joint, Shoulder, Hip, Or Knee;  Surgeon: Zach Wilder MD;  Location: [facility] OR;  Service: Pain Management;  Laterality: Right;  right hip intra-articular injection     LUNG LOBECTOMY Left     LOWER LOBE    NEPHRECTOMY Left 2005    Entire kidney due to cancer.    ROBOTIC BRONCHOSCOPY Bilateral 11/9/2023    Procedure: ROBOTIC BRONCHOSCOPY;  Surgeon: Cm Freitas MD;  Location: [facility] OR;  Service: Pulmonary;  Laterality: Bilateral;          Current Outpatient Medications   Medication Instructions    acetaminophen (TYLENOL) 1,000 mg, Oral, 2 times daily PRN    amLODIPine (NORVASC) 5 mg, Oral, Nightly    aspirin (ECOTRIN) 81 mg, Oral, Daily    cyanocobalamin (VITAMIN B-12) 100 mcg, Oral, Daily    DULoxetine (CYMBALTA) 60 mg, Oral, Daily    hydroCHLOROthiazide (HYDRODIURIL) 12.5 mg, Oral, Daily PRN    ipratropium (ATROVENT) 42 mcg (0.06 %) nasal spray Each Nostril    labetaloL (NORMODYNE) 200 mg, Oral, 2 times daily    lisinopriL (PRINIVIL,ZESTRIL) 20 mg, Oral, 2 times daily    pantoprazole (PROTONIX) 40 mg, Oral, Daily    rosuvastatin (CRESTOR) 10 mg, Oral, Nightly    UNABLE TO FIND 1 capsule, Oral, Daily, prevagen        Review of patient's allergies indicates:   Allergen Reactions    Hydrocodone Hallucinations    Oxycodone-acetaminophen Hallucinations and Other (See Comments)    Amoxicillin Rash        Review of Systems   Cardiovascular:  Negative for chest pain, irregular heartbeat and palpitations.   Musculoskeletal:  Positive for muscle cramps.        Objective:     Vitals:    01/12/24 1046   BP: 122/62   BP Location: Left arm   Patient Position: Sitting   BP Method: Medium (Manual)   Pulse: 85   SpO2: 97%   Weight: 65.2 kg (143 lb 10.1 oz)   Height: 5' 6" (1.676 m)    "    Physical Exam  Vitals and nursing note reviewed.   Constitutional:       Appearance: She is well-developed.   HENT:      Head: Normocephalic and atraumatic.   Eyes:      Conjunctiva/sclera: Conjunctivae normal.   Cardiovascular:      Rate and Rhythm: Normal rate and regular rhythm.      Heart sounds: Murmur (Grade 2/6 systolic murmur at the base) heard.   Pulmonary:      Effort: Pulmonary effort is normal.      Breath sounds: Normal breath sounds.   Abdominal:      General: Bowel sounds are normal.      Palpations: Abdomen is soft.   Musculoskeletal:         General: Normal range of motion.   Skin:     General: Skin is warm and dry.   Neurological:      Mental Status: She is alert and oriented to person, place, and time.   Psychiatric:         Behavior: Behavior normal.         Thought Content: Thought content normal.         Judgment: Judgment normal.       CMP  Sodium   Date Value Ref Range Status   11/09/2023 143 136 - 145 mmol/L Final     Potassium   Date Value Ref Range Status   11/09/2023 4.0 3.5 - 5.1 mmol/L Final     Comment:     Anion Gap reference range revised on 4/28/2023     Chloride   Date Value Ref Range Status   11/09/2023 107 95 - 110 mmol/L Final     CO2   Date Value Ref Range Status   11/09/2023 30 22 - 31 mmol/L Final     Glucose   Date Value Ref Range Status   11/09/2023 112 (H) 70 - 110 mg/dL Final     Comment:     The ADA recommends the following guidelines for fasting glucose:    Normal:       less than 100 mg/dL    Prediabetes:  100 mg/dL to 125 mg/dL    Diabetes:     126 mg/dL or higher       BUN   Date Value Ref Range Status   11/09/2023 20 (H) 7 - 18 mg/dL Final     Creatinine   Date Value Ref Range Status   11/09/2023 1.11 0.50 - 1.40 mg/dL Final   12/31/2012 1.2 0.5 - 1.4 mg/dL Final     Calcium   Date Value Ref Range Status   11/09/2023 8.8 8.4 - 10.2 mg/dL Final   12/31/2012 9.7 8.7 - 10.5 mg/dL Final     Total Protein   Date Value Ref Range Status   09/16/2023 6.2 6.0 - 8.4 g/dL  Final     Albumin   Date Value Ref Range Status   09/16/2023 3.5 3.5 - 5.2 g/dL Final     Total Bilirubin   Date Value Ref Range Status   09/16/2023 0.8 0.1 - 1.0 mg/dL Final     Comment:     For infants and newborns, interpretation of results should be based  on gestational age, weight and in agreement with clinical  observations.    Premature Infant recommended reference ranges:  Up to 24 hours.............<8.0 mg/dL  Up to 48 hours............<12.0 mg/dL  3-5 days..................<15.0 mg/dL  6-29 days.................<15.0 mg/dL       Alkaline Phosphatase   Date Value Ref Range Status   09/16/2023 53 (L) 55 - 135 U/L Final     AST   Date Value Ref Range Status   09/16/2023 14 10 - 40 U/L Final     ALT   Date Value Ref Range Status   09/16/2023 9 (L) 10 - 44 U/L Final     Anion Gap   Date Value Ref Range Status   11/09/2023 6 5 - 12 mmol/L Final     Comment:     Anion Gap reference range revised on 4/28/2023 12/31/2012 14 5 - 15 meq/L Final     eGFR if    Date Value Ref Range Status   07/18/2022 >60.0 >60 mL/min/1.73 m^2 Final     eGFR if non    Date Value Ref Range Status   07/18/2022 52.6 (A) >60 mL/min/1.73 m^2 Final     Comment:     Calculation used to obtain the estimated glomerular filtration  rate (eGFR) is the CKD-EPI equation.         BMP  Lab Results   Component Value Date     11/09/2023    K 4.0 11/09/2023     11/09/2023    CO2 30 11/09/2023    BUN 20 (H) 11/09/2023    CREATININE 1.11 11/09/2023    CALCIUM 8.8 11/09/2023    ANIONGAP 6 11/09/2023    ESTGFRAFRICA >60.0 07/18/2022    EGFRNONAA 52.6 (A) 07/18/2022      BNP  @LABRCNTIP(BNP,BNPTRIAGEBLO)@   Lab Results   Component Value Date    CHOL 157 09/02/2020    CHOL 285 (H) 05/04/2020    CHOL 264 (H) 08/15/2019     Lab Results   Component Value Date    HDL 42 09/02/2020    HDL 37 (L) 05/04/2020    HDL 47 08/15/2019     Lab Results   Component Value Date    LDLCALC 84.8 09/02/2020    LDLCALC 192.0 (H)  "05/04/2020    LDLCALC 186.6 (H) 08/15/2019     Lab Results   Component Value Date    TRIG 151 (H) 09/02/2020    TRIG 280 (H) 05/04/2020    TRIG 152 (H) 08/15/2019     Lab Results   Component Value Date    CHOLHDL 26.8 09/02/2020    CHOLHDL 13.0 (L) 05/04/2020    CHOLHDL 17.8 (L) 08/15/2019      Lab Results   Component Value Date    TSH 2.660 08/20/2023     No results found for: "LABA1C", "HGBA1C"  Lab Results   Component Value Date    WBC 4.80 01/05/2024    HGB 11.6 (L) 01/05/2024    HCT 35.7 (L) 01/05/2024    MCV 89 01/05/2024     01/05/2024         Results for orders placed during the hospital encounter of 10/18/22    Echo Saline Bubble? No    Interpretation Summary  · The left ventricle is normal in size with mild concentric hypertrophy and normal systolic function.  · Grade I left ventricular diastolic dysfunction.  · The estimated PA systolic pressure is 25 mmHg.  · Normal right ventricular size with normal right ventricular systolic function.  · Normal central venous pressure (3 mmHg).  · The estimated ejection fraction is 65%.  · Mild tricuspid regurgitation.  · Mild mitral regurgitation.  · Mild aortic regurgitation.     No results found for this or any previous visit.         Assessment:       Primary hypertension  Controlled on amlodipine, labetalol and lisinopril.    Nonrheumatic aortic valve insufficiency  The echocardiogram to assess the aortic valve.    Metastatic renal cell carcinoma to lung, right  Aware followed by Oncology    Aortic atherosclerosis  Noted on CT scan.       Plan:       Continue amlodipine labetalol and lisinopril for blood pressure control.  Follow-up with Oncology.  Obtain an echocardiogram to assess her aortic insufficiency and aortic sclerosis        "

## 2024-01-16 ENCOUNTER — TELEPHONE (OUTPATIENT)
Dept: HEMATOLOGY/ONCOLOGY | Facility: CLINIC | Age: 84
End: 2024-01-16

## 2024-01-16 NOTE — TELEPHONE ENCOUNTER
----- Message from Laura Staples sent at 1/10/2024 10:53 AM CST -----  Regarding: BX RESULTS  DO YOU WANTTO CALL HER? SHE IS SEEING DR ZAPATA NEXT WEEK.  ----- Message -----  From: Shey Swartz  Sent: 1/10/2024  10:07 AM CST  To: [facility] Staff    Pt would like a call back with BX results.      [phone]

## 2024-01-18 ENCOUNTER — OFFICE VISIT (OUTPATIENT)
Dept: HEMATOLOGY/ONCOLOGY | Facility: CLINIC | Age: 84
End: 2024-01-18
Payer: MEDICARE

## 2024-01-18 VITALS
BODY MASS INDEX: 23.5 KG/M2 | TEMPERATURE: 98 F | RESPIRATION RATE: 17 BRPM | DIASTOLIC BLOOD PRESSURE: 85 MMHG | WEIGHT: 145.63 LBS | SYSTOLIC BLOOD PRESSURE: 199 MMHG | HEART RATE: 92 BPM

## 2024-01-18 DIAGNOSIS — C78.01 METASTATIC RENAL CELL CARCINOMA TO LUNG, RIGHT: Primary | ICD-10-CM

## 2024-01-18 DIAGNOSIS — C64.9 METASTATIC RENAL CELL CARCINOMA TO LUNG, RIGHT: Primary | ICD-10-CM

## 2024-01-18 PROCEDURE — 99214 OFFICE O/P EST MOD 30 MIN: CPT | Mod: S$PBB,,, | Performed by: INTERNAL MEDICINE

## 2024-01-18 PROCEDURE — 99213 OFFICE O/P EST LOW 20 MIN: CPT | Performed by: INTERNAL MEDICINE

## 2024-01-18 NOTE — PROGRESS NOTES
PROGRESS NOTE    Subjective:       Patient ID: Karlie Hess is a 83 y.o. female.    History of Left RCC, treated [id number] further treatment     History of [facility] lung cancer-treated with lobectomy-7/2/2015-no chemo/xrt-Path c/w mucin producing adenocarcinoma. [id number]     History of left hilar mass, 9/2020 EBUS negative     PET 10/18/2023:  Nodular densities are as outlined below:  -21 x 17 mm right infrahilar nodule with SUV max 2.5 (image 119), previously measuring 17 x 16 mm (August 20, 2023)  -27 x 27 mm marginated nodule in the posterior inferior left hilum with SUV max 2.7 (image 109)  -13 x 7 mm nodular density along the right posterior pararenal space with SUV max 0.8 (image 179), seen on studies dating back to 1/12/2021.     11/9/2023-EBUS  1. LUNG, RIGHT LOWER LOBE MASS, FINE NEEDLE ASPIRATION   - NEGATIVE FOR MALIGNANT CELLS.     2. LUNG, RIGHT LOWER LOBE MASS, BRUSHING   - RARE ATYPICAL CELLS PRESENT     3. LUNG, RIGHT LOWER LOBE MASS, BIOPSY TOUCH PREP   - POSITIVE FOR CARCINOMA.   - SEE CONCURRENT SURGICAL BIOPSY ([id number])     4. LUNG, RIGHT LOWER LOBE, BRONCHOALVEOLAR LAVAGE   - RARE ATYPICAL CELLS PRESENT.     5. LUNG, LEFT LOWER LOBE MASS, FINE NEEDLE ASPIRATION   - RARE ATYPICAL CELLS PRESENT     6. LYMPH NODE, STATION 11RS, FINE NEEDLE ASPIRATION   - NEGATIVE FOR MALIGNANT CELLS.   - SCANT KATARZYNA MATERIAL PRESENT      Karnofsky performance status score <80   Time from original diagnosis to initiation of targeted therapy <1 year   Hemoglobin less than the lower limit of normal   Serum calcium greater than the upper limit of normal   Neutrophil count greater than the upper limit of normal   Platelet count greater than the upper limit of normal   Favorable risk: None of the above risk factors present.  Intermediate risk: 1 or 2 of the above risk factors present.  Poor risk: 3 or more risk factors present.     1/5/2024:  RIGHT  PARARENAL MASS, CT GUIDED CORE BIOPSY:   - CLEAR CELL RENAL CELL CARCINOMA     12/27/2023-1/5/2024  RLL SBRT    Chief Complaint:  No chief complaint on file.  Metastatic Renal Cell Carcinoma follow up    History of Present Illness:   Karlie Hess is a 83 y.o. female who presents for follow up of above.      She has completed the radiation to the lung lesion.         Family and Social history reviewed and is unchanged from 11/21/2023             Current Outpatient Medications:     acetaminophen (TYLENOL) 500 MG tablet, Take 1,000 mg by mouth 2 (two) times daily as needed., Disp: , Rfl:     amLODIPine (NORVASC) 5 MG tablet, Take 1 tablet (5 mg total) by mouth every evening., Disp: 90 tablet, Rfl: 3    aspirin (ECOTRIN) 81 MG EC tablet, Take 81 mg by mouth once daily., Disp: , Rfl:     cyanocobalamin (VITAMIN B-12) 1000 MCG tablet, Take 100 mcg by mouth once daily., Disp: , Rfl:     duloxetine (CYMBALTA) 60 MG capsule, Take 60 mg by mouth once daily., Disp: , Rfl:     hydroCHLOROthiazide (HYDRODIURIL) 12.5 MG Tab, Take 1 tablet (12.5 mg total) by mouth daily as needed (Leg swelling)., Disp: 30 tablet, Rfl: 11    ipratropium (ATROVENT) 42 mcg (0.06 %) nasal spray, by Each Nostril route., Disp: , Rfl:     labetaloL (NORMODYNE) 200 MG tablet, TAKE 1 TABLET BY MOUTH TWICE  DAILY, Disp: 180 tablet, Rfl: 3    lisinopriL (PRINIVIL,ZESTRIL) 20 MG tablet, Take 1 tablet (20 mg total) by mouth 2 (two) times daily., Disp: 180 tablet, Rfl: 3    pantoprazole (PROTONIX) 40 MG tablet, Take 40 mg by mouth once daily., Disp: , Rfl:     rosuvastatin (CRESTOR) 10 MG tablet, Take 10 mg by mouth every evening., Disp: , Rfl:     UNABLE TO FIND, Take 1 capsule by mouth once daily. prevagen, Disp: , Rfl:   No current facility-administered medications for this visit.    Facility-Administered Medications Ordered in Other Visits:     LIDOcaine (PF) 10 mg/ml (1%) injection 10 mg, 1 mL, Intradermal, Once, Gabriel Loaiza,  MD        Objective:       Physical Examination:     BP (!) 199/85   Pulse 92   Temp 97.9 °F (36.6 °C)   Resp 17   Wt 66 kg (145 lb 9.6 oz)   BMI 23.50 kg/m²     Physical Exam  Constitutional:       Appearance: Normal appearance.   HENT:      Head: Normocephalic and atraumatic.   Eyes:      General: No scleral icterus.     Conjunctiva/sclera: Conjunctivae normal.   Cardiovascular:      Rate and Rhythm: Normal rate.   Pulmonary:      Effort: Pulmonary effort is normal.   Abdominal:      General: Abdomen is flat.   Neurological:      General: No focal deficit present.      Mental Status: She is alert and oriented to person, place, and time.   Psychiatric:         Mood and Affect: Mood normal.         Behavior: Behavior normal.         Labs:   Recent Results (from the past 336 hour(s))   CBC Auto Differential    Collection Time: 01/05/24  6:56 AM   Result Value Ref Range    WBC 4.80 3.90 - 12.70 K/uL    Hemoglobin 11.6 (L) 12.0 - 16.0 g/dL    Hematocrit 35.7 (L) 37.0 - 48.5 %    Platelets 261 150 - 450 K/uL     CMP  Sodium   Date Value Ref Range Status   11/09/2023 143 136 - 145 mmol/L Final     Potassium   Date Value Ref Range Status   11/09/2023 4.0 3.5 - 5.1 mmol/L Final     Comment:     Anion Gap reference range revised on 4/28/2023     Chloride   Date Value Ref Range Status   11/09/2023 107 95 - 110 mmol/L Final     CO2   Date Value Ref Range Status   11/09/2023 30 22 - 31 mmol/L Final     Glucose   Date Value Ref Range Status   11/09/2023 112 (H) 70 - 110 mg/dL Final     Comment:     The ADA recommends the following guidelines for fasting glucose:    Normal:       less than 100 mg/dL    Prediabetes:  100 mg/dL to 125 mg/dL    Diabetes:     126 mg/dL or higher       BUN   Date Value Ref Range Status   11/09/2023 20 (H) 7 - 18 mg/dL Final     Creatinine   Date Value Ref Range Status   11/09/2023 1.11 0.50 - 1.40 mg/dL Final   12/31/2012 1.2 0.5 - 1.4 mg/dL Final     Calcium   Date Value Ref Range Status  "  11/09/2023 8.8 8.4 - 10.2 mg/dL Final   12/31/2012 9.7 8.7 - 10.5 mg/dL Final     Total Protein   Date Value Ref Range Status   09/16/2023 6.2 6.0 - 8.4 g/dL Final     Albumin   Date Value Ref Range Status   09/16/2023 3.5 3.5 - 5.2 g/dL Final     Total Bilirubin   Date Value Ref Range Status   09/16/2023 0.8 0.1 - 1.0 mg/dL Final     Comment:     For infants and newborns, interpretation of results should be based  on gestational age, weight and in agreement with clinical  observations.    Premature Infant recommended reference ranges:  Up to 24 hours.............<8.0 mg/dL  Up to 48 hours............<12.0 mg/dL  3-5 days..................<15.0 mg/dL  6-29 days.................<15.0 mg/dL       Alkaline Phosphatase   Date Value Ref Range Status   09/16/2023 53 (L) 55 - 135 U/L Final     AST   Date Value Ref Range Status   09/16/2023 14 10 - 40 U/L Final     ALT   Date Value Ref Range Status   09/16/2023 9 (L) 10 - 44 U/L Final     Anion Gap   Date Value Ref Range Status   11/09/2023 6 5 - 12 mmol/L Final     Comment:     Anion Gap reference range revised on 4/28/2023 12/31/2012 14 5 - 15 meq/L Final     eGFR if    Date Value Ref Range Status   07/18/2022 >60.0 >60 mL/min/1.73 m^2 Final     eGFR if non    Date Value Ref Range Status   07/18/2022 52.6 (A) >60 mL/min/1.73 m^2 Final     Comment:     Calculation used to obtain the estimated glomerular filtration  rate (eGFR) is the CKD-EPI equation.        No results found for: "CEA"  No results found for: "PSA"        Assessment/Plan:     Problem List Items Addressed This Visit       Metastatic renal cell carcinoma to lung, right - Primary     Ms. Hess is clearly stage IV RCC and the abdominal nodule is positive as well.  She has had radiation therapy to the lung lesion and she did well with this.  I feel she should be considered for radiation to the renal bed lesion and would then monitor conservatively without systemic therapy " and employ this only as needed.  Patient is good with this approach. Spent over 30 min in total care today including pre-visit review and charting, visit and post-visit planning.               Discussion:     Follow up in about 6 weeks (around 2/29/2024).      Electronically signed by Del Nathan

## 2024-01-18 NOTE — ASSESSMENT & PLAN NOTE
Ms. Hess is clearly stage IV RCC and the abdominal nodule is positive as well.  She has had radiation therapy to the lung lesion and she did well with this.  I feel she should be considered for radiation to the renal bed lesion and would then monitor conservatively without systemic therapy and employ this only as needed.  Patient is good with this approach. Spent over 30 min in total care today including pre-visit review and charting, visit and post-visit planning.

## 2024-01-24 ENCOUNTER — OFFICE VISIT (OUTPATIENT)
Dept: RADIATION ONCOLOGY | Facility: CLINIC | Age: 84
End: 2024-01-24
Payer: MEDICARE

## 2024-01-24 VITALS
SYSTOLIC BLOOD PRESSURE: 130 MMHG | DIASTOLIC BLOOD PRESSURE: 61 MMHG | HEART RATE: 95 BPM | WEIGHT: 142.69 LBS | OXYGEN SATURATION: 96 % | BODY MASS INDEX: 23.03 KG/M2 | RESPIRATION RATE: 16 BRPM

## 2024-01-24 DIAGNOSIS — C78.01 METASTATIC RENAL CELL CARCINOMA TO LUNG, RIGHT: Primary | ICD-10-CM

## 2024-01-24 DIAGNOSIS — C64.9 METASTATIC RENAL CELL CARCINOMA TO LUNG, RIGHT: Primary | ICD-10-CM

## 2024-01-24 PROCEDURE — 99215 OFFICE O/P EST HI 40 MIN: CPT | Mod: S$GLB,,, | Performed by: RADIOLOGY

## 2024-01-24 NOTE — PROGRESS NOTES
Karlie Hess  [id number]  1940 1/24/2024  No referring provider defined for this encounter.    REASON FOR CONSULTATION: oligometastatic clear cell RCC L kidney    TREATMENT GOAL: primary    HISTORY OF PRESENT ILLNESS:   Karlie Hess is a 83 y.o. 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 Freitas).  Staging studies have been stable until CT abdomen pelvis from September of this year appreciated soft tissue nodule right pararenal space, slowly enlarged to 1.3 cm (0.6 cm in 2021).  CT chest bilateral kathleen stable.  PET-CT demonstrated SUV 0.8 at 1.3 cm right pararenal, SUV 2.5 at enlarging 2.1 x 1.7 cm right infrahilar nodule and SUV 2.7 at inferior left hilum suspicious for benign etiology.  She underwent repeat EBUS with Dr. Freitas that returned metastatic clear cell renal cell carcinoma from right lung mass with rare atypical cells appreciated from left lower lobe FNA; 11R(-).    Case was reviewed at Multidisciplinary Tumor Conference with recommendation for core needle biopsy of pararenal lesion.      Completed SBRT to right lower lobe, 50 Gy in 5 fractions ending January 5, 2024.  Treatment well tolerated.    Core needle biopsy of right pararenal (contralateral from primary) lesion returned clear cell RCC.  Case discussed with Dr. Nathan with plan for SBRT to this site followed by observation.      Patient returns to discuss.    She is without complaints.  She denies fever, chills, chest pain or shortness of breath.  Denies abdominal pain.  Reports SBRT course and recent biopsy both uneventful.    Review of systems otherwise negative unless indicated in HPI.    Past Medical History:   Diagnosis Date    Aortic insufficiency     Cancer 2005    renal ca, L kidney removed    COVID-19 2022     Diastolic heart failure     Diffusion capacity of lung (dl), decreased     Encounter for blood transfusion 1963    with miscarriage    Fibromyalgia     GERD (gastroesophageal reflux disease)     Hypercholesterolemia     Hyperlipidemia     Hypertension     Lung cancer 2015    adenocarcinoma ; upper left lobe    Renal disorder 2005    Left Kidney cancer    Sinusitis      Past Surgical History:   Procedure Laterality Date    BACK SURGERY  2013    laminectomy    ENDOBRONCHIAL ULTRASOUND Left 07/15/2022    Procedure: ENDOBRONCHIAL ULTRASOUND (EBUS);  Surgeon: Cm Freitas MD;  Location: [facility];  Service: Pulmonary;  Laterality: Left;    ENDOBRONCHIAL ULTRASOUND Bilateral 11/9/2023    Procedure: ENDOBRONCHIAL ULTRASOUND (EBUS);  Surgeon: Cm Freitas MD;  Location: [facility] OR;  Service: Pulmonary;  Laterality: Bilateral;    HYSTERECTOMY  1975    CARY/BSO. Due to endometriosis.    INJECTION OF JOINT Right 05/27/2019    Procedure: Injection, Joint, Shoulder, Hip, Or Knee;  Surgeon: Zach Wilder MD;  Location: [facility] OR;  Service: Pain Management;  Laterality: Right;  right hip intra-articular injection     LUNG LOBECTOMY Left     LOWER LOBE    NEPHRECTOMY Left 2005    Entire kidney due to cancer.    ROBOTIC BRONCHOSCOPY Bilateral 11/9/2023    Procedure: ROBOTIC BRONCHOSCOPY;  Surgeon: Cm Freitas MD;  Location: [facility];  Service: Pulmonary;  Laterality: Bilateral;     Social History     Socioeconomic History    Marital status:    Tobacco Use    Smoking status: Never    Smokeless tobacco: Never   Substance and Sexual Activity    Alcohol use: No    Drug use: No    Sexual activity: Not Currently     Social Determinants of Health     Financial Resource Strain: Low Risk  (1/15/2024)    Overall Financial Resource Strain (CARDIA)     Difficulty of Paying Living Expenses: Not hard at all   Food Insecurity: No Food Insecurity (1/15/2024)    Hunger Vital Sign     Worried About Running Out of Food in the Last Year:  Never true     Ran Out of Food in the Last Year: Never true   Transportation Needs: No Transportation Needs (1/15/2024)    PRAPARE - Transportation     Lack of Transportation (Medical): No     Lack of Transportation (Non-Medical): No   Physical Activity: Inactive (1/15/2024)    Exercise Vital Sign     Days of Exercise per Week: 0 days     Minutes of Exercise per Session: 0 min   Stress: No Stress Concern Present (1/15/2024)    [employer] - Occupational Stress Questionnaire     Feeling of Stress : Not at all   Social Connections: Unknown (1/15/2024)    Social Connection and Isolation Panel [NHANES]     Frequency of Communication with Friends and Family: More than three times a week     Frequency of Social Gatherings with Friends and Family: Once a week     Attends Club or Organization Meetings: More than 4 times per year     Marital Status:    Housing Stability: Low Risk  (1/15/2024)    Housing Stability Vital Sign     Unable to Pay for Housing in the Last Year: No     Number of Places Lived in the Last Year: 1     Unstable Housing in the Last Year: No     Family History   Problem Relation Age of Onset    Hypertension Mother     Breast cancer Maternal Aunt     Breast cancer Maternal Aunt        PRIOR HISTORY OF CHEMOTHERAPY OR RADIOTHERAPY: Please see HPI for patients prior oncologic history.    Medication List with Changes/Refills   Current Medications    ACETAMINOPHEN (TYLENOL) 500 MG TABLET    Take 1,000 mg by mouth 2 (two) times daily as needed.    AMLODIPINE (NORVASC) 5 MG TABLET    Take 1 tablet (5 mg total) by mouth every evening.    ASPIRIN (ECOTRIN) 81 MG EC TABLET    Take 81 mg by mouth once daily.    CYANOCOBALAMIN (VITAMIN B-12) 1000 MCG TABLET    Take 100 mcg by mouth once daily.    DULOXETINE (CYMBALTA) 60 MG CAPSULE    Take 60 mg by mouth once daily.    HYDROCHLOROTHIAZIDE (HYDRODIURIL) 12.5 MG TAB    Take 1 tablet (12.5 mg total) by mouth daily as needed (Leg  swelling).    IPRATROPIUM (ATROVENT) 42 MCG (0.06 %) NASAL SPRAY    by Each Nostril route.    LABETALOL (NORMODYNE) 200 MG TABLET    TAKE 1 TABLET BY MOUTH TWICE  DAILY    LISINOPRIL (PRINIVIL,ZESTRIL) 20 MG TABLET    Take 1 tablet (20 mg total) by mouth 2 (two) times daily.    PANTOPRAZOLE (PROTONIX) 40 MG TABLET    Take 40 mg by mouth once daily.    ROSUVASTATIN (CRESTOR) 10 MG TABLET    Take 10 mg by mouth every evening.    UNABLE TO FIND    Take 1 capsule by mouth once daily. prevagen     Review of patient's allergies indicates:   Allergen Reactions    Hydrocodone Hallucinations    Oxycodone-acetaminophen Hallucinations and Other (See Comments)    Amoxicillin Rash       QUALITY OF LIFE: 100%- Normal, No Complaints, No Evidence of Disease    Vitals:    01/24/24 0814   BP: 130/61   Pulse: 95   Resp: 16   SpO2: 96%   Weight: 64.7 kg (142 lb 11.2 oz)   PainSc: 0-No pain     Body mass index is 23.03 kg/m².    PHYSICAL EXAM:   GENERAL: alert; in no apparent distress.  Looks younger than stated age  HEAD: normocephalic, atraumatic.  EYES: pupils are equal, round, reactive to light and accommodation. Sclera anicteric. Conjunctiva not injected.   NOSE/THROAT: no nasal erythema or rhinorrhea. Oropharynx pink, without erythema, ulcerations or thrush.   NECK: no cervical motion rigidity; supple with no masses.    CHEST: Patient is speaking comfortably on room air with normal work of breathing without using accessory muscles of respiration.  CARDIOVASCULAR: regular rate and rhythm  ABDOMEN: soft, nontender, nondistended.   MUSCULOSKELETAL: no tenderness to palpation along the spine or scapulae. Normal range of motion.  NEUROLOGIC: cranial nerves II-XII intact bilaterally. Strength 5/5 in bilateral upper and lower extremities. No sensory deficits appreciated. Normal gait.  EXTREMITIES: no clubbing, cyanosis, edema.  SKIN: no erythema, rashes, ulcerations noted.     REVIEW OF IMAGING/PATHOLOGY/LABS: Please see HPI. All images  reviewed personally by dictating physician.     ASSESSMENT: Karlie Hess is a 83 y.o. female with oligometastatic clear cell RCC L kidney.  PLAN:  Karlie Hess completed SBRT to right lower ending January 5, 2024.  Treatment well tolerated without complaints.  She returns after undergoing recommended biopsy of right pararenal deposit, pathology returning clear cell RCC.  This is her only known site of disease and per HPI she has an extended disease-free interval since nephrectomy.  Case was reviewed at multidisciplinary tumor Conference with recommendation for local therapy and further observation, deferring any systemic therapy.    Today we discussed definitive stereotactic body radiation therapy which results in excellent local control.  I described the process of rigid immobilization, abdominal compression to minimize respiratory excursion and 4D CT simulation to monitor respiratory motion followed by IMRT-based planning with intent to deliver high dose per fraction treatments every other day using advanced daily image guidance.  She recently underwent a similar course and is familiar.  My recommendation is 50Gy in 5 fractions.     I carefully explained the process of simulation and treatment delivery with weekly physician visits. Patient wishes to proceed.     We discussed the risks and benefits of the above treatment and have gone over in detail the acute and late toxicities of radiation therapy to the R pararenal mass. The patient expressed  understanding and has signed a consent form which is included in the patients chart.      The patient has our contact information and understands that they are free to contact us at any time with questions or concerns regarding radiation therapy.     DISPOSITION: RTC FOR CT SIM     I have personally seen and evaluated this patient with a high complexity diagnosis.      Greater than 60 minutes were dedicated to reviewing/interpreting pertinent  laboratory/imaging/pathology as well as prior consultations; reviewing and performing history and physical; counseling patient on oncologic recommendations; documentation in the electronic medical record including ordering of additional tests and/or radiation treatment protocol; and coordination of care with physicians with referrals placed as appropriate.    PHYSICIAN: Andi Jj Jr, MD    Thank you for the opportunity to meet and consult with Karlie Hess.   Please feel free to contact me to discuss the above recommendation further.

## 2024-02-07 ENCOUNTER — TREATMENT (OUTPATIENT)
Dept: RADIATION ONCOLOGY | Facility: CLINIC | Age: 84
End: 2024-02-07
Payer: MEDICARE

## 2024-02-07 PROCEDURE — 77336 RADIATION PHYSICS CONSULT: CPT | Mod: S$GLB,,, | Performed by: RADIOLOGY

## 2024-02-07 PROCEDURE — 77435 SBRT MANAGEMENT: CPT | Mod: S$GLB,,, | Performed by: RADIOLOGY

## 2024-02-07 PROCEDURE — 77373 STRTCTC BDY RAD THER TX DLVR: CPT | Mod: S$GLB,,, | Performed by: RADIOLOGY

## 2024-02-27 ENCOUNTER — CLINICAL SUPPORT (OUTPATIENT)
Dept: RADIATION ONCOLOGY | Facility: CLINIC | Age: 84
End: 2024-02-27
Payer: MEDICARE

## 2024-02-27 DIAGNOSIS — C78.01 METASTATIC RENAL CELL CARCINOMA TO LUNG, RIGHT: Primary | ICD-10-CM

## 2024-02-27 DIAGNOSIS — C64.9 METASTATIC RENAL CELL CARCINOMA TO LUNG, RIGHT: Primary | ICD-10-CM

## 2024-02-27 DIAGNOSIS — R91.8 MASS OF LEFT LUNG: ICD-10-CM

## 2024-02-27 NOTE — PROGRESS NOTES
DIAGNOSIS: oligometastatic clear cell RCC L kidney    TREATMENT  Completed SBRT, 50 Gy in 5 fractions to right pararenal mass ending February 7, 2024.  Treatment well tolerated.    Contacted patient today for 3 week checkup.  Patient without complaints.  Denies nausea, vomiting or abdominal pain.  Denies skin changes or fatigue.    A/P  1.  Excellent tolerance of SBRT.  2.  Follow-up with Dr. Nathan; CT C/AP at 3mos post-RT--ordered  3.  Return to clinic 6mos.

## 2024-02-28 ENCOUNTER — OFFICE VISIT (OUTPATIENT)
Dept: HEMATOLOGY/ONCOLOGY | Facility: CLINIC | Age: 84
End: 2024-02-28
Payer: MEDICARE

## 2024-02-28 VITALS
HEART RATE: 81 BPM | DIASTOLIC BLOOD PRESSURE: 65 MMHG | TEMPERATURE: 98 F | BODY MASS INDEX: 23.16 KG/M2 | SYSTOLIC BLOOD PRESSURE: 143 MMHG | WEIGHT: 143.5 LBS | RESPIRATION RATE: 15 BRPM

## 2024-02-28 DIAGNOSIS — C64.9 METASTATIC RENAL CELL CARCINOMA TO LUNG, RIGHT: Primary | ICD-10-CM

## 2024-02-28 DIAGNOSIS — C78.01 METASTATIC RENAL CELL CARCINOMA TO LUNG, RIGHT: Primary | ICD-10-CM

## 2024-02-28 DIAGNOSIS — G89.3 CANCER ASSOCIATED PAIN: ICD-10-CM

## 2024-02-28 PROCEDURE — 99213 OFFICE O/P EST LOW 20 MIN: CPT | Performed by: INTERNAL MEDICINE

## 2024-02-28 PROCEDURE — 99214 OFFICE O/P EST MOD 30 MIN: CPT | Mod: S$PBB,,, | Performed by: INTERNAL MEDICINE

## 2024-02-28 NOTE — PROGRESS NOTES
PROGRESS NOTE    Subjective:       Patient ID: Karlie Hess is a 83 y.o. female.    History of Left RCC, treated [id number] further treatment     History of JAX lung cancer-treated with lobectomy-7/2/2015-no chemo/xrt-Path c/w mucin producing adenocarcinoma. [id number]     History of left hilar mass, 9/2020 EBUS negative     PET 10/18/2023:  Nodular densities are as outlined below:  -21 x 17 mm right infrahilar nodule with SUV max 2.5 (image 119), previously measuring 17 x 16 mm (August 20, 2023)  -27 x 27 mm marginated nodule in the posterior inferior left hilum with SUV max 2.7 (image 109)  -13 x 7 mm nodular density along the right posterior pararenal space with SUV max 0.8 (image 179), seen on studies dating back to 1/12/2021.     11/9/2023-EBUS  1. LUNG, RIGHT LOWER LOBE MASS, FINE NEEDLE ASPIRATION   - NEGATIVE FOR MALIGNANT CELLS.     2. LUNG, RIGHT LOWER LOBE MASS, BRUSHING   - RARE ATYPICAL CELLS PRESENT     3. LUNG, RIGHT LOWER LOBE MASS, BIOPSY TOUCH PREP   - POSITIVE FOR CARCINOMA.   - SEE CONCURRENT SURGICAL BIOPSY ([id number])     4. LUNG, RIGHT LOWER LOBE, BRONCHOALVEOLAR LAVAGE   - RARE ATYPICAL CELLS PRESENT.     5. LUNG, LEFT LOWER LOBE MASS, FINE NEEDLE ASPIRATION   - RARE ATYPICAL CELLS PRESENT     6. LYMPH NODE, STATION 11RS, FINE NEEDLE ASPIRATION   - NEGATIVE FOR MALIGNANT CELLS.   - SCANT KATARZYNA MATERIAL PRESENT      Karnofsky performance status score <80   Time from original diagnosis to initiation of targeted therapy <1 year   Hemoglobin less than the lower limit of normal   Serum calcium greater than the upper limit of normal   Neutrophil count greater than the upper limit of normal   Platelet count greater than the upper limit of normal   Favorable risk: None of the above risk factors present.  Intermediate risk: 1 or 2 of the above risk factors present.  Poor risk: 3 or more risk factors present.     1/5/2024:  RIGHT  PARARENAL MASS, CT GUIDED CORE BIOPSY:   - CLEAR CELL RENAL CELL CARCINOMA     12/27/2023-1/5/2024  RLL SBRT    1/30/2024-2/7/2024  XRT to Rt. Renal bed lesion    Chief Complaint:  No chief complaint on file.  Metastatic Renal Cell Carcinoma follow up    History of Present Illness:   Karlie Hess is a 83 y.o. female who presents for follow up of above.      She has completed the radiation to the lung lesion and renal bed lesion.  Doing well with no complaints.       Family and Social history reviewed and is unchanged from 11/21/2023             Current Outpatient Medications:     acetaminophen (TYLENOL) 500 MG tablet, Take 1,000 mg by mouth 2 (two) times daily as needed., Disp: , Rfl:     amLODIPine (NORVASC) 5 MG tablet, Take 1 tablet (5 mg total) by mouth every evening., Disp: 90 tablet, Rfl: 3    aspirin (ECOTRIN) 81 MG EC tablet, Take 81 mg by mouth once daily., Disp: , Rfl:     cyanocobalamin (VITAMIN B-12) 1000 MCG tablet, Take 100 mcg by mouth once daily., Disp: , Rfl:     duloxetine (CYMBALTA) 60 MG capsule, Take 60 mg by mouth once daily., Disp: , Rfl:     hydroCHLOROthiazide (HYDRODIURIL) 12.5 MG Tab, Take 1 tablet (12.5 mg total) by mouth daily as needed (Leg swelling)., Disp: 30 tablet, Rfl: 11    ipratropium (ATROVENT) 42 mcg (0.06 %) nasal spray, by Each Nostril route., Disp: , Rfl:     labetaloL (NORMODYNE) 200 MG tablet, TAKE 1 TABLET BY MOUTH TWICE  DAILY, Disp: 180 tablet, Rfl: 3    lisinopriL (PRINIVIL,ZESTRIL) 20 MG tablet, Take 1 tablet (20 mg total) by mouth 2 (two) times daily., Disp: 180 tablet, Rfl: 3    pantoprazole (PROTONIX) 40 MG tablet, Take 40 mg by mouth once daily., Disp: , Rfl:     rosuvastatin (CRESTOR) 10 MG tablet, Take 10 mg by mouth every evening., Disp: , Rfl:     UNABLE TO FIND, Take 1 capsule by mouth once daily. prevagen, Disp: , Rfl:   No current facility-administered medications for this visit.    Facility-Administered Medications Ordered in Other Visits:      LIDOcaine (PF) 10 mg/ml (1%) injection 10 mg, 1 mL, Intradermal, Once, Gabriel Loaiza MD        Objective:       Physical Examination:     BP (!) 143/65   Pulse 81   Temp 97.8 °F (36.6 °C)   Resp 15   Wt 65.1 kg (143 lb 8 oz)   BMI 23.16 kg/m²     Physical Exam  Constitutional:       Appearance: Normal appearance.   HENT:      Head: Normocephalic and atraumatic.   Eyes:      General: No scleral icterus.     Conjunctiva/sclera: Conjunctivae normal.   Cardiovascular:      Rate and Rhythm: Normal rate.   Pulmonary:      Effort: Pulmonary effort is normal.   Abdominal:      General: Abdomen is flat.   Neurological:      General: No focal deficit present.      Mental Status: She is alert and oriented to person, place, and time.   Psychiatric:         Mood and Affect: Mood normal.         Behavior: Behavior normal.         Labs:   No results found for this or any previous visit (from the past 336 hour(s)).    CMP  Sodium   Date Value Ref Range Status   11/09/2023 143 136 - 145 mmol/L Final     Potassium   Date Value Ref Range Status   11/09/2023 4.0 3.5 - 5.1 mmol/L Final     Comment:     Anion Gap reference range revised on 4/28/2023     Chloride   Date Value Ref Range Status   11/09/2023 107 95 - 110 mmol/L Final     CO2   Date Value Ref Range Status   11/09/2023 30 22 - 31 mmol/L Final     Glucose   Date Value Ref Range Status   11/09/2023 112 (H) 70 - 110 mg/dL Final     Comment:     The ADA recommends the following guidelines for fasting glucose:    Normal:       less than 100 mg/dL    Prediabetes:  100 mg/dL to 125 mg/dL    Diabetes:     126 mg/dL or higher       BUN   Date Value Ref Range Status   11/09/2023 20 (H) 7 - 18 mg/dL Final     Creatinine   Date Value Ref Range Status   11/09/2023 1.11 0.50 - 1.40 mg/dL Final   12/31/2012 1.2 0.5 - 1.4 mg/dL Final     Calcium   Date Value Ref Range Status   11/09/2023 8.8 8.4 - 10.2 mg/dL Final   12/31/2012 9.7 8.7 - 10.5 mg/dL Final     Total Protein   Date  "Value Ref Range Status   09/16/2023 6.2 6.0 - 8.4 g/dL Final     Albumin   Date Value Ref Range Status   09/16/2023 3.5 3.5 - 5.2 g/dL Final     Total Bilirubin   Date Value Ref Range Status   09/16/2023 0.8 0.1 - 1.0 mg/dL Final     Comment:     For infants and newborns, interpretation of results should be based  on gestational age, weight and in agreement with clinical  observations.    Premature Infant recommended reference ranges:  Up to 24 hours.............<8.0 mg/dL  Up to 48 hours............<12.0 mg/dL  3-5 days..................<15.0 mg/dL  6-29 days.................<15.0 mg/dL       Alkaline Phosphatase   Date Value Ref Range Status   09/16/2023 53 (L) 55 - 135 U/L Final     AST   Date Value Ref Range Status   09/16/2023 14 10 - 40 U/L Final     ALT   Date Value Ref Range Status   09/16/2023 9 (L) 10 - 44 U/L Final     Anion Gap   Date Value Ref Range Status   11/09/2023 6 5 - 12 mmol/L Final     Comment:     Anion Gap reference range revised on 4/28/2023 12/31/2012 14 5 - 15 meq/L Final     eGFR if    Date Value Ref Range Status   07/18/2022 >60.0 >60 mL/min/1.73 m^2 Final     eGFR if non    Date Value Ref Range Status   07/18/2022 52.6 (A) >60 mL/min/1.73 m^2 Final     Comment:     Calculation used to obtain the estimated glomerular filtration  rate (eGFR) is the CKD-EPI equation.        No results found for: "CEA"  No results found for: "PSA"        Assessment/Plan:     Problem List Items Addressed This Visit       Metastatic renal cell carcinoma to lung, right - Primary     Patient is doing well after the radiation therapy to the lung and liver lesions.  She is feeling good and I discussed the approach today.  I will plan CT imaging in the next 2 months and if this disease responds, then I will withhold systemic treatment until we see disease growth.  I discussed that this is stage IV cancer and not curable and will grow again but this could take a considerable amount " of time.  Will have her back with me in 2 months with scans.          Cancer associated pain     Patient is doing well from this standpoint.  Continue current care approach.            Discussion:     Follow up in about 2 months (around 4/28/2024).      Electronically signed by Del Nathan

## 2024-02-28 NOTE — ASSESSMENT & PLAN NOTE
Patient is doing well after the radiation therapy to the lung and liver lesions.  She is feeling good and I discussed the approach today.  I will plan CT imaging in the next 2 months and if this disease responds, then I will withhold systemic treatment until we see disease growth.  I discussed that this is stage IV cancer and not curable and will grow again but this could take a considerable amount of time.  Will have her back with me in 2 months with scans.

## 2024-02-29 ENCOUNTER — HOSPITAL ENCOUNTER (EMERGENCY)
Facility: HOSPITAL | Age: 84
Discharge: HOME | End: 2024-02-29
Attending: EMERGENCY MEDICINE
Payer: MEDICARE

## 2024-02-29 VITALS
DIASTOLIC BLOOD PRESSURE: 78 MMHG | OXYGEN SATURATION: 96 % | SYSTOLIC BLOOD PRESSURE: 169 MMHG | RESPIRATION RATE: 23 BRPM | TEMPERATURE: 98 F | HEIGHT: 65 IN | HEART RATE: 83 BPM | WEIGHT: 140 LBS | BODY MASS INDEX: 23.32 KG/M2

## 2024-02-29 DIAGNOSIS — R06.02 SOB (SHORTNESS OF BREATH): ICD-10-CM

## 2024-02-29 LAB
ALBUMIN SERPL BCP-MCNC: 4 G/DL (ref 3.5–5.2)
ALP SERPL-CCNC: 66 U/L (ref 55–135)
ALT SERPL W/O P-5'-P-CCNC: 8 U/L (ref 10–44)
ANION GAP SERPL CALC-SCNC: 9 MMOL/L (ref 8–16)
AST SERPL-CCNC: 12 U/L (ref 10–40)
BASOPHILS # BLD AUTO: 0.03 K/UL (ref 0–0.2)
BASOPHILS NFR BLD: 0.5 % (ref 0–1.9)
BILIRUB SERPL-MCNC: 0.5 MG/DL (ref 0.1–1)
BNP SERPL-MCNC: 70 PG/ML (ref 0–99)
BUN SERPL-MCNC: 20 MG/DL (ref 8–23)
CALCIUM SERPL-MCNC: 9.2 MG/DL (ref 8.7–10.5)
CHLORIDE SERPL-SCNC: 105 MMOL/L (ref 95–110)
CO2 SERPL-SCNC: 28 MMOL/L (ref 23–29)
CREAT SERPL-MCNC: 1.3 MG/DL (ref 0.5–1.4)
DIFFERENTIAL METHOD BLD: ABNORMAL
EOSINOPHIL # BLD AUTO: 0.2 K/UL (ref 0–0.5)
EOSINOPHIL NFR BLD: 3.4 % (ref 0–8)
ERYTHROCYTE [DISTWIDTH] IN BLOOD BY AUTOMATED COUNT: 12.6 % (ref 11.5–14.5)
EST. GFR  (NO RACE VARIABLE): 40.5 ML/MIN/1.73 M^2
GLUCOSE SERPL-MCNC: 81 MG/DL (ref 70–110)
HCT VFR BLD AUTO: 36.1 % (ref 37–48.5)
HGB BLD-MCNC: 11.4 G/DL (ref 12–16)
IMM GRANULOCYTES # BLD AUTO: 0.02 K/UL (ref 0–0.04)
IMM GRANULOCYTES NFR BLD AUTO: 0.3 % (ref 0–0.5)
LYMPHOCYTES # BLD AUTO: 0.8 K/UL (ref 1–4.8)
LYMPHOCYTES NFR BLD: 12 % (ref 18–48)
MCH RBC QN AUTO: 28.5 PG (ref 27–31)
MCHC RBC AUTO-ENTMCNC: 31.6 G/DL (ref 32–36)
MCV RBC AUTO: 90 FL (ref 82–98)
MONOCYTES # BLD AUTO: 0.5 K/UL (ref 0.3–1)
MONOCYTES NFR BLD: 8.5 % (ref 4–15)
NEUTROPHILS # BLD AUTO: 4.7 K/UL (ref 1.8–7.7)
NEUTROPHILS NFR BLD: 75.3 % (ref 38–73)
NRBC BLD-RTO: 0 /100 WBC
OHS QRS DURATION: 82 MS
OHS QTC CALCULATION: 464 MS
PLATELET # BLD AUTO: 264 K/UL (ref 150–450)
PMV BLD AUTO: 9.4 FL (ref 9.2–12.9)
POTASSIUM SERPL-SCNC: 3.8 MMOL/L (ref 3.5–5.1)
PROT SERPL-MCNC: 6.8 G/DL (ref 6–8.4)
RBC # BLD AUTO: 4 M/UL (ref 4–5.4)
SODIUM SERPL-SCNC: 142 MMOL/L (ref 136–145)
WBC # BLD AUTO: 6.24 K/UL (ref 3.9–12.7)

## 2024-02-29 PROCEDURE — 80053 COMPREHEN METABOLIC PANEL: CPT | Performed by: EMERGENCY MEDICINE

## 2024-02-29 PROCEDURE — 99285 EMERGENCY DEPT VISIT HI MDM: CPT | Mod: 25

## 2024-02-29 PROCEDURE — 83880 ASSAY OF NATRIURETIC PEPTIDE: CPT | Performed by: EMERGENCY MEDICINE

## 2024-02-29 PROCEDURE — 93010 ELECTROCARDIOGRAM REPORT: CPT | Mod: ,,, | Performed by: GENERAL PRACTICE

## 2024-02-29 PROCEDURE — 85025 COMPLETE CBC W/AUTO DIFF WBC: CPT | Performed by: EMERGENCY MEDICINE

## 2024-02-29 PROCEDURE — 93005 ELECTROCARDIOGRAM TRACING: CPT | Performed by: GENERAL PRACTICE

## 2024-02-29 NOTE — ED NOTES
"SOB AND DRY NP COUGH REPORTED. CA PT. ALERT AND AMBULATORY.  PRIVATE ROOM. EVEN AND NON LABORED RESPIRATIONS.  AIRWAY CLEAR.  PULSES REGULAR.  < 3" CAPILLARY REFILL. SKIN WDI.  MAEW.  NON DISTENDED ABDOMEN. ALERT, ORIENTED AND AMBULATORY. NAD AT THIS TIME.  CALL LIGHT IN REACH.  "

## 2024-02-29 NOTE — ED PROVIDER NOTES
Encounter Date: 2/29/2024       History     Chief Complaint   Patient presents with    Shortness of Breath     Hx of lung cx, most recent radiation at the end of January, states every time she tries to take a deep breath she coughs.      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  No fever.  The cough is nonproductive.  No wheezing.  No history any CHF.  She denies any chest pain or palpitations.  No history of any dependent edema.  Denies any PND or orthopnea.  She does note some slight exertional dyspnea.  No complaint of any abdominal or flank pain.  No nausea vomiting.  No trouble voiding urine.  No history of anemia.      Review of patient's allergies indicates:   Allergen Reactions    Hydrocodone Hallucinations    Oxycodone-acetaminophen Hallucinations and Other (See Comments)    Amoxicillin Rash     Past Medical History:   Diagnosis Date    Aortic insufficiency     Cancer 2005    renal ca, L kidney removed    COVID-19 2022    Diastolic heart failure     Diffusion capacity of lung (dl), decreased     Encounter for blood transfusion 1963    with miscarriage    Fibromyalgia     GERD (gastroesophageal reflux disease)     Hypercholesterolemia     Hyperlipidemia     Hypertension     Lung cancer 2015    adenocarcinoma ; upper left lobe    Renal disorder 2005    Left Kidney cancer    Sinusitis      Past Surgical History:   Procedure Laterality Date    BACK SURGERY  2013    laminectomy    ENDOBRONCHIAL ULTRASOUND Left 07/15/2022    Procedure: ENDOBRONCHIAL ULTRASOUND (EBUS);  Surgeon: Cm Freitas MD;  Location: [facility];  Service: Pulmonary;  Laterality: Left;    ENDOBRONCHIAL ULTRASOUND Bilateral 11/9/2023    Procedure: ENDOBRONCHIAL ULTRASOUND (EBUS);  Surgeon: Cm Freitas MD;  Location: [facility];  Service: Pulmonary;   Laterality: Bilateral;    HYSTERECTOMY  1975    CARY/BSO. Due to endometriosis.    INJECTION OF JOINT Right 05/27/2019    Procedure: Injection, Joint, Shoulder, Hip, Or Knee;  Surgeon: Zach Wilder MD;  [facility]: [facility];  Service: Pain Management;  Laterality: Right;  right hip intra-articular injection     LUNG LOBECTOMY Left     LOWER LOBE    NEPHRECTOMY Left 2005    Entire kidney due to cancer.    ROBOTIC BRONCHOSCOPY Bilateral 11/9/2023    Procedure: ROBOTIC BRONCHOSCOPY;  Surgeon: Cm Freitas MD;  Location: [facility] OR;  Service: Pulmonary;  Laterality: Bilateral;     Family History   Problem Relation Age of Onset    Hypertension Mother     Breast cancer Maternal Aunt     Breast cancer Maternal Aunt      Social History     Tobacco Use    Smoking status: Never    Smokeless tobacco: Never   Substance Use Topics    Alcohol use: No    Drug use: No     Review of Systems   Constitutional:  Negative for activity change, appetite change, chills, diaphoresis and fever.   HENT:  Negative for congestion, postnasal drip, rhinorrhea, sinus pressure, sinus pain, sore throat and trouble swallowing.    Respiratory:  Positive for cough and shortness of breath. Negative for choking, chest tightness, wheezing and stridor.    Cardiovascular:  Negative for chest pain, palpitations and leg swelling.   Gastrointestinal:  Negative for abdominal pain, nausea and vomiting.   Genitourinary:  Negative for decreased urine volume, difficulty urinating and dysuria.   Musculoskeletal:  Negative for back pain.   Skin:  Negative for rash.   Neurological:  Negative for dizziness, weakness, light-headedness and headaches.   Hematological:  Does not bruise/bleed easily.   All other systems reviewed and are negative.      Physical Exam     Initial Vitals [02/29/24 0835]   BP Pulse Resp Temp SpO2   (!) 150/70 86 18 98.5 °F (36.9 °C) 95 %      MAP       --         Physical Exam    Vitals reviewed.  Constitutional: She appears well-developed and  well-nourished. She is not diaphoretic. No distress.   HENT:   Head: Normocephalic and atraumatic.   Nose: Nose normal.   Mouth/Throat: Oropharynx is clear and moist. No oropharyngeal exudate.   Eyes: Conjunctivae are normal. Pupils are equal, round, and reactive to light.   Neck: Neck supple. No JVD present.   Normal range of motion.  Cardiovascular:  Normal rate, regular rhythm, normal heart sounds and intact distal pulses.     Exam reveals no gallop and no friction rub.       No murmur heard.  Pulmonary/Chest: Breath sounds normal. No respiratory distress. She has no wheezes. She has no rhonchi. She has no rales. She exhibits no tenderness.   Abdominal: Abdomen is soft. Bowel sounds are normal. She exhibits no distension. There is no abdominal tenderness. There is no rebound and no guarding.   Musculoskeletal:         General: No tenderness or edema. Normal range of motion.      Cervical back: Normal range of motion and neck supple.     Lymphadenopathy:     She has no cervical adenopathy.   Neurological: She is alert and oriented to person, place, and time. She has normal strength. GCS score is 15. GCS eye subscore is 4. GCS verbal subscore is 5. GCS motor subscore is 6.   Skin: Skin is warm and dry. Capillary refill takes less than 2 seconds. No rash noted. No erythema. No pallor.         ED Course   Procedures  Labs Reviewed   CBC W/ AUTO DIFFERENTIAL - Abnormal; Notable for the following components:       Result Value    Hemoglobin 11.4 (*)     Hematocrit 36.1 (*)     MCHC 31.6 (*)     Lymph # 0.8 (*)     Gran % 75.3 (*)     Lymph % 12.0 (*)     All other components within normal limits   COMPREHENSIVE METABOLIC PANEL - Abnormal; Notable for the following components:    ALT 8 (*)     eGFR 40.5 (*)     All other components within normal limits   B-TYPE NATRIURETIC PEPTIDE        ECG Results              EKG 12-lead (In process)        Collection Time Result Time QRS Duration OHS QTC Calculation    02/29/24  09:27:40 02/29/24 09:58:02 82 464                     In process by Interface, Lab In [facility] (02/29/24 09:58:05)                   Narrative:    Test Reason : R06.02,    Vent. Rate : 082 BPM     Atrial Rate : 082 BPM     P-R Int : 144 ms          QRS Dur : 082 ms      QT Int : 398 ms       P-R-T Axes : 041 066 060 degrees     QTc Int : 464 ms    Normal sinus rhythm  Normal ECG  When compared with ECG of 11-OCT-2022 14:03,  No significant change was found    Referred By: [email or web address]   SELF           Confirmed By:                                   Imaging Results              NM Lung Scan Ventilation Perfusion (Final result)  Result time 02/29/24 14:42:53      Final result by Patti Gandhi MD (02/29/24 14:42:53)                   Narrative:    Reason: Dyspnea, metastatic renal cancer to lung, rule out PE    Radiopharmaceutical: Ventilation imaging was performed with administration of 10.0 mCi xenon-133. Perfusion imaging was performed with administration of 5.5 mCi Technetium 99m MAA    COMPARISON: Chest x-ray at 9:14 AM which shows a ill-defined hazy opacity in the right lower lobe.. Patient has a known 2 cm nodule in the right infrahilar region. The left lung is clear.    FINDINGS:  Perfusion images show homogeneous radiopharmaceutical distribution throughout both lungs without segmental or subsegmental perfusion defect. There is volume loss in the left lung from left upper lobectomy.    Ventilation images show homogeneous radiopharmaceutical distribution throughout both lungs without air trapping.    IMPRESSION:  No evidence of pulmonary emboli on VQ scan    Volume loss in the left lung from prior left upper lobectomy    Ill-defined opacity in the right lower lobe which may be secondary to pneumonia versus malignancy. Follow-up CT chest is recommended    Electronically signed by:  Patti Gandhi MD  02/29/2024 02:42 PM CST Workstation: [id number]                                     X-Ray Chest AP (Final  result)  Result time 02/29/24 09:30:51      Final result by Jerome Walker MD (02/29/24 09:30:51)                   Narrative:    Reason: SHORTNESS OF BREATH    FINDINGS:    Portable chest with comparison chest x-ray August 20, 2023 show normal cardiomediastinal silhouette.  Hazy ill-defined opacification of the right mid to lower lung noted. Left lung is clear. Pulmonary vasculature is normal. No acute osseous abnormality.    IMPRESSION:    Hazy ill-defined opacification of the right mid to lower lung could reflect infectious consolidation and/or atelectasis. Continued follow-up recommended.    Electronically signed by:  Jerome Walker DO  02/29/2024 09:30 AM CST Workstation: [id number]                                     Medications   iohexoL (OMNIPAQUE 350) injection 100 mL (has no administration in time range)     Medical Decision Making            Attending Attestation:             Attending ED Notes:   ED course and MDM:  This 83-year-old female who has a history of metastatic renal cancer who presented with some complaints of shortness of breath, admitted that she had been receiving radiation therapy but no chemo.  The ED workup showed normal vital signs and oxygen saturation was 97% on room air.  The patient was afebrile and not tachycardic.  The patient's chest x-ray showed a density in the right lung base consistent with a history of metastatic cancer.  The screening labs obtained were reviewed.  CMP was normal.  BNP is 70.  The H&H is 11.4 and 36.1.  The patient's V/Q lung scan showed no evidence of a PE.  The patient was apprised of these findings and no other intervention was otherwise required.    Differential diagnosis includes pull effusion, enlarging metastatic lesion, CHF, pneumonia, bronchitis, pneumothorax                             Clinical Impression:  Final diagnoses:  [R06.02] SOB (shortness of breath)          ED Disposition Condition    Discharge Stable          ED Prescriptions     None       Follow-up Information       Follow up With Specialties Details Why Contact Info    Tao Orozco MD Family Medicine  As needed [address]  [place] [address]  [phone]               Michael Ferrer Jr., MD  02/29/24 [id number]

## 2024-02-29 NOTE — DISCHARGE INSTRUCTIONS
Watch for any fever, cough ,worsening shortness of breath, nausea, vomiting, wheezing.  Follow up with the treating physicians and return to the emergency room should any trouble breathing recur and persist

## 2024-04-10 ENCOUNTER — TELEPHONE (OUTPATIENT)
Dept: HEMATOLOGY/ONCOLOGY | Facility: CLINIC | Age: 84
End: 2024-04-10

## 2024-04-10 DIAGNOSIS — C78.01 METASTATIC RENAL CELL CARCINOMA TO LUNG, RIGHT: Primary | ICD-10-CM

## 2024-04-10 DIAGNOSIS — C64.9 METASTATIC RENAL CELL CARCINOMA TO LUNG, RIGHT: Primary | ICD-10-CM

## 2024-04-10 NOTE — TELEPHONE ENCOUNTER
----- Message from MAY Ba sent at 4/10/2024 10:30 AM CDT -----  Get a urinalysis with culture and she has a CT scheduled on the 24th Jodie  ----- Message -----  From: Katerin Leung, CARLOS  Sent: 4/10/2024   9:56 AM CDT  To: MAY Ba    Please see attached - US?  ----- Message -----  From: Shye Swartz  Sent: 4/10/2024   9:32 AM CDT  To: Lisy Del Staff    Pt called to let us know that she passed blood tinged urine yesterday about 3:00. She wants to know what she should do, this happened when she found out that she had cancer in her left kidney. There was no pain or fever.      [phone]

## 2024-04-10 NOTE — TELEPHONE ENCOUNTER
Attempted to call patient with above, no answer. Voicemail left with instructions of above and to call back with any questions.

## 2024-04-24 ENCOUNTER — HOSPITAL ENCOUNTER (OUTPATIENT)
Dept: RADIOLOGY | Facility: HOSPITAL | Age: 84
Discharge: HOME | End: 2024-04-24
Attending: RADIOLOGY
Payer: MEDICARE

## 2024-04-24 ENCOUNTER — TELEPHONE (OUTPATIENT)
Dept: HEMATOLOGY/ONCOLOGY | Facility: CLINIC | Age: 84
End: 2024-04-24

## 2024-04-24 DIAGNOSIS — C78.01 METASTATIC RENAL CELL CARCINOMA TO LUNG, RIGHT: Primary | ICD-10-CM

## 2024-04-24 DIAGNOSIS — C78.01 METASTATIC RENAL CELL CARCINOMA TO LUNG, RIGHT: ICD-10-CM

## 2024-04-24 DIAGNOSIS — R91.8 MASS OF LEFT LUNG: ICD-10-CM

## 2024-04-24 DIAGNOSIS — C64.9 METASTATIC RENAL CELL CARCINOMA TO LUNG, RIGHT: Primary | ICD-10-CM

## 2024-04-24 DIAGNOSIS — C64.9 METASTATIC RENAL CELL CARCINOMA TO LUNG, RIGHT: ICD-10-CM

## 2024-04-24 PROCEDURE — 74176 CT ABD & PELVIS W/O CONTRAST: CPT | Mod: TC,PO

## 2024-04-24 PROCEDURE — 71250 CT THORAX DX C-: CPT | Mod: 26,,, | Performed by: RADIOLOGY

## 2024-04-24 PROCEDURE — 74176 CT ABD & PELVIS W/O CONTRAST: CPT | Mod: 26,,, | Performed by: RADIOLOGY

## 2024-04-25 ENCOUNTER — HOSPITAL ENCOUNTER (OUTPATIENT)
Dept: RADIOLOGY | Facility: HOSPITAL | Age: 84
Discharge: HOME | End: 2024-04-25
Attending: INTERNAL MEDICINE
Payer: MEDICARE

## 2024-04-25 DIAGNOSIS — C64.9 METASTATIC RENAL CELL CARCINOMA TO LUNG, RIGHT: ICD-10-CM

## 2024-04-25 DIAGNOSIS — C78.01 METASTATIC RENAL CELL CARCINOMA TO LUNG, RIGHT: ICD-10-CM

## 2024-04-25 DIAGNOSIS — N28.89 OTHER SPECIFIED DISORDERS OF KIDNEY AND URETER: Primary | ICD-10-CM

## 2024-04-25 PROCEDURE — 76770 US EXAM ABDO BACK WALL COMP: CPT | Mod: 26,,, | Performed by: RADIOLOGY

## 2024-04-25 PROCEDURE — 76770 US EXAM ABDO BACK WALL COMP: CPT | Mod: TC,PO

## 2024-04-30 ENCOUNTER — OFFICE VISIT (OUTPATIENT)
Dept: HEMATOLOGY/ONCOLOGY | Facility: CLINIC | Age: 84
End: 2024-04-30
Payer: MEDICARE

## 2024-04-30 VITALS
HEART RATE: 86 BPM | RESPIRATION RATE: 16 BRPM | SYSTOLIC BLOOD PRESSURE: 145 MMHG | WEIGHT: 144 LBS | TEMPERATURE: 98 F | DIASTOLIC BLOOD PRESSURE: 65 MMHG | BODY MASS INDEX: 23.96 KG/M2

## 2024-04-30 DIAGNOSIS — C78.01 METASTATIC RENAL CELL CARCINOMA TO LUNG, RIGHT: Primary | ICD-10-CM

## 2024-04-30 DIAGNOSIS — C64.9 METASTATIC RENAL CELL CARCINOMA TO LUNG, RIGHT: Primary | ICD-10-CM

## 2024-04-30 PROCEDURE — 99215 OFFICE O/P EST HI 40 MIN: CPT | Mod: S$PBB,,, | Performed by: INTERNAL MEDICINE

## 2024-04-30 NOTE — ASSESSMENT & PLAN NOTE
Patient with stage IV RCC.  Patient is doing ok from this standpoint.  She is s/p radiation therapy and is feeling ok.     CT scans viewed and I feel the renal bed lesion may be malignancy based on these views but will get MRI testing done to better evaluate this area.      We discussed systemic therapy with TKI/Immunotherapy and the risks and benefits.  She  will think about this vs surveillance at this time.      Will plan imaging again in 2 to 3 months regardless of immediate vs delayed therapy.

## 2024-04-30 NOTE — PROGRESS NOTES
PROGRESS NOTE    Subjective:       Patient ID: Karlie Hess is a 83 y.o. female.    History of Left RCC, treated [id number] further treatment     History of JAX lung cancer-treated with lobectomy-7/2/2015-no chemo/xrt-Path c/w mucin producing adenocarcinoma. [id number]     History of left hilar mass, 9/2020 EBUS negative     PET 10/18/2023:  Nodular densities are as outlined below:  -21 x 17 mm right infrahilar nodule with SUV max 2.5 (image 119), previously measuring 17 x 16 mm (August 20, 2023)  -27 x 27 mm marginated nodule in the posterior inferior left hilum with SUV max 2.7 (image 109)  -13 x 7 mm nodular density along the right posterior pararenal space with SUV max 0.8 (image 179), seen on studies dating back to 1/12/2021.     11/9/2023-EBUS  1. LUNG, RIGHT LOWER LOBE MASS, FINE NEEDLE ASPIRATION   - NEGATIVE FOR MALIGNANT CELLS.     2. LUNG, RIGHT LOWER LOBE MASS, BRUSHING   - RARE ATYPICAL CELLS PRESENT     3. LUNG, RIGHT LOWER LOBE MASS, BIOPSY TOUCH PREP   - POSITIVE FOR CARCINOMA.   - SEE CONCURRENT SURGICAL BIOPSY ([id number])     4. LUNG, RIGHT LOWER LOBE, BRONCHOALVEOLAR LAVAGE   - RARE ATYPICAL CELLS PRESENT.     5. LUNG, LEFT LOWER LOBE MASS, FINE NEEDLE ASPIRATION   - RARE ATYPICAL CELLS PRESENT     6. LYMPH NODE, STATION 11RS, FINE NEEDLE ASPIRATION   - NEGATIVE FOR MALIGNANT CELLS.   - SCANT KATARZYNA MATERIAL PRESENT      Karnofsky performance status score <80   Time from original diagnosis to initiation of targeted therapy <1 year   Hemoglobin less than the lower limit of normal   Serum calcium greater than the upper limit of normal   Neutrophil count greater than the upper limit of normal   Platelet count greater than the upper limit of normal   Favorable risk: None of the above risk factors present.  Intermediate risk: 1 or 2 of the above risk factors present.  Poor risk: 3 or more risk factors present.     1/5/2024:  RIGHT  PARARENAL MASS, CT GUIDED CORE BIOPSY:   - CLEAR CELL RENAL CELL CARCINOMA     12/27/2023-1/5/2024  RLL SBRT    1/30/2024-2/7/2024  XRT to Rt. Renal bed lesion    Chief Complaint:  No chief complaint on file.  Metastatic Renal Cell Carcinoma follow up    History of Present Illness:   Karlie Hess is a 83 y.o. female who presents for follow up of above.      Ms. Hess has no new complaints at this time.       Family and Social history reviewed and is unchanged from 11/21/2023             Current Outpatient Medications:     acetaminophen (TYLENOL) 500 MG tablet, Take 1,000 mg by mouth 2 (two) times daily as needed., Disp: , Rfl:     amLODIPine (NORVASC) 5 MG tablet, Take 1 tablet (5 mg total) by mouth every evening., Disp: 90 tablet, Rfl: 3    aspirin (ECOTRIN) 81 MG EC tablet, Take 81 mg by mouth once daily., Disp: , Rfl:     cyanocobalamin (VITAMIN B-12) 1000 MCG tablet, Take 100 mcg by mouth once daily., Disp: , Rfl:     duloxetine (CYMBALTA) 60 MG capsule, Take 60 mg by mouth once daily., Disp: , Rfl:     hydroCHLOROthiazide (HYDRODIURIL) 12.5 MG Tab, Take 1 tablet (12.5 mg total) by mouth daily as needed (Leg swelling)., Disp: 30 tablet, Rfl: 11    ipratropium (ATROVENT) 42 mcg (0.06 %) nasal spray, by Each Nostril route., Disp: , Rfl:     labetaloL (NORMODYNE) 200 MG tablet, TAKE 1 TABLET BY MOUTH TWICE  DAILY, Disp: 180 tablet, Rfl: 3    lisinopriL (PRINIVIL,ZESTRIL) 20 MG tablet, Take 1 tablet (20 mg total) by mouth 2 (two) times daily., Disp: 180 tablet, Rfl: 3    pantoprazole (PROTONIX) 40 MG tablet, Take 40 mg by mouth once daily., Disp: , Rfl:     rosuvastatin (CRESTOR) 10 MG tablet, Take 10 mg by mouth every evening., Disp: , Rfl:     UNABLE TO FIND, Take 1 capsule by mouth once daily. prevagen, Disp: , Rfl:   No current facility-administered medications for this visit.    Facility-Administered Medications Ordered in Other Visits:     LIDOcaine (PF) 10 mg/ml (1%) injection 10 mg, 1 mL,  Intradermal, Once, Gabriel Loaiza MD        Objective:       Physical Examination:     There were no vitals taken for this visit.    Physical Exam  Constitutional:       Appearance: Normal appearance.   HENT:      Head: Normocephalic and atraumatic.   Eyes:      General: No scleral icterus.     Conjunctiva/sclera: Conjunctivae normal.   Cardiovascular:      Rate and Rhythm: Normal rate.   Pulmonary:      Effort: Pulmonary effort is normal.   Abdominal:      General: Abdomen is flat.   Neurological:      General: No focal deficit present.      Mental Status: She is alert and oriented to person, place, and time.   Psychiatric:         Mood and Affect: Mood normal.         Behavior: Behavior normal.         Labs:   No results found for this or any previous visit (from the past 336 hour(s)).    CMP  Sodium   Date Value Ref Range Status   02/29/2024 142 136 - 145 mmol/L Final     Potassium   Date Value Ref Range Status   02/29/2024 3.8 3.5 - 5.1 mmol/L Final     Chloride   Date Value Ref Range Status   02/29/2024 105 95 - 110 mmol/L Final     CO2   Date Value Ref Range Status   02/29/2024 28 23 - 29 mmol/L Final     Glucose   Date Value Ref Range Status   02/29/2024 81 70 - 110 mg/dL Final     BUN   Date Value Ref Range Status   02/29/2024 20 8 - 23 mg/dL Final     Creatinine   Date Value Ref Range Status   02/29/2024 1.3 0.5 - 1.4 mg/dL Final   12/31/2012 1.2 0.5 - 1.4 mg/dL Final     Calcium   Date Value Ref Range Status   02/29/2024 9.2 8.7 - 10.5 mg/dL Final   12/31/2012 9.7 8.7 - 10.5 mg/dL Final     Total Protein   Date Value Ref Range Status   02/29/2024 6.8 6.0 - 8.4 g/dL Final     Albumin   Date Value Ref Range Status   02/29/2024 4.0 3.5 - 5.2 g/dL Final     Total Bilirubin   Date Value Ref Range Status   02/29/2024 0.5 0.1 - 1.0 mg/dL Final     Comment:     For infants and newborns, interpretation of results should be based  on gestational age, weight and in agreement with  "clinical  observations.    Premature Infant recommended reference ranges:  Up to 24 hours.............<8.0 mg/dL  Up to 48 hours............<12.0 mg/dL  3-5 days..................<15.0 mg/dL  6-29 days.................<15.0 mg/dL       Alkaline Phosphatase   Date Value Ref Range Status   02/29/2024 66 55 - 135 U/L Final     AST   Date Value Ref Range Status   02/29/2024 12 10 - 40 U/L Final     ALT   Date Value Ref Range Status   02/29/2024 8 (L) 10 - 44 U/L Final     Anion Gap   Date Value Ref Range Status   02/29/2024 9 8 - 16 mmol/L Final   12/31/2012 14 5 - 15 meq/L Final     eGFR if    Date Value Ref Range Status   07/18/2022 >60.0 >60 mL/min/1.73 m^2 Final     eGFR if non    Date Value Ref Range Status   07/18/2022 52.6 (A) >60 mL/min/1.73 m^2 Final     Comment:     Calculation used to obtain the estimated glomerular filtration  rate (eGFR) is the CKD-EPI equation.        No results found for: "CEA"  No results found for: "PSA"        Assessment/Plan:     Problem List Items Addressed This Visit       Metastatic renal cell carcinoma to lung, right - Primary     Patient with stage IV RCC.  Patient is doing ok from this standpoint.  She is s/p radiation therapy and is feeling ok.     CT scans viewed and I feel the renal bed lesion may be malignancy based on these views but will get MRI testing done to better evaluate this area.      We discussed systemic therapy with TKI/Immunotherapy and the risks and benefits.  She  will think about this vs surveillance at this time.      Will plan imaging again in 2 to 3 months regardless of immediate vs delayed therapy.             Discussion:     Follow up in about 6 weeks (around 6/11/2024).      Electronically signed by Del Nathan      "

## 2024-05-03 ENCOUNTER — HOSPITAL ENCOUNTER (OUTPATIENT)
Dept: RADIOLOGY | Facility: HOSPITAL | Age: 84
Discharge: HOME | End: 2024-05-03
Attending: NURSE PRACTITIONER
Payer: MEDICARE

## 2024-05-03 DIAGNOSIS — C64.9 METASTATIC RENAL CELL CARCINOMA TO LUNG, RIGHT: ICD-10-CM

## 2024-05-03 DIAGNOSIS — C78.01 METASTATIC RENAL CELL CARCINOMA TO LUNG, RIGHT: ICD-10-CM

## 2024-05-03 DIAGNOSIS — N28.89 OTHER SPECIFIED DISORDERS OF KIDNEY AND URETER: ICD-10-CM

## 2024-05-03 LAB
CREAT SERPL-MCNC: 1.4 MG/DL (ref 0.5–1.4)
SAMPLE: NORMAL

## 2024-05-03 PROCEDURE — 74183 MRI ABD W/O CNTR FLWD CNTR: CPT | Mod: 26,,, | Performed by: RADIOLOGY

## 2024-05-03 PROCEDURE — A9585 GADOBUTROL INJECTION: HCPCS | Mod: PO | Performed by: NURSE PRACTITIONER

## 2024-05-03 PROCEDURE — 82565 ASSAY OF CREATININE: CPT | Mod: PO

## 2024-05-03 PROCEDURE — 25500020 PHARM REV CODE 255: Mod: PO | Performed by: NURSE PRACTITIONER

## 2024-05-03 RX ORDER — GADOBUTROL 604.72 MG/ML
6.5 INJECTION INTRAVENOUS
Status: COMPLETED | OUTPATIENT
Start: 2024-05-03 | End: 2024-05-03

## 2024-05-03 RX ADMIN — GADOBUTROL 6.5 ML: 604.72 INJECTION INTRAVENOUS at 12:05

## 2024-05-06 NOTE — PROGRESS NOTES
She doesn't see you til the 6th. DO you want to send her to [facility] for the area at the pancreas.    Chiquita

## 2024-05-07 ENCOUNTER — TELEPHONE (OUTPATIENT)
Dept: ONCOLOGY | Facility: CLINIC | Age: 84
End: 2024-05-07

## 2024-05-07 DIAGNOSIS — K86.89 PANCREATIC MASS: Primary | ICD-10-CM

## 2024-05-07 NOTE — TELEPHONE ENCOUNTER
Spoke with the patient and notified her about the results of the MRI showing new pancreatic mass and Dr. Hernandez recs for bx. She verbalized understanding and will have her see Dr. Bazzi for evaluation.

## 2024-05-07 NOTE — TELEPHONE ENCOUNTER
----- Message from Stephany Woods LPN sent at 5/7/2024  3:44 PM CDT -----  Please call her. She sees Lisy on 6/6  ----- Message -----  From: Luz Villareal  Sent: 5/7/2024   1:32 PM CDT  To: [facility] Mathis Staff    The patient said she has left a couple of messages about the results of her MRI done on Friday. She said she needs to discuss the results because it looks like it is not good results. Please call her back at [phone].

## 2024-05-13 ENCOUNTER — HOSPITAL ENCOUNTER (OUTPATIENT)
Facility: HOSPITAL | Age: 84
Discharge: HOME | End: 2024-05-13
Attending: INTERNAL MEDICINE | Admitting: INTERNAL MEDICINE
Payer: MEDICARE

## 2024-05-13 ENCOUNTER — ANESTHESIA EVENT (OUTPATIENT)
Dept: SURGERY | Facility: HOSPITAL | Age: 84
End: 2024-05-13
Payer: MEDICARE

## 2024-05-13 ENCOUNTER — ANESTHESIA (OUTPATIENT)
Dept: SURGERY | Facility: HOSPITAL | Age: 84
End: 2024-05-13
Payer: MEDICARE

## 2024-05-13 VITALS
DIASTOLIC BLOOD PRESSURE: 81 MMHG | RESPIRATION RATE: 18 BRPM | WEIGHT: 142 LBS | HEIGHT: 66 IN | RESPIRATION RATE: 21 BRPM | OXYGEN SATURATION: 98 % | TEMPERATURE: 98 F | SYSTOLIC BLOOD PRESSURE: 187 MMHG | HEART RATE: 80 BPM | DIASTOLIC BLOOD PRESSURE: 92 MMHG | SYSTOLIC BLOOD PRESSURE: 172 MMHG | HEART RATE: 94 BPM | OXYGEN SATURATION: 99 % | BODY MASS INDEX: 22.82 KG/M2

## 2024-05-13 DIAGNOSIS — K86.89 PANCREATIC MASS: ICD-10-CM

## 2024-05-13 DIAGNOSIS — Z01.818 PRE-OP TESTING: ICD-10-CM

## 2024-05-13 LAB
ANION GAP SERPL CALC-SCNC: 8 MMOL/L (ref 8–16)
BUN SERPL-MCNC: 20 MG/DL (ref 8–23)
CALCIUM SERPL-MCNC: 9.1 MG/DL (ref 8.7–10.5)
CHLORIDE SERPL-SCNC: 110 MMOL/L (ref 95–110)
CO2 SERPL-SCNC: 25 MMOL/L (ref 23–29)
CREAT SERPL-MCNC: 1.2 MG/DL (ref 0.5–1.4)
ERYTHROCYTE [DISTWIDTH] IN BLOOD BY AUTOMATED COUNT: 13.8 % (ref 11.5–14.5)
EST. GFR  (NO RACE VARIABLE): 44.6 ML/MIN/1.73 M^2
GLUCOSE SERPL-MCNC: 130 MG/DL (ref 70–110)
HCT VFR BLD AUTO: 34.1 % (ref 37–48.5)
HGB BLD-MCNC: 10.9 G/DL (ref 12–16)
MCH RBC QN AUTO: 27.8 PG (ref 27–31)
MCHC RBC AUTO-ENTMCNC: 32 G/DL (ref 32–36)
MCV RBC AUTO: 87 FL (ref 82–98)
PLATELET # BLD AUTO: 238 K/UL (ref 150–450)
PMV BLD AUTO: 9.3 FL (ref 9.2–12.9)
POTASSIUM SERPL-SCNC: 3.9 MMOL/L (ref 3.5–5.1)
RBC # BLD AUTO: 3.92 M/UL (ref 4–5.4)
SODIUM SERPL-SCNC: 143 MMOL/L (ref 136–145)
WBC # BLD AUTO: 7.21 K/UL (ref 3.9–12.7)

## 2024-05-13 PROCEDURE — 37000009 HC ANESTHESIA EA ADD 15 MINS: Performed by: INTERNAL MEDICINE

## 2024-05-13 PROCEDURE — D9220A PRA ANESTHESIA: Mod: ANES,,, | Performed by: STUDENT IN AN ORGANIZED HEALTH CARE EDUCATION/TRAINING PROGRAM

## 2024-05-13 PROCEDURE — D9220A PRA ANESTHESIA: Mod: CRNA,,, | Performed by: NURSE ANESTHETIST, CERTIFIED REGISTERED

## 2024-05-13 PROCEDURE — 63600175 PHARM REV CODE 636 W HCPCS: Performed by: NURSE ANESTHETIST, CERTIFIED REGISTERED

## 2024-05-13 PROCEDURE — 37000008 HC ANESTHESIA 1ST 15 MINUTES: Performed by: INTERNAL MEDICINE

## 2024-05-13 PROCEDURE — 80048 BASIC METABOLIC PNL TOTAL CA: CPT | Performed by: ANESTHESIOLOGY

## 2024-05-13 PROCEDURE — 27202053 HC NEEDLE BIOPSY EUS: Performed by: INTERNAL MEDICINE

## 2024-05-13 PROCEDURE — 85027 COMPLETE CBC AUTOMATED: CPT | Performed by: ANESTHESIOLOGY

## 2024-05-13 PROCEDURE — 43242 EGD US FINE NEEDLE BX/ASPIR: CPT | Performed by: INTERNAL MEDICINE

## 2024-05-13 PROCEDURE — 88342 IMHCHEM/IMCYTCHM 1ST ANTB: CPT | Mod: TC | Performed by: PATHOLOGY

## 2024-05-13 PROCEDURE — 63600175 PHARM REV CODE 636 W HCPCS: Performed by: INTERNAL MEDICINE

## 2024-05-13 RX ORDER — FENTANYL CITRATE 50 UG/ML
25 INJECTION, SOLUTION INTRAMUSCULAR; INTRAVENOUS ONCE
Status: COMPLETED | OUTPATIENT
Start: 2024-05-13 | End: 2024-05-13

## 2024-05-13 RX ORDER — PROPOFOL 10 MG/ML
VIAL (ML) INTRAVENOUS
Status: DISCONTINUED | OUTPATIENT
Start: 2024-05-13 | End: 2024-05-13

## 2024-05-13 RX ORDER — LIDOCAINE HYDROCHLORIDE 20 MG/ML
INJECTION INTRAVENOUS
Status: DISCONTINUED | OUTPATIENT
Start: 2024-05-13 | End: 2024-05-13

## 2024-05-13 RX ORDER — HYDRALAZINE HYDROCHLORIDE 20 MG/ML
INJECTION INTRAMUSCULAR; INTRAVENOUS
Status: DISCONTINUED | OUTPATIENT
Start: 2024-05-13 | End: 2024-05-13

## 2024-05-13 RX ADMIN — PROPOFOL 20 MG: 10 INJECTION, EMULSION INTRAVENOUS at 09:05

## 2024-05-13 RX ADMIN — SODIUM CHLORIDE, SODIUM LACTATE, POTASSIUM CHLORIDE, AND CALCIUM CHLORIDE: .6; .31; .03; .02 INJECTION, SOLUTION INTRAVENOUS at 08:05

## 2024-05-13 RX ADMIN — HYDRALAZINE HYDROCHLORIDE 2 MG: 20 INJECTION, SOLUTION INTRAMUSCULAR; INTRAVENOUS at 09:05

## 2024-05-13 RX ADMIN — PROPOFOL 80 MG: 10 INJECTION, EMULSION INTRAVENOUS at 09:05

## 2024-05-13 RX ADMIN — SODIUM CHLORIDE, SODIUM LACTATE, POTASSIUM CHLORIDE, AND CALCIUM CHLORIDE: .6; .31; .03; .02 INJECTION, SOLUTION INTRAVENOUS at 09:05

## 2024-05-13 RX ADMIN — FENTANYL CITRATE 25 MCG: 50 INJECTION, SOLUTION INTRAMUSCULAR; INTRAVENOUS at 09:05

## 2024-05-13 NOTE — ANESTHESIA POSTPROCEDURE EVALUATION
Anesthesia Post Evaluation    Patient: Karlie Hess    Procedure(s) Performed: Procedure(s) (LRB):  ULTRASOUND, UPPER GI TRACT, ENDOSCOPIC (N/A)    Final Anesthesia Type: general      Patient location during evaluation: GI PACU  Patient participation: Yes- Able to Participate  Level of consciousness: awake and alert  Post-procedure vital signs: reviewed and stable  Pain management: adequate  Airway patency: patent    PONV status at discharge: No PONV  Anesthetic complications: no      Cardiovascular status: hemodynamically stable  Respiratory status: unassisted, spontaneous ventilation and room air  Hydration status: euvolemic  Follow-up not needed.              Vitals Value Taken Time   /62 05/13/24 1010   Temp  05/13/24 1015   Pulse 90 05/13/24 1013   Resp 18 05/13/24 0947   SpO2 97 % 05/13/24 1013   Vitals shown include unfiled device data.      No case tracking events are documented in the log.      Pain/Krystal Score: Krystal Score: 10 (5/13/2024  9:41 AM)

## 2024-05-13 NOTE — H&P
GASTROENTEROLOGY PRE-PROCEDURE H&P NOTE  Patient Name: Karlie Hess  Patient MRN: [id number]  Patient : 1940    Service date: 2024    PCP: Tao Orozco MD    No chief complaint on file.      HPI: Patient is a 83 y.o. female with PMHx as below here for evaluation of pancreatic lesion of unclear signficance in setting of known RCC.     Past Medical History:  Past Medical History:   Diagnosis Date    Aortic insufficiency     Cancer     renal ca, L kidney removed    COVID-19     Diastolic heart failure     Diffusion capacity of lung (dl), decreased     Encounter for blood transfusion     with miscarriage    Fibromyalgia     GERD (gastroesophageal reflux disease)     Hypercholesterolemia     Hyperlipidemia     Hypertension     Lung cancer     adenocarcinoma ; upper left lobe    Renal disorder     Left Kidney cancer    Sinusitis         Past Surgical History:  Past Surgical History:   Procedure Laterality Date    BACK SURGERY  2013    laminectomy    ENDOBRONCHIAL ULTRASOUND Left 07/15/2022    Procedure: ENDOBRONCHIAL ULTRASOUND (EBUS);  Surgeon: Cm Freitas MD;  Location: [facility];  Service: Pulmonary;  Laterality: Left;    ENDOBRONCHIAL ULTRASOUND Bilateral 2023    Procedure: ENDOBRONCHIAL ULTRASOUND (EBUS);  Surgeon: Cm Freitas MD;  Location: [facility] OR;  Service: Pulmonary;  Laterality: Bilateral;    HYSTERECTOMY      CARY/BSO. Due to endometriosis.    INJECTION OF JOINT Right 2019    Procedure: Injection, Joint, Shoulder, Hip, Or Knee;  Surgeon: Zach Wilder MD;  Location: [facility] OR;  Service: Pain Management;  Laterality: Right;  right hip intra-articular injection     LUNG LOBECTOMY Left     LOWER LOBE    NEPHRECTOMY Left     Entire kidney due to cancer.    ROBOTIC BRONCHOSCOPY Bilateral 2023    Procedure: ROBOTIC BRONCHOSCOPY;  Surgeon: Cm Freitas MD;  Location: [facility] OR;  Service: Pulmonary;  Laterality: Bilateral;        Home  Medications:  Medications Prior to Admission   Medication Sig Dispense Refill Last Dose    labetaloL (NORMODYNE) 200 MG tablet TAKE 1 TABLET BY MOUTH TWICE  DAILY 180 tablet 3 5/13/2024    acetaminophen (TYLENOL) 500 MG tablet Take 1,000 mg by mouth 2 (two) times daily as needed.       amLODIPine (NORVASC) 5 MG tablet Take 1 tablet (5 mg total) by mouth every evening. 90 tablet 3     aspirin (ECOTRIN) 81 MG EC tablet Take 81 mg by mouth once daily.   More than a month    cyanocobalamin (VITAMIN B-12) 1000 MCG tablet Take 100 mcg by mouth once daily.       duloxetine (CYMBALTA) 60 MG capsule Take 60 mg by mouth once daily.       hydroCHLOROthiazide (HYDRODIURIL) 12.5 MG Tab Take 1 tablet (12.5 mg total) by mouth daily as needed (Leg swelling). 30 tablet 11     ipratropium (ATROVENT) 42 mcg (0.06 %) nasal spray by Each Nostril route.       lisinopriL (PRINIVIL,ZESTRIL) 20 MG tablet Take 1 tablet (20 mg total) by mouth 2 (two) times daily. 180 tablet 3     pantoprazole (PROTONIX) 40 MG tablet Take 40 mg by mouth once daily.       rosuvastatin (CRESTOR) 10 MG tablet Take 10 mg by mouth every evening.       UNABLE TO FIND Take 1 capsule by mouth once daily. prevagen                  Review of patient's allergies indicates:   Allergen Reactions    Hydrocodone Hallucinations    Oxycodone-acetaminophen Hallucinations and Other (See Comments)       Social History:   Social History     Occupational History    Not on file   Tobacco Use    Smoking status: Never    Smokeless tobacco: Never   Substance and Sexual Activity    Alcohol use: No    Drug use: No    Sexual activity: Not Currently       Family History:   Family History   Problem Relation Name Age of Onset    Hypertension Mother      Breast cancer Maternal Aunt      Breast cancer Maternal Aunt         Review of Systems:  A 10 point review of systems was performed and was normal, except as mentioned in the HPI, including constitutional, HEENT, heme, lymph, cardiovascular,  "respiratory, gastrointestinal, genitourinary, neurologic, endocrine, psychiatric and musculoskeletal.      OBJECTIVE:    Physical Exam:  24 Hour Vital Sign Ranges: Temp:  [98 °F (36.7 °C)] 98 °F (36.7 °C)  Pulse:  [91] 91  Resp:  [16] 16  SpO2:  [97 %] 97 %  BP: (189)/(84) 189/84  Most recent vitals: BP (!) 189/84   Pulse 91   Temp 98 °F (36.7 °C)   Resp 16   Ht 5' 6" (1.676 m)   Wt 64.4 kg (142 lb)   SpO2 97%   Breastfeeding No   BMI 22.92 kg/m²    GEN: well-developed, well-nourished, awake and alert, non-toxic appearing adult  HEENT: PERRL, sclera anicteric, oral mucosa pink and moist without lesion  NECK: trachea midline; Good ROM  CV: regular rate and rhythm, no murmurs or gallops  RESP: clear to auscultation bilaterally, no wheezes, rhonci or rales  ABD: soft, non-tender, non-distended, normal bowel sounds  EXT: no swelling or edema, 2+ pulses distally  SKIN: no rashes or jaundice  PSYCH: normal affect    Labs:   Recent Labs     05/13/24  0741   WBC 7.21   MCV 87        Recent Labs     05/13/24  0741      K 3.9      CO2 25   BUN 20   *     No results for input(s): "ALB" in the last 72 hours.    Invalid input(s): "ALKP", "SGOT", "SGPT", "TBIL", "DBIL", "TPRO"  No results for input(s): "PT", "INR", "PTT" in the last 72 hours.      IMPRESSION / RECOMMENDATIONS:  EGD/EUS w/ FNA with interventions as warranted.   RIsks, benefits, alternatives discussed in detail regarding upcoming procedures and sedation. Some of the more common endoscopic complications include but not limited to immediate or delayed perforation, bleeding, infections, pain, inadvertent injury to surrounding tissue / organs and possible need for surgical evaluation. Patient expressed understanding, all questions answered and will proceed with procedure as planned.     Andi LEE [employer] III  5/13/2024  9:10 AM      "

## 2024-05-13 NOTE — [employer] PATIENT INSTRUCTIONS
Discharge Summary/Instructions after an Endoscopic Procedure  Patient Name: Karlie Hess  Patient MRN: [id number]  Patient YOB: 1940  Monday, May 13, 2024  Andi Bazzi III, MD  RESTRICTIONS:  During your procedure today, you received medications for sedation.  These   medications may affect your judgment, balance and coordination.  Therefore,   for 24 hours, you have the following restrictions:   - DO NOT drive a car, operate machinery, make legal/financial decisions,   sign important papers or drink alcohol.    ACTIVITY:  Today: no heavy lifting, straining or running due to procedural   sedation/anesthesia.  The following day: return to full activity including work.  DIET:  Eat and drink normally unless instructed otherwise.     TREATMENT FOR COMMON SIDE EFFECTS:  - Mild abdominal pain, nausea, belching, bloating or excessive gas:  rest,   eat lightly and use a heating pad.  - Sore Throat: treat with throat lozenges and/or gargle with warm salt   water.  - Because air was used during the procedure, expelling large amounts of air   from your rectum or belching is normal.  - If a bowel prep was taken, you may not have a bowel movement for 1-3 days.    This is normal.  SYMPTOMS TO WATCH FOR AND REPORT TO YOUR PHYSICIAN:  1. Abdominal pain or bloating, other than gas cramps.  2. Chest pain.  3. Back pain.  4. Signs of infection such as: chills or fever occurring within 24 hours   after the procedure.  5. Rectal bleeding, which would show as bright red, maroon, or black stools.   (A tablespoon of blood from the rectum is not serious, especially if   hemorrhoids are present.)  6. Vomiting.  7. Weakness or dizziness.  GO DIRECTLY TO THE NEAREST EMERGENCY ROOM IF YOU HAVE ANY OF THE FOLLOWING:      Difficulty breathing              Chills and/or fever over 101 F   Persistent vomiting and/or vomiting blood   Severe abdominal pain   Severe chest pain   Black, tarry stools   Bleeding- more than one  tablespoon   Any other symptom or condition that you feel may need urgent attention  Your doctor recommends these additional instructions:  If any biopsies were taken, your doctors clinic will contact you in 1 to 2   weeks with any results.  - Discharge patient to home.   - Patient has a contact number available for emergencies.  The signs and   symptoms of potential delayed complications were discussed with the   patient.  Return to normal activities tomorrow.  Written discharge   instructions were provided to the patient.   - Resume regular diet.   - Continue present medications.   - Await pathology results.  For questions, problems or results please call your physician - Andi Bazzi III, MD at Work:  [phone].  [facility], EMERGENCY ROOM PHONE NUMBER: [phone]  IF A COMPLICATION OR EMERGENCY SITUATION ARISES AND YOU ARE UNABLE TO REACH   YOUR PHYSICIAN - GO DIRECTLY TO THE EMERGENCY ROOM.  Andi Bazzi III, MD  5/13/2024 9:40:27 AM  This report has been verified and signed electronically.  Dear patient,  As a result of recent federal legislation (The Federal Cures Act), you may   receive lab or pathology results from your procedure in your [facility]   account before your physician is able to contact you. Your physician or   their representative will relay the results to you with their   recommendations at their soonest availability.  Thank you,  [employer]

## 2024-05-13 NOTE — ANESTHESIA PREPROCEDURE EVALUATION
05/13/2024  Karlie Hess is a 83 y.o., female.      Pre-op Assessment    I have reviewed the Patient Summary Reports.     I have reviewed the Nursing Notes. I have reviewed the NPO Status.   I have reviewed the Medications.     Review of Systems  Anesthesia Hx:  No problems with previous Anesthesia             Denies Family Hx of Anesthesia complications.    Denies Personal Hx of Anesthesia complications.                    Social:  Non-Smoker       Hematology/Oncology:  Hematology Normal                       --  Cancer in past history:                     EENT/Dental:  EENT/Dental Normal           Cardiovascular:     Hypertension Valvular problems/Murmurs, AI      CHF    hyperlipidemia SLAUGHTER  ECG has been reviewed.                          Pulmonary:  Pulmonary Normal                       Renal/:  Renal/ Normal                 Hepatic/GI:     GERD             Musculoskeletal:  Musculoskeletal Normal                Neurological:        Chronic Pain Syndrome                         Endocrine:  Endocrine Normal            Psych:  Psychiatric Normal                    Physical Exam  General: Well nourished, Cooperative, Alert and Oriented    Airway:  Mallampati: II   Mouth Opening: Normal  TM Distance: Normal  Tongue: Normal  Neck ROM: Normal ROM    Dental:  Intact    Chest/Lungs:  Clear to auscultation    Heart:  Rate: Normal  Rhythm: Regular Rhythm  Sounds: Normal        Anesthesia Plan  Type of Anesthesia, risks & benefits discussed:    Anesthesia Type: Gen Natural Airway  Intra-op Monitoring Plan: Standard ASA Monitors  Induction:  IV  Informed Consent: Informed consent signed with the Patient and all parties understand the risks and agree with anesthesia plan.  All questions answered.   ASA Score: 3    Ready For Surgery From Anesthesia Perspective.     .

## 2024-05-13 NOTE — TRANSFER OF CARE
"Anesthesia Transfer of Care Note    Patient: Karlie Hess    Procedure(s) Performed: Procedure(s) (LRB):  ULTRASOUND, UPPER GI TRACT, ENDOSCOPIC (N/A)    Patient location: GI    Anesthesia Type: general    Transport from OR: Transported from OR on room air with adequate spontaneous ventilation    Post pain: adequate analgesia    Post assessment: no apparent anesthetic complications    Post vital signs: stable    Level of consciousness: awake and alert    Nausea/Vomiting: no nausea/vomiting    Complications: none    Transfer of care protocol was followed      Last vitals: Visit Vitals  BP (!) 189/84   Pulse 91   Temp 36.7 °C (98 °F)   Resp 16   Ht 5' 6" (1.676 m)   Wt 64.4 kg (142 lb)   SpO2 97%   Breastfeeding No   BMI 22.92 kg/m²     "

## 2024-05-23 ENCOUNTER — OFFICE VISIT (OUTPATIENT)
Facility: CLINIC | Age: 84
End: 2024-05-23
Payer: MEDICARE

## 2024-05-23 VITALS
SYSTOLIC BLOOD PRESSURE: 151 MMHG | TEMPERATURE: 97 F | RESPIRATION RATE: 17 BRPM | BODY MASS INDEX: 23.26 KG/M2 | WEIGHT: 144.13 LBS | HEART RATE: 85 BPM | DIASTOLIC BLOOD PRESSURE: 66 MMHG

## 2024-05-23 DIAGNOSIS — C64.9 METASTATIC RENAL CELL CARCINOMA TO LUNG, RIGHT: Primary | ICD-10-CM

## 2024-05-23 DIAGNOSIS — C78.01 METASTATIC RENAL CELL CARCINOMA TO LUNG, RIGHT: Primary | ICD-10-CM

## 2024-05-23 PROCEDURE — 99999 PR PBB SHADOW E&M-EST. PATIENT-LVL IV: CPT | Mod: PBBFAC,,, | Performed by: INTERNAL MEDICINE

## 2024-05-23 PROCEDURE — 99214 OFFICE O/P EST MOD 30 MIN: CPT | Mod: PBBFAC,PN | Performed by: INTERNAL MEDICINE

## 2024-05-23 PROCEDURE — 99215 OFFICE O/P EST HI 40 MIN: CPT | Mod: S$PBB,,, | Performed by: INTERNAL MEDICINE

## 2024-05-23 RX ORDER — HYDROXYZINE HYDROCHLORIDE 25 MG/1
25 TABLET, FILM COATED ORAL 3 TIMES DAILY PRN
COMMUNITY
Start: 2024-05-20

## 2024-05-23 NOTE — ASSESSMENT & PLAN NOTE
I had a long discussion with patient today about this new spread of disease and this life threatening situation this represents.  I feel she needs systemic therapy and reviewed treatment with axitinib/Pembro.  I reviewed the risks and benefits in detail with her including autoimmune issues among many others.  She understands this and would like to proceed with therapy.      Will arrange chemo education, Dr. Kim for port.      Begin in the next two weeks.  Will see her again in about 4 weeks.

## 2024-05-23 NOTE — PROGRESS NOTES
PROGRESS NOTE    Subjective:       Patient ID: Karlie Hess is a 83 y.o. female.    History of Left RCC, treated [id number] further treatment     History of [facility] lung cancer-treated with lobectomy-7/2/2015-no chemo/xrt-Path c/w mucin producing adenocarcinoma. [id number]     History of left hilar mass, 9/2020 EBUS negative     PET 10/18/2023:  Nodular densities are as outlined below:  -21 x 17 mm right infrahilar nodule with SUV max 2.5 (image 119), previously measuring 17 x 16 mm (August 20, 2023)  -27 x 27 mm marginated nodule in the posterior inferior left hilum with SUV max 2.7 (image 109)  -13 x 7 mm nodular density along the right posterior pararenal space with SUV max 0.8 (image 179), seen on studies dating back to 1/12/2021.     11/9/2023-EBUS  1. LUNG, RIGHT LOWER LOBE MASS, FINE NEEDLE ASPIRATION   - NEGATIVE FOR MALIGNANT CELLS.     2. LUNG, RIGHT LOWER LOBE MASS, BRUSHING   - RARE ATYPICAL CELLS PRESENT     3. LUNG, RIGHT LOWER LOBE MASS, BIOPSY TOUCH PREP   - POSITIVE FOR CARCINOMA.   - SEE CONCURRENT SURGICAL BIOPSY ([id number])     4. LUNG, RIGHT LOWER LOBE, BRONCHOALVEOLAR LAVAGE   - RARE ATYPICAL CELLS PRESENT.     5. LUNG, LEFT LOWER LOBE MASS, FINE NEEDLE ASPIRATION   - RARE ATYPICAL CELLS PRESENT     6. LYMPH NODE, STATION 11RS, FINE NEEDLE ASPIRATION   - NEGATIVE FOR MALIGNANT CELLS.   - SCANT KATARZYNA MATERIAL PRESENT      Karnofsky performance status score <80   Time from original diagnosis to initiation of targeted therapy <1 year   Hemoglobin less than the lower limit of normal   Serum calcium greater than the upper limit of normal   Neutrophil count greater than the upper limit of normal   Platelet count greater than the upper limit of normal   Favorable risk: None of the above risk factors present.  Intermediate risk: 1 or 2 of the above risk factors present.  Poor risk: 3 or more risk factors present.     1/5/2024:  RIGHT  PARARENAL MASS, CT GUIDED CORE BIOPSY:   - CLEAR CELL RENAL CELL CARCINOMA     12/27/2023-1/5/2024  RLL SBRT    1/30/2024-2/7/2024  XRT to Rt. Renal bed lesion    5/3/2024-MRI Abd:--Progressive Disease  Right kidney mass  2nd mass in lower pole of right kidney  Pancreatic Tail mass    5/13/2024-Bx:  PANCREATIC TAIL MASS, BIOPSY:   - METASTATIC CLEAR CELL RENAL CELL CARCINOMA.     Chief Complaint:  No chief complaint on file.  Metastatic Renal Cell Carcinoma follow up    History of Present Illness:   Karlie Hess is a 83 y.o. female who presents for follow up of above.      Ms. Hess has no new complaints at this time.       Family and Social history reviewed and is unchanged from 11/21/2023             Current Outpatient Medications:     hydrOXYzine HCL (ATARAX) 25 MG tablet, Take 25 mg by mouth 3 (three) times daily as needed for Itching., Disp: , Rfl:     acetaminophen (TYLENOL) 500 MG tablet, Take 1,000 mg by mouth 2 (two) times daily as needed., Disp: , Rfl:     amLODIPine (NORVASC) 5 MG tablet, Take 1 tablet (5 mg total) by mouth every evening., Disp: 90 tablet, Rfl: 3    aspirin (ECOTRIN) 81 MG EC tablet, Take 81 mg by mouth once daily., Disp: , Rfl:     axitinib (INLYTA) 5 mg Tab, Take 5 mg by mouth 2 (two) times a day., Disp: 60 tablet, Rfl: 6    cyanocobalamin (VITAMIN B-12) 1000 MCG tablet, Take 100 mcg by mouth once daily., Disp: , Rfl:     duloxetine (CYMBALTA) 60 MG capsule, Take 60 mg by mouth once daily., Disp: , Rfl:     hydroCHLOROthiazide (HYDRODIURIL) 12.5 MG Tab, Take 1 tablet (12.5 mg total) by mouth daily as needed (Leg swelling)., Disp: 30 tablet, Rfl: 11    ipratropium (ATROVENT) 42 mcg (0.06 %) nasal spray, by Each Nostril route., Disp: , Rfl:     labetaloL (NORMODYNE) 200 MG tablet, TAKE 1 TABLET BY MOUTH TWICE  DAILY, Disp: 180 tablet, Rfl: 3    lisinopriL (PRINIVIL,ZESTRIL) 20 MG tablet, Take 1 tablet (20 mg total) by mouth 2 (two) times daily., Disp: 180 tablet, Rfl: 3     pantoprazole (PROTONIX) 40 MG tablet, Take 40 mg by mouth once daily., Disp: , Rfl:     rosuvastatin (CRESTOR) 10 MG tablet, Take 10 mg by mouth every evening., Disp: , Rfl:     UNABLE TO FIND, Take 1 capsule by mouth once daily. prevagen, Disp: , Rfl:   No current facility-administered medications for this visit.    Facility-Administered Medications Ordered in Other Visits:     LIDOcaine (PF) 10 mg/ml (1%) injection 10 mg, 1 mL, Intradermal, Once, Gabriel Loaiza MD        Objective:       Physical Examination:     BP (!) 151/66   Pulse 85   Temp 97.2 °F (36.2 °C)   Resp 17   Wt 65.4 kg (144 lb 1.6 oz)   BMI 23.26 kg/m²     Physical Exam  Constitutional:       Appearance: Normal appearance.   HENT:      Head: Normocephalic and atraumatic.   Eyes:      General: No scleral icterus.     Conjunctiva/sclera: Conjunctivae normal.   Cardiovascular:      Rate and Rhythm: Normal rate.   Pulmonary:      Effort: Pulmonary effort is normal.   Abdominal:      General: Abdomen is flat.   Neurological:      General: No focal deficit present.      Mental Status: She is alert and oriented to person, place, and time.   Psychiatric:         Mood and Affect: Mood normal.         Behavior: Behavior normal.         Labs:   No results found for this or any previous visit (from the past 336 hour(s)).    CMP  Sodium   Date Value Ref Range Status   05/13/2024 143 136 - 145 mmol/L Final     Potassium   Date Value Ref Range Status   05/13/2024 3.9 3.5 - 5.1 mmol/L Final     Chloride   Date Value Ref Range Status   05/13/2024 110 95 - 110 mmol/L Final     CO2   Date Value Ref Range Status   05/13/2024 25 23 - 29 mmol/L Final     Glucose   Date Value Ref Range Status   05/13/2024 130 (H) 70 - 110 mg/dL Final     BUN   Date Value Ref Range Status   05/13/2024 20 8 - 23 mg/dL Final     Creatinine   Date Value Ref Range Status   05/13/2024 1.2 0.5 - 1.4 mg/dL Final   12/31/2012 1.2 0.5 - 1.4 mg/dL Final     Calcium   Date Value Ref Range  "Status   05/13/2024 9.1 8.7 - 10.5 mg/dL Final   12/31/2012 9.7 8.7 - 10.5 mg/dL Final     Total Protein   Date Value Ref Range Status   02/29/2024 6.8 6.0 - 8.4 g/dL Final     Albumin   Date Value Ref Range Status   02/29/2024 4.0 3.5 - 5.2 g/dL Final     Total Bilirubin   Date Value Ref Range Status   02/29/2024 0.5 0.1 - 1.0 mg/dL Final     Comment:     For infants and newborns, interpretation of results should be based  on gestational age, weight and in agreement with clinical  observations.    Premature Infant recommended reference ranges:  Up to 24 hours.............<8.0 mg/dL  Up to 48 hours............<12.0 mg/dL  3-5 days..................<15.0 mg/dL  6-29 days.................<15.0 mg/dL       Alkaline Phosphatase   Date Value Ref Range Status   02/29/2024 66 55 - 135 U/L Final     AST   Date Value Ref Range Status   02/29/2024 12 10 - 40 U/L Final     ALT   Date Value Ref Range Status   02/29/2024 8 (L) 10 - 44 U/L Final     Anion Gap   Date Value Ref Range Status   05/13/2024 8 8 - 16 mmol/L Final   12/31/2012 14 5 - 15 meq/L Final     eGFR if    Date Value Ref Range Status   07/18/2022 >60.0 >60 mL/min/1.73 m^2 Final     eGFR if non    Date Value Ref Range Status   07/18/2022 52.6 (A) >60 mL/min/1.73 m^2 Final     Comment:     Calculation used to obtain the estimated glomerular filtration  rate (eGFR) is the CKD-EPI equation.        No results found for: "CEA"  No results found for: "PSA"        Assessment/Plan:     Problem List Items Addressed This Visit       Metastatic renal cell carcinoma to lung, right - Primary     I had a long discussion with patient today about this new spread of disease and this life threatening situation this represents.  I feel she needs systemic therapy and reviewed treatment with axitinib/Pembro.  I reviewed the risks and benefits in detail with her including autoimmune issues among many others.  She understands this and would like to proceed " with therapy.      Will arrange chemo education, Dr. Kim for port.      Begin in the next two weeks.  Will see her again in about 4 weeks.          Relevant Medications    axitinib (INLYTA) 5 mg Tab    Other Relevant Orders    Ambulatory referral/consult to General Surgery    Ambulatory referral/consult to [facility]         Discussion:     Follow up in about 4 weeks (around 6/20/2024).      Electronically signed by Del Nathan

## 2024-05-23 NOTE — PROGRESS NOTES
FOLLOW-UP APPOINTMENT    PATIENT:   Karlie Hess  :    1940  MR#:    [id number]  DATE OF VISIT:  2024      Chief Complaint: Chemo School    HPI:   Ms. Karlie Hess presents today for chemotherapy education.  She will be starting treatment with Keytruda and Inlyta for the above diagnosis.         2024    10:42 AM 2024     1:09 PM 2024     1:19 PM 2024     1:13 PM 2023     1:34 PM 2023     8:06 AM 2019     8:41 AM   Depression Patient Health Questionnaire   Over the last two weeks how often have you been bothered by little interest or pleasure in doing things Not at all Not at all Not at all Not at all Not at all Not at all Not at all   Over the last two weeks how often have you been bothered by feeling down, depressed or hopeless Not at all Not at all Not at all Not at all Not at all Not at all Not at all   PHQ-2 Total Score 0 0 0 0 0 0 0         Review of Systems   Constitutional:  Negative for appetite change and unexpected weight change.   HENT:   Negative for mouth sores.    Respiratory:  Negative for cough and shortness of breath.    Cardiovascular:  Negative for chest pain.   Gastrointestinal:  Negative for abdominal pain and diarrhea.   Genitourinary:  Negative for frequency.    Musculoskeletal:  Negative for back pain.   Skin:  Positive for rash.   Neurological:  Negative for headaches.   Hematological:  Negative for adenopathy.   Psychiatric/Behavioral:  The patient is not nervous/anxious.        Oncology History   Metastatic renal cell carcinoma to lung, right   2023 Initial Diagnosis    Metastatic renal cell carcinoma to lung, right     2024 -  Chemotherapy    Treatment Summary   Plan Name: OP AXITINIB + PEMBROLIZUMAB 200MG Q3W  Treatment Goal: Control  Status: Active  Start Date: 2024 (Planned)  End Date: 2026 (Planned)  Provider: Del Wasserman MD  Chemotherapy: axitinib (INLYTA) 5 mg Tab, 5 mg, Oral, 2 times daily, 0  of 1 cycle, Start date: --, End date: --         Patient Active Problem List   Diagnosis    Hip pain    History of lung cancer    Mass of left lung    History of renal cell cancer    Primary hypertension    Nonrheumatic aortic valve insufficiency    SOB (shortness of breath)    SLAUGHTER (dyspnea on exertion)    Leg edema    Right lower lobe lung mass    Metastatic renal cell carcinoma to lung, right    Chronic diastolic heart failure    Aortic atherosclerosis    Cancer associated pain       Past Medical History:   Diagnosis Date    Aortic insufficiency     Cancer 2005    renal ca, L kidney removed    COVID-19 2022    Diastolic heart failure     Diffusion capacity of lung (dl), decreased     Encounter for blood transfusion 1963    with miscarriage    Fibromyalgia     GERD (gastroesophageal reflux disease)     Hypercholesterolemia     Hyperlipidemia     Hypertension     Lung cancer 2015    adenocarcinoma ; upper left lobe    Renal disorder 2005    Left Kidney cancer    Sinusitis        Past Surgical History:   Procedure Laterality Date    BACK SURGERY  2013    laminectomy    ENDOBRONCHIAL ULTRASOUND Left 07/15/2022    Procedure: ENDOBRONCHIAL ULTRASOUND (EBUS);  Surgeon: Cm Freitas MD;  Location: [facility];  Service: Pulmonary;  Laterality: Left;    ENDOBRONCHIAL ULTRASOUND Bilateral 11/9/2023    Procedure: ENDOBRONCHIAL ULTRASOUND (EBUS);  Surgeon: Cm Freitas MD;  Location: [facility];  Service: Pulmonary;  Laterality: Bilateral;    ENDOSCOPIC ULTRASOUND OF UPPER GASTROINTESTINAL TRACT N/A 5/13/2024    Procedure: ULTRASOUND, UPPER GI TRACT, ENDOSCOPIC;  Surgeon: Andi Bazzi III, MD;  Location: [facility];  Service: Endoscopy;  Laterality: N/A;    HYSTERECTOMY  1975    CARY/BSO. Due to endometriosis.    INJECTION OF JOINT Right 05/27/2019    Procedure: Injection, Joint, Shoulder, Hip, Or Knee;  Surgeon: Zach Wilder MD;  Location: [facility];  Service: Pain Management;  Laterality: Right;  right hip  intra-articular injection     LUNG LOBECTOMY Left     LOWER LOBE    NEPHRECTOMY Left 2005    Entire kidney due to cancer.    ROBOTIC BRONCHOSCOPY Bilateral 11/9/2023    Procedure: ROBOTIC BRONCHOSCOPY;  Surgeon: Cm Freitas MD;  Location: [facility];  Service: Pulmonary;  Laterality: Bilateral;       Social History     Socioeconomic History    Marital status:    Tobacco Use    Smoking status: Never    Smokeless tobacco: Never   Substance and Sexual Activity    Alcohol use: No    Drug use: No    Sexual activity: Not Currently     Social Determinants of Health     Financial Resource Strain: Low Risk  (1/15/2024)    Overall Financial Resource Strain (CARDIA)     Difficulty of Paying Living Expenses: Not hard at all   Food Insecurity: No Food Insecurity (1/15/2024)    Hunger Vital Sign     Worried About Running Out of Food in the Last Year: Never true     Ran Out of Food in the Last Year: Never true   Transportation Needs: No Transportation Needs (1/15/2024)    PRAPARE - Transportation     Lack of Transportation (Medical): No     Lack of Transportation (Non-Medical): No   Physical Activity: Inactive (1/15/2024)    Exercise Vital Sign     Days of Exercise per Week: 0 days     Minutes of Exercise per Session: 0 min   Stress: No Stress Concern Present (1/15/2024)    [employer] - Occupational Stress Questionnaire     Feeling of Stress : Not at all   Housing Stability: Low Risk  (1/15/2024)    Housing Stability Vital Sign     Unable to Pay for Housing in the Last Year: No     Number of Places Lived in the Last Year: 1     Unstable Housing in the Last Year: No       Family History   Problem Relation Name Age of Onset    Hypertension Mother      Breast cancer Maternal Aunt      Breast cancer Maternal Aunt           Current Outpatient Medications:     acetaminophen (TYLENOL) 500 MG tablet, Take 1,000 mg by mouth 2 (two) times daily as needed., Disp: , Rfl:     amLODIPine (NORVASC) 5 MG  tablet, Take 1 tablet (5 mg total) by mouth every evening., Disp: 90 tablet, Rfl: 3    aspirin (ECOTRIN) 81 MG EC tablet, Take 81 mg by mouth once daily., Disp: , Rfl:     axitinib (INLYTA) 5 mg Tab, Take 5 mg by mouth 2 (two) times a day., Disp: 60 tablet, Rfl: 6    cyanocobalamin (VITAMIN B-12) 1000 MCG tablet, Take 100 mcg by mouth once daily., Disp: , Rfl:     duloxetine (CYMBALTA) 60 MG capsule, Take 60 mg by mouth once daily., Disp: , Rfl:     hydroCHLOROthiazide (HYDRODIURIL) 12.5 MG Tab, Take 1 tablet (12.5 mg total) by mouth daily as needed (Leg swelling)., Disp: 30 tablet, Rfl: 11    hydrOXYzine HCL (ATARAX) 25 MG tablet, Take 25 mg by mouth 3 (three) times daily as needed for Itching., Disp: , Rfl:     ipratropium (ATROVENT) 42 mcg (0.06 %) nasal spray, by Each Nostril route., Disp: , Rfl:     labetaloL (NORMODYNE) 200 MG tablet, TAKE 1 TABLET BY MOUTH TWICE  DAILY, Disp: 180 tablet, Rfl: 3    lisinopriL (PRINIVIL,ZESTRIL) 20 MG tablet, Take 1 tablet (20 mg total) by mouth 2 (two) times daily., Disp: 180 tablet, Rfl: 3    ondansetron (ZOFRAN) 8 MG tablet, Take 1 tablet (8 mg total) by mouth every 8 (eight) hours as needed for Nausea., Disp: 30 tablet, Rfl: 5    pantoprazole (PROTONIX) 40 MG tablet, Take 40 mg by mouth once daily., Disp: , Rfl:     promethazine (PHENERGAN) 25 MG tablet, Take 1 tablet (25 mg total) by mouth every 6 (six) hours as needed for Nausea., Disp: 30 tablet, Rfl: 5    rosuvastatin (CRESTOR) 10 MG tablet, Take 10 mg by mouth every evening., Disp: , Rfl:     UNABLE TO FIND, Take 1 capsule by mouth once daily. prevagen, Disp: , Rfl:   No current facility-administered medications for this visit.    Facility-Administered Medications Ordered in Other Visits:     LIDOcaine (PF) 10 mg/ml (1%) injection 10 mg, 1 mL, Intradermal, Once, Gabriel Loaiza MD    Review of patient's allergies indicates:   Allergen Reactions    Hydrocodone Hallucinations    Oxycodone-acetaminophen  Hallucinations and Other (See Comments)       Physcial Examination  VITAL SIGNS:    Body surface area is 1.75 meters squared.   Pain Assessment  Vitals:    05/24/24 1039   BP: (!) 152/69   Pulse: 83   Resp: 17   Temp: 97.9 °F (36.6 °C)   Weight: 65.7 kg (144 lb 14.4 oz)   PainSc: 0-No pain        Wt Readings from Last 5 Encounters:   05/24/24 65.7 kg (144 lb 14.4 oz)   05/23/24 65.4 kg (144 lb 1.6 oz)   05/13/24 64.4 kg (142 lb)   05/10/24 63.5 kg (140 lb)   04/30/24 65.3 kg (144 lb)       GENERAL:  Karlie Hess is healthy-appearing 83 y.o. female, in no distress.   EYES:   Pupils equal, round, reactive.  Conjunctivae, sclera and lids normal.  HEENT: Head normocephalic and atraumatic, without alopecia.  Oropharynx is unremarkable.  No icterus, jaundice, stomatitis, mucositis, or ulceration is noted.  Ears are clear and unremarkable.  Nose, nares, and septum are unremarkable.    NECK:   No masses.  Thyroid and trachea are normal.    BREASTS:  Deferred.  RESPIRATORY: Clear to auscultation bilaterally.  Symmetrically effortless expansion.  No wheezing and no stridor.    CV: Heart reveals regular rate and rhythm without murmur, rub, or gallops.  ABDOMEN: Soft, non-tender.  No masses, no hernias, and no rebound or rigidity are noted.  /RECTAL:  Deferred.  LYMPHATICS: No preauricular, submandibular, cervical, supraclavicular, axillary, lymphadenopathy.  MUSCULOSKELETAL:Fair musculature, no atrophy.  No arthritic changes.  No edema or cyanosis. Back is without gross abnormal curvature.   NEUROLOGICAL: Cranial nerves II-XII grossly intact.  Motor and sensory exam intact.  SKIN:   No lesions, bruises, petechiae or rashes.  Good turgor.    PSYCHIATRIC: Patient is alert and oriented to time, place and person.  Mood and affect are appropriate.         Laboratory and Radiology   Lab Results   Component Value Date    WBC 7.21 05/13/2024    RBC 3.92 (L) 05/13/2024    HGB 10.9 (L) 05/13/2024    HCT 34.1 (L) 05/13/2024     MCV 87 05/13/2024    MCH 27.8 05/13/2024    MCHC 32.0 05/13/2024    RDW 13.8 05/13/2024     05/13/2024    MPV 9.3 05/13/2024    GRAN 4.7 02/29/2024    GRAN 75.3 (H) 02/29/2024    LYMPH 0.8 (L) 02/29/2024    LYMPH 12.0 (L) 02/29/2024    MONO 0.5 02/29/2024    MONO 8.5 02/29/2024    EOS 0.2 02/29/2024    BASO 0.03 02/29/2024    EOSINOPHIL 3.4 02/29/2024    BASOPHIL 0.5 02/29/2024     BMP  Lab Results   Component Value Date     05/13/2024    K 3.9 05/13/2024     05/13/2024    CO2 25 05/13/2024    BUN 20 05/13/2024    CREATININE 1.2 05/13/2024    CALCIUM 9.1 05/13/2024    ANIONGAP 8 05/13/2024    ESTGFRAFRICA >60.0 07/18/2022    EGFRNONAA 52.6 (A) 07/18/2022     Lab Results   Component Value Date    ALT 8 (L) 02/29/2024    AST 12 02/29/2024    ALKPHOS 66 02/29/2024    BILITOT 0.5 02/29/2024     Results for orders placed or performed during the hospital encounter of 04/24/24 (from the past 2160 hour(s))   CT Chest Abdomen Pelvis Without Contrast (XPD)    Narrative    CMS MANDATED QUALITY DATA - CT RADIATION - 436    All CT scans at this facility utilize dose modulation, iterative reconstruction, and/or weight based dosing when appropriate to reduce radiation dose to as low as reasonably achievable.    EXAMINATION:  CT CHEST ABDOMEN PELVIS WITHOUT CONTRAST(XPD)    CLINICAL HISTORY:  monitor cancer; Secondary malignant neoplasm of right lung    TECHNIQUE:  The examination is performed without the benefit of IV contrast enhancement.    COMPARISON:  11/07/2023, 10/18/2023, 09/16/2023.    FINDINGS:  The previously described noncalcified pulmonary nodule located medially within the right lower lobe is partially obscured by changes within the adjacent parenchyma on the current study but measures roughly 2.1 cm in maximum dimension, similar in size to previous exams.  There is narrowing of bronchi along the posterior margin of the mass.  Adjacent pulmonary opacities are associated with air bronchograms and may  relate to atelectasis, infiltrate or post radiation changes.  An additional small noncalcified pulmonary nodule observed inferiorly and laterally in the right lower lobe (image 75) is unchanged.  A nodular soft tissue density abuts the pleura in the right lung base currently measuring 5 mm and previously measuring 4 mm (image 81).  Surgical changes related to partial left pneumonectomy are again noted.  There is some minor scattered atelectasis or scarring.  There is minimal thickening along the upper margin of the right major fissure into a lesser degree along the minor fissure.  There is no significant effusion.    The heart is normal in size.  Minimal pericardial fluid or thickening again observed.  A nodular opacity along the posterior margin of the left pulmonary hilum remains unchanged.  No mediastinal adenopathy demonstrated.    The unenhanced liver and spleen normal in size and demonstrate no focal parenchymal abnormalities.  The gallbladder, biliary system, pancreas and adrenal glands appear unremarkable.  The left kidney is apparently surgically absent.    There is an abnormal appearance of the right renal vein which appears distended representing a change compared to prior exams there is question of soft tissue prominence involving the mid to lower pole the right kidney anteriorly.  Further assessment of these findings with contrast-enhanced imaging or ultrasound is recommended.    An irregularly marginated nodular soft tissue density in the right retroperitoneum posterolaterally appears slightly increased in size compared to prior examination currently measuring 14 x 12 mm.  This lesion has been previously biopsied.    There are mild atheromatous changes in the wall of the aorta and branch vessels.  There are numerous colonic diverticuli without evidence of acute diverticulitis.  There is no bowel wall thickening.  There are no findings of obstruction.    There has been a prior hysterectomy.  There are  no adnexal masses.  The unopacified urinary bladder is grossly unremarkable.    No acute osseous abnormality is observed.  Mild loss of height involving the rightward aspect of the superior endplate of L4 is unchanged.      Impression    Persistent right lower lobe nodular opacity, not significantly changed in size.  The margins of the nodule are partially obscured by new adjacent parenchymal opacities which may relate to interval radiation therapy.  Correlate clinically.    Stable nodularity along the posteroinferior margin of the left pulmonary hilum.    Slight increase in size of pleural base nodule in the right lung base.  Additional small pulmonary nodules remain unchanged.    Postoperative changes within the left lung.    Interval development of enlargement of the right renal vein with question of soft tissue prominence along the anterior cortex of the mid to lower pole of the right kidney.  Correlation with renal ultrasound is recommended to exclude developing neoplasm.    Slight increase in previously demonstrated nodule along the posterolateral margin of the right kidney, previously biopsied.    Additional incidental findings as above.      Electronically signed by: Umm Sanchez  Date:    04/24/2024  Time:    13:23     No results found for this or any previous visit (from the past 2160 hour(s)).  Results for orders placed or performed during the hospital encounter of 05/03/24 (from the past 2160 hour(s))   MRI Abdomen W WO Contrast    Impression    Solid enhancing soft tissue mass within the anterior cortex of the mid to lower right kidney extending into the right renal vein highly suggestive of primary renal neoplasm.  There is a 2nd suspicious enhancing mass in the lower pole of the right kidney.    Heterogeneously enhancing mass within the pancreatic tail also of concern for primary or secondary neoplasm.    Enhancing soft tissue nodule within the posterior pararenal region on the right as has been  previously identified and biopsied.      Electronically signed by: Umm Sanchez  Date:    05/03/2024  Time:    12:32       Pathology  Pathology Results  (Last 10 years)      None            TITLE: PLAN OF CARE FOR THE CHEMOTHERAPY PATIENT / TEACHING PROTOCOL    PURPOSE: To involve the patient / significant other in the plan of care and to provide teaching to the significant other & patient receiving chemotherapy.    LEVEL: Independent.    CONTENT: The Plan of Care for the chemotherapy patient is individualized and appropriate to the patients needs, strengths, limitations, & goals.  Education includes information regarding chemotherapy side effects, the treatment itself, and self-care  Activities.    GOAL / OUTCOME STANDARDS    PHYSIOLOGIC: The client will remain free or experience minimal side effects or toxicities throughout the chemotherapy treatment period.     PSYCHOLOGIC: The client/significant others will demonstrate positive coping mechanisms in relation to chemotherapy and its side effects.      COGINITIVE: The client/significant others will verbalize understanding of self-care measure to avoid/minimize side effects of the chemotherapy regimen.    EVALUATION / COMMENT KEY:    V = Audiovisual/Video  S = Successfully meets outcome  N = Needs further instruction  NA = Not applicable to the patient  P = Previous knowledge  U = Unable to comprehend  * = See progress notes          PLAN OF CARE  INFORMATION TO BE DELIVERED / NURSING INTERVENTIONS DATE EVALUATION   Assessment of client/caregiver,         knowledge of cancer diagnosis,         and chemotherapy as a treatment. 1a. Evaluate patient/caregiver learning ability    b. Plan teaching sessions with patient/caregiver according to needs and present anxiety level/ability to learn.    c. Provide Chemotherapy Education Packet,        Mouth Care Protocol,         Specific Patient Education Sheets. 05/24/2024 S   Individual chemotherapy treatment         plan. 2a.  Review of Chemotherapy Education handout from [email or web address]            05/24/2024   S   Knowledge Deficit & Self-Management of general side effects common to all chemotherapy:  Nausea/Vomiting  b.   Diarrhea  Mouth Care  Dental care  Constipation  Hair Loss  Potential for infection  Fatigue   3a. Reinforce that the majority of side effects from chemotherapy are reversible and are  controlled both in the hospital and at home        (blood counts recover, hair grows back).   b.  Refer to the following for reinforcement of         information post-treatment:  Mouth Care Protocol.  Bowel Protocol for constipation or diarrhea.  3.  Drug Specific Chemotherapy Information Sheets for each medication patient receiving.    05/24/2024     S     PLAN OF CARE  INFORMATION TO BE DELIVERED / NURSING INTERVENTIONS DATE EVALUATION   h. Potential for bleeding         i. Potential anemia/fatigue         j. Potential sunburn         k. Birth control measures  l. Safety measures post treatment 4.  Chemotherapy Home Care Instruction  and Safety Information Sheet.  A. patient/caregivers to thoroughly cook shellfish (shrimp, crab, etc) to decrease the chance of infection.    B.  Use sunscreen and protective clothing while in the sun.   05/24/2024      Knowledge deficit & Self Management of Drug Specific  Side Effects.    BLADDER EFFECTS        (Hemorrhagic Cystitis)                  Preventable with adequate hydration; occurs 2-3 days or more post treatment.   1.  Instruct patient to:  a.   Void at least every 2 hours; increase intake.  b.   DO NOT hold urine; go when urge is felt.  c.    Empty bladder at bedtime and on         awakening.  d.   Observe for color changes (red to tea           colored), amount and frequency changes.  e.   Notify oncologist of any abnormalities           in urine or voiding or if you cannot               drink adequate fluids.   05/24/2024   S   b.   CHANGES IN URINE        COLOR:      1.   Instruct  patient:  a.   Most evident in first 2-3 voidings after           administration.  Lasts less than 24 hours.  If urine is discolored 2 or more days post- treatment, notify oncologist.      05/24/2024 S   c.    KIDNEY EFFECTS           (Nephrotoxicity)   1.  Instruct patient to:  a.   Drink 8-16 glasses of fluid/day the day   pre-treatment and 3-4 days post-treatment to maintain hydration; the best way to minimize kidney problems.  b.   Notify oncologist immediately if unable to drink fluids or if changes are noted in urinary elimination.     05/24/2024   S   PULMONARY TOXICITY    Instruct patient to report symptoms such as shortness of breath, chest pain, shallow breathing, or chest wall discomfort to physician.  Reinforce preventative measures used by the health care team.  Baseline and periodic PFT and chest x-ray.   05/24/2024   S     PLAN OF CARE INFORMATION TO BE DELIVERED / NURSING INTERVENTIONS DATE EVALUATION   NERVE & MUSCLE EFFECTS (neurotoxocity; neuropathy, possible visual/hearing changes)        Instruct patient to:    Report numbness or tingling of the hands/feet, loss of fine motor movement (buttoning shirt, tying shoelaces), or gait changes to your oncologist.  If numbness/tingling are present:  protect feet with shoes at all times.  Use gloves for washing dishes/gardening & potholders in kitchen.       05/24/2024   S   CARDIOTOXICITY  Decreased effectiveness of             cardiac function. Effective are                  cumulative and irreversible.                                    CARDIAC ARRYTHMIAS              4   Instruct:  Heart function may be tested before treatment and perdiocally during treatment.  Notify oncologist of irregular pulse, palpitations, shortness of breath, or swelling in lower extremities/feet.          Can cause arrhythmias on infusion that resolve once infusion discontinued. Instruct nurse if any irregularity felt.    05/24/2024   S   EXTRAVASTION  Occurs when vesicants  leak outside of vein and cause damage to the skin and underlying tissues.   Reinforce preventive measures used to avoid complications.  Fresh IV site or central line monitored continuously with vesicant IVP.  Continuous infusion via central line site and blood return monitored periodically around the clock.  Instruct to:  Notify nurse of any discomfort, burning, stinging, etc. at IV site during chemotherapy administration.  Notify oncologist of any redness, pain, or swelling at IV site after discharge from hospital.   05/24/2024   S   HYPERSENSITIVITY can happen with any medication.   Instruct patient:  Nurse is with them during the initial part of treatment and will be close by to monitor.  Pre-medication ordered by the oncologist must be taken on time. If doses are missed, treatment will need to be re-scheduled.  Skin redness, itching, or hives appearing after discharge should be reported to oncologist. 05/24/2024   S       PLAN OF CARE INFORMATION TO BE DELIVERED / NURSING INTERVENTIONS DATE EVALUATION   FLU-LIKE SYNDROME      Instruct patient symptoms are hard to prevent and may include fever, shaking chills, muscle and body aches.  Taking prescribed medications from physician if needed.  Adequate fluids are important.    Reinforce the need to call if temperature is         elevated to 100.4 or more  05/24/2024   S   HAND-FOOT SYNDROME  causes painful, symmetric swelling and redness of palms and soles                  Instruct patient to report any numbness or tingling in the hands or feet.  Explain prevention techniques, such as     Use heavy moisturizers to lessen skin dryness and itching, but to avoid if skin is cracked or broken  Bathe in tepid water, use non-perfumed soap, and wash gently. Baths with oatmeal or diluted baking soda may be soothing.  Avoid tight fitting shoes and repetitive actions, such as rubbing hands or applying pressure to hands/feet.  Review measures to take should syndrome occur:  Cold  compresses and elevation for          edema  Pain medications and other measures as ordered by oncologist.   4.   Syndrome resolves few weeks after therapy. 05/24/2024   S   5. DISCHARGE PLANNING /        EDUCATION 1.    Explain importance of compliance with follow- up  tests (CBC, CMP).  2.    Verify patient/caregiver know:  a.    Oncologists office phone number.  b.    Dates of follow-up appointments.  c.    Prescriptions given for nausea  3.   Review side effects to monitor and notify          oncologist about.  4.   Reinforce the need for patient and caregivers to:  a.    Review information given.  b.    Call oncologists office with questions          or symptoms  5.   Provide Cancer [facility] [employer] make referrals if needed for financial or .   05/24/2024   S     PROGRESS NOTES: I met with the patient today for chemotherapy education. she will be starting treatment with  . We discussed the mechanism of action, potential side effects of this treatment as well as ways she can manage them at home. Some of these side effects include but or not limited to fever, nausea, vomiting, decreased appetite, fatigue, weakness, cytopenias, myalgia/arthralgia, constipation, diarrhea, bleeding, headache, shortness of breath, nail changes, taste change, hair thinning/loss, mood disturbances, or edema. We also discussed dietary modifications she should make although this will be discussed in more detail with the dietician. she was provided with anti-emetic medication, a copy of all of the information we discussed today as well as our contact information. she will be provided a schedule on his first day of treatment. We will obtain labs on a weekly basis and the patient will follow-up with the physician for toxicity monitoring throughout treatment. All questions were answered and an informed consent was obtained. she was reminded to certainly contact us sooner if needed.  Attached to the patients folder  and discussed with the patient the 24 hour/ 7 days a week after hours telephone number for the physician.  Patient notified to call anytime 24/7 because their is a physician on call for any problems that may arise.  Patient also notified to report to [facility] if they can not get in touch with a physician after hours.  Discussed the five wishes booklet with the patient and their family.           Assessment/Plan     ICD-10-CM ICD-9-CM   1. Metastatic renal cell carcinoma to lung, right  C78.01 197.0    C64.9 189.0          Medications Ordered:  Zofran 8mg 1 tab PO Q8h prn nausea  Phenergan 25mg PO Q4-6h prn nausea        Standing Labs Ordered:  CBC weekly  CMP weekly  TSH monthly    Follow up in about 2 weeks (around 6/7/2024) for with Dr. Wasserman and 5 weeks with me.      Electronically signed by: Chiquita Almazan, MSN, APRN, AGNP-C, OCN      Answers submitted by the patient for this visit:  Review of Systems Questionnaire (Submitted on 5/24/2024)  visual disturbance: No

## 2024-05-24 ENCOUNTER — OFFICE VISIT (OUTPATIENT)
Facility: CLINIC | Age: 84
End: 2024-05-24
Payer: MEDICARE

## 2024-05-24 VITALS
SYSTOLIC BLOOD PRESSURE: 152 MMHG | WEIGHT: 144.88 LBS | DIASTOLIC BLOOD PRESSURE: 69 MMHG | RESPIRATION RATE: 17 BRPM | HEART RATE: 83 BPM | BODY MASS INDEX: 23.39 KG/M2 | TEMPERATURE: 98 F

## 2024-05-24 DIAGNOSIS — R53.83 FATIGUE, UNSPECIFIED TYPE: ICD-10-CM

## 2024-05-24 DIAGNOSIS — C64.9 METASTATIC RENAL CELL CARCINOMA TO LUNG, RIGHT: Primary | ICD-10-CM

## 2024-05-24 DIAGNOSIS — C78.01 METASTATIC RENAL CELL CARCINOMA TO LUNG, RIGHT: Primary | ICD-10-CM

## 2024-05-24 PROCEDURE — 99213 OFFICE O/P EST LOW 20 MIN: CPT | Mod: PBBFAC,PN | Performed by: NURSE PRACTITIONER

## 2024-05-24 PROCEDURE — 99999 PR PBB SHADOW E&M-EST. PATIENT-LVL III: CPT | Mod: PBBFAC,,, | Performed by: NURSE PRACTITIONER

## 2024-05-24 PROCEDURE — G2211 COMPLEX E/M VISIT ADD ON: HCPCS | Mod: S$PBB,,, | Performed by: NURSE PRACTITIONER

## 2024-05-24 PROCEDURE — 99215 OFFICE O/P EST HI 40 MIN: CPT | Mod: S$PBB,,, | Performed by: NURSE PRACTITIONER

## 2024-05-24 RX ORDER — ONDANSETRON HYDROCHLORIDE 8 MG/1
8 TABLET, FILM COATED ORAL EVERY 8 HOURS PRN
Qty: 30 TABLET | Refills: 5 | Status: SHIPPED | OUTPATIENT
Start: 2024-05-24

## 2024-05-24 RX ORDER — PROMETHAZINE HYDROCHLORIDE 25 MG/1
25 TABLET ORAL EVERY 6 HOURS PRN
Qty: 30 TABLET | Refills: 5 | Status: SHIPPED | OUTPATIENT
Start: 2024-05-24

## 2024-05-27 ENCOUNTER — OFFICE VISIT (OUTPATIENT)
Dept: SURGERY | Facility: CLINIC | Age: 84
End: 2024-05-27
Payer: MEDICARE

## 2024-05-27 VITALS
DIASTOLIC BLOOD PRESSURE: 64 MMHG | TEMPERATURE: 98 F | SYSTOLIC BLOOD PRESSURE: 110 MMHG | BODY MASS INDEX: 23.14 KG/M2 | WEIGHT: 144 LBS | HEART RATE: 84 BPM | HEIGHT: 66 IN

## 2024-05-27 DIAGNOSIS — C64.9 METASTATIC RENAL CELL CARCINOMA TO LUNG, RIGHT: ICD-10-CM

## 2024-05-27 DIAGNOSIS — C78.01 METASTATIC RENAL CELL CARCINOMA TO LUNG, RIGHT: ICD-10-CM

## 2024-05-27 PROCEDURE — 99203 OFFICE O/P NEW LOW 30 MIN: CPT | Mod: S$PBB,,, | Performed by: STUDENT IN AN ORGANIZED HEALTH CARE EDUCATION/TRAINING PROGRAM

## 2024-05-27 PROCEDURE — 99999 PR PBB SHADOW E&M-EST. PATIENT-LVL IV: CPT | Mod: PBBFAC,,, | Performed by: STUDENT IN AN ORGANIZED HEALTH CARE EDUCATION/TRAINING PROGRAM

## 2024-05-27 PROCEDURE — 99214 OFFICE O/P EST MOD 30 MIN: CPT | Mod: PBBFAC,PN | Performed by: STUDENT IN AN ORGANIZED HEALTH CARE EDUCATION/TRAINING PROGRAM

## 2024-05-27 RX ORDER — CEFAZOLIN SODIUM 2 G/50ML
2 SOLUTION INTRAVENOUS
Status: CANCELLED | OUTPATIENT
Start: 2024-05-31

## 2024-05-27 RX ORDER — SODIUM CHLORIDE 9 MG/ML
INJECTION, SOLUTION INTRAVENOUS CONTINUOUS
Status: CANCELLED | OUTPATIENT
Start: 2024-05-31

## 2024-05-27 NOTE — PROGRESS NOTES
History & Physical    Subjective     History of Present Illness:  Patient is a 83 y.o. female presents with metastatic renal cell carcinoma.  She was referred to me for port placement.  Denies history of central lines were ports previously.    Chief Complaint   Patient presents with    Consult     Port Placement       Review of patient's allergies indicates:   Allergen Reactions    Hydrocodone Hallucinations    Oxycodone-acetaminophen Hallucinations and Other (See Comments)       Current Outpatient Medications   Medication Sig Dispense Refill    acetaminophen (TYLENOL) 500 MG tablet Take 1,000 mg by mouth 2 (two) times daily as needed.      amLODIPine (NORVASC) 5 MG tablet Take 1 tablet (5 mg total) by mouth every evening. 90 tablet 3    axitinib (INLYTA) 5 mg Tab Take 5 mg by mouth 2 (two) times a day. 60 tablet 6    cyanocobalamin (VITAMIN B-12) 1000 MCG tablet Take 100 mcg by mouth once daily.      duloxetine (CYMBALTA) 60 MG capsule Take 60 mg by mouth once daily.      hydroCHLOROthiazide (HYDRODIURIL) 12.5 MG Tab Take 1 tablet (12.5 mg total) by mouth daily as needed (Leg swelling). 30 tablet 11    hydrOXYzine HCL (ATARAX) 25 MG tablet Take 25 mg by mouth 3 (three) times daily as needed for Itching.      ipratropium (ATROVENT) 42 mcg (0.06 %) nasal spray by Each Nostril route.      labetaloL (NORMODYNE) 200 MG tablet TAKE 1 TABLET BY MOUTH TWICE  DAILY 180 tablet 3    lisinopriL (PRINIVIL,ZESTRIL) 20 MG tablet Take 1 tablet (20 mg total) by mouth 2 (two) times daily. 180 tablet 3    ondansetron (ZOFRAN) 8 MG tablet Take 1 tablet (8 mg total) by mouth every 8 (eight) hours as needed for Nausea. 30 tablet 5    pantoprazole (PROTONIX) 40 MG tablet Take 40 mg by mouth once daily.      promethazine (PHENERGAN) 25 MG tablet Take 1 tablet (25 mg total) by mouth every 6 (six) hours as needed for Nausea. 30 tablet 5    rosuvastatin (CRESTOR) 10 MG tablet Take 10 mg by mouth every evening.      UNABLE TO FIND Take 1  capsule by mouth once daily. prevagen      aspirin (ECOTRIN) 81 MG EC tablet Take 81 mg by mouth once daily. (Patient not taking: Reported on 5/27/2024)       No current facility-administered medications for this visit.     Facility-Administered Medications Ordered in Other Visits   Medication Dose Route Frequency Provider Last Rate Last Admin    LIDOcaine (PF) 10 mg/ml (1%) injection 10 mg  1 mL Intradermal Once Gabriel Loaiza MD           Past Medical History:   Diagnosis Date    Aortic insufficiency     Cancer 2005    renal ca, L kidney removed    COVID-19 2022    Diastolic heart failure     Diffusion capacity of lung (dl), decreased     Encounter for blood transfusion 1963    with miscarriage    Fibromyalgia     GERD (gastroesophageal reflux disease)     Hypercholesterolemia     Hyperlipidemia     Hypertension     Lung cancer 2015    adenocarcinoma ; upper left lobe    Renal disorder 2005    Left Kidney cancer    Sinusitis      Past Surgical History:   Procedure Laterality Date    ADENOIDECTOMY      BACK SURGERY  2013    laminectomy    ENDOBRONCHIAL ULTRASOUND Left 07/15/2022    Procedure: ENDOBRONCHIAL ULTRASOUND (EBUS);  Surgeon: Cm Freitas MD;  Location: [facility];  Service: Pulmonary;  Laterality: Left;    ENDOBRONCHIAL ULTRASOUND Bilateral 11/09/2023    Procedure: ENDOBRONCHIAL ULTRASOUND (EBUS);  Surgeon: Cm Freitas MD;  Location: [facility];  Service: Pulmonary;  Laterality: Bilateral;    ENDOSCOPIC ULTRASOUND OF UPPER GASTROINTESTINAL TRACT N/A 05/13/2024    Procedure: ULTRASOUND, UPPER GI TRACT, ENDOSCOPIC;  Surgeon: Andi Bazzi III, MD;  Location: [facility] ENDO;  Service: Endoscopy;  Laterality: N/A;    HYSTERECTOMY  1975    CARY/BSO. Due to endometriosis.    INJECTION OF JOINT Right 05/27/2019    Procedure: Injection, Joint, Shoulder, Hip, Or Knee;  Surgeon: Zach Wilder MD;  Location: [facility] OR;  Service: Pain Management;  Laterality: Right;  right hip intra-articular injection      "LUNG LOBECTOMY Left     LOWER LOBE    NEPHRECTOMY Left 2005    Entire kidney due to cancer.    ROBOTIC BRONCHOSCOPY Bilateral 11/09/2023    Procedure: ROBOTIC BRONCHOSCOPY;  Surgeon: Cm Freitas MD;  Location: [facility] OR;  Service: Pulmonary;  Laterality: Bilateral;    TONSILLECTOMY       Family History   Problem Relation Name Age of Onset    Hypertension Mother      Breast cancer Maternal Aunt      Breast cancer Maternal Aunt       Social History     Tobacco Use    Smoking status: Never    Smokeless tobacco: Never   Substance Use Topics    Alcohol use: No    Drug use: No        Review of Systems:  Review of Systems   Constitutional:  Negative for fever.   HENT: Negative.     Eyes: Negative.    Respiratory: Negative.     Cardiovascular: Negative.    Gastrointestinal: Negative.    Endocrine: Negative.    Genitourinary: Negative.    Musculoskeletal: Negative.    Skin: Negative.    Allergic/Immunologic: Negative.    Neurological: Negative.    Hematological: Negative.    Psychiatric/Behavioral: Negative.            Objective     Vital Signs (Most Recent)  Temp: 97.8 °F (36.6 °C) (05/27/24 1310)  Pulse: 84 (05/27/24 1310)  BP: 110/64 (05/27/24 1310)  5' 6" (1.676 m)  65.3 kg (144 lb)     Physical Exam:  Physical Exam  Constitutional:       General: She is not in acute distress.     Appearance: Normal appearance. She is not ill-appearing, toxic-appearing or diaphoretic.   HENT:      Head: Normocephalic.      Nose: Nose normal.   Eyes:      Conjunctiva/sclera: Conjunctivae normal.   Cardiovascular:      Rate and Rhythm: Normal rate and regular rhythm.   Pulmonary:      Effort: Pulmonary effort is normal.   Musculoskeletal:         General: Normal range of motion.      Cervical back: Normal range of motion.   Skin:     General: Skin is warm.   Neurological:      General: No focal deficit present.      Mental Status: She is alert.   Psychiatric:         Mood and Affect: Mood normal.              Assessment and Plan "   83-year-old female with metastatic renal cell cancer.  She was referred for port placement.    PLAN:  Risks versus benefits of the planned procedure were discussed.  Consent was obtained.  Surgery was tentatively scheduled for this Friday.

## 2024-05-27 NOTE — H&P (VIEW-ONLY)
History & Physical    Subjective     History of Present Illness:  Patient is a 83 y.o. female presents with metastatic renal cell carcinoma.  She was referred to me for port placement.  Denies history of central lines were ports previously.    Chief Complaint   Patient presents with    Consult     Port Placement       Review of patient's allergies indicates:   Allergen Reactions    Hydrocodone Hallucinations    Oxycodone-acetaminophen Hallucinations and Other (See Comments)       Current Outpatient Medications   Medication Sig Dispense Refill    acetaminophen (TYLENOL) 500 MG tablet Take 1,000 mg by mouth 2 (two) times daily as needed.      amLODIPine (NORVASC) 5 MG tablet Take 1 tablet (5 mg total) by mouth every evening. 90 tablet 3    axitinib (INLYTA) 5 mg Tab Take 5 mg by mouth 2 (two) times a day. 60 tablet 6    cyanocobalamin (VITAMIN B-12) 1000 MCG tablet Take 100 mcg by mouth once daily.      duloxetine (CYMBALTA) 60 MG capsule Take 60 mg by mouth once daily.      hydroCHLOROthiazide (HYDRODIURIL) 12.5 MG Tab Take 1 tablet (12.5 mg total) by mouth daily as needed (Leg swelling). 30 tablet 11    hydrOXYzine HCL (ATARAX) 25 MG tablet Take 25 mg by mouth 3 (three) times daily as needed for Itching.      ipratropium (ATROVENT) 42 mcg (0.06 %) nasal spray by Each Nostril route.      labetaloL (NORMODYNE) 200 MG tablet TAKE 1 TABLET BY MOUTH TWICE  DAILY 180 tablet 3    lisinopriL (PRINIVIL,ZESTRIL) 20 MG tablet Take 1 tablet (20 mg total) by mouth 2 (two) times daily. 180 tablet 3    ondansetron (ZOFRAN) 8 MG tablet Take 1 tablet (8 mg total) by mouth every 8 (eight) hours as needed for Nausea. 30 tablet 5    pantoprazole (PROTONIX) 40 MG tablet Take 40 mg by mouth once daily.      promethazine (PHENERGAN) 25 MG tablet Take 1 tablet (25 mg total) by mouth every 6 (six) hours as needed for Nausea. 30 tablet 5    rosuvastatin (CRESTOR) 10 MG tablet Take 10 mg by mouth every evening.      UNABLE TO FIND Take 1  capsule by mouth once daily. prevagen      aspirin (ECOTRIN) 81 MG EC tablet Take 81 mg by mouth once daily. (Patient not taking: Reported on 5/27/2024)       No current facility-administered medications for this visit.     Facility-Administered Medications Ordered in Other Visits   Medication Dose Route Frequency Provider Last Rate Last Admin    LIDOcaine (PF) 10 mg/ml (1%) injection 10 mg  1 mL Intradermal Once Gabriel Loaiza MD           Past Medical History:   Diagnosis Date    Aortic insufficiency     Cancer 2005    renal ca, L kidney removed    COVID-19 2022    Diastolic heart failure     Diffusion capacity of lung (dl), decreased     Encounter for blood transfusion 1963    with miscarriage    Fibromyalgia     GERD (gastroesophageal reflux disease)     Hypercholesterolemia     Hyperlipidemia     Hypertension     Lung cancer 2015    adenocarcinoma ; upper left lobe    Renal disorder 2005    Left Kidney cancer    Sinusitis      Past Surgical History:   Procedure Laterality Date    ADENOIDECTOMY      BACK SURGERY  2013    laminectomy    ENDOBRONCHIAL ULTRASOUND Left 07/15/2022    Procedure: ENDOBRONCHIAL ULTRASOUND (EBUS);  Surgeon: Cm Freitas MD;  Location: [facility];  Service: Pulmonary;  Laterality: Left;    ENDOBRONCHIAL ULTRASOUND Bilateral 11/09/2023    Procedure: ENDOBRONCHIAL ULTRASOUND (EBUS);  Surgeon: Cm Freitas MD;  Location: [facility];  Service: Pulmonary;  Laterality: Bilateral;    ENDOSCOPIC ULTRASOUND OF UPPER GASTROINTESTINAL TRACT N/A 05/13/2024    Procedure: ULTRASOUND, UPPER GI TRACT, ENDOSCOPIC;  Surgeon: Andi Bazzi III, MD;  Location: [facility] ENDO;  Service: Endoscopy;  Laterality: N/A;    HYSTERECTOMY  1975    CARY/BSO. Due to endometriosis.    INJECTION OF JOINT Right 05/27/2019    Procedure: Injection, Joint, Shoulder, Hip, Or Knee;  Surgeon: Zach Wilder MD;  Location: [facility] OR;  Service: Pain Management;  Laterality: Right;  right hip intra-articular injection      "LUNG LOBECTOMY Left     LOWER LOBE    NEPHRECTOMY Left 2005    Entire kidney due to cancer.    ROBOTIC BRONCHOSCOPY Bilateral 11/09/2023    Procedure: ROBOTIC BRONCHOSCOPY;  Surgeon: Cm Freitas MD;  Location: [facility] OR;  Service: Pulmonary;  Laterality: Bilateral;    TONSILLECTOMY       Family History   Problem Relation Name Age of Onset    Hypertension Mother      Breast cancer Maternal Aunt      Breast cancer Maternal Aunt       Social History     Tobacco Use    Smoking status: Never    Smokeless tobacco: Never   Substance Use Topics    Alcohol use: No    Drug use: No        Review of Systems:  Review of Systems   Constitutional:  Negative for fever.   HENT: Negative.     Eyes: Negative.    Respiratory: Negative.     Cardiovascular: Negative.    Gastrointestinal: Negative.    Endocrine: Negative.    Genitourinary: Negative.    Musculoskeletal: Negative.    Skin: Negative.    Allergic/Immunologic: Negative.    Neurological: Negative.    Hematological: Negative.    Psychiatric/Behavioral: Negative.            Objective     Vital Signs (Most Recent)  Temp: 97.8 °F (36.6 °C) (05/27/24 1310)  Pulse: 84 (05/27/24 1310)  BP: 110/64 (05/27/24 1310)  5' 6" (1.676 m)  65.3 kg (144 lb)     Physical Exam:  Physical Exam  Constitutional:       General: She is not in acute distress.     Appearance: Normal appearance. She is not ill-appearing, toxic-appearing or diaphoretic.   HENT:      Head: Normocephalic.      Nose: Nose normal.   Eyes:      Conjunctiva/sclera: Conjunctivae normal.   Cardiovascular:      Rate and Rhythm: Normal rate and regular rhythm.   Pulmonary:      Effort: Pulmonary effort is normal.   Musculoskeletal:         General: Normal range of motion.      Cervical back: Normal range of motion.   Skin:     General: Skin is warm.   Neurological:      General: No focal deficit present.      Mental Status: She is alert.   Psychiatric:         Mood and Affect: Mood normal.              Assessment and Plan "   83-year-old female with metastatic renal cell cancer.  She was referred for port placement.    PLAN:  Risks versus benefits of the planned procedure were discussed.  Consent was obtained.  Surgery was tentatively scheduled for this Friday.

## 2024-05-31 ENCOUNTER — ANESTHESIA (OUTPATIENT)
Dept: SURGERY | Facility: HOSPITAL | Age: 84
End: 2024-05-31
Payer: MEDICARE

## 2024-05-31 ENCOUNTER — ANESTHESIA EVENT (OUTPATIENT)
Dept: SURGERY | Facility: HOSPITAL | Age: 84
End: 2024-05-31
Payer: MEDICARE

## 2024-05-31 ENCOUNTER — HOSPITAL ENCOUNTER (OUTPATIENT)
Facility: HOSPITAL | Age: 84
Discharge: HOME | End: 2024-05-31
Attending: STUDENT IN AN ORGANIZED HEALTH CARE EDUCATION/TRAINING PROGRAM | Admitting: STUDENT IN AN ORGANIZED HEALTH CARE EDUCATION/TRAINING PROGRAM
Payer: MEDICARE

## 2024-05-31 VITALS
RESPIRATION RATE: 16 BRPM | SYSTOLIC BLOOD PRESSURE: 140 MMHG | WEIGHT: 142 LBS | BODY MASS INDEX: 22.82 KG/M2 | TEMPERATURE: 98 F | OXYGEN SATURATION: 99 % | HEART RATE: 70 BPM | DIASTOLIC BLOOD PRESSURE: 68 MMHG | HEIGHT: 66 IN

## 2024-05-31 DIAGNOSIS — C64.9 METASTATIC RENAL CELL CARCINOMA TO LUNG, RIGHT: Primary | ICD-10-CM

## 2024-05-31 DIAGNOSIS — C78.01 METASTATIC RENAL CELL CARCINOMA TO LUNG, RIGHT: Primary | ICD-10-CM

## 2024-05-31 DIAGNOSIS — C80.1 CANCER: ICD-10-CM

## 2024-05-31 PROCEDURE — 71000015 HC POSTOP RECOV 1ST HR: Performed by: STUDENT IN AN ORGANIZED HEALTH CARE EDUCATION/TRAINING PROGRAM

## 2024-05-31 PROCEDURE — 77001 FLUOROGUIDE FOR VEIN DEVICE: CPT | Mod: 26,,, | Performed by: STUDENT IN AN ORGANIZED HEALTH CARE EDUCATION/TRAINING PROGRAM

## 2024-05-31 PROCEDURE — 63600175 PHARM REV CODE 636 W HCPCS: Performed by: NURSE ANESTHETIST, CERTIFIED REGISTERED

## 2024-05-31 PROCEDURE — C1788 PORT, INDWELLING, IMP: HCPCS | Performed by: STUDENT IN AN ORGANIZED HEALTH CARE EDUCATION/TRAINING PROGRAM

## 2024-05-31 PROCEDURE — 63600175 PHARM REV CODE 636 W HCPCS: Performed by: STUDENT IN AN ORGANIZED HEALTH CARE EDUCATION/TRAINING PROGRAM

## 2024-05-31 PROCEDURE — D9220A PRA ANESTHESIA: Mod: ANES,,, | Performed by: ANESTHESIOLOGY

## 2024-05-31 PROCEDURE — D9220A PRA ANESTHESIA: Mod: CRNA,,, | Performed by: NURSE ANESTHETIST, CERTIFIED REGISTERED

## 2024-05-31 PROCEDURE — 36000706: Performed by: STUDENT IN AN ORGANIZED HEALTH CARE EDUCATION/TRAINING PROGRAM

## 2024-05-31 PROCEDURE — 25000003 PHARM REV CODE 250: Performed by: NURSE ANESTHETIST, CERTIFIED REGISTERED

## 2024-05-31 PROCEDURE — 36561 INSERT TUNNELED CV CATH: CPT | Mod: LT,,, | Performed by: STUDENT IN AN ORGANIZED HEALTH CARE EDUCATION/TRAINING PROGRAM

## 2024-05-31 PROCEDURE — 37000008 HC ANESTHESIA 1ST 15 MINUTES: Performed by: STUDENT IN AN ORGANIZED HEALTH CARE EDUCATION/TRAINING PROGRAM

## 2024-05-31 PROCEDURE — 36000707: Performed by: STUDENT IN AN ORGANIZED HEALTH CARE EDUCATION/TRAINING PROGRAM

## 2024-05-31 PROCEDURE — 25000003 PHARM REV CODE 250: Performed by: STUDENT IN AN ORGANIZED HEALTH CARE EDUCATION/TRAINING PROGRAM

## 2024-05-31 PROCEDURE — 37000009 HC ANESTHESIA EA ADD 15 MINS: Performed by: STUDENT IN AN ORGANIZED HEALTH CARE EDUCATION/TRAINING PROGRAM

## 2024-05-31 DEVICE — KIT POWERPORT SINGLE 8FR: Type: IMPLANTABLE DEVICE | Site: NECK | Status: FUNCTIONAL

## 2024-05-31 RX ORDER — CEFAZOLIN SODIUM 2 G/50ML
2 SOLUTION INTRAVENOUS
Status: DISCONTINUED | OUTPATIENT
Start: 2024-05-31 | End: 2024-05-31 | Stop reason: HOSPADM

## 2024-05-31 RX ORDER — MEPERIDINE HYDROCHLORIDE 50 MG/ML
12.5 INJECTION INTRAMUSCULAR; INTRAVENOUS; SUBCUTANEOUS EVERY 10 MIN PRN
Status: DISCONTINUED | OUTPATIENT
Start: 2024-05-31 | End: 2024-05-31 | Stop reason: HOSPADM

## 2024-05-31 RX ORDER — ACETAMINOPHEN 10 MG/ML
INJECTION, SOLUTION INTRAVENOUS
Status: DISCONTINUED | OUTPATIENT
Start: 2024-05-31 | End: 2024-05-31

## 2024-05-31 RX ORDER — FENTANYL CITRATE 50 UG/ML
INJECTION, SOLUTION INTRAMUSCULAR; INTRAVENOUS
Status: DISCONTINUED | OUTPATIENT
Start: 2024-05-31 | End: 2024-05-31

## 2024-05-31 RX ORDER — ONDANSETRON HYDROCHLORIDE 2 MG/ML
INJECTION, SOLUTION INTRAVENOUS
Status: DISCONTINUED | OUTPATIENT
Start: 2024-05-31 | End: 2024-05-31

## 2024-05-31 RX ORDER — FAMOTIDINE 10 MG/ML
INJECTION INTRAVENOUS
Status: DISCONTINUED | OUTPATIENT
Start: 2024-05-31 | End: 2024-05-31

## 2024-05-31 RX ORDER — HEPARIN SODIUM 1000 [USP'U]/ML
INJECTION, SOLUTION INTRAVENOUS; SUBCUTANEOUS
Status: DISCONTINUED | OUTPATIENT
Start: 2024-05-31 | End: 2024-05-31 | Stop reason: HOSPADM

## 2024-05-31 RX ORDER — PROPOFOL 10 MG/ML
VIAL (ML) INTRAVENOUS
Status: DISCONTINUED | OUTPATIENT
Start: 2024-05-31 | End: 2024-05-31

## 2024-05-31 RX ORDER — ONDANSETRON HYDROCHLORIDE 2 MG/ML
4 INJECTION, SOLUTION INTRAVENOUS DAILY PRN
Status: DISCONTINUED | OUTPATIENT
Start: 2024-05-31 | End: 2024-05-31 | Stop reason: HOSPADM

## 2024-05-31 RX ORDER — FENTANYL CITRATE 50 UG/ML
25 INJECTION, SOLUTION INTRAMUSCULAR; INTRAVENOUS EVERY 5 MIN PRN
Status: DISCONTINUED | OUTPATIENT
Start: 2024-05-31 | End: 2024-05-31 | Stop reason: HOSPADM

## 2024-05-31 RX ORDER — BUPIVACAINE HYDROCHLORIDE AND EPINEPHRINE 2.5; 5 MG/ML; UG/ML
INJECTION, SOLUTION EPIDURAL; INFILTRATION; INTRACAUDAL; PERINEURAL
Status: DISCONTINUED | OUTPATIENT
Start: 2024-05-31 | End: 2024-05-31 | Stop reason: HOSPADM

## 2024-05-31 RX ORDER — SODIUM CHLORIDE 9 MG/ML
INJECTION, SOLUTION INTRAVENOUS CONTINUOUS
Status: DISCONTINUED | OUTPATIENT
Start: 2024-05-31 | End: 2024-05-31 | Stop reason: HOSPADM

## 2024-05-31 RX ORDER — CEFAZOLIN SODIUM 1 G/3ML
INJECTION, POWDER, FOR SOLUTION INTRAMUSCULAR; INTRAVENOUS
Status: DISCONTINUED | OUTPATIENT
Start: 2024-05-31 | End: 2024-05-31

## 2024-05-31 RX ORDER — DIPHENHYDRAMINE HYDROCHLORIDE 50 MG/ML
12.5 INJECTION INTRAMUSCULAR; INTRAVENOUS
Status: DISCONTINUED | OUTPATIENT
Start: 2024-05-31 | End: 2024-05-31 | Stop reason: HOSPADM

## 2024-05-31 RX ADMIN — ONDANSETRON 4 MG: 2 INJECTION INTRAMUSCULAR; INTRAVENOUS at 07:05

## 2024-05-31 RX ADMIN — PROPOFOL 20 MG: 10 INJECTION, EMULSION INTRAVENOUS at 07:05

## 2024-05-31 RX ADMIN — FAMOTIDINE 20 MG: 10 INJECTION, SOLUTION INTRAVENOUS at 07:05

## 2024-05-31 RX ADMIN — FENTANYL CITRATE 50 MCG: 50 INJECTION, SOLUTION INTRAMUSCULAR; INTRAVENOUS at 07:05

## 2024-05-31 RX ADMIN — ACETAMINOPHEN 1000 MG: 10 INJECTION, SOLUTION INTRAVENOUS at 07:05

## 2024-05-31 RX ADMIN — CEFAZOLIN 2 G: 330 INJECTION, POWDER, FOR SOLUTION INTRAMUSCULAR; INTRAVENOUS at 07:05

## 2024-05-31 RX ADMIN — PROPOFOL 30 MG: 10 INJECTION, EMULSION INTRAVENOUS at 07:05

## 2024-05-31 RX ADMIN — SODIUM CHLORIDE, SODIUM LACTATE, POTASSIUM CHLORIDE, AND CALCIUM CHLORIDE: .6; .31; .03; .02 INJECTION, SOLUTION INTRAVENOUS at 07:05

## 2024-05-31 RX ADMIN — PROPOFOL 50 MG: 10 INJECTION, EMULSION INTRAVENOUS at 07:05

## 2024-05-31 NOTE — DISCHARGE SUMMARY
[facility]  Brief Operative Note    Surgery Date: 5/31/2024     Surgeons and Role:     * Terry Kim MD - Primary    Assisting Surgeon: None    Pre-op Diagnosis:  Metastatic renal cell carcinoma to lung, right [C78.01, C64.9]    Post-op Diagnosis:  Post-Op Diagnosis Codes:     * Metastatic renal cell carcinoma to lung, right [C78.01, C64.9]    Procedure(s) (LRB):  [id number] (N/A)    Anesthesia: General    Operative Findings:  Tunneled left IJ port    Estimated Blood Loss:  Minimal         Specimens:   Specimen (24h ago, onward)      None              Discharge Note    OUTCOME: Patient tolerated treatment/procedure well without complication and is now ready for discharge.    DISPOSITION: Home or Self Care    FINAL DIAGNOSIS:  Metastatic renal cell carcinoma to lung, right    FOLLOWUP: In clinic    DISCHARGE INSTRUCTIONS:    Discharge Procedure Orders   Diet Adult Regular     Notify your health care provider if you experience any of the following:  redness, tenderness, or signs of infection (pain, swelling, redness, odor or green/yellow discharge around incision site)     Notify your health care provider if you experience any of the following:  severe uncontrolled pain     Notify your health care provider if you experience any of the following:  temperature >100.4     No dressing needed   Order Comments: Okay to shower.  No swimming or soaking for 2-3 weeks.     Activity as tolerated        Clinical Reference Documents Added to Patient Instructions         Document    [facility]GE IN[facility]CTIONS (ENGLISH)

## 2024-05-31 NOTE — OP NOTE
[facility]  Surgery Department  Operative Note    SUMMARY     Date of Procedure: 5/31/2024     Procedure: Procedure(s) (LRB):  [id number] (N/A)     Surgeons and Role:     * Terry Kim MD - Primary    Assisting Surgeon: None    Pre-Operative Diagnosis: Metastatic renal cell carcinoma to lung, right [C78.01, C64.9]    Post-Operative Diagnosis: Post-Op Diagnosis Codes:     * Metastatic renal cell carcinoma to lung, right [C78.01, C64.9]    Anesthesia: General    Operative Findings (including complications, if any):  Non patent right internal jugular vein.  Placement of a left internal jugular vein port tunneled to the left chest wall    Description of Technical Procedures:  This is an 83-year-old female with metastatic renal cancer.  She did consent to port placement.  She was taken to the OR, placed supine, sedated by Anesthesia, the neck and chest were prepped and draped in the usual fashion, a time-out was completed.  Using ultrasound, the right internal jugular vein was surveyed.  It did not appear patent.  I elected to go in the left side which was widely patent instead.  The left internal jugular vein was cannulated with a hollow bore needle.  A wire was advanced and confirmed to be in appropriate location using ultrasound and fluoroscopy.  A location on the left chest wall was chosen for the port site.  A 3 cm incision was made sharply.  A port pocket was developed using electrocautery.  The port was secured within the pocket using Vicryl sutures.  The catheter was then tunneled from the chest wall to the wire exit site in the neck.  Using Seldinger technique and under fluoroscopic guidance, the wire was exchanged for a split sheath dilator without apparent complication.  The catheter was advanced down the sheath and the sheath was then removed.  Repeat fluoroscopic imaging confirmed appropriate location of the catheter without kinking.  The port was accessed, aspirated, and then  flushed with heparinized saline with ease.  The port pocket was then closed in layers with Vicryl and Monocryl.  The wire exit site in the neck was closed with a buried Vicryl suture.  Dermabond was applied as a dressing.  Patient tolerated the entire procedure without apparent complication, was woken from anesthesia, and brought to recovery in stable condition.  All counts were correct.    Significant Surgical Tasks Conducted by the Assistant(s), if Applicable: n/a    Estimated Blood Loss (EBL):min           Implants:   Implant Name Type Inv. Item Serial No.  Lot No. LRB No. Used Action   KIT POWERPORT SINGLE 8FR - [id number]  KIT POWERPORT SINGLE 8FR  [employer] [id number] Left 1 Implanted       Specimens:   Specimen (24h ago, onward)      None                    Condition: Good    Disposition: PACU - hemodynamically stable.    Attestation: Op Note Attestation: I was physically present and scrubbed for the entire procedure.

## 2024-05-31 NOTE — ANESTHESIA PREPROCEDURE EVALUATION
05/31/2024  Karlie Hess is a 83 y.o., female.      Patient Active Problem List   Diagnosis    Hip pain    History of lung cancer    Mass of left lung    History of renal cell cancer    Primary hypertension    Nonrheumatic aortic valve insufficiency    SOB (shortness of breath)    SLAUGHTER (dyspnea on exertion)    Leg edema    Right lower lobe lung mass    Metastatic renal cell carcinoma to lung, right    Chronic diastolic heart failure    Aortic atherosclerosis    Cancer associated pain       Past Surgical History:   Procedure Laterality Date    ADENOIDECTOMY      BACK SURGERY  2013    laminectomy    ENDOBRONCHIAL ULTRASOUND Left 07/15/2022    Procedure: ENDOBRONCHIAL ULTRASOUND (EBUS);  Surgeon: Cm Freitas MD;  Location: [facility];  Service: Pulmonary;  Laterality: Left;    ENDOBRONCHIAL ULTRASOUND Bilateral 11/09/2023    Procedure: ENDOBRONCHIAL ULTRASOUND (EBUS);  Surgeon: Cm Freitas MD;  Location: [facility] OR;  Service: Pulmonary;  Laterality: Bilateral;    ENDOSCOPIC ULTRASOUND OF UPPER GASTROINTESTINAL TRACT N/A 05/13/2024    Procedure: ULTRASOUND, UPPER GI TRACT, ENDOSCOPIC;  Surgeon: Andi Bazzi III, MD;  Location: [facility] ENDO;  Service: Endoscopy;  Laterality: N/A;    HYSTERECTOMY  1975    CARY/BSO. Due to endometriosis.    INJECTION OF JOINT Right 05/27/2019    Procedure: Injection, Joint, Shoulder, Hip, Or Knee;  Surgeon: Zach Wilder MD;  Location: [facility] OR;  Service: Pain Management;  Laterality: Right;  right hip intra-articular injection     LUNG LOBECTOMY Left     LOWER LOBE    NEPHRECTOMY Left 2005    Entire kidney due to cancer.    ROBOTIC BRONCHOSCOPY Bilateral 11/09/2023    Procedure: ROBOTIC BRONCHOSCOPY;  Surgeon: Cm Freitas MD;  Location: [facility] OR;  Service: Pulmonary;  Laterality: Bilateral;    TONSILLECTOMY          Tobacco Use:  The patient  reports that she has  never smoked. She has never used smokeless tobacco.     Results for orders placed or performed during the hospital encounter of 02/29/24   EKG 12-lead    Collection Time: 02/29/24  9:27 AM   Result Value Ref Range    QRS Duration 82 ms    [facility] QTC Calculation 464 ms    Narrative    Test Reason : R06.02,    Vent. Rate : 082 BPM     Atrial Rate : 082 BPM     P-R Int : 144 ms          QRS Dur : 082 ms      QT Int : 398 ms       P-R-T Axes : 041 066 060 degrees     QTc Int : 464 ms    Normal sinus rhythm  Normal ECG  When compared with ECG of 11-OCT-2022 14:03,  No significant change was found  Confirmed by Yovana AZUL, Niraj GUALLPA ([id number]) on 2/29/2024 11:16:55 PM    Referred By: [email or web address]   SELF           Confirmed By:Niraj Yen MD             Lab Results   Component Value Date    WBC 7.21 05/13/2024    HGB 10.9 (L) 05/13/2024    HCT 34.1 (L) 05/13/2024    MCV 87 05/13/2024     05/13/2024     BMP  Lab Results   Component Value Date     05/13/2024    K 3.9 05/13/2024     05/13/2024    CO2 25 05/13/2024    BUN 20 05/13/2024    CREATININE 1.2 05/13/2024    CALCIUM 9.1 05/13/2024    ANIONGAP 8 05/13/2024     (H) 05/13/2024    GLU 81 02/29/2024     (H) 11/09/2023       Results for orders placed during the hospital encounter of 10/18/22    Echo Saline Bubble? No    Interpretation Summary  · The left ventricle is normal in size with mild concentric hypertrophy and normal systolic function.  · Grade I left ventricular diastolic dysfunction.  · The estimated PA systolic pressure is 25 mmHg.  · Normal right ventricular size with normal right ventricular systolic function.  · Normal central venous pressure (3 mmHg).  · The estimated ejection fraction is 65%.  · Mild tricuspid regurgitation.  · Mild mitral regurgitation.  · Mild aortic regurgitation.              Pre-op Assessment    I have reviewed the Patient Summary Reports.     I have reviewed the Nursing Notes. I have reviewed the NPO Status.    I have reviewed the Medications.     Review of Systems  Anesthesia Hx:  No problems with previous Anesthesia             Denies Family Hx of Anesthesia complications.    Denies Personal Hx of Anesthesia complications.                    Social:  Non-Smoker       Hematology/Oncology:  Hematology Normal                       --  Cancer in past history (metastatic renal cell carcinoma):                     EENT/Dental:  EENT/Dental Normal           Cardiovascular:     Hypertension, well controlled Valvular problems/Murmurs, AI      CHF    hyperlipidemia SLAUGHTER  ECG has been reviewed.                          Pulmonary:      Shortness of breath   RLL mass               Renal/:  Chronic Renal Disease (prior nephrectomy)                Hepatic/GI:     GERD, well controlled             Musculoskeletal:  Musculoskeletal Normal                Neurological:        Chronic Pain Syndrome                         Endocrine:  Endocrine Normal            Psych:  Psychiatric Normal                    Physical Exam  General: Well nourished, Cooperative, Alert and Oriented    Airway:  Mallampati: II   Mouth Opening: Normal  TM Distance: Normal  Tongue: Normal  Neck ROM: Normal ROM    Dental:  Partial Dentures    Chest/Lungs:  Clear to auscultation    Heart:  Rate: Normal  Rhythm: Regular Rhythm  Sounds: Normal        Anesthesia Plan  Type of Anesthesia, risks & benefits discussed:    Anesthesia Type: Gen Natural Airway  Intra-op Monitoring Plan: Standard ASA Monitors  Induction:  IV  Informed Consent: Informed consent signed with the Patient and all parties understand the risks and agree with anesthesia plan.  All questions answered.   ASA Score: 3  Anesthesia Plan Notes:   GNA  IV tylenol  Zofran Pepcid    Ready For Surgery From Anesthesia Perspective.     .

## 2024-05-31 NOTE — TRANSFER OF CARE
"Anesthesia Transfer of Care Note    Patient: Karlie Hess    Procedure(s) Performed: Procedure(s) (LRB):  [id number] (N/A)    Patient location: OPS    Anesthesia Type: general    Transport from OR: Transported from OR on room air with adequate spontaneous ventilation    Post pain: adequate analgesia    Post assessment: no apparent anesthetic complications    Post vital signs: stable    Level of consciousness: awake and alert    Nausea/Vomiting: no nausea/vomiting    Complications: none    Transfer of care protocol was followed      Last vitals: Visit Vitals  BP (!) 143/64 (BP Location: Left arm, Patient Position: Sitting)   Pulse 87   Temp 36.7 °C (98.1 °F) (Oral)   Resp 16   Ht 5' 6" (1.676 m)   Wt 64.4 kg (142 lb)   SpO2 97%   Breastfeeding No   BMI 22.92 kg/m²     "

## 2024-05-31 NOTE — ANESTHESIA POSTPROCEDURE EVALUATION
Anesthesia Post Evaluation    Patient: Karlie Hess    Procedure(s) Performed: Procedure(s) (LRB):  [id number] (N/A)    Final Anesthesia Type: general      Patient location during evaluation: PACU  Patient participation: Yes- Able to Participate  Level of consciousness: awake and alert and oriented  Post-procedure vital signs: reviewed and stable  Pain management: adequate  Airway patency: patent    PONV status at discharge: No PONV  Anesthetic complications: no      Cardiovascular status: blood pressure returned to baseline  Respiratory status: unassisted, spontaneous ventilation and room air  Hydration status: euvolemic  Follow-up not needed.              Vitals Value Taken Time   /54 05/31/24 0757   Temp 36.6 05/31/24 0757   Pulse 87 05/31/24 0757   Resp 16 05/31/24 0757   SpO2 97 % 05/31/24 0757         No case tracking events are documented in the log.      Pain/Krystal Score: No data recorded

## 2024-06-06 RX ORDER — SODIUM CHLORIDE 0.9 % (FLUSH) 0.9 %
10 SYRINGE (ML) INJECTION
Status: CANCELLED | OUTPATIENT
Start: 2024-06-06

## 2024-06-06 RX ORDER — DIPHENHYDRAMINE HYDROCHLORIDE 50 MG/ML
50 INJECTION INTRAMUSCULAR; INTRAVENOUS ONCE AS NEEDED
Status: CANCELLED | OUTPATIENT
Start: 2024-06-06

## 2024-06-06 RX ORDER — HEPARIN 100 UNIT/ML
500 SYRINGE INTRAVENOUS
Status: CANCELLED | OUTPATIENT
Start: 2024-06-06

## 2024-06-06 RX ORDER — EPINEPHRINE 0.3 MG/.3ML
0.3 INJECTION SUBCUTANEOUS ONCE AS NEEDED
Status: CANCELLED | OUTPATIENT
Start: 2024-06-06

## 2024-06-07 ENCOUNTER — INFUSION (OUTPATIENT)
Dept: INFUSION THERAPY | Facility: HOSPITAL | Age: 84
End: 2024-06-07
Attending: INTERNAL MEDICINE
Payer: MEDICARE

## 2024-06-07 ENCOUNTER — DOCUMENTATION ONLY (OUTPATIENT)
Facility: CLINIC | Age: 84
End: 2024-06-07
Payer: MEDICARE

## 2024-06-07 VITALS
DIASTOLIC BLOOD PRESSURE: 64 MMHG | OXYGEN SATURATION: 97 % | WEIGHT: 142.88 LBS | HEIGHT: 66 IN | TEMPERATURE: 97 F | RESPIRATION RATE: 18 BRPM | SYSTOLIC BLOOD PRESSURE: 131 MMHG | HEART RATE: 82 BPM | BODY MASS INDEX: 22.96 KG/M2

## 2024-06-07 DIAGNOSIS — C64.9 METASTATIC RENAL CELL CARCINOMA TO LUNG, RIGHT: Primary | ICD-10-CM

## 2024-06-07 DIAGNOSIS — C78.01 METASTATIC RENAL CELL CARCINOMA TO LUNG, RIGHT: Primary | ICD-10-CM

## 2024-06-07 DIAGNOSIS — R53.83 FATIGUE, UNSPECIFIED TYPE: ICD-10-CM

## 2024-06-07 LAB
BASOPHILS # BLD AUTO: 0.03 K/UL (ref 0–0.2)
BASOPHILS NFR BLD: 0.7 % (ref 0–1.9)
DIFFERENTIAL METHOD BLD: ABNORMAL
EOSINOPHIL # BLD AUTO: 0.3 K/UL (ref 0–0.5)
EOSINOPHIL NFR BLD: 6.8 % (ref 0–8)
ERYTHROCYTE [DISTWIDTH] IN BLOOD BY AUTOMATED COUNT: 13.8 % (ref 11.5–14.5)
HCT VFR BLD AUTO: 32.3 % (ref 37–48.5)
HGB BLD-MCNC: 10.1 G/DL (ref 12–16)
IMM GRANULOCYTES # BLD AUTO: 0.01 K/UL (ref 0–0.04)
IMM GRANULOCYTES NFR BLD AUTO: 0.2 % (ref 0–0.5)
LYMPHOCYTES # BLD AUTO: 1.2 K/UL (ref 1–4.8)
LYMPHOCYTES NFR BLD: 28.1 % (ref 18–48)
MCH RBC QN AUTO: 28.1 PG (ref 27–31)
MCHC RBC AUTO-ENTMCNC: 31.3 G/DL (ref 32–36)
MCV RBC AUTO: 90 FL (ref 82–98)
MONOCYTES # BLD AUTO: 0.4 K/UL (ref 0.3–1)
MONOCYTES NFR BLD: 8.7 % (ref 4–15)
NEUTROPHILS # BLD AUTO: 2.4 K/UL (ref 1.8–7.7)
NEUTROPHILS NFR BLD: 55.5 % (ref 38–73)
NRBC BLD-RTO: 0 /100 WBC
PLATELET # BLD AUTO: 245 K/UL (ref 150–450)
PMV BLD AUTO: 9.8 FL (ref 9.2–12.9)
RBC # BLD AUTO: 3.6 M/UL (ref 4–5.4)
WBC # BLD AUTO: 4.24 K/UL (ref 3.9–12.7)

## 2024-06-07 PROCEDURE — 25000003 PHARM REV CODE 250: Performed by: INTERNAL MEDICINE

## 2024-06-07 PROCEDURE — 96413 CHEMO IV INFUSION 1 HR: CPT

## 2024-06-07 PROCEDURE — 85025 COMPLETE CBC W/AUTO DIFF WBC: CPT | Performed by: NURSE PRACTITIONER

## 2024-06-07 PROCEDURE — 63600175 PHARM REV CODE 636 W HCPCS: Performed by: INTERNAL MEDICINE

## 2024-06-07 RX ORDER — EPINEPHRINE 0.3 MG/.3ML
0.3 INJECTION SUBCUTANEOUS ONCE AS NEEDED
Status: DISCONTINUED | OUTPATIENT
Start: 2024-06-07 | End: 2024-06-07 | Stop reason: HOSPADM

## 2024-06-07 RX ORDER — HEPARIN 100 UNIT/ML
500 SYRINGE INTRAVENOUS
Status: DISCONTINUED | OUTPATIENT
Start: 2024-06-07 | End: 2024-06-07 | Stop reason: HOSPADM

## 2024-06-07 RX ORDER — SODIUM CHLORIDE 0.9 % (FLUSH) 0.9 %
10 SYRINGE (ML) INJECTION
Status: DISCONTINUED | OUTPATIENT
Start: 2024-06-07 | End: 2024-06-07 | Stop reason: HOSPADM

## 2024-06-07 RX ORDER — DIPHENHYDRAMINE HYDROCHLORIDE 50 MG/ML
50 INJECTION INTRAMUSCULAR; INTRAVENOUS ONCE AS NEEDED
Status: DISCONTINUED | OUTPATIENT
Start: 2024-06-07 | End: 2024-06-07 | Stop reason: HOSPADM

## 2024-06-07 RX ADMIN — HEPARIN 500 UNITS: 100 SYRINGE at 03:06

## 2024-06-07 RX ADMIN — SODIUM CHLORIDE 200 MG: 9 INJECTION, SOLUTION INTRAVENOUS at 02:06

## 2024-06-07 NOTE — PLAN OF CARE
Problem: Fatigue  Goal: Improved Activity Tolerance  Intervention: Promote Improved Energy  Flowsheets (Taken 6/7/2024 1536)  Fatigue Management: fatigue-related activity identified  Activity Management: Ambulated -L4

## 2024-06-07 NOTE — PROGRESS NOTES
CHEMO SCHOOL- NUTRITION    Karlie Hess is a 83 y.o. female with metastatic renal cell carcinoma to lung. Pt will be receiving keytruda and Inlynta. I met with Ms. Hess for chemo school today. Pt reports excellent appetite and intake. She denies any recent unintentional weight loss. She is working on staying hydrated but does not like water. She brings bottle of water to work with her to help increase hydration. She denies having any nutrition related questions or concerns at the current time.     CW: 142#.     Food Insecurity:  Patient is worried whether their food would run out before they have more money to buy more: Never  The food they bought just didn't last and they didn't have money to buy more: Never      Plan:   Reviewed chemo school packet & provided copy to pt.   Discussed importance of maintaining wt & staying hydrated.   Reviewed food safety guidelines recommended during treatment.   Discussed alternate sources of hydration  Provided RD contact info & encouraged pt to call with any questions/concerns.   Will f/u as needed.       Electronically signed by: Iman Cheung MBA, RDN, LDN

## 2024-06-16 NOTE — PROGRESS NOTES
PROGRESS NOTE    Subjective:       Patient ID: Karlie Hess is a 83 y.o. female.    History of Left RCC, treated [id number] further treatment     History of JAX lung cancer-treated with lobectomy-7/2/2015-no chemo/xrt-Path c/w mucin producing adenocarcinoma. [id number]     History of left hilar mass, 9/2020 EBUS negative     PET 10/18/2023:  Nodular densities are as outlined below:  -21 x 17 mm right infrahilar nodule with SUV max 2.5 (image 119), previously measuring 17 x 16 mm (August 20, 2023)  -27 x 27 mm marginated nodule in the posterior inferior left hilum with SUV max 2.7 (image 109)  -13 x 7 mm nodular density along the right posterior pararenal space with SUV max 0.8 (image 179), seen on studies dating back to 1/12/2021.     11/9/2023-EBUS  1. LUNG, RIGHT LOWER LOBE MASS, FINE NEEDLE ASPIRATION   - NEGATIVE FOR MALIGNANT CELLS.     2. LUNG, RIGHT LOWER LOBE MASS, BRUSHING   - RARE ATYPICAL CELLS PRESENT     3. LUNG, RIGHT LOWER LOBE MASS, BIOPSY TOUCH PREP   - POSITIVE FOR CARCINOMA.   - SEE CONCURRENT SURGICAL BIOPSY ([id number])     4. LUNG, RIGHT LOWER LOBE, BRONCHOALVEOLAR LAVAGE   - RARE ATYPICAL CELLS PRESENT.     5. LUNG, LEFT LOWER LOBE MASS, FINE NEEDLE ASPIRATION   - RARE ATYPICAL CELLS PRESENT     6. LYMPH NODE, STATION 11RS, FINE NEEDLE ASPIRATION   - NEGATIVE FOR MALIGNANT CELLS.   - SCANT KATARZYNA MATERIAL PRESENT      Karnofsky performance status score <80   Time from original diagnosis to initiation of targeted therapy <1 year   Hemoglobin less than the lower limit of normal   Serum calcium greater than the upper limit of normal   Neutrophil count greater than the upper limit of normal   Platelet count greater than the upper limit of normal   Favorable risk: None of the above risk factors present.  Intermediate risk: 1 or 2 of the above risk factors present.  Poor risk: 3 or more risk factors present.     1/5/2024:  RIGHT  PARARENAL MASS, CT GUIDED CORE BIOPSY:   - CLEAR CELL RENAL CELL CARCINOMA     12/27/2023-1/5/2024  RLL SBRT    1/30/2024-2/7/2024  XRT to Rt. Renal bed lesion    5/3/2024-MRI Abd:--Progressive Disease  Right kidney mass  2nd mass in lower pole of right kidney  Pancreatic Tail mass    5/13/2024-Bx:  PANCREATIC TAIL MASS, BIOPSY:   - METASTATIC CLEAR CELL RENAL CELL CARCINOMA.     6/7/2024-Pembro/Axitinib:  Cycle 1: 6/7/2024  Cycle 2: 6/26/2024-due    Chief Complaint:  No chief complaint on file.  Metastatic Renal Cell Carcinoma follow up    History of Present Illness:   Karlie Hess is a 83 y.o. female who presents for follow up of above.      Ms. Hess has no new complaints at this time.  Her BP has been up.  She has no sob or diarrhea.     BP regimen  Lebtolol 100mg bid  Lisinopril 20mg bid  Amlodipine 5mg qhs    Family and Social history reviewed and is unchanged from 11/21/2023             Current Outpatient Medications:     acetaminophen (TYLENOL) 500 MG tablet, Take 1,000 mg by mouth 2 (two) times daily as needed., Disp: , Rfl:     amLODIPine (NORVASC) 5 MG tablet, Take 1 tablet (5 mg total) by mouth every evening., Disp: 90 tablet, Rfl: 3    axitinib (INLYTA) 5 mg Tab, Take 5 mg by mouth 2 (two) times a day., Disp: 60 tablet, Rfl: 6    cyanocobalamin (VITAMIN B-12) 1000 MCG tablet, Take 100 mcg by mouth once daily., Disp: , Rfl:     duloxetine (CYMBALTA) 60 MG capsule, Take 60 mg by mouth once daily., Disp: , Rfl:     hydroCHLOROthiazide (HYDRODIURIL) 12.5 MG Tab, Take 1 tablet (12.5 mg total) by mouth daily as needed (Leg swelling)., Disp: 30 tablet, Rfl: 11    ipratropium (ATROVENT) 42 mcg (0.06 %) nasal spray, by Each Nostril route., Disp: , Rfl:     labetaloL (NORMODYNE) 200 MG tablet, TAKE 1 TABLET BY MOUTH TWICE  DAILY, Disp: 180 tablet, Rfl: 3    lisinopriL (PRINIVIL,ZESTRIL) 20 MG tablet, Take 1 tablet (20 mg total) by mouth 2 (two) times daily., Disp: 180 tablet, Rfl: 3    ondansetron  "(ZOFRAN) 8 MG tablet, Take 1 tablet (8 mg total) by mouth every 8 (eight) hours as needed for Nausea., Disp: 30 tablet, Rfl: 5    pantoprazole (PROTONIX) 40 MG tablet, Take 40 mg by mouth once daily., Disp: , Rfl:     promethazine (PHENERGAN) 25 MG tablet, Take 1 tablet (25 mg total) by mouth every 6 (six) hours as needed for Nausea., Disp: 30 tablet, Rfl: 5    rosuvastatin (CRESTOR) 10 MG tablet, Take 10 mg by mouth every evening., Disp: , Rfl:     UNABLE TO FIND, Take 1 capsule by mouth once daily. Prevagen  otc, Disp: , Rfl:     aspirin (ECOTRIN) 81 MG EC tablet, Take 81 mg by mouth once daily. (Patient not taking: Reported on 5/27/2024), Disp: , Rfl:     cloNIDine (CATAPRES) 0.1 MG tablet, Take 1 tablet (0.1 mg total) by mouth 3 (three) times daily as needed. For systolic BP over 180, Disp: 60 tablet, Rfl: 11    hydrOXYzine HCL (ATARAX) 25 MG tablet, Take 25 mg by mouth 3 (three) times daily as needed for Itching. (Patient not taking: Reported on 5/29/2024), Disp: , Rfl:   No current facility-administered medications for this visit.    Facility-Administered Medications Ordered in Other Visits:     LIDOcaine (PF) 10 mg/ml (1%) injection 10 mg, 1 mL, Intradermal, Once, Gabriel Loaiza MD        Objective:       Physical Examination:     BP (!) 211/100   Pulse 77   Temp 98.1 °F (36.7 °C) (Temporal)   Ht 5' 6" (1.676 m)   Wt 63.9 kg (140 lb 14.4 oz)   SpO2 97%   BMI 22.74 kg/m²     Physical Exam  Constitutional:       Appearance: Normal appearance.   HENT:      Head: Normocephalic and atraumatic.   Eyes:      General: No scleral icterus.     Conjunctiva/sclera: Conjunctivae normal.   Cardiovascular:      Rate and Rhythm: Normal rate.   Pulmonary:      Effort: Pulmonary effort is normal.   Abdominal:      General: Abdomen is flat.   Neurological:      General: No focal deficit present.      Mental Status: She is alert and oriented to person, place, and time.   Psychiatric:         Mood and Affect: Mood " normal.         Behavior: Behavior normal.         Labs:   Recent Results (from the past 336 hour(s))   CBC Auto Differential    Collection Time: 06/07/24  2:24 PM   Result Value Ref Range    WBC 4.24 3.90 - 12.70 K/uL    Hemoglobin 10.1 (L) 12.0 - 16.0 g/dL    Hematocrit 32.3 (L) 37.0 - 48.5 %    Platelets 245 150 - 450 K/uL       CMP  Sodium   Date Value Ref Range Status   05/13/2024 143 136 - 145 mmol/L Final     Potassium   Date Value Ref Range Status   05/13/2024 3.9 3.5 - 5.1 mmol/L Final     Chloride   Date Value Ref Range Status   05/13/2024 110 95 - 110 mmol/L Final     CO2   Date Value Ref Range Status   05/13/2024 25 23 - 29 mmol/L Final     Glucose   Date Value Ref Range Status   05/13/2024 130 (H) 70 - 110 mg/dL Final     BUN   Date Value Ref Range Status   05/13/2024 20 8 - 23 mg/dL Final     Creatinine   Date Value Ref Range Status   05/13/2024 1.2 0.5 - 1.4 mg/dL Final   12/31/2012 1.2 0.5 - 1.4 mg/dL Final     Calcium   Date Value Ref Range Status   05/13/2024 9.1 8.7 - 10.5 mg/dL Final   12/31/2012 9.7 8.7 - 10.5 mg/dL Final     Total Protein   Date Value Ref Range Status   02/29/2024 6.8 6.0 - 8.4 g/dL Final     Albumin   Date Value Ref Range Status   02/29/2024 4.0 3.5 - 5.2 g/dL Final     Total Bilirubin   Date Value Ref Range Status   02/29/2024 0.5 0.1 - 1.0 mg/dL Final     Comment:     For infants and newborns, interpretation of results should be based  on gestational age, weight and in agreement with clinical  observations.    Premature Infant recommended reference ranges:  Up to 24 hours.............<8.0 mg/dL  Up to 48 hours............<12.0 mg/dL  3-5 days..................<15.0 mg/dL  6-29 days.................<15.0 mg/dL       Alkaline Phosphatase   Date Value Ref Range Status   02/29/2024 66 55 - 135 U/L Final     AST   Date Value Ref Range Status   02/29/2024 12 10 - 40 U/L Final     ALT   Date Value Ref Range Status   02/29/2024 8 (L) 10 - 44 U/L Final     Anion Gap   Date Value Ref  "Range Status   05/13/2024 8 8 - 16 mmol/L Final   12/31/2012 14 5 - 15 meq/L Final     eGFR if    Date Value Ref Range Status   07/18/2022 >60.0 >60 mL/min/1.73 m^2 Final     eGFR if non    Date Value Ref Range Status   07/18/2022 52.6 (A) >60 mL/min/1.73 m^2 Final     Comment:     Calculation used to obtain the estimated glomerular filtration  rate (eGFR) is the CKD-EPI equation.        No results found for: "CEA"  No results found for: "PSA"        Assessment/Plan:     Problem List Items Addressed This Visit       Primary hypertension     BP is very elevated today.  No cardiac symptoms.  Likely due to addition of Axi.  Will add clonidine as a rescue dose.  She will continue to monitor.      Will contact cardiologist. She will take an additional lebetolol now.     6/19/2024:  Received reply from Dr. Kaufman.  Increase amlodipine to 5mg bid and add HCTZ if leg swelling occurs.  Will call patient.  Resume axitinib and this regimen and will add the HCTZ now.          Relevant Medications    cloNIDine (CATAPRES) 0.1 MG tablet    Metastatic renal cell carcinoma to lung, right - Primary     Patient is on axitinib and pembro.  She has no sign of AI disease at this time and is feeling ok but his having BP issues.  Will hold Axitinib and reach out to Dr. Kaufman for assistance here.  Would like to add Losartan 25-50mg to her regimen when I resume the Axi but need his input prior to doing this.  Discussed this with patient.                 Discussion:     Follow up in about 3 weeks (around 7/8/2024) for for NP visit 6 with me.      Electronically signed by Del Nathan      "

## 2024-06-17 ENCOUNTER — OFFICE VISIT (OUTPATIENT)
Facility: CLINIC | Age: 84
End: 2024-06-17
Payer: MEDICARE

## 2024-06-17 VITALS
TEMPERATURE: 98 F | BODY MASS INDEX: 22.64 KG/M2 | DIASTOLIC BLOOD PRESSURE: 100 MMHG | HEIGHT: 66 IN | OXYGEN SATURATION: 97 % | HEART RATE: 77 BPM | WEIGHT: 140.88 LBS | SYSTOLIC BLOOD PRESSURE: 211 MMHG

## 2024-06-17 DIAGNOSIS — I10 PRIMARY HYPERTENSION: ICD-10-CM

## 2024-06-17 DIAGNOSIS — C64.9 METASTATIC RENAL CELL CARCINOMA TO LUNG, RIGHT: Primary | ICD-10-CM

## 2024-06-17 DIAGNOSIS — C78.01 METASTATIC RENAL CELL CARCINOMA TO LUNG, RIGHT: Primary | ICD-10-CM

## 2024-06-17 PROCEDURE — 99999 PR PBB SHADOW E&M-EST. PATIENT-LVL III: CPT | Mod: PBBFAC,,, | Performed by: INTERNAL MEDICINE

## 2024-06-17 PROCEDURE — G2211 COMPLEX E/M VISIT ADD ON: HCPCS | Mod: S$PBB,,, | Performed by: INTERNAL MEDICINE

## 2024-06-17 PROCEDURE — 99213 OFFICE O/P EST LOW 20 MIN: CPT | Mod: PBBFAC,PN | Performed by: INTERNAL MEDICINE

## 2024-06-17 PROCEDURE — 99215 OFFICE O/P EST HI 40 MIN: CPT | Mod: S$PBB,,, | Performed by: INTERNAL MEDICINE

## 2024-06-17 RX ORDER — CLONIDINE HYDROCHLORIDE 0.1 MG/1
0.1 TABLET ORAL 3 TIMES DAILY PRN
Qty: 60 TABLET | Refills: 11 | Status: SHIPPED | OUTPATIENT
Start: 2024-06-17 | End: 2025-06-17

## 2024-06-17 NOTE — ASSESSMENT & PLAN NOTE
Patient is on axitinib and pembro.  She has no sign of AI disease at this time and is feeling ok but his having BP issues.  Will hold Axitinib and reach out to Dr. Kaufman for assistance here.  Would like to add Losartan 25-50mg to her regimen when I resume the Axi but need his input prior to doing this.  Discussed this with patient.

## 2024-06-17 NOTE — ASSESSMENT & PLAN NOTE
BP is very elevated today.  No cardiac symptoms.  Likely due to addition of Axi.  Will add clonidine as a rescue dose.  She will continue to monitor.      Will contact cardiologist. She will take an additional lebetolol now.     6/19/2024:  Received reply from Dr. Kaufman.  Increase amlodipine to 5mg bid and add HCTZ if leg swelling occurs.  Will call patient.  Resume axitinib and this regimen and will add the HCTZ now.

## 2024-06-19 ENCOUNTER — TELEPHONE (OUTPATIENT)
Facility: CLINIC | Age: 84
End: 2024-06-19
Payer: MEDICARE

## 2024-06-19 DIAGNOSIS — I10 PRIMARY HYPERTENSION: Primary | ICD-10-CM

## 2024-06-19 RX ORDER — AMLODIPINE BESYLATE 5 MG/1
5 TABLET ORAL 2 TIMES DAILY
Qty: 180 TABLET | Refills: 3 | Status: SHIPPED | OUTPATIENT
Start: 2024-06-19 | End: 2025-06-19

## 2024-06-19 NOTE — TELEPHONE ENCOUNTER
Called patient to inform about Axitinib. Per Dr. Wasserman resume the axitinib for her cancer 5 mg by mouth 2 (two) times a day, patient stated understanding.

## 2024-06-19 NOTE — TELEPHONE ENCOUNTER
Per Dr. Wasserman, to call the patient and inform that the cardiologist and him spoke. he wants to increase her amlodipine to 5mg bid and put her on HCTZ 12.5mg daily. Patient stated she has been taking her BP at home on 06/18/2024 morning 126/74. 06/18/2024 night 167/86 before BP medications and 150/74 after BP medications. This morning 114/64 before medications and patient wants to wait to take this morning medications. Advise was given to monitor and record BP before and after medications and to call back if symptoms of low or high BP appear. Patient stated understanding.

## 2024-06-27 ENCOUNTER — LAB VISIT (OUTPATIENT)
Dept: LAB | Facility: HOSPITAL | Age: 84
End: 2024-06-27
Attending: INTERNAL MEDICINE
Payer: MEDICARE

## 2024-06-27 ENCOUNTER — TELEPHONE (OUTPATIENT)
Dept: INFUSION THERAPY | Facility: HOSPITAL | Age: 84
End: 2024-06-27

## 2024-06-27 DIAGNOSIS — C78.01 METASTATIC RENAL CELL CARCINOMA TO LUNG, RIGHT: ICD-10-CM

## 2024-06-27 DIAGNOSIS — R53.83 FATIGUE, UNSPECIFIED TYPE: ICD-10-CM

## 2024-06-27 DIAGNOSIS — C64.9 METASTATIC RENAL CELL CARCINOMA TO LUNG, RIGHT: ICD-10-CM

## 2024-06-27 LAB
ALBUMIN SERPL BCP-MCNC: 4.3 G/DL (ref 3.5–5.2)
ALP SERPL-CCNC: 70 U/L (ref 55–135)
ALT SERPL W/O P-5'-P-CCNC: 9 U/L (ref 10–44)
ANION GAP SERPL CALC-SCNC: 8 MMOL/L (ref 8–16)
AST SERPL-CCNC: 14 U/L (ref 10–40)
BASOPHILS # BLD AUTO: 0.03 K/UL (ref 0–0.2)
BASOPHILS NFR BLD: 0.5 % (ref 0–1.9)
BILIRUB SERPL-MCNC: 0.4 MG/DL (ref 0.1–1)
BUN SERPL-MCNC: 25 MG/DL (ref 8–23)
CALCIUM SERPL-MCNC: 9 MG/DL (ref 8.7–10.5)
CHLORIDE SERPL-SCNC: 103 MMOL/L (ref 95–110)
CO2 SERPL-SCNC: 28 MMOL/L (ref 23–29)
CREAT SERPL-MCNC: 1.5 MG/DL (ref 0.5–1.4)
DIFFERENTIAL METHOD BLD: ABNORMAL
EOSINOPHIL # BLD AUTO: 0.2 K/UL (ref 0–0.5)
EOSINOPHIL NFR BLD: 4.3 % (ref 0–8)
ERYTHROCYTE [DISTWIDTH] IN BLOOD BY AUTOMATED COUNT: 13.8 % (ref 11.5–14.5)
EST. GFR  (NO RACE VARIABLE): 34.2 ML/MIN/1.73 M^2
GLUCOSE SERPL-MCNC: 81 MG/DL (ref 70–110)
HCT VFR BLD AUTO: 37.3 % (ref 37–48.5)
HGB BLD-MCNC: 12 G/DL (ref 12–16)
IMM GRANULOCYTES # BLD AUTO: 0.02 K/UL (ref 0–0.04)
IMM GRANULOCYTES NFR BLD AUTO: 0.4 % (ref 0–0.5)
LYMPHOCYTES # BLD AUTO: 1.7 K/UL (ref 1–4.8)
LYMPHOCYTES NFR BLD: 29.4 % (ref 18–48)
MCH RBC QN AUTO: 27.8 PG (ref 27–31)
MCHC RBC AUTO-ENTMCNC: 32.2 G/DL (ref 32–36)
MCV RBC AUTO: 87 FL (ref 82–98)
MONOCYTES # BLD AUTO: 0.6 K/UL (ref 0.3–1)
MONOCYTES NFR BLD: 10.5 % (ref 4–15)
NEUTROPHILS # BLD AUTO: 3.1 K/UL (ref 1.8–7.7)
NEUTROPHILS NFR BLD: 54.9 % (ref 38–73)
NRBC BLD-RTO: 0 /100 WBC
PLATELET # BLD AUTO: 234 K/UL (ref 150–450)
PMV BLD AUTO: 8.5 FL (ref 9.2–12.9)
POTASSIUM SERPL-SCNC: 4.2 MMOL/L (ref 3.5–5.1)
PROT SERPL-MCNC: 6.8 G/DL (ref 6–8.4)
RBC # BLD AUTO: 4.31 M/UL (ref 4–5.4)
SODIUM SERPL-SCNC: 139 MMOL/L (ref 136–145)
TSH SERPL DL<=0.005 MIU/L-ACNC: 4.68 UIU/ML (ref 0.34–5.6)
WBC # BLD AUTO: 5.64 K/UL (ref 3.9–12.7)

## 2024-06-27 PROCEDURE — 80053 COMPREHEN METABOLIC PANEL: CPT | Performed by: NURSE PRACTITIONER

## 2024-06-27 PROCEDURE — 84443 ASSAY THYROID STIM HORMONE: CPT | Performed by: NURSE PRACTITIONER

## 2024-06-27 PROCEDURE — 85025 COMPLETE CBC W/AUTO DIFF WBC: CPT | Performed by: NURSE PRACTITIONER

## 2024-06-27 PROCEDURE — 36415 COLL VENOUS BLD VENIPUNCTURE: CPT | Performed by: NURSE PRACTITIONER

## 2024-06-27 RX ORDER — HEPARIN 100 UNIT/ML
500 SYRINGE INTRAVENOUS
Status: CANCELLED | OUTPATIENT
Start: 2024-06-27

## 2024-06-27 RX ORDER — DIPHENHYDRAMINE HYDROCHLORIDE 50 MG/ML
50 INJECTION INTRAMUSCULAR; INTRAVENOUS ONCE AS NEEDED
Status: CANCELLED | OUTPATIENT
Start: 2024-06-27

## 2024-06-27 RX ORDER — SODIUM CHLORIDE 0.9 % (FLUSH) 0.9 %
10 SYRINGE (ML) INJECTION
Status: CANCELLED | OUTPATIENT
Start: 2024-06-27

## 2024-06-27 RX ORDER — EPINEPHRINE 0.3 MG/.3ML
0.3 INJECTION SUBCUTANEOUS ONCE AS NEEDED
Status: CANCELLED | OUTPATIENT
Start: 2024-06-27

## 2024-06-27 NOTE — TELEPHONE ENCOUNTER
Spoke with pt about needing new labs drawn prior to infusion scheduled on 6/28/24. Pt stated understanding and stated that she will go get labs done this evening.

## 2024-06-28 ENCOUNTER — INFUSION (OUTPATIENT)
Dept: INFUSION THERAPY | Facility: HOSPITAL | Age: 84
End: 2024-06-28
Attending: INTERNAL MEDICINE
Payer: MEDICARE

## 2024-06-28 VITALS
HEART RATE: 75 BPM | WEIGHT: 140.38 LBS | HEIGHT: 66 IN | TEMPERATURE: 98 F | BODY MASS INDEX: 22.56 KG/M2 | SYSTOLIC BLOOD PRESSURE: 179 MMHG | DIASTOLIC BLOOD PRESSURE: 77 MMHG | OXYGEN SATURATION: 98 % | RESPIRATION RATE: 16 BRPM

## 2024-06-28 DIAGNOSIS — C64.9 METASTATIC RENAL CELL CARCINOMA TO LUNG, RIGHT: Primary | ICD-10-CM

## 2024-06-28 DIAGNOSIS — C78.01 METASTATIC RENAL CELL CARCINOMA TO LUNG, RIGHT: Primary | ICD-10-CM

## 2024-06-28 PROCEDURE — 63600175 PHARM REV CODE 636 W HCPCS: Performed by: INTERNAL MEDICINE

## 2024-06-28 PROCEDURE — 96413 CHEMO IV INFUSION 1 HR: CPT

## 2024-06-28 PROCEDURE — A4216 STERILE WATER/SALINE, 10 ML: HCPCS | Performed by: INTERNAL MEDICINE

## 2024-06-28 PROCEDURE — 25000003 PHARM REV CODE 250: Performed by: INTERNAL MEDICINE

## 2024-06-28 PROCEDURE — 96361 HYDRATE IV INFUSION ADD-ON: CPT

## 2024-06-28 RX ORDER — DIPHENHYDRAMINE HYDROCHLORIDE 50 MG/ML
50 INJECTION INTRAMUSCULAR; INTRAVENOUS ONCE AS NEEDED
Status: DISCONTINUED | OUTPATIENT
Start: 2024-06-28 | End: 2024-06-28 | Stop reason: HOSPADM

## 2024-06-28 RX ORDER — SODIUM CHLORIDE 0.9 % (FLUSH) 0.9 %
10 SYRINGE (ML) INJECTION
Status: DISCONTINUED | OUTPATIENT
Start: 2024-06-28 | End: 2024-06-28 | Stop reason: HOSPADM

## 2024-06-28 RX ORDER — EPINEPHRINE 0.3 MG/.3ML
0.3 INJECTION SUBCUTANEOUS ONCE AS NEEDED
Status: DISCONTINUED | OUTPATIENT
Start: 2024-06-28 | End: 2024-06-28 | Stop reason: HOSPADM

## 2024-06-28 RX ORDER — HEPARIN 100 UNIT/ML
500 SYRINGE INTRAVENOUS
Status: DISCONTINUED | OUTPATIENT
Start: 2024-06-28 | End: 2024-06-28 | Stop reason: HOSPADM

## 2024-06-28 RX ADMIN — Medication 10 ML: at 03:06

## 2024-06-28 RX ADMIN — HEPARIN 500 UNITS: 100 SYRINGE at 03:06

## 2024-06-28 RX ADMIN — SODIUM CHLORIDE 200 MG: 9 INJECTION, SOLUTION INTRAVENOUS at 02:06

## 2024-06-28 RX ADMIN — SODIUM CHLORIDE: 9 INJECTION, SOLUTION INTRAVENOUS at 02:06

## 2024-06-28 NOTE — PLAN OF CARE
Problem: Fall Injury Risk  Goal: Absence of Fall and Fall-Related Injury  Outcome: Progressing  Intervention: Identify and Manage Contributors  Flowsheets (Taken 6/28/2024 1406)  Self-Care Promotion: independence encouraged  Medication Review/Management: medications reviewed  Intervention: Promote Injury-Free Environment  Flowsheets (Taken 6/28/2024 1406)  Safety Promotion/Fall Prevention: medications reviewed

## 2024-07-05 ENCOUNTER — HOSPITAL ENCOUNTER (OUTPATIENT)
Dept: CARDIOLOGY | Facility: HOSPITAL | Age: 84
Discharge: HOME | End: 2024-07-05
Attending: INTERNAL MEDICINE
Payer: MEDICARE

## 2024-07-05 VITALS — HEIGHT: 66 IN | WEIGHT: 140.44 LBS | BODY MASS INDEX: 22.57 KG/M2

## 2024-07-05 DIAGNOSIS — I35.1 AORTIC VALVE INSUFFICIENCY, ETIOLOGY OF CARDIAC VALVE DISEASE UNSPECIFIED: ICD-10-CM

## 2024-07-05 DIAGNOSIS — R60.0 LEG EDEMA: ICD-10-CM

## 2024-07-05 LAB
AORTIC ROOT ANNULUS: 3.2 CM
AORTIC VALVE CUSP SEPERATION: 2.3 CM
AV INDEX (PROSTH): 1.08
AV MEAN GRADIENT: 2 MMHG
AV PEAK GRADIENT: 4 MMHG
AV REGURGITATION PRESSURE HALF TIME: 387 MS
AV VALVE AREA BY VELOCITY RATIO: 3.42 CM²
AV VALVE AREA: 3.38 CM²
AV VELOCITY RATIO: 1.09
BSA FOR ECHO PROCEDURE: 1.72 M2
CV ECHO LV RWT: 0.49 CM
DOP CALC AO PEAK VEL: 1 M/S
DOP CALC AO VTI: 20.8 CM
DOP CALC LVOT AREA: 3.1 CM2
DOP CALC LVOT DIAMETER: 2 CM
DOP CALC LVOT PEAK VEL: 1.09 M/S
DOP CALC LVOT STROKE VOLUME: 70.34 CM3
DOP CALC MV VTI: 19.7 CM
DOP CALCLVOT PEAK VEL VTI: 22.4 CM
E WAVE DECELERATION TIME: 197 MSEC
E/A RATIO: 0.63
E/E' RATIO: 13.56 M/S
ECHO LV POSTERIOR WALL: 1 CM (ref 0.6–1.1)
EJECTION FRACTION: 60 %
FRACTIONAL SHORTENING: 32 % (ref 28–44)
INTERVENTRICULAR SEPTUM: 1 CM (ref 0.6–1.1)
IVC DIAMETER: 1.3 CM
IVRT: 102 MSEC
LEFT ATRIUM AREA SYSTOLIC (APICAL 2 CHAMBER): 15.5 CM2
LEFT ATRIUM AREA SYSTOLIC (APICAL 4 CHAMBER): 17.4 CM2
LEFT ATRIUM SIZE: 3.6 CM
LEFT ATRIUM VOLUME INDEX MOD: 30.1 ML/M2
LEFT ATRIUM VOLUME MOD: 51.7 CM3
LEFT INTERNAL DIMENSION IN SYSTOLE: 2.8 CM (ref 2.1–4)
LEFT VENTRICLE DIASTOLIC VOLUME INDEX: 43.14 ML/M2
LEFT VENTRICLE DIASTOLIC VOLUME: 74.2 ML
LEFT VENTRICLE END SYSTOLIC VOLUME APICAL 2 CHAMBER: 46.5 ML
LEFT VENTRICLE END SYSTOLIC VOLUME APICAL 4 CHAMBER: 50.7 ML
LEFT VENTRICLE MASS INDEX: 77 G/M2
LEFT VENTRICLE SYSTOLIC VOLUME INDEX: 17.2 ML/M2
LEFT VENTRICLE SYSTOLIC VOLUME: 29.6 ML
LEFT VENTRICULAR INTERNAL DIMENSION IN DIASTOLE: 4.1 CM (ref 3.5–6)
LEFT VENTRICULAR MASS: 132.11 G
LV LATERAL E/E' RATIO: 12.2 M/S
LV SEPTAL E/E' RATIO: 15.25 M/S
LVED V (TEICH): 74.2 ML
LVES V (TEICH): 29.6 ML
LVOT MG: 3 MMHG
LVOT MV: 0.73 CM/S
MV MEAN GRADIENT: 2 MMHG
MV PEAK A VEL: 0.97 M/S
MV PEAK E VEL: 0.61 M/S
MV PEAK GRADIENT: 5 MMHG
MV VALVE AREA BY CONTINUITY EQUATION: 3.57 CM2
OHS CV RV/LV RATIO: 0.44 CM
PISA AR MAX VEL: 3.19 M/S
PISA TR MAX VEL: 2.25 M/S
PV MV: 0.64 M/S
PV PEAK GRADIENT: 3 MMHG
PV PEAK VELOCITY: 0.93 M/S
RA PRESSURE ESTIMATED: 3 MMHG
RIGHT VENTRICULAR END-DIASTOLIC DIMENSION: 1.8 CM
RV TB RVSP: 5 MMHG
RV TISSUE DOPPLER FREE WALL SYSTOLIC VELOCITY 1 (APICAL 4 CHAMBER VIEW): 10.6 CM/S
TDI LATERAL: 0.05 M/S
TDI SEPTAL: 0.04 M/S
TDI: 0.05 M/S
TR MAX PG: 20 MMHG
TRICUSPID ANNULAR PLANE SYSTOLIC EXCURSION: 2.09 CM
TV REST PULMONARY ARTERY PRESSURE: 23 MMHG
Z-SCORE OF LEFT VENTRICULAR DIMENSION IN END DIASTOLE: -1.51
Z-SCORE OF LEFT VENTRICULAR DIMENSION IN END SYSTOLE: -0.42

## 2024-07-05 PROCEDURE — 93306 TTE W/DOPPLER COMPLETE: CPT | Mod: 26,,, | Performed by: INTERNAL MEDICINE

## 2024-07-05 PROCEDURE — 93306 TTE W/DOPPLER COMPLETE: CPT

## 2024-07-05 NOTE — PROGRESS NOTES
PROGRESS NOTE    Subjective:       Patient ID: Karlie Hess is a 83 y.o. female.    History of Left RCC, treated [id number] further treatment     History of JAX lung cancer-treated with lobectomy-7/2/2015-no chemo/xrt-Path c/w mucin producing adenocarcinoma. [id number]     History of left hilar mass, 9/2020 EBUS negative     PET 10/18/2023:  Nodular densities are as outlined below:  -21 x 17 mm right infrahilar nodule with SUV max 2.5 (image 119), previously measuring 17 x 16 mm (August 20, 2023)  -27 x 27 mm marginated nodule in the posterior inferior left hilum with SUV max 2.7 (image 109)  -13 x 7 mm nodular density along the right posterior pararenal space with SUV max 0.8 (image 179), seen on studies dating back to 1/12/2021.     11/9/2023-EBUS  1. LUNG, RIGHT LOWER LOBE MASS, FINE NEEDLE ASPIRATION   - NEGATIVE FOR MALIGNANT CELLS.     2. LUNG, RIGHT LOWER LOBE MASS, BRUSHING   - RARE ATYPICAL CELLS PRESENT     3. LUNG, RIGHT LOWER LOBE MASS, BIOPSY TOUCH PREP   - POSITIVE FOR CARCINOMA.   - SEE CONCURRENT SURGICAL BIOPSY ([id number])     4. LUNG, RIGHT LOWER LOBE, BRONCHOALVEOLAR LAVAGE   - RARE ATYPICAL CELLS PRESENT.     5. LUNG, LEFT LOWER LOBE MASS, FINE NEEDLE ASPIRATION   - RARE ATYPICAL CELLS PRESENT     6. LYMPH NODE, STATION 11RS, FINE NEEDLE ASPIRATION   - NEGATIVE FOR MALIGNANT CELLS.   - SCANT KATARZYNA MATERIAL PRESENT      Karnofsky performance status score <80   Time from original diagnosis to initiation of targeted therapy <1 year   Hemoglobin less than the lower limit of normal   Serum calcium greater than the upper limit of normal   Neutrophil count greater than the upper limit of normal   Platelet count greater than the upper limit of normal   Favorable risk: None of the above risk factors present.  Intermediate risk: 1 or 2 of the above risk factors present.  Poor risk: 3 or more risk factors present.     1/5/2024:  RIGHT  PARARENAL MASS, CT GUIDED CORE BIOPSY:   - CLEAR CELL RENAL CELL CARCINOMA     12/27/2023-1/5/2024  RLL SBRT    1/30/2024-2/7/2024  XRT to Rt. Renal bed lesion    5/3/2024-MRI Abd:--Progressive Disease  Right kidney mass  2nd mass in lower pole of right kidney  Pancreatic Tail mass    5/13/2024-Bx:  PANCREATIC TAIL MASS, BIOPSY:   - METASTATIC CLEAR CELL RENAL CELL CARCINOMA.     6/7/2024-Pembro/Axitinib:  Cycle 1: 6/7/2024  Cycle 2: 6/26/2024  Cycle 3:      Chief Complaint:  Metastatic Renal Cell Carcinoma follow up    History of Present Illness:   Karlie Hess is a 83 y.o. female who presents for follow up of above.      Ms. Hess has no new complaints at this time.  Her BP has been up and yesterday Dr. Christopher switched her to losartan she has not yet started it. She was rushing this morning and was stressed about work.  She has no sob or diarrhea.     BP regimen  Lebtolol 100mg bid  Lisinopril 20mg bid- Switched to Losartan 50 mg BID  Amlodipine 5mg qhs    Family and Social history reviewed and is unchanged from 11/21/2023       Current Outpatient Medications:     acetaminophen (TYLENOL) 500 MG tablet, Take 1,000 mg by mouth 2 (two) times daily as needed., Disp: , Rfl:     amLODIPine (NORVASC) 5 MG tablet, Take 1 tablet (5 mg total) by mouth 2 (two) times daily. (Patient taking differently: Take 5 mg by mouth every evening.), Disp: 180 tablet, Rfl: 3    axitinib (INLYTA) 5 mg Tab, Take 1 tablet (5 mg) by mouth 2 (two) times a day., Disp: 60 tablet, Rfl: 6    cloNIDine (CATAPRES) 0.1 MG tablet, Take 1 tablet (0.1 mg total) by mouth 3 (three) times daily as needed. For systolic BP over 180, Disp: 60 tablet, Rfl: 11    cyanocobalamin (VITAMIN B-12) 1000 MCG tablet, Take 100 mcg by mouth once daily., Disp: , Rfl:     duloxetine (CYMBALTA) 60 MG capsule, Take 60 mg by mouth once daily., Disp: , Rfl:     hydroCHLOROthiazide (HYDRODIURIL) 12.5 MG Tab, Take 1 tablet (12.5 mg total) by mouth daily as needed  (Leg swelling)., Disp: 30 tablet, Rfl: 11    ipratropium (ATROVENT) 42 mcg (0.06 %) nasal spray, by Each Nostril route., Disp: , Rfl:     labetaloL (NORMODYNE) 200 MG tablet, TAKE 1 TABLET BY MOUTH TWICE  DAILY (Patient taking differently: Take 100 mg by mouth As instructed. 100mg qam and 100mg qafternoon prn), Disp: 180 tablet, Rfl: 3    losartan (COZAAR) 50 MG tablet, Take 1 tablet (50 mg total) by mouth 2 (two) times a day., Disp: 180 tablet, Rfl: 3    ondansetron (ZOFRAN) 8 MG tablet, Take 1 tablet (8 mg total) by mouth every 8 (eight) hours as needed for Nausea., Disp: 30 tablet, Rfl: 5    pantoprazole (PROTONIX) 40 MG tablet, Take 40 mg by mouth once daily., Disp: , Rfl:     promethazine (PHENERGAN) 25 MG tablet, Take 1 tablet (25 mg total) by mouth every 6 (six) hours as needed for Nausea., Disp: 30 tablet, Rfl: 5    rosuvastatin (CRESTOR) 10 MG tablet, Take 10 mg by mouth every evening., Disp: , Rfl:     UNABLE TO FIND, Take 1 capsule by mouth once daily. Prevagen  otc, Disp: , Rfl:     hydrOXYzine HCL (ATARAX) 25 MG tablet, Take 25 mg by mouth 3 (three) times daily as needed for Itching. (Patient not taking: Reported on 5/29/2024), Disp: , Rfl:   No current facility-administered medications for this visit.    Facility-Administered Medications Ordered in Other Visits:     LIDOcaine (PF) 10 mg/ml (1%) injection 10 mg, 1 mL, Intradermal, Once, Gabriel Loaiza MD    Review of Systems   Constitutional:  Positive for malaise/fatigue.   HENT:  Negative for hearing loss.    Respiratory:  Negative for cough and shortness of breath.    Cardiovascular:  Negative for chest pain and leg swelling.   Gastrointestinal:  Negative for constipation, diarrhea and nausea.   Genitourinary:  Negative for dysuria and hematuria.   Musculoskeletal:  Negative for joint pain and myalgias.   Skin:  Positive for rash.   Neurological:  Negative for weakness and headaches.   Psychiatric/Behavioral:  Negative for depression.   "        Objective:       Physical Examination:     BP (!) 137/90 (BP Location: Left arm, Patient Position: Sitting, BP Method: Medium (Manual))   Pulse 83   Temp 98.1 °F (36.7 °C)   Resp 16   Ht 5' 6" (1.676 m)   Wt 63.8 kg (140 lb 9.6 oz)   BMI 22.69 kg/m²     Physical Exam  Constitutional:       Appearance: Normal appearance.   HENT:      Head: Normocephalic and atraumatic.      Right Ear: External ear normal.      Left Ear: External ear normal.      Nose: Nose normal.      Mouth/Throat:      Mouth: Mucous membranes are moist.   Eyes:      General: No scleral icterus.     Conjunctiva/sclera: Conjunctivae normal.   Cardiovascular:      Rate and Rhythm: Normal rate.   Pulmonary:      Effort: Pulmonary effort is normal.   Abdominal:      General: Abdomen is flat.   Musculoskeletal:      Right lower leg: No edema.      Left lower leg: No edema.   Skin:     General: Skin is warm and dry.   Neurological:      General: No focal deficit present.      Mental Status: She is alert and oriented to person, place, and time.   Psychiatric:         Mood and Affect: Mood normal.         Behavior: Behavior normal.         Labs:   Recent Results (from the past 336 hour(s))   CBC Auto Differential    Collection Time: 07/09/24 11:39 AM   Result Value Ref Range    WBC 5.95 3.90 - 12.70 K/uL    Hemoglobin 11.6 (L) 12.0 - 16.0 g/dL    Hematocrit 35.2 (L) 37.0 - 48.5 %    Platelets 212 150 - 450 K/uL   CBC Auto Differential    Collection Time: 06/27/24  5:36 PM   Result Value Ref Range    WBC 5.64 3.90 - 12.70 K/uL    Hemoglobin 12.0 12.0 - 16.0 g/dL    Hematocrit 37.3 37.0 - 48.5 %    Platelets 234 150 - 450 K/uL       CMP  Sodium   Date Value Ref Range Status   07/09/2024 140 136 - 145 mmol/L Final     Potassium   Date Value Ref Range Status   07/09/2024 4.3 3.5 - 5.1 mmol/L Final     Chloride   Date Value Ref Range Status   07/09/2024 105 95 - 110 mmol/L Final     CO2   Date Value Ref Range Status   07/09/2024 27 23 - 29 mmol/L " "Final     Glucose   Date Value Ref Range Status   07/09/2024 116 (H) 70 - 110 mg/dL Final     BUN   Date Value Ref Range Status   07/09/2024 23 8 - 23 mg/dL Final     Creatinine   Date Value Ref Range Status   07/09/2024 1.4 0.5 - 1.4 mg/dL Final   12/31/2012 1.2 0.5 - 1.4 mg/dL Final     Calcium   Date Value Ref Range Status   07/09/2024 8.8 8.7 - 10.5 mg/dL Final   12/31/2012 9.7 8.7 - 10.5 mg/dL Final     Total Protein   Date Value Ref Range Status   07/09/2024 6.3 6.0 - 8.4 g/dL Final     Albumin   Date Value Ref Range Status   07/09/2024 4.0 3.5 - 5.2 g/dL Final     Total Bilirubin   Date Value Ref Range Status   07/09/2024 0.5 0.1 - 1.0 mg/dL Final     Comment:     For infants and newborns, interpretation of results should be based  on gestational age, weight and in agreement with clinical  observations.    Premature Infant recommended reference ranges:  Up to 24 hours.............<8.0 mg/dL  Up to 48 hours............<12.0 mg/dL  3-5 days..................<15.0 mg/dL  6-29 days.................<15.0 mg/dL       Alkaline Phosphatase   Date Value Ref Range Status   07/09/2024 61 55 - 135 U/L Final     AST   Date Value Ref Range Status   07/09/2024 13 10 - 40 U/L Final     ALT   Date Value Ref Range Status   07/09/2024 8 (L) 10 - 44 U/L Final     Anion Gap   Date Value Ref Range Status   07/09/2024 8 8 - 16 mmol/L Final   12/31/2012 14 5 - 15 meq/L Final     eGFR if    Date Value Ref Range Status   07/18/2022 >60.0 >60 mL/min/1.73 m^2 Final     eGFR if non    Date Value Ref Range Status   07/18/2022 52.6 (A) >60 mL/min/1.73 m^2 Final     Comment:     Calculation used to obtain the estimated glomerular filtration  rate (eGFR) is the CKD-EPI equation.        No results found for: "CEA"      Assessment/Plan:     Problem List Items Addressed This Visit          Cardiac/Vascular    Primary hypertension       Renal/    Elevated serum creatinine       Oncology    Metastatic renal cell " carcinoma to lung, right - Primary         Renal Cell Carcinoma- Continue Inlyta and and Keytruda; Patient to start Losartan and keep BP log for cardiology   2. BP- Start Losartan and keep BP log; Has clonidine for RALPH over 180.  3. Elevated creat- Repeat labs today    Discussion:     Follow up in about 4 weeks (around 8/6/2024) for with Dr. Wasserman and 8 weeks with me.      Electronically signed by Chiquita Almazan, MSN, APRN, AGNP-C, OCN

## 2024-07-08 ENCOUNTER — OFFICE VISIT (OUTPATIENT)
Dept: CARDIOLOGY | Facility: CLINIC | Age: 84
End: 2024-07-08
Payer: MEDICARE

## 2024-07-08 VITALS
BODY MASS INDEX: 22.8 KG/M2 | HEIGHT: 66 IN | HEART RATE: 88 BPM | SYSTOLIC BLOOD PRESSURE: 160 MMHG | WEIGHT: 141.88 LBS | OXYGEN SATURATION: 98 % | DIASTOLIC BLOOD PRESSURE: 80 MMHG

## 2024-07-08 DIAGNOSIS — I70.0 AORTIC ATHEROSCLEROSIS: ICD-10-CM

## 2024-07-08 DIAGNOSIS — I35.1 NONRHEUMATIC AORTIC VALVE INSUFFICIENCY: ICD-10-CM

## 2024-07-08 DIAGNOSIS — C78.01 METASTATIC RENAL CELL CARCINOMA TO LUNG, RIGHT: ICD-10-CM

## 2024-07-08 DIAGNOSIS — R06.09 DOE (DYSPNEA ON EXERTION): ICD-10-CM

## 2024-07-08 DIAGNOSIS — Z90.2 S/P LOBECTOMY OF LUNG: Primary | ICD-10-CM

## 2024-07-08 DIAGNOSIS — I10 PRIMARY HYPERTENSION: ICD-10-CM

## 2024-07-08 DIAGNOSIS — C64.9 METASTATIC RENAL CELL CARCINOMA TO LUNG, RIGHT: ICD-10-CM

## 2024-07-08 PROCEDURE — 99999 PR PBB SHADOW E&M-EST. PATIENT-LVL IV: CPT | Mod: PBBFAC,,, | Performed by: INTERNAL MEDICINE

## 2024-07-08 PROCEDURE — 99214 OFFICE O/P EST MOD 30 MIN: CPT | Mod: PBBFAC,PN | Performed by: INTERNAL MEDICINE

## 2024-07-08 PROCEDURE — 99213 OFFICE O/P EST LOW 20 MIN: CPT | Mod: S$PBB,,, | Performed by: INTERNAL MEDICINE

## 2024-07-08 RX ORDER — LOSARTAN POTASSIUM 50 MG/1
50 TABLET ORAL 2 TIMES DAILY
Qty: 180 TABLET | Refills: 3 | Status: SHIPPED | OUTPATIENT
Start: 2024-07-08 | End: 2025-07-08

## 2024-07-08 NOTE — PROGRESS NOTES
Patient ID:  Karlie Hess is a 83 y.o. female who presents for follow-up of Congestive Heart Failure and Results (echo)      Primary hypertension  Controlled on amlodipine, labetalol and lisinopril.     Nonrheumatic aortic valve insufficiency  The echocardiogram to assess the aortic valve.     Metastatic renal cell carcinoma to lung, right  Aware followed by Oncology     Aortic atherosclerosis  Noted on CT scan.    She has metastatic renal cell CA.  She is receiving immunotherapy.  She takes 2 pills for that.  She has been taking labetalol 100 mg in the morning and at noon.  She has been working as an .  Blood pressure taking by me 140/80        Past Medical History:   Diagnosis Date    Aortic insufficiency     Cancer 2005    renal ca, L kidney removed    COVID-19 2022    Diastolic heart failure     Diffusion capacity of lung (dl), decreased     Encounter for blood transfusion 1963    with miscarriage    Fibromyalgia     GERD (gastroesophageal reflux disease)     Hypercholesterolemia     Hyperlipidemia     Hypertension     Lung cancer 2015    adenocarcinoma ; upper left lobe    Renal disorder 2005    Left Kidney cancer    Sinusitis         Past Surgical History:   Procedure Laterality Date    ADENOIDECTOMY      BACK SURGERY  2013    laminectomy    ENDOBRONCHIAL ULTRASOUND Left 07/15/2022    Procedure: ENDOBRONCHIAL ULTRASOUND (EBUS);  Surgeon: Cm Freitas MD;  Location: [facility];  Service: Pulmonary;  Laterality: Left;    ENDOBRONCHIAL ULTRASOUND Bilateral 11/09/2023    Procedure: ENDOBRONCHIAL ULTRASOUND (EBUS);  Surgeon: Cm Freitas MD;  Location[facility] OR;  Service: Pulmonary;  Laterality: Bilateral;    ENDOSCOPIC ULTRASOUND OF UPPER GASTROINTESTINAL TRACT N/A 05/13/2024    Procedure: ULTRASOUND, UPPER GI TRACT, ENDOSCOPIC;  Surgeon: Andi Bazzi III, MD;  Location: [facility] ENDO;  Service: Endoscopy;  Laterality: N/A;    HYSTERECTOMY  1975    CARY/BSO. Due to endometriosis.     INJECTION OF JOINT Right 05/27/2019    Procedure: Injection, Joint, Shoulder, Hip, Or Knee;  Surgeon: Zach Wilder MD;  Location: [facility] OR;  Service: Pain Management;  Laterality: Right;  right hip intra-articular injection     INSERTION OF TUNNELED CENTRAL VENOUS CATHETER (CVC) WITH SUBCUTANEOUS PORT N/A 5/31/2024    Procedure: [id number];  Surgeon: Terry Kim MD;  Location: [facility] OR;  Service: General;  Laterality: N/A;    LUNG LOBECTOMY Left     LOWER LOBE    NEPHRECTOMY Left 2005    Entire kidney due to cancer.    ROBOTIC BRONCHOSCOPY Bilateral 11/09/2023    Procedure: ROBOTIC BRONCHOSCOPY;  Surgeon: Cm Freitas MD;  Location: [facility] OR;  Service: Pulmonary;  Laterality: Bilateral;    TONSILLECTOMY            Current Outpatient Medications   Medication Instructions    acetaminophen (TYLENOL) 1,000 mg, Oral, 2 times daily PRN    amLODIPine (NORVASC) 5 mg, Oral, 2 times daily    axitinib (INLYTA) 5 mg Tab Take 1 tablet (5 mg) by mouth 2 (two) times a day.    cloNIDine (CATAPRES) 0.1 mg, Oral, 3 times daily PRN, For systolic BP over 180    cyanocobalamin (VITAMIN B-12) 100 mcg, Oral, Daily    DULoxetine (CYMBALTA) 60 mg, Oral, Daily    hydroCHLOROthiazide (HYDRODIURIL) 12.5 mg, Oral, Daily PRN    hydrOXYzine HCL (ATARAX) 25 mg, 3 times daily PRN    ipratropium (ATROVENT) 42 mcg (0.06 %) nasal spray Each Nostril    labetaloL (NORMODYNE) 200 mg, Oral, 2 times daily    losartan (COZAAR) 50 mg, Oral, 2 times daily    ondansetron (ZOFRAN) 8 mg, Oral, Every 8 hours PRN    pantoprazole (PROTONIX) 40 mg, Oral, Daily    promethazine (PHENERGAN) 25 mg, Oral, Every 6 hours PRN    rosuvastatin (CRESTOR) 10 mg, Oral, Nightly    UNABLE TO FIND 1 capsule, Oral, Daily, Prevagen  otc<BR>        Review of patient's allergies indicates:   Allergen Reactions    Hydrocodone Hallucinations    Oxycodone-acetaminophen Hallucinations and Other (See Comments)        Review of Systems   Cardiovascular:  Positive for dyspnea  "on exertion and leg swelling. Negative for chest pain and irregular heartbeat.   Respiratory:  Negative for cough and shortness of breath.         Objective:     Vitals:    07/08/24 1558   BP: (!) 160/80   BP Location: Left arm   Patient Position: Sitting   BP Method: Medium (Manual)   Pulse: 88   SpO2: 98%   Weight: 64.4 kg (141 lb 13.9 oz)   Height: 5' 6" (1.676 m)       Physical Exam  Vitals and nursing note reviewed.   Constitutional:       Appearance: She is well-developed.   HENT:      Head: Normocephalic and atraumatic.   Eyes:      Conjunctiva/sclera: Conjunctivae normal.   Cardiovascular:      Rate and Rhythm: Normal rate and regular rhythm.      Heart sounds: Normal heart sounds.   Pulmonary:      Effort: Pulmonary effort is normal.      Breath sounds: Normal breath sounds.   Abdominal:      General: Bowel sounds are normal.      Palpations: Abdomen is soft.   Musculoskeletal:         General: Normal range of motion.   Skin:     General: Skin is warm and dry.   Neurological:      Mental Status: She is alert and oriented to person, place, and time.   Psychiatric:         Behavior: Behavior normal.         Thought Content: Thought content normal.         Judgment: Judgment normal.       CMP  Sodium   Date Value Ref Range Status   07/17/2024 142 136 - 145 mmol/L Final     Potassium   Date Value Ref Range Status   07/17/2024 4.4 3.5 - 5.1 mmol/L Final     Chloride   Date Value Ref Range Status   07/17/2024 105 95 - 110 mmol/L Final     CO2   Date Value Ref Range Status   07/17/2024 29 23 - 29 mmol/L Final     Glucose   Date Value Ref Range Status   07/17/2024 88 70 - 110 mg/dL Final     BUN   Date Value Ref Range Status   07/17/2024 21 8 - 23 mg/dL Final     Creatinine   Date Value Ref Range Status   07/17/2024 1.3 0.5 - 1.4 mg/dL Final   12/31/2012 1.2 0.5 - 1.4 mg/dL Final     Calcium   Date Value Ref Range Status   07/17/2024 8.6 (L) 8.7 - 10.5 mg/dL Final   12/31/2012 9.7 8.7 - 10.5 mg/dL Final     Total " Protein   Date Value Ref Range Status   07/17/2024 6.5 6.0 - 8.4 g/dL Final     Albumin   Date Value Ref Range Status   07/17/2024 4.0 3.5 - 5.2 g/dL Final     Total Bilirubin   Date Value Ref Range Status   07/17/2024 0.5 0.1 - 1.0 mg/dL Final     Comment:     For infants and newborns, interpretation of results should be based  on gestational age, weight and in agreement with clinical  observations.    Premature Infant recommended reference ranges:  Up to 24 hours.............<8.0 mg/dL  Up to 48 hours............<12.0 mg/dL  3-5 days..................<15.0 mg/dL  6-29 days.................<15.0 mg/dL       Alkaline Phosphatase   Date Value Ref Range Status   07/17/2024 62 55 - 135 U/L Final     AST   Date Value Ref Range Status   07/17/2024 13 10 - 40 U/L Final     ALT   Date Value Ref Range Status   07/17/2024 8 (L) 10 - 44 U/L Final     Anion Gap   Date Value Ref Range Status   07/17/2024 8 8 - 16 mmol/L Final   12/31/2012 14 5 - 15 meq/L Final     eGFR if    Date Value Ref Range Status   07/18/2022 >60.0 >60 mL/min/1.73 m^2 Final     eGFR if non    Date Value Ref Range Status   07/18/2022 52.6 (A) >60 mL/min/1.73 m^2 Final     Comment:     Calculation used to obtain the estimated glomerular filtration  rate (eGFR) is the CKD-EPI equation.         BMP  Lab Results   Component Value Date     07/17/2024    K 4.4 07/17/2024     07/17/2024    CO2 29 07/17/2024    BUN 21 07/17/2024    CREATININE 1.3 07/17/2024    CALCIUM 8.6 (L) 07/17/2024    ANIONGAP 8 07/17/2024    ESTGFRAFRICA >60.0 07/18/2022    EGFRNONAA 52.6 (A) 07/18/2022      BNP  @LABRCNTIP(BNP,BNPTRIAGEBLO)@   Lab Results   Component Value Date    CHOL 157 09/02/2020    CHOL 285 (H) 05/04/2020    CHOL 264 (H) 08/15/2019     Lab Results   Component Value Date    HDL 42 09/02/2020    HDL 37 (L) 05/04/2020    HDL 47 08/15/2019     Lab Results   Component Value Date    LDLCALC 84.8 09/02/2020    LDLCALC 192.0 (H)  "05/04/2020    LDLCALC 186.6 (H) 08/15/2019     Lab Results   Component Value Date    TRIG 151 (H) 09/02/2020    TRIG 280 (H) 05/04/2020    TRIG 152 (H) 08/15/2019     Lab Results   Component Value Date    CHOLHDL 26.8 09/02/2020    CHOLHDL 13.0 (L) 05/04/2020    CHOLHDL 17.8 (L) 08/15/2019      Lab Results   Component Value Date    TSH 4.681 06/27/2024     No results found for: "LABA1C", "HGBA1C"  Lab Results   Component Value Date    WBC 5.96 07/17/2024    HGB 11.9 (L) 07/17/2024    HCT 37.3 07/17/2024    MCV 88 07/17/2024     07/17/2024         Results for orders placed during the hospital encounter of 07/05/24    Echo Saline Bubble? No    Interpretation Summary    Left Ventricle: The left ventricle is normal in size. Mild basal septal thickening. There is mild asymmetric hypertrophy. There is normal systolic function. Ejection fraction by visual approximation is 60%. Grade I diastolic dysfunction.    Right Ventricle: Normal right ventricular cavity size. Wall thickness is normal. Systolic function is normal.    Aortic Valve: There is mild aortic regurgitation with a centrally directed jet.    Mitral Valve: There is mild regurgitation with a centrally directed jet.    Tricuspid Valve: There is mild regurgitation.    IVC/SVC: Normal venous pressure at 3 mmHg.    The estimated pulmonary artery systolic pressure is 23 mmHg.     No results found for this or any previous visit.     EKG  Results for orders placed or performed during the hospital encounter of 02/29/24   EKG 12-lead    Collection Time: 02/29/24  9:27 AM   Result Value Ref Range    QRS Duration 82 ms    [facility] QTC Calculation 464 ms    Narrative    Test Reason : R06.02,    Vent. Rate : 082 BPM     Atrial Rate : 082 BPM     P-R Int : 144 ms          QRS Dur : 082 ms      QT Int : 398 ms       P-R-T Axes : 041 066 060 degrees     QTc Int : 464 ms    Normal sinus rhythm  Normal ECG  When compared with ECG of 11-OCT-2022 14:03,  No significant change was " found  Confirmed by Yovana AZUL, Niraj GUALLPA ([id number]) on 2/29/2024 11:16:55 PM    Referred By: [email or web address]   SELF           Confirmed By:Niraj Yen MD      Stress  Results for orders placed during the hospital encounter of 10/18/22    Nuclear Stress - Cardiology Interpreted    Interpretation Summary    Normal myocardial perfusion scan. There is no evidence of myocardial ischemia or infarction.    The gated perfusion images showed an ejection fraction of 85% post stress. Normal ejection fraction is greater than 47%.    There is normal wall motion post stress.    The EKG portion of this study is negative for ischemia.    The patient reported no chest pain during the stress test.    There were no arrhythmias during stress.    T.i.d. 0.93             Assessment:       Primary hypertension  On amlodipine, labetalol and lisinopril drill to losartan b.i.d.    Nonrheumatic aortic valve insufficiency  Will try to control her blood pressure better    Aortic atherosclerosis  On antihypertensive as well as a statin    Metastatic renal cell carcinoma to lung, right  Aware followed by Heme-Onc       Plan:       Discontinue lisinopril.  She is to take losartan 50 mg p.o. b.i.d..  She will be seen in the office in 1 month with a HERMAN

## 2024-07-09 ENCOUNTER — OFFICE VISIT (OUTPATIENT)
Facility: CLINIC | Age: 84
End: 2024-07-09
Payer: MEDICARE

## 2024-07-09 ENCOUNTER — LAB VISIT (OUTPATIENT)
Dept: LAB | Facility: HOSPITAL | Age: 84
End: 2024-07-09
Attending: NURSE PRACTITIONER
Payer: MEDICARE

## 2024-07-09 VITALS
DIASTOLIC BLOOD PRESSURE: 90 MMHG | SYSTOLIC BLOOD PRESSURE: 137 MMHG | HEIGHT: 66 IN | BODY MASS INDEX: 22.6 KG/M2 | TEMPERATURE: 98 F | WEIGHT: 140.63 LBS | RESPIRATION RATE: 16 BRPM | HEART RATE: 83 BPM

## 2024-07-09 DIAGNOSIS — C78.01 METASTATIC RENAL CELL CARCINOMA TO LUNG, RIGHT: Primary | ICD-10-CM

## 2024-07-09 DIAGNOSIS — I10 PRIMARY HYPERTENSION: ICD-10-CM

## 2024-07-09 DIAGNOSIS — C78.01 METASTATIC RENAL CELL CARCINOMA TO LUNG, RIGHT: ICD-10-CM

## 2024-07-09 DIAGNOSIS — R79.89 ELEVATED SERUM CREATININE: ICD-10-CM

## 2024-07-09 DIAGNOSIS — C64.9 METASTATIC RENAL CELL CARCINOMA TO LUNG, RIGHT: Primary | ICD-10-CM

## 2024-07-09 DIAGNOSIS — C64.9 METASTATIC RENAL CELL CARCINOMA TO LUNG, RIGHT: ICD-10-CM

## 2024-07-09 LAB
ALBUMIN SERPL BCP-MCNC: 4 G/DL (ref 3.5–5.2)
ALP SERPL-CCNC: 61 U/L (ref 55–135)
ALT SERPL W/O P-5'-P-CCNC: 8 U/L (ref 10–44)
ANION GAP SERPL CALC-SCNC: 8 MMOL/L (ref 8–16)
AST SERPL-CCNC: 13 U/L (ref 10–40)
BASOPHILS # BLD AUTO: 0.03 K/UL (ref 0–0.2)
BASOPHILS NFR BLD: 0.5 % (ref 0–1.9)
BILIRUB SERPL-MCNC: 0.5 MG/DL (ref 0.1–1)
BUN SERPL-MCNC: 23 MG/DL (ref 8–23)
CALCIUM SERPL-MCNC: 8.8 MG/DL (ref 8.7–10.5)
CHLORIDE SERPL-SCNC: 105 MMOL/L (ref 95–110)
CO2 SERPL-SCNC: 27 MMOL/L (ref 23–29)
CREAT SERPL-MCNC: 1.4 MG/DL (ref 0.5–1.4)
DIFFERENTIAL METHOD BLD: ABNORMAL
EOSINOPHIL # BLD AUTO: 0.2 K/UL (ref 0–0.5)
EOSINOPHIL NFR BLD: 3.9 % (ref 0–8)
ERYTHROCYTE [DISTWIDTH] IN BLOOD BY AUTOMATED COUNT: 13.5 % (ref 11.5–14.5)
EST. GFR  (NO RACE VARIABLE): 37.1 ML/MIN/1.73 M^2
GLUCOSE SERPL-MCNC: 116 MG/DL (ref 70–110)
HCT VFR BLD AUTO: 35.2 % (ref 37–48.5)
HGB BLD-MCNC: 11.6 G/DL (ref 12–16)
IMM GRANULOCYTES # BLD AUTO: 0.01 K/UL (ref 0–0.04)
IMM GRANULOCYTES NFR BLD AUTO: 0.2 % (ref 0–0.5)
LYMPHOCYTES # BLD AUTO: 1.3 K/UL (ref 1–4.8)
LYMPHOCYTES NFR BLD: 22.4 % (ref 18–48)
MCH RBC QN AUTO: 27.8 PG (ref 27–31)
MCHC RBC AUTO-ENTMCNC: 33 G/DL (ref 32–36)
MCV RBC AUTO: 84 FL (ref 82–98)
MONOCYTES # BLD AUTO: 0.5 K/UL (ref 0.3–1)
MONOCYTES NFR BLD: 7.6 % (ref 4–15)
NEUTROPHILS # BLD AUTO: 3.9 K/UL (ref 1.8–7.7)
NEUTROPHILS NFR BLD: 65.4 % (ref 38–73)
NRBC BLD-RTO: 0 /100 WBC
PLATELET # BLD AUTO: 212 K/UL (ref 150–450)
PMV BLD AUTO: 8.5 FL (ref 9.2–12.9)
POTASSIUM SERPL-SCNC: 4.3 MMOL/L (ref 3.5–5.1)
PROT SERPL-MCNC: 6.3 G/DL (ref 6–8.4)
RBC # BLD AUTO: 4.17 M/UL (ref 4–5.4)
SODIUM SERPL-SCNC: 140 MMOL/L (ref 136–145)
WBC # BLD AUTO: 5.95 K/UL (ref 3.9–12.7)

## 2024-07-09 PROCEDURE — 99215 OFFICE O/P EST HI 40 MIN: CPT | Mod: S$PBB,,, | Performed by: NURSE PRACTITIONER

## 2024-07-09 PROCEDURE — 80053 COMPREHEN METABOLIC PANEL: CPT | Performed by: NURSE PRACTITIONER

## 2024-07-09 PROCEDURE — 85025 COMPLETE CBC W/AUTO DIFF WBC: CPT | Performed by: NURSE PRACTITIONER

## 2024-07-09 PROCEDURE — 99999 PR PBB SHADOW E&M-EST. PATIENT-LVL IV: CPT | Mod: PBBFAC,,, | Performed by: NURSE PRACTITIONER

## 2024-07-09 PROCEDURE — G2211 COMPLEX E/M VISIT ADD ON: HCPCS | Mod: S$PBB,,, | Performed by: NURSE PRACTITIONER

## 2024-07-09 PROCEDURE — 36415 COLL VENOUS BLD VENIPUNCTURE: CPT | Performed by: NURSE PRACTITIONER

## 2024-07-09 PROCEDURE — 99214 OFFICE O/P EST MOD 30 MIN: CPT | Mod: PBBFAC,PN | Performed by: NURSE PRACTITIONER

## 2024-07-17 ENCOUNTER — LAB VISIT (OUTPATIENT)
Dept: LAB | Facility: HOSPITAL | Age: 84
End: 2024-07-17
Attending: NURSE PRACTITIONER
Payer: MEDICARE

## 2024-07-17 DIAGNOSIS — C78.01 METASTATIC RENAL CELL CARCINOMA TO LUNG, RIGHT: ICD-10-CM

## 2024-07-17 DIAGNOSIS — C64.9 METASTATIC RENAL CELL CARCINOMA TO LUNG, RIGHT: ICD-10-CM

## 2024-07-17 LAB
ALBUMIN SERPL BCP-MCNC: 4 G/DL (ref 3.5–5.2)
ALP SERPL-CCNC: 62 U/L (ref 55–135)
ALT SERPL W/O P-5'-P-CCNC: 8 U/L (ref 10–44)
ANION GAP SERPL CALC-SCNC: 8 MMOL/L (ref 8–16)
AST SERPL-CCNC: 13 U/L (ref 10–40)
BASOPHILS # BLD AUTO: 0.04 K/UL (ref 0–0.2)
BASOPHILS NFR BLD: 0.7 % (ref 0–1.9)
BILIRUB SERPL-MCNC: 0.5 MG/DL (ref 0.1–1)
BUN SERPL-MCNC: 21 MG/DL (ref 8–23)
CALCIUM SERPL-MCNC: 8.6 MG/DL (ref 8.7–10.5)
CHLORIDE SERPL-SCNC: 105 MMOL/L (ref 95–110)
CO2 SERPL-SCNC: 29 MMOL/L (ref 23–29)
CREAT SERPL-MCNC: 1.3 MG/DL (ref 0.5–1.4)
DIFFERENTIAL METHOD BLD: ABNORMAL
EOSINOPHIL # BLD AUTO: 0.3 K/UL (ref 0–0.5)
EOSINOPHIL NFR BLD: 4.4 % (ref 0–8)
ERYTHROCYTE [DISTWIDTH] IN BLOOD BY AUTOMATED COUNT: 13.7 % (ref 11.5–14.5)
EST. GFR  (NO RACE VARIABLE): 40.5 ML/MIN/1.73 M^2
GLUCOSE SERPL-MCNC: 88 MG/DL (ref 70–110)
HCT VFR BLD AUTO: 37.3 % (ref 37–48.5)
HGB BLD-MCNC: 11.9 G/DL (ref 12–16)
IMM GRANULOCYTES # BLD AUTO: 0.02 K/UL (ref 0–0.04)
IMM GRANULOCYTES NFR BLD AUTO: 0.3 % (ref 0–0.5)
LYMPHOCYTES # BLD AUTO: 1.3 K/UL (ref 1–4.8)
LYMPHOCYTES NFR BLD: 22.1 % (ref 18–48)
MCH RBC QN AUTO: 28.1 PG (ref 27–31)
MCHC RBC AUTO-ENTMCNC: 31.9 G/DL (ref 32–36)
MCV RBC AUTO: 88 FL (ref 82–98)
MONOCYTES # BLD AUTO: 0.6 K/UL (ref 0.3–1)
MONOCYTES NFR BLD: 9.2 % (ref 4–15)
NEUTROPHILS # BLD AUTO: 3.8 K/UL (ref 1.8–7.7)
NEUTROPHILS NFR BLD: 63.3 % (ref 38–73)
NRBC BLD-RTO: 0 /100 WBC
PLATELET # BLD AUTO: 236 K/UL (ref 150–450)
PMV BLD AUTO: 9.7 FL (ref 9.2–12.9)
POTASSIUM SERPL-SCNC: 4.4 MMOL/L (ref 3.5–5.1)
PROT SERPL-MCNC: 6.5 G/DL (ref 6–8.4)
RBC # BLD AUTO: 4.23 M/UL (ref 4–5.4)
SODIUM SERPL-SCNC: 142 MMOL/L (ref 136–145)
WBC # BLD AUTO: 5.96 K/UL (ref 3.9–12.7)

## 2024-07-17 PROCEDURE — 36415 COLL VENOUS BLD VENIPUNCTURE: CPT | Performed by: NURSE PRACTITIONER

## 2024-07-17 PROCEDURE — 80053 COMPREHEN METABOLIC PANEL: CPT | Performed by: NURSE PRACTITIONER

## 2024-07-17 PROCEDURE — 85025 COMPLETE CBC W/AUTO DIFF WBC: CPT | Performed by: NURSE PRACTITIONER

## 2024-07-18 RX ORDER — DIPHENHYDRAMINE HYDROCHLORIDE 50 MG/ML
50 INJECTION INTRAMUSCULAR; INTRAVENOUS ONCE AS NEEDED
Status: CANCELLED | OUTPATIENT
Start: 2024-07-18

## 2024-07-18 RX ORDER — SODIUM CHLORIDE 0.9 % (FLUSH) 0.9 %
10 SYRINGE (ML) INJECTION
Status: CANCELLED | OUTPATIENT
Start: 2024-07-18

## 2024-07-18 RX ORDER — HEPARIN 100 UNIT/ML
500 SYRINGE INTRAVENOUS
Status: CANCELLED | OUTPATIENT
Start: 2024-07-18

## 2024-07-18 RX ORDER — EPINEPHRINE 0.3 MG/.3ML
0.3 INJECTION SUBCUTANEOUS ONCE AS NEEDED
Status: CANCELLED | OUTPATIENT
Start: 2024-07-18

## 2024-07-19 ENCOUNTER — INFUSION (OUTPATIENT)
Dept: INFUSION THERAPY | Facility: HOSPITAL | Age: 84
End: 2024-07-19
Attending: INTERNAL MEDICINE
Payer: MEDICARE

## 2024-07-19 ENCOUNTER — TELEPHONE (OUTPATIENT)
Facility: CLINIC | Age: 84
End: 2024-07-19
Payer: MEDICARE

## 2024-07-19 VITALS
OXYGEN SATURATION: 98 % | DIASTOLIC BLOOD PRESSURE: 90 MMHG | SYSTOLIC BLOOD PRESSURE: 164 MMHG | WEIGHT: 142.38 LBS | HEART RATE: 79 BPM | RESPIRATION RATE: 16 BRPM | TEMPERATURE: 98 F | HEIGHT: 66 IN | BODY MASS INDEX: 22.88 KG/M2

## 2024-07-19 DIAGNOSIS — C78.01 METASTATIC RENAL CELL CARCINOMA TO LUNG, RIGHT: Primary | ICD-10-CM

## 2024-07-19 DIAGNOSIS — C64.9 METASTATIC RENAL CELL CARCINOMA TO LUNG, RIGHT: Primary | ICD-10-CM

## 2024-07-19 DIAGNOSIS — L29.2 VULVAR PRURITUS: ICD-10-CM

## 2024-07-19 PROCEDURE — 25000003 PHARM REV CODE 250: Performed by: INTERNAL MEDICINE

## 2024-07-19 PROCEDURE — 96413 CHEMO IV INFUSION 1 HR: CPT

## 2024-07-19 PROCEDURE — 63600175 PHARM REV CODE 636 W HCPCS: Performed by: INTERNAL MEDICINE

## 2024-07-19 RX ORDER — DIPHENHYDRAMINE HYDROCHLORIDE 50 MG/ML
50 INJECTION INTRAMUSCULAR; INTRAVENOUS ONCE AS NEEDED
Status: DISCONTINUED | OUTPATIENT
Start: 2024-07-19 | End: 2024-07-19 | Stop reason: HOSPADM

## 2024-07-19 RX ORDER — SODIUM CHLORIDE 0.9 % (FLUSH) 0.9 %
10 SYRINGE (ML) INJECTION
Status: DISCONTINUED | OUTPATIENT
Start: 2024-07-19 | End: 2024-07-19 | Stop reason: HOSPADM

## 2024-07-19 RX ORDER — HEPARIN 100 UNIT/ML
500 SYRINGE INTRAVENOUS
Status: DISCONTINUED | OUTPATIENT
Start: 2024-07-19 | End: 2024-07-19 | Stop reason: HOSPADM

## 2024-07-19 RX ORDER — EPINEPHRINE 0.3 MG/.3ML
0.3 INJECTION SUBCUTANEOUS ONCE AS NEEDED
Status: DISCONTINUED | OUTPATIENT
Start: 2024-07-19 | End: 2024-07-19 | Stop reason: HOSPADM

## 2024-07-19 RX ADMIN — SODIUM CHLORIDE 200 MG: 9 INJECTION, SOLUTION INTRAVENOUS at 02:07

## 2024-07-19 RX ADMIN — HEPARIN 500 UNITS: 100 SYRINGE at 03:07

## 2024-07-19 NOTE — TELEPHONE ENCOUNTER
Patient call regarding vaginal pruritus,  she has been having vaginal pruritus since last Saturday and got worst Tuesday, no secretion or discharge no bleeding no problem with urination no changes in color, some little bumps, she is concerned if it has something to do with her medication she is in Ixitinib now. Message sent to Laquita De Leon NP, for advice.

## 2024-07-23 DIAGNOSIS — I35.1 NONRHEUMATIC AORTIC VALVE INSUFFICIENCY: ICD-10-CM

## 2024-07-23 DIAGNOSIS — I10 PRIMARY HYPERTENSION: ICD-10-CM

## 2024-07-24 RX ORDER — LABETALOL 200 MG/1
200 TABLET, FILM COATED ORAL 2 TIMES DAILY
Qty: 180 TABLET | Refills: 3 | Status: SHIPPED | OUTPATIENT
Start: 2024-07-24

## 2024-07-30 ENCOUNTER — HOSPITAL ENCOUNTER (OUTPATIENT)
Facility: HOSPITAL | Age: 84
Discharge: HOME | End: 2024-07-31
Attending: EMERGENCY MEDICINE | Admitting: INTERNAL MEDICINE
Payer: MEDICARE

## 2024-07-30 DIAGNOSIS — I63.9 CVA (CEREBRAL VASCULAR ACCIDENT): ICD-10-CM

## 2024-07-30 DIAGNOSIS — C80.1 CANCER: ICD-10-CM

## 2024-07-30 DIAGNOSIS — G45.9 TIA (TRANSIENT ISCHEMIC ATTACK): ICD-10-CM

## 2024-07-30 DIAGNOSIS — I10 HYPERTENSION, UNSPECIFIED TYPE: ICD-10-CM

## 2024-07-30 DIAGNOSIS — R55 VASOVAGAL NEAR SYNCOPE: Primary | ICD-10-CM

## 2024-07-30 DIAGNOSIS — I10 PRIMARY HYPERTENSION: ICD-10-CM

## 2024-07-30 DIAGNOSIS — R07.9 CHEST PAIN: ICD-10-CM

## 2024-07-30 PROBLEM — R41.0 CONFUSION: Status: ACTIVE | Noted: 2024-07-30

## 2024-07-30 LAB
ALBUMIN SERPL BCP-MCNC: 3.9 G/DL (ref 3.5–5.2)
ALP SERPL-CCNC: 58 U/L (ref 55–135)
ALT SERPL W/O P-5'-P-CCNC: 11 U/L (ref 10–44)
ANION GAP SERPL CALC-SCNC: 7 MMOL/L (ref 8–16)
AST SERPL-CCNC: 16 U/L (ref 10–40)
BASOPHILS # BLD AUTO: 0.03 K/UL (ref 0–0.2)
BASOPHILS NFR BLD: 0.3 % (ref 0–1.9)
BILIRUB SERPL-MCNC: 0.4 MG/DL (ref 0.1–1)
BNP SERPL-MCNC: 92 PG/ML (ref 0–99)
BUN SERPL-MCNC: 21 MG/DL (ref 8–23)
CALCIUM SERPL-MCNC: 8.7 MG/DL (ref 8.7–10.5)
CHLORIDE SERPL-SCNC: 105 MMOL/L (ref 95–110)
CHOLEST SERPL-MCNC: 176 MG/DL (ref 120–199)
CHOLEST/HDLC SERPL: 4.4 {RATIO} (ref 2–5)
CO2 SERPL-SCNC: 28 MMOL/L (ref 23–29)
CREAT SERPL-MCNC: 1.4 MG/DL (ref 0.5–1.4)
CREAT SERPL-MCNC: 1.4 MG/DL (ref 0.5–1.4)
DIFFERENTIAL METHOD BLD: ABNORMAL
EOSINOPHIL # BLD AUTO: 0.3 K/UL (ref 0–0.5)
EOSINOPHIL NFR BLD: 3.5 % (ref 0–8)
ERYTHROCYTE [DISTWIDTH] IN BLOOD BY AUTOMATED COUNT: 13.9 % (ref 11.5–14.5)
EST. GFR  (NO RACE VARIABLE): 37.1 ML/MIN/1.73 M^2
GLUCOSE SERPL-MCNC: 96 MG/DL (ref 70–110)
GLUCOSE SERPL-MCNC: 96 MG/DL (ref 70–110)
HCT VFR BLD AUTO: 34.7 % (ref 37–48.5)
HDLC SERPL-MCNC: 40 MG/DL (ref 40–75)
HDLC SERPL: 22.7 % (ref 20–50)
HGB BLD-MCNC: 11.4 G/DL (ref 12–16)
IMM GRANULOCYTES # BLD AUTO: 0.04 K/UL (ref 0–0.04)
IMM GRANULOCYTES NFR BLD AUTO: 0.5 % (ref 0–0.5)
INR PPP: 0.9 (ref 0.8–1.2)
LDLC SERPL CALC-MCNC: ABNORMAL MG/DL (ref 63–159)
LYMPHOCYTES # BLD AUTO: 1.7 K/UL (ref 1–4.8)
LYMPHOCYTES NFR BLD: 19.1 % (ref 18–48)
MCH RBC QN AUTO: 28.1 PG (ref 27–31)
MCHC RBC AUTO-ENTMCNC: 32.9 G/DL (ref 32–36)
MCV RBC AUTO: 86 FL (ref 82–98)
MONOCYTES # BLD AUTO: 0.8 K/UL (ref 0.3–1)
MONOCYTES NFR BLD: 8.7 % (ref 4–15)
NEUTROPHILS # BLD AUTO: 6 K/UL (ref 1.8–7.7)
NEUTROPHILS NFR BLD: 67.9 % (ref 38–73)
NONHDLC SERPL-MCNC: 136 MG/DL
NRBC BLD-RTO: 0 /100 WBC
PLATELET # BLD AUTO: 215 K/UL (ref 150–450)
PMV BLD AUTO: 9.3 FL (ref 9.2–12.9)
POTASSIUM SERPL-SCNC: 3.8 MMOL/L (ref 3.5–5.1)
PROT SERPL-MCNC: 6.3 G/DL (ref 6–8.4)
PROTHROMBIN TIME: 10.6 SEC (ref 9–12.5)
RBC # BLD AUTO: 4.06 M/UL (ref 4–5.4)
SAMPLE: NORMAL
SODIUM SERPL-SCNC: 140 MMOL/L (ref 136–145)
T4 FREE SERPL-MCNC: 0.66 NG/DL (ref 0.71–1.51)
TRIGL SERPL-MCNC: 417 MG/DL (ref 30–150)
TROPONIN I SERPL HS-MCNC: 8.5 PG/ML (ref 0–14.9)
TSH SERPL DL<=0.005 MIU/L-ACNC: 12.4 UIU/ML (ref 0.34–5.6)
WBC # BLD AUTO: 8.76 K/UL (ref 3.9–12.7)

## 2024-07-30 PROCEDURE — 84439 ASSAY OF FREE THYROXINE: CPT | Performed by: EMERGENCY MEDICINE

## 2024-07-30 PROCEDURE — 83880 ASSAY OF NATRIURETIC PEPTIDE: CPT | Performed by: EMERGENCY MEDICINE

## 2024-07-30 PROCEDURE — 84443 ASSAY THYROID STIM HORMONE: CPT | Performed by: EMERGENCY MEDICINE

## 2024-07-30 PROCEDURE — 96374 THER/PROPH/DIAG INJ IV PUSH: CPT

## 2024-07-30 PROCEDURE — 63600175 PHARM REV CODE 636 W HCPCS: Performed by: EMERGENCY MEDICINE

## 2024-07-30 PROCEDURE — 80053 COMPREHEN METABOLIC PANEL: CPT | Performed by: EMERGENCY MEDICINE

## 2024-07-30 PROCEDURE — 85610 PROTHROMBIN TIME: CPT | Performed by: EMERGENCY MEDICINE

## 2024-07-30 PROCEDURE — G0408 INPT/TELE FOLLOW UP 35: HCPCS | Mod: 95,G0,, | Performed by: STUDENT IN AN ORGANIZED HEALTH CARE EDUCATION/TRAINING PROGRAM

## 2024-07-30 PROCEDURE — 82962 GLUCOSE BLOOD TEST: CPT

## 2024-07-30 PROCEDURE — 85025 COMPLETE CBC W/AUTO DIFF WBC: CPT | Performed by: EMERGENCY MEDICINE

## 2024-07-30 PROCEDURE — 99285 EMERGENCY DEPT VISIT HI MDM: CPT | Mod: 25

## 2024-07-30 PROCEDURE — 93010 ELECTROCARDIOGRAM REPORT: CPT | Mod: ,,, | Performed by: INTERNAL MEDICINE

## 2024-07-30 PROCEDURE — 84484 ASSAY OF TROPONIN QUANT: CPT | Performed by: EMERGENCY MEDICINE

## 2024-07-30 PROCEDURE — 25000003 PHARM REV CODE 250: Performed by: EMERGENCY MEDICINE

## 2024-07-30 PROCEDURE — 80061 LIPID PANEL: CPT | Performed by: EMERGENCY MEDICINE

## 2024-07-30 PROCEDURE — 93005 ELECTROCARDIOGRAM TRACING: CPT | Performed by: INTERNAL MEDICINE

## 2024-07-30 RX ORDER — ACETAMINOPHEN 500 MG
1000 TABLET ORAL
Status: COMPLETED | OUTPATIENT
Start: 2024-07-30 | End: 2024-07-30

## 2024-07-30 RX ORDER — LORAZEPAM 2 MG/ML
1 INJECTION INTRAMUSCULAR
Status: COMPLETED | OUTPATIENT
Start: 2024-07-30 | End: 2024-07-30

## 2024-07-30 RX ORDER — ASPIRIN 325 MG
325 TABLET ORAL
Status: COMPLETED | OUTPATIENT
Start: 2024-07-30 | End: 2024-07-30

## 2024-07-30 RX ADMIN — ASPIRIN 325 MG ORAL TABLET 325 MG: 325 PILL ORAL at 11:07

## 2024-07-30 RX ADMIN — ACETAMINOPHEN 1000 MG: 500 TABLET, FILM COATED ORAL at 11:07

## 2024-07-30 RX ADMIN — LORAZEPAM 1 MG: 2 INJECTION INTRAMUSCULAR; INTRAVENOUS at 11:07

## 2024-07-30 NOTE — Clinical Note
Diagnosis: TIA (transient ischemic attack) [[id number]]   Future Attending Provider: YUSRA MENARD [[id number]]   Place in Observation: [facility] [[id number]]

## 2024-07-31 ENCOUNTER — CLINICAL SUPPORT (OUTPATIENT)
Dept: CARDIOLOGY | Facility: HOSPITAL | Age: 84
End: 2024-07-31
Attending: INTERNAL MEDICINE
Payer: MEDICARE

## 2024-07-31 VITALS
SYSTOLIC BLOOD PRESSURE: 157 MMHG | HEART RATE: 83 BPM | RESPIRATION RATE: 16 BRPM | DIASTOLIC BLOOD PRESSURE: 68 MMHG | OXYGEN SATURATION: 98 % | BODY MASS INDEX: 22.6 KG/M2 | TEMPERATURE: 98 F | WEIGHT: 140 LBS

## 2024-07-31 VITALS — HEIGHT: 66 IN | BODY MASS INDEX: 22.5 KG/M2 | WEIGHT: 140 LBS

## 2024-07-31 PROBLEM — R55 VASOVAGAL NEAR SYNCOPE: Status: ACTIVE | Noted: 2024-07-31

## 2024-07-31 PROBLEM — R41.0 CONFUSION: Status: RESOLVED | Noted: 2024-07-30 | Resolved: 2024-07-31

## 2024-07-31 PROBLEM — N93.9 VAGINAL BLEEDING: Status: ACTIVE | Noted: 2024-07-31

## 2024-07-31 LAB
ALBUMIN SERPL BCP-MCNC: 3.8 G/DL (ref 3.5–5.2)
ALP SERPL-CCNC: 57 U/L (ref 55–135)
ALT SERPL W/O P-5'-P-CCNC: 10 U/L (ref 10–44)
ANION GAP SERPL CALC-SCNC: 8 MMOL/L (ref 8–16)
AORTIC ROOT ANNULUS: 3 CM
AORTIC VALVE CUSP SEPERATION: 1.8 CM
APICAL FOUR CHAMBER EJECTION FRACTION: 66 %
APICAL TWO CHAMBER EJECTION FRACTION: 62 %
APTT PPP: 26 SEC (ref 21–32)
AST SERPL-CCNC: 16 U/L (ref 10–40)
AV INDEX (PROSTH): 0.89
AV MEAN GRADIENT: 2 MMHG
AV PEAK GRADIENT: 5 MMHG
AV REGURGITATION PRESSURE HALF TIME: 567 MS
AV VALVE AREA BY VELOCITY RATIO: 2.38 CM²
AV VALVE AREA: 2.27 CM²
AV VELOCITY RATIO: 0.93
BACTERIA #/AREA URNS HPF: ABNORMAL /HPF
BASOPHILS # BLD AUTO: 0.02 K/UL (ref 0–0.2)
BASOPHILS NFR BLD: 0.3 % (ref 0–1.9)
BILIRUB SERPL-MCNC: 0.4 MG/DL (ref 0.1–1)
BILIRUB UR QL STRIP: NEGATIVE
BSA FOR ECHO PROCEDURE: 1.72 M2
BUN SERPL-MCNC: 21 MG/DL (ref 8–23)
CALCIUM SERPL-MCNC: 8.6 MG/DL (ref 8.7–10.5)
CHLORIDE SERPL-SCNC: 107 MMOL/L (ref 95–110)
CLARITY UR: CLEAR
CO2 SERPL-SCNC: 26 MMOL/L (ref 23–29)
COLOR UR: COLORLESS
CREAT SERPL-MCNC: 1.2 MG/DL (ref 0.5–1.4)
CV ECHO LV RWT: 0.48 CM
DIFFERENTIAL METHOD BLD: ABNORMAL
DOP CALC AO PEAK VEL: 1.07 M/S
DOP CALC AO VTI: 21.7 CM
DOP CALC LVOT AREA: 2.5 CM2
DOP CALC LVOT DIAMETER: 1.8 CM
DOP CALC LVOT PEAK VEL: 1 M/S
DOP CALC LVOT STROKE VOLUME: 49.34 CM3
DOP CALC MV VTI: 22 CM
DOP CALCLVOT PEAK VEL VTI: 19.4 CM
E WAVE DECELERATION TIME: 194 MSEC
E/A RATIO: 0.65
E/E' RATIO: 12.33 M/S
ECHO LV POSTERIOR WALL: 1 CM (ref 0.6–1.1)
EOSINOPHIL # BLD AUTO: 0.3 K/UL (ref 0–0.5)
EOSINOPHIL NFR BLD: 3.4 % (ref 0–8)
ERYTHROCYTE [DISTWIDTH] IN BLOOD BY AUTOMATED COUNT: 13.7 % (ref 11.5–14.5)
EST. GFR  (NO RACE VARIABLE): 44.6 ML/MIN/1.73 M^2
ESTIMATED AVG GLUCOSE: 117 MG/DL (ref 68–131)
FRACTIONAL SHORTENING: 33 % (ref 28–44)
GLUCOSE SERPL-MCNC: 96 MG/DL (ref 70–110)
GLUCOSE UR QL STRIP: NEGATIVE
HBA1C MFR BLD: 5.7 % (ref 4.5–6.2)
HCT VFR BLD AUTO: 34.1 % (ref 37–48.5)
HGB BLD-MCNC: 11.2 G/DL (ref 12–16)
HGB UR QL STRIP: ABNORMAL
HYALINE CASTS #/AREA URNS LPF: 0 /LPF
IMM GRANULOCYTES # BLD AUTO: 0.03 K/UL (ref 0–0.04)
IMM GRANULOCYTES NFR BLD AUTO: 0.4 % (ref 0–0.5)
INR PPP: 0.9 (ref 0.8–1.2)
INTERVENTRICULAR SEPTUM: 1 CM (ref 0.6–1.1)
IVC DIAMETER: 1.1 CM
KETONES UR QL STRIP: NEGATIVE
LEFT INTERNAL DIMENSION IN SYSTOLE: 2.8 CM (ref 2.1–4)
LEFT VENTRICLE DIASTOLIC VOLUME INDEX: 45.69 ML/M2
LEFT VENTRICLE DIASTOLIC VOLUME: 78.58 ML
LEFT VENTRICLE END DIASTOLIC VOLUME APICAL 2 CHAMBER: 52.3 ML
LEFT VENTRICLE END DIASTOLIC VOLUME APICAL 4 CHAMBER: 68.9 ML
LEFT VENTRICLE MASS INDEX: 80 G/M2
LEFT VENTRICLE SYSTOLIC VOLUME INDEX: 17.2 ML/M2
LEFT VENTRICLE SYSTOLIC VOLUME: 29.55 ML
LEFT VENTRICULAR INTERNAL DIMENSION IN DIASTOLE: 4.2 CM (ref 3.5–6)
LEFT VENTRICULAR MASS: 137.25 G
LEUKOCYTE ESTERASE UR QL STRIP: NEGATIVE
LV LATERAL E/E' RATIO: 12.33 M/S
LV SEPTAL E/E' RATIO: 12.33 M/S
LVED V (TEICH): 78.58 ML
LVES V (TEICH): 29.55 ML
LVOT MG: 2 MMHG
LVOT MV: 0.63 CM/S
LYMPHOCYTES # BLD AUTO: 1.8 K/UL (ref 1–4.8)
LYMPHOCYTES NFR BLD: 23.8 % (ref 18–48)
MAGNESIUM SERPL-MCNC: 1.8 MG/DL (ref 1.6–2.6)
MCH RBC QN AUTO: 28.1 PG (ref 27–31)
MCHC RBC AUTO-ENTMCNC: 32.8 G/DL (ref 32–36)
MCV RBC AUTO: 86 FL (ref 82–98)
MICROSCOPIC COMMENT: ABNORMAL
MONOCYTES # BLD AUTO: 0.6 K/UL (ref 0.3–1)
MONOCYTES NFR BLD: 8.3 % (ref 4–15)
MV MEAN GRADIENT: 3 MMHG
MV PEAK A VEL: 1.14 M/S
MV PEAK E VEL: 0.74 M/S
MV PEAK GRADIENT: 6 MMHG
MV VALVE AREA BY CONTINUITY EQUATION: 2.24 CM2
NEUTROPHILS # BLD AUTO: 4.9 K/UL (ref 1.8–7.7)
NEUTROPHILS NFR BLD: 63.8 % (ref 38–73)
NITRITE UR QL STRIP: NEGATIVE
NRBC BLD-RTO: 0 /100 WBC
OHS LV EJECTION FRACTION SIMPSONS BIPLANE MOD: 64 %
OHS QRS DURATION: 86 MS
OHS QTC CALCULATION: 469 MS
PH UR STRIP: 7 [PH] (ref 5–8)
PISA AR MAX VEL: 3.89 M/S
PISA MRMAX VEL: 4.31 M/S
PISA TR MAX VEL: 2.34 M/S
PLATELET # BLD AUTO: 198 K/UL (ref 150–450)
PMV BLD AUTO: 9 FL (ref 9.2–12.9)
POTASSIUM SERPL-SCNC: 3.9 MMOL/L (ref 3.5–5.1)
PROT SERPL-MCNC: 6 G/DL (ref 6–8.4)
PROT UR QL STRIP: ABNORMAL
PROTHROMBIN TIME: 10.6 SEC (ref 9–12.5)
PV MV: 0.59 M/S
PV PEAK GRADIENT: 3 MMHG
PV PEAK VELOCITY: 0.86 M/S
RA PRESSURE ESTIMATED: 3 MMHG
RBC # BLD AUTO: 3.99 M/UL (ref 4–5.4)
RBC #/AREA URNS HPF: 4 /HPF (ref 0–4)
RV TB RVSP: 5 MMHG
RV TISSUE DOPPLER FREE WALL SYSTOLIC VELOCITY 1 (APICAL 4 CHAMBER VIEW): 14.1 CM/S
SODIUM SERPL-SCNC: 141 MMOL/L (ref 136–145)
SP GR UR STRIP: 1.01 (ref 1–1.03)
SQUAMOUS #/AREA URNS HPF: 1 /HPF
TDI LATERAL: 0.06 M/S
TDI SEPTAL: 0.06 M/S
TDI: 0.06 M/S
TR MAX PG: 22 MMHG
TV REST PULMONARY ARTERY PRESSURE: 25 MMHG
URN SPEC COLLECT METH UR: ABNORMAL
UROBILINOGEN UR STRIP-ACNC: NEGATIVE EU/DL
WBC # BLD AUTO: 7.62 K/UL (ref 3.9–12.7)
WBC #/AREA URNS HPF: 1 /HPF (ref 0–5)
YEAST URNS QL MICRO: ABNORMAL
Z-SCORE OF LEFT VENTRICULAR DIMENSION IN END DIASTOLE: -1.27
Z-SCORE OF LEFT VENTRICULAR DIMENSION IN END SYSTOLE: -0.42

## 2024-07-31 PROCEDURE — 93306 TTE W/DOPPLER COMPLETE: CPT

## 2024-07-31 PROCEDURE — 85610 PROTHROMBIN TIME: CPT | Performed by: INTERNAL MEDICINE

## 2024-07-31 PROCEDURE — 85730 THROMBOPLASTIN TIME PARTIAL: CPT | Performed by: INTERNAL MEDICINE

## 2024-07-31 PROCEDURE — 25000003 PHARM REV CODE 250: Performed by: EMERGENCY MEDICINE

## 2024-07-31 PROCEDURE — G0378 HOSPITAL OBSERVATION PER HR: HCPCS

## 2024-07-31 PROCEDURE — 97165 OT EVAL LOW COMPLEX 30 MIN: CPT

## 2024-07-31 PROCEDURE — 97530 THERAPEUTIC ACTIVITIES: CPT

## 2024-07-31 PROCEDURE — 83735 ASSAY OF MAGNESIUM: CPT | Performed by: INTERNAL MEDICINE

## 2024-07-31 PROCEDURE — 85025 COMPLETE CBC W/AUTO DIFF WBC: CPT | Performed by: INTERNAL MEDICINE

## 2024-07-31 PROCEDURE — 80053 COMPREHEN METABOLIC PANEL: CPT | Performed by: INTERNAL MEDICINE

## 2024-07-31 PROCEDURE — 81001 URINALYSIS AUTO W/SCOPE: CPT | Performed by: INTERNAL MEDICINE

## 2024-07-31 PROCEDURE — 92610 EVALUATE SWALLOWING FUNCTION: CPT

## 2024-07-31 PROCEDURE — 83036 HEMOGLOBIN GLYCOSYLATED A1C: CPT | Performed by: INTERNAL MEDICINE

## 2024-07-31 PROCEDURE — 25000003 PHARM REV CODE 250: Performed by: FAMILY MEDICINE

## 2024-07-31 PROCEDURE — 92523 SPEECH SOUND LANG COMPREHEN: CPT

## 2024-07-31 PROCEDURE — 97161 PT EVAL LOW COMPLEX 20 MIN: CPT

## 2024-07-31 PROCEDURE — 25000003 PHARM REV CODE 250: Performed by: INTERNAL MEDICINE

## 2024-07-31 RX ORDER — IBUPROFEN 200 MG
24 TABLET ORAL
Status: DISCONTINUED | OUTPATIENT
Start: 2024-07-31 | End: 2024-07-31 | Stop reason: HOSPADM

## 2024-07-31 RX ORDER — GLUCAGON 1 MG
1 KIT INJECTION
Status: DISCONTINUED | OUTPATIENT
Start: 2024-07-31 | End: 2024-07-31 | Stop reason: HOSPADM

## 2024-07-31 RX ORDER — TALC
6 POWDER (GRAM) TOPICAL NIGHTLY PRN
Status: DISCONTINUED | OUTPATIENT
Start: 2024-07-31 | End: 2024-07-31 | Stop reason: HOSPADM

## 2024-07-31 RX ORDER — LISINOPRIL 20 MG/1
20 TABLET ORAL DAILY
Start: 2024-07-31

## 2024-07-31 RX ORDER — NALOXONE HCL 0.4 MG/ML
0.02 VIAL (ML) INJECTION
Status: DISCONTINUED | OUTPATIENT
Start: 2024-07-31 | End: 2024-07-31 | Stop reason: HOSPADM

## 2024-07-31 RX ORDER — LABETALOL 100 MG/1
100 TABLET, FILM COATED ORAL
Status: COMPLETED | OUTPATIENT
Start: 2024-07-31 | End: 2024-07-31

## 2024-07-31 RX ORDER — BISACODYL 10 MG/1
10 SUPPOSITORY RECTAL DAILY PRN
Status: DISCONTINUED | OUTPATIENT
Start: 2024-08-01 | End: 2024-07-31 | Stop reason: HOSPADM

## 2024-07-31 RX ORDER — LANOLIN ALCOHOL/MO/W.PET/CERES
800 CREAM (GRAM) TOPICAL
Status: DISCONTINUED | OUTPATIENT
Start: 2024-07-31 | End: 2024-07-31 | Stop reason: HOSPADM

## 2024-07-31 RX ORDER — ASPIRIN 81 MG/1
81 TABLET ORAL DAILY
Status: DISCONTINUED | OUTPATIENT
Start: 2024-07-31 | End: 2024-07-31 | Stop reason: HOSPADM

## 2024-07-31 RX ORDER — ATORVASTATIN CALCIUM 40 MG/1
40 TABLET, FILM COATED ORAL DAILY
Status: DISCONTINUED | OUTPATIENT
Start: 2024-07-31 | End: 2024-07-31

## 2024-07-31 RX ORDER — LABETALOL HYDROCHLORIDE 5 MG/ML
10 INJECTION, SOLUTION INTRAVENOUS
Status: DISCONTINUED | OUTPATIENT
Start: 2024-07-31 | End: 2024-07-31 | Stop reason: HOSPADM

## 2024-07-31 RX ORDER — SODIUM CHLORIDE 0.9 % (FLUSH) 0.9 %
10 SYRINGE (ML) INJECTION
Status: DISCONTINUED | OUTPATIENT
Start: 2024-07-31 | End: 2024-07-31 | Stop reason: HOSPADM

## 2024-07-31 RX ORDER — ASPIRIN 81 MG/1
81 TABLET ORAL DAILY
Start: 2024-07-31

## 2024-07-31 RX ORDER — ONDANSETRON 4 MG/1
8 TABLET, ORALLY DISINTEGRATING ORAL EVERY 8 HOURS PRN
Status: DISCONTINUED | OUTPATIENT
Start: 2024-07-31 | End: 2024-07-31 | Stop reason: HOSPADM

## 2024-07-31 RX ORDER — LISINOPRIL 20 MG/1
20 TABLET ORAL
Status: COMPLETED | OUTPATIENT
Start: 2024-07-31 | End: 2024-07-31

## 2024-07-31 RX ORDER — LABETALOL 200 MG/1
200 TABLET, FILM COATED ORAL
Status: COMPLETED | OUTPATIENT
Start: 2024-07-31 | End: 2024-07-31

## 2024-07-31 RX ORDER — ATORVASTATIN CALCIUM 40 MG/1
40 TABLET, FILM COATED ORAL DAILY
Status: DISCONTINUED | OUTPATIENT
Start: 2024-07-31 | End: 2024-07-31 | Stop reason: HOSPADM

## 2024-07-31 RX ORDER — BISACODYL 10 MG/1
10 SUPPOSITORY RECTAL DAILY PRN
Status: DISCONTINUED | OUTPATIENT
Start: 2024-07-31 | End: 2024-07-31 | Stop reason: HOSPADM

## 2024-07-31 RX ORDER — SODIUM,POTASSIUM PHOSPHATES 280-250MG
2 POWDER IN PACKET (EA) ORAL
Status: DISCONTINUED | OUTPATIENT
Start: 2024-07-31 | End: 2024-07-31 | Stop reason: HOSPADM

## 2024-07-31 RX ORDER — ACETAMINOPHEN 325 MG/1
650 TABLET ORAL EVERY 8 HOURS PRN
Status: DISCONTINUED | OUTPATIENT
Start: 2024-07-31 | End: 2024-07-31 | Stop reason: HOSPADM

## 2024-07-31 RX ORDER — ACETAMINOPHEN 325 MG/1
650 TABLET ORAL EVERY 4 HOURS PRN
Status: DISCONTINUED | OUTPATIENT
Start: 2024-07-31 | End: 2024-07-31 | Stop reason: HOSPADM

## 2024-07-31 RX ORDER — ONDANSETRON HYDROCHLORIDE 2 MG/ML
4 INJECTION, SOLUTION INTRAVENOUS EVERY 8 HOURS PRN
Status: DISCONTINUED | OUTPATIENT
Start: 2024-07-31 | End: 2024-07-31 | Stop reason: HOSPADM

## 2024-07-31 RX ORDER — ENOXAPARIN SODIUM 100 MG/ML
30 INJECTION SUBCUTANEOUS EVERY 24 HOURS
Status: DISCONTINUED | OUTPATIENT
Start: 2024-07-31 | End: 2024-07-31 | Stop reason: HOSPADM

## 2024-07-31 RX ORDER — CLONIDINE HYDROCHLORIDE 0.1 MG/1
0.1 TABLET ORAL 3 TIMES DAILY PRN
Status: DISCONTINUED | OUTPATIENT
Start: 2024-07-31 | End: 2024-07-31 | Stop reason: HOSPADM

## 2024-07-31 RX ORDER — POLYETHYLENE GLYCOL 3350 17 G/17G
17 POWDER, FOR SOLUTION ORAL DAILY PRN
Status: DISCONTINUED | OUTPATIENT
Start: 2024-07-31 | End: 2024-07-31 | Stop reason: HOSPADM

## 2024-07-31 RX ORDER — SODIUM CHLORIDE 0.9 % (FLUSH) 0.9 %
2 SYRINGE (ML) INJECTION EVERY 12 HOURS PRN
Status: DISCONTINUED | OUTPATIENT
Start: 2024-07-31 | End: 2024-07-31 | Stop reason: HOSPADM

## 2024-07-31 RX ORDER — SODIUM CHLORIDE 9 MG/ML
INJECTION, SOLUTION INTRAVENOUS CONTINUOUS
Status: DISCONTINUED | OUTPATIENT
Start: 2024-07-31 | End: 2024-07-31 | Stop reason: HOSPADM

## 2024-07-31 RX ORDER — CLONIDINE HYDROCHLORIDE 0.1 MG/1
0.1 TABLET ORAL 3 TIMES DAILY PRN
Qty: 90 TABLET | Refills: 0 | Status: SHIPPED | OUTPATIENT
Start: 2024-07-31

## 2024-07-31 RX ORDER — ALUMINUM HYDROXIDE, MAGNESIUM HYDROXIDE, AND SIMETHICONE 1200; 120; 1200 MG/30ML; MG/30ML; MG/30ML
30 SUSPENSION ORAL 4 TIMES DAILY PRN
Status: DISCONTINUED | OUTPATIENT
Start: 2024-07-31 | End: 2024-07-31 | Stop reason: HOSPADM

## 2024-07-31 RX ORDER — ENOXAPARIN SODIUM 100 MG/ML
40 INJECTION SUBCUTANEOUS EVERY 24 HOURS
Status: DISCONTINUED | OUTPATIENT
Start: 2024-07-31 | End: 2024-07-31

## 2024-07-31 RX ORDER — DULOXETIN HYDROCHLORIDE 30 MG/1
30 CAPSULE, DELAYED RELEASE ORAL DAILY
Status: DISCONTINUED | OUTPATIENT
Start: 2024-07-31 | End: 2024-07-31 | Stop reason: HOSPADM

## 2024-07-31 RX ORDER — AMOXICILLIN 250 MG
1 CAPSULE ORAL DAILY PRN
Status: DISCONTINUED | OUTPATIENT
Start: 2024-07-31 | End: 2024-07-31 | Stop reason: HOSPADM

## 2024-07-31 RX ORDER — PANTOPRAZOLE SODIUM 40 MG/1
40 TABLET, DELAYED RELEASE ORAL DAILY
Status: DISCONTINUED | OUTPATIENT
Start: 2024-07-31 | End: 2024-07-31 | Stop reason: HOSPADM

## 2024-07-31 RX ORDER — LABETALOL 200 MG/1
200 TABLET, FILM COATED ORAL 2 TIMES DAILY
Status: DISCONTINUED | OUTPATIENT
Start: 2024-07-31 | End: 2024-07-31 | Stop reason: HOSPADM

## 2024-07-31 RX ORDER — IBUPROFEN 200 MG
16 TABLET ORAL
Status: DISCONTINUED | OUTPATIENT
Start: 2024-07-31 | End: 2024-07-31 | Stop reason: HOSPADM

## 2024-07-31 RX ORDER — CLONIDINE HYDROCHLORIDE 0.1 MG/1
0.1 TABLET ORAL 3 TIMES DAILY PRN
Qty: 90 TABLET | Refills: 0 | Status: CANCELLED | OUTPATIENT
Start: 2024-07-31

## 2024-07-31 RX ADMIN — LABETALOL HYDROCHLORIDE 100 MG: 100 TABLET, FILM COATED ORAL at 12:07

## 2024-07-31 RX ADMIN — LISINOPRIL 20 MG: 20 TABLET ORAL at 01:07

## 2024-07-31 RX ADMIN — LABETALOL HYDROCHLORIDE 200 MG: 200 TABLET, FILM COATED ORAL at 01:07

## 2024-07-31 RX ADMIN — ASPIRIN 81 MG: 81 TABLET, COATED ORAL at 09:07

## 2024-07-31 RX ADMIN — SODIUM CHLORIDE: 9 INJECTION, SOLUTION INTRAVENOUS at 02:07

## 2024-07-31 RX ADMIN — DULOXETINE HYDROCHLORIDE 30 MG: 30 CAPSULE, DELAYED RELEASE ORAL at 09:07

## 2024-07-31 RX ADMIN — PANTOPRAZOLE SODIUM 40 MG: 40 TABLET, DELAYED RELEASE ORAL at 06:07

## 2024-07-31 NOTE — ASSESSMENT & PLAN NOTE
Given negative MRI, carotid ultrasound, TTE and passing blood clots per vagina with stable hemoglobin this is most likely working diagnosis.  TIA still on the differential but less likely given previous findings.  Given this neurosurgery recommends aspirin but not Plavix due to vaginal bleeding and follow up with PCP in consider neurology referral unless symptoms completely have resolved.

## 2024-07-31 NOTE — PLAN OF CARE
Patient cleared for discharge by case management to home, no needs.        07/31/24 1545   Final Note   Assessment Type Final Discharge Note   Anticipated Discharge Disposition Home   Hospital Resources/Appts/Education Provided Appointments scheduled and added to AVS

## 2024-07-31 NOTE — HOSPITAL COURSE
83-year-old female with history of lung cancer and kidney cancer and recent vaginal bleeding who completed a cervical biopsy approximately 4 days ago.  She was having increasing vaginal bleeding and yesterday was passing blood clots per vagina.  After this happened she stood up and felt faint and became really dizzy and confused but did not fall down and did not pass out.  By the time she arrived in the ED near-syncope symptoms had resolved and she was seen by tele stroke/neurosurgery who agreed that there were no focal deficits and all symptoms had stopped.  She had a vaginal exam in the ED which showed minimal bleeding and patient believes the bleeding is drastically slowing down.  Hemoglobin remained stable here she had a MRI of the head that was negative for CVA and carotid ultrasounds which is not show any significant stenosis.  TTE was within normal limits EF 55% and normal wall motion.  Blood pressures were elevated and we restarted her home blood pressure medicines.  Patient was discharged home in stable condition follow up with PCP in 1 week and to return to ED if any recurrence of symptoms.

## 2024-07-31 NOTE — TELEMEDICINE CONSULT
[facility]  Vascular Neurology  Comprehensive Stroke Center  TeleVascular Neurology Acute Consultation Note        Consult Information  Consult to Telemedicine - Acute Stroke  Consult performed by: Gal Barnett MD  Consult ordered by: Maksim Smith MD          Consulting Provider: MAKSIM SMITH   Current Providers  No providers found    Patient Location:  [facility] EMERGENCY DEPARTMENT Emergency Department    Spoke hospital nurse at bedside with patient assisting consultant.  Patient information was obtained from patient, past medical records, and ER records.       Stroke Documentation  Acute Stroke Times   Last Known Normal Date: 07/30/24  Last Known Normal Time: 2145  Symptom Onset Date: 07/30/24  Stroke Team Called Date: 07/30/24  Stroke Team Called Time: 2221  Stroke Team Arrival Date: 07/30/24  Stroke Team Arrival Time: 2223  CT Interpretation Time: 2232  Thrombolytic Therapy Recommended: No    NIH Scale:  1a. Level of Consciousness: 0-->Alert, keenly responsive  1b. LOC Questions: 0-->Answers both questions correctly  1c. LOC Commands: 0-->Performs both tasks correctly  2. Best Gaze: 0-->Normal  3. Visual: 0-->No visual loss  4. Facial Palsy: 0-->Normal symmetrical movements  5a. Motor Arm, Left: 0-->No drift, limb holds 90 (or 45) degrees for full 10 secs  5b. Motor Arm, Right: 0-->No drift, limb holds 90 (or 45) degrees for full 10 secs  6a. Motor Leg, Left: 0-->No drift, leg holds 30 degree position for full 5 secs  6b. Motor Leg, Right: 0-->No drift, leg holds 30 degree position for full 5 secs  7. Limb Ataxia: 0-->Absent  8. Sensory: 0-->Normal, no sensory loss  9. Best Language: 0-->No aphasia, normal  10. Dysarthria: 0-->Normal  11. Extinction and Inattention (formerly Neglect): 0-->No abnormality  Total (NIH Stroke Scale): 0      Modified Gates: Score: 0  Felton Coma Scale:     ABCD2 Score:    [id number] Score:    HAS -BLED Score:    ICH Score:    Hunt & Yoder  Classification:      Blood pressure (!) 175/84, pulse 82, temperature 97.7 °F (36.5 °C), resp. rate 18, weight 63.5 kg (140 lb), SpO2 98%.      In my opinion, this was a: Tier 1; VAN Stroke Assessment: Negative     Medical Decision Making  HPI:  83 y.o. female with a history of metastatic RCC w/ lung mets, HTN who presents with AMS.    A cervical biopsy done today morning after which she had some vaginal bleeding.    30 minutes PTA, she reportedly became confused.  In the ER she seemed confused and was taking longer than normal to respond to questions (?word finding difficulty).  A component of anxiety was suspected as well.    /84     Images personally reviewed and interpreted:  Study: Head CT and CTA Head & Neck  Study Interpretation: Chronic infarcts in the left basal ganglia and the right caudate     Assessment and plan:  84 y/o female with history as above who presents with confusion, slow responses.      Symptoms appear to have resolved.  Her exam is normal right now.    DDX includes TIA vs mimic  Avoid DAPT due to recent vaginal bleeding. Start ASA 81mg daily.  MRI brain w/ MRA head and neck.       Lytics recommendation: Thrombolytic therapy not recommended due to Patient back to neurological baseline  Thrombectomy recommendation: No; at this time symptoms not suggestive of large vessel occlusion  Placement recommendation: admit to inpatient               ROS  Physical Exam  Neurological:      Mental Status: She is alert and oriented to person, place, and time.      Cranial Nerves: No dysarthria or facial asymmetry.      Motor: No weakness or pronator drift.      Comments:  No aphasia       Past Medical History:   Diagnosis Date    Aortic insufficiency     Cancer 2005    renal ca, L kidney removed    COVID-19 2022    Diastolic heart failure     Diffusion capacity of lung (dl), decreased     Encounter for blood transfusion 1963    with miscarriage    Fibromyalgia     GERD (gastroesophageal reflux  disease)     Hypercholesterolemia     Hyperlipidemia     Hypertension     Lung cancer 2015    adenocarcinoma ; upper left lobe    Renal disorder 2005    Left Kidney cancer    Sinusitis      Past Surgical History:   Procedure Laterality Date    ADENOIDECTOMY      BACK SURGERY  2013    laminectomy    ENDOBRONCHIAL ULTRASOUND Left 07/15/2022    Procedure: ENDOBRONCHIAL ULTRASOUND (EBUS);  Surgeon: Cm Freitas MD;  Location: [facility];  Service: Pulmonary;  Laterality: Left;    ENDOBRONCHIAL ULTRASOUND Bilateral 11/09/2023    Procedure: ENDOBRONCHIAL ULTRASOUND (EBUS);  Surgeon: Cm Freitas MD;  Location: [facility] OR;  Service: Pulmonary;  Laterality: Bilateral;    ENDOSCOPIC ULTRASOUND OF UPPER GASTROINTESTINAL TRACT N/A 05/13/2024    Procedure: ULTRASOUND, UPPER GI TRACT, ENDOSCOPIC;  Surgeon: Andi Bazzi III, MD;  Location: [facility] ENDO;  Service: Endoscopy;  Laterality: N/A;    HYSTERECTOMY  1975    CARY/BSO. Due to endometriosis.    INJECTION OF JOINT Right 05/27/2019    Procedure: Injection, Joint, Shoulder, Hip, Or Knee;  Surgeon: Zach Wilder MD;  Location: [facility];  Service: Pain Management;  Laterality: Right;  right hip intra-articular injection     INSERTION OF TUNNELED CENTRAL VENOUS CATHETER (CVC) WITH SUBCUTANEOUS PORT N/A 5/31/2024    Procedure: [id number];  Surgeon: Terry Kim MD;  Location: [facility];  Service: General;  Laterality: N/A;    LUNG LOBECTOMY Left     LOWER LOBE    NEPHRECTOMY Left 2005    Entire kidney due to cancer.    ROBOTIC BRONCHOSCOPY Bilateral 11/09/2023    Procedure: ROBOTIC BRONCHOSCOPY;  Surgeon: Cm Freitas MD;  Location: [facility];  Service: Pulmonary;  Laterality: Bilateral;    TONSILLECTOMY       Family History   Problem Relation Name Age of Onset    Hypertension Mother      Breast cancer Maternal Aunt      Breast cancer Maternal Aunt         Diagnoses  Problem Noted   Confusion 7/30/2024       Gal Barnett MD      Emergent/Acute  neurological consultation requested by spoke provider due to critical concerns for possible cerebrovascular event that could result in permanent loss of neurologic/bodily function, severe disability or death of this patient.  Immediate/timely evaluation by a highly prepared expert is paramount for optimal outcomes  High risk for neurological deterioration if not properly diagnosed  High risk for neurological deterioration if not treated promplty/as soon as possible  Complex diagnostic evaluation may be required (advanced imaging)  High risk treatment options (thrombolytics and/or thrombectomy)    Patient care was coordinated with spoke provider, including but not limted to    Discussing likely diagnosis/etiology of symptoms  Making recommendations for further diagnostic studies  Making recommendations for intravenous thrombolytics or other advanced therapies  Making recommendations for disposition (admission/transfer for higher level of care)

## 2024-07-31 NOTE — PLAN OF CARE
[facility] - Emergency Dept  Initial Discharge Assessment       Primary Care Provider: Tao Orozco MD    Admission Diagnosis: TIA (transient ischemic attack) [G45.9]    Admission Date: 7/30/2024  Expected Discharge Date:     Assessment and facesheet verification done at bedside. All demographic information correct in chart. Patient lives alone - family to assist if needed when dc'd . Home DME : BP machine , denies need for additional DME at this time. States she is independent with all ADL's . No anticipated needs at time of DC , will update as needed.     Ca ( sister ) # [phone] ,will transport patient home from facility .     Transition of Care Barriers: None    Payor: MEDICARE / Plan: MEDICARE PART A & B / Product Type: Government /     Extended Emergency Contact Information  Primary Emergency Contact: DeshawnShondaCandance  Mobile Phone: [phone]  Relation: Grandchild  Preferred language: English   needed? No  Secondary Emergency Contact: Crow Hess  Address: [address] [place] [address] [facility]  Mobile Phone: [phone]  Relation: Son    Discharge Plan A: Home  Discharge Plan B: Home with family      [facility] - [place] [id number] [facility]  [address] [address]  Phone: [phone] Fax: [phone]    [id number] [place], [place] - [address]  AT [address] & [facility]  [address] [facility] [address]  Phone: [phone] Fax: [phone]    [facility] - [place], [place] - [address]  [address]  [address] [address]  Phone: [phone] Fax: [phone]      Initial Assessment (most recent)       Adult Discharge Assessment - 07/31/24 1040          Discharge Assessment    Assessment Type Discharge Planning Assessment     Confirmed/corrected address, phone number and insurance Yes      Confirmed Demographics Correct on Facesheet     Source of Information patient     When was your last doctors appointment? 12/01/23     Does patient/caregiver understand observation status Yes     Communicated MEENAKSHI with patient/caregiver Yes     Reason For Admission Seizure     People in Home alone     Do you expect to return to your current living situation? Yes     Do you have help at home or someone to help you manage your care at home? Yes     Prior to hospitilization cognitive status: Alert/Oriented     Current cognitive status: Alert/Oriented     Walking or Climbing Stairs Difficulty no     Dressing/Bathing Difficulty no     Home Layout Able to live on 1st floor     Equipment Currently Used at Home blood pressure machine     Readmission within 30 days? No     Patient currently being followed by outpatient case management? No     Do you currently have service(s) that help you manage your care at home? No     Do you take prescription medications? Yes     Do you have prescription coverage? Yes     Do you have any problems affording any of your prescribed medications? No     Is the patient taking medications as prescribed? yes     Who is going to help you get home at discharge? Ca ( sister )     How do you get to doctors appointments? family or friend will provide     Are you on dialysis? No     Do you take coumadin? No     Discharge Plan A Home     Discharge Plan B Home with family     DME Needed Upon Discharge  none     Discharge Plan discussed with: Patient;Adult children     Transition of Care Barriers None        Transportation Needs    Has the lack of transportation kept you from medical appointments, meetings, work or from getting things needed for daily living? No

## 2024-07-31 NOTE — PROGRESS NOTES
Pharmacist Renal Dose Adjustment Note    Karlie Hess is a 83 y.o. female being treated with Enoxaparin.     Patient Data:    Vital Signs (Most Recent):  Temp: 97.7 °F (36.5 °C) (07/30/24 2138)  Pulse: 80 (07/31/24 0128)  Resp: 15 (07/31/24 0128)  BP: (!) 153/63 (07/31/24 0128)  SpO2: 95 % (07/31/24 0128) Vital Signs (72h Range):  Temp:  [97.7 °F (36.5 °C)]   Pulse:  [75-83]   Resp:  [14-18]   BP: (153-209)/(63-95)   SpO2:  [95 %-98 %]      Recent Labs   Lab 07/30/24  2224   CREATININE 1.4     Serum creatinine: 1.4 mg/dL 07/30/24 2224  Estimated creatinine clearance: 28 mL/min    Enoxaparin 40 mg every 24 hours will be changed to Enoxaparin 30 mg every 24 hours due to CrCl < 30 per Renal Dose Adjustment protocol.    Pharmacist's Name: Renee Martinez  Pharmacist's Extension: [phone]

## 2024-07-31 NOTE — PT/OT/SLP EVAL
Physical Therapy Evaluation and Discharge Note    Patient Name:  Karlie Hess   MRN:  [id number]    Recommendations:     Discharge Recommendations: No Therapy Indicated  Discharge Equipment Recommendations: none   Barriers to discharge: None    Assessment:     Karlie Hess is a 83 y.o. female admitted with a medical diagnosis of Confusion. Patient is agreeable to participation with PT evaluation in ED. She reports 2/10 headache at rest. She lives with granddaughter and is independent at baseline. 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 Strength and coordination testing WNL. She requests to use the [facility] to void and performs supine <> sit <> stand and step transfer to [facility] with supervision. BP taken while patient sitting on [facility] of 202/93 with RN present to administer BP meds and said patient is ok to ambulate since there is a possibility of D/C home today to assess needs. She then ambulated 200' in hallway with no AD and SBA. She returned to bed with BP of 210/100 and all needs met. At this time, patient is functioning at their prior level of function and does not require further acute PT services.     Recent Surgery: * No surgery found *      Plan:     During this hospitalization, patient does not require further acute PT services.  Please re-consult if situation changes.      Subjective     Chief Complaint: needs to pee   Patient/Family Comments/goals: get to [facility]  Pain/Comfort:  Pain Rating 1: 2/10  Location 1: head  Pain Addressed 1: Reposition, Distraction    Patients cultural, spiritual, Mormon conflicts given the current situation:      Living Environment:  Patient lives with granddaughter  Prior to admission, patients level of function was independent. 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 She denies any recent falls or PT. (+) drive.  Equipment used at home: none.  DME owned (not currently used): none.  Upon discharge, patient will have assistance from granddaughter.    Objective:      Communicated with CARLOS Sanchez prior to session.  Patient found HOB elevated with blood pressure cuff, peripheral IV, pulse ox (continuous), telemetry upon PT entry to room.    General Precautions: Standard, fall    Orthopedic Precautions:N/A   Braces: N/A  Respiratory Status: Room air    Exams:  Fine Motor Coordination:    -       Intact  Left hand, finger to nose, Right hand, finger to nose, Left hand thumb/finger opposition skills, Right hand thumb/finger opposition skills, Left hand, diadochokinesis skill , and Right hand, diadochokinesis skill   RLE Strength: WNL  LLE Strength: WNL    Functional Mobility:  Bed Mobility:     Supine to Sit: supervision  Transfers:     Sit to Stand:  supervision with no AD  Gait: 200' in hallway with no AD and SBA    AM-PAC 6 CLICK MOBILITY  Total Score:24       Treatment and Education:  Patient was educated on the importance of OOB activity and functional mobility to negate negative effects of prolonged bed rest during hospitalization, safe transfers and ambulation, and D/C planning     She requires supervision for BSC transfer and performs hygiene independently     AM-PAC 6 CLICK MOBILITY  Total Score:24     Patient left HOB elevated with all lines intact and call button in reach.    GOALS:   Multidisciplinary Problems       Physical Therapy Goals       Not on file                    History:     Past Medical History:   Diagnosis Date    Aortic insufficiency     Cancer 2005    renal ca, L kidney removed    COVID-19 2022    Diastolic heart failure     Diffusion capacity of lung (dl), decreased     Encounter for blood transfusion 1963    with miscarriage    Fibromyalgia     GERD (gastroesophageal reflux disease)     Hypercholesterolemia     Hyperlipidemia     Hypertension     Lung cancer 2015    adenocarcinoma ; upper left lobe    Renal disorder 2005    Left Kidney cancer    Sinusitis        Past Surgical History:   Procedure Laterality Date    ADENOIDECTOMY      BACK SURGERY   2013    laminectomy    ENDOBRONCHIAL ULTRASOUND Left 07/15/2022    Procedure: ENDOBRONCHIAL ULTRASOUND (EBUS);  Surgeon: Cm Freitas MD;  Location: [facility];  Service: Pulmonary;  Laterality: Left;    ENDOBRONCHIAL ULTRASOUND Bilateral 11/09/2023    Procedure: ENDOBRONCHIAL ULTRASOUND (EBUS);  Surgeon: Cm Freitas MD;  Location: [facility];  Service: Pulmonary;  Laterality: Bilateral;    ENDOSCOPIC ULTRASOUND OF UPPER GASTROINTESTINAL TRACT N/A 05/13/2024    Procedure: ULTRASOUND, UPPER GI TRACT, ENDOSCOPIC;  Surgeon: Andi Bazzi III, MD;  Location: [facility] ENDO;  Service: Endoscopy;  Laterality: N/A;    HYSTERECTOMY  1975    CARY/BSO. Due to endometriosis.    INJECTION OF JOINT Right 05/27/2019    Procedure: Injection, Joint, Shoulder, Hip, Or Knee;  Surgeon: Zach Wilder MD;  Location: [facility];  Service: Pain Management;  Laterality: Right;  right hip intra-articular injection     INSERTION OF TUNNELED CENTRAL VENOUS CATHETER (CVC) WITH SUBCUTANEOUS PORT N/A 5/31/2024    Procedure: [id number];  Surgeon: Terry Kim MD;  Location: [facility];  Service: General;  Laterality: N/A;    LUNG LOBECTOMY Left     LOWER LOBE    NEPHRECTOMY Left 2005    Entire kidney due to cancer.    ROBOTIC BRONCHOSCOPY Bilateral 11/09/2023    Procedure: ROBOTIC BRONCHOSCOPY;  Surgeon: Cm Freitas MD;  Location: [facility];  Service: Pulmonary;  Laterality: Bilateral;    TONSILLECTOMY         Time Tracking:     PT Received On: 07/31/24  PT Start Time: 1310     PT Stop Time: 1328  PT Total Time (min): 18 min     Billable Minutes: Evaluation 18      07/31/2024

## 2024-07-31 NOTE — ED NOTES
Patient resting in bed. Ultrasound in progress at this time via ultrasound tech. Patient has no needs at this time.

## 2024-07-31 NOTE — ASSESSMENT & PLAN NOTE
This appears to be slowing down.  Confirmed on vaginal examined ED. patient declines further vaginal examined we will follow up outpatient.

## 2024-07-31 NOTE — H&P
[facility]         HISTORY & PHYSICAL   HOSPITALIST ADMISSION NOTE    Admitting MD: Moreno Eaton MD             Patient Name:Karlie Hess  Patient :1940   Patient Age/Gender: 83 y.o. female  MRN:[id number]  [facility]:[id number]  Status: OP- Observation  Admission Date:2024  Date of Service:2024               .HISTORY OF PRESENT ILLNESS   Chief Complaint:   Chief Complaint   Patient presents with    Vaginal Bleeding    Altered Mental Status     Start having vaginal bleeding passing large clots at approx 1800. Had an ablation done in [place]. Pt has had new onset confusion approx 30min ago having trouble formulating words/thoughts.         Principal Problem:   <principal problem not specified>    Patient Information Source(s):      patient and ER records.    HPI:  Karlie Hess is a 83 y.o. female who has a history of  hypertension hyperlipidemia renal cell carcinoma lung cancer diastolic heart failure aortic insufficiency  and presents with confusion        Patient states that she was getting a biopsy done in [place] and then drove herself arrived around 5:15 p.m..  She was tired and told her granddaughter who lives with her that she wanted to lay down and then later she ended up noticing that she was still having bleeding from the biopsy site and so she held pressure as recommended.  Eventually they decided that since the bleeding was not stopping that she should go to the ER.  She decided to come to the ER but was noted to feel odd/disoriented.  She has never had situations or symptoms like this before she denies a prior history of stroke, also denies a prior history of myocardial infarction.  She came to the ER and was slightly confused for a few moments.  She had trouble with remembering a few details but states that 2023 she was diagnosed with renal cell cancer and that was as a result of cancer diagnosed 2005.  Today patient was  having some difficulty with speaking but otherwise no motor deficits, currently or at baseline.  She does not use a cane or walker.  Patient placed on stroke protocol.  No notes on file                 .REVIEW OF SYSTEMS.   Review of Systems    All other ROS other than above negative           . MEDICAL HISTORY.   PCP: Tao Orozco MD  Specialists:    Past Medical History    Past Medical History:   Diagnosis Date    Aortic insufficiency     Cancer 2005    renal ca, L kidney removed    COVID-19 2022    Diastolic heart failure     Diffusion capacity of lung (dl), decreased     Encounter for blood transfusion 1963    with miscarriage    Fibromyalgia     GERD (gastroesophageal reflux disease)     Hypercholesterolemia     Hyperlipidemia     Hypertension     Lung cancer 2015    adenocarcinoma ; upper left lobe    Renal disorder 2005    Left Kidney cancer    Sinusitis        Past Surgical History     has a past surgical history that includes Nephrectomy (Left, 2005); Hysterectomy (1975); Injection of joint (Right, 05/27/2019); Back surgery (2013); Endobronchial ultrasound (Left, 07/15/2022); Lung lobectomy (Left); robotic bronchoscopy (Bilateral, 11/09/2023); Endobronchial ultrasound (Bilateral, 11/09/2023); Endoscopic ultrasound of upper gastrointestinal tract (N/A, 05/13/2024); Tonsillectomy; Adenoidectomy; and Insertion of tunneled central venous catheter (CVC) with subcutaneous port (N/A, 5/31/2024).  Past Surgical History:   Procedure Laterality Date    ADENOIDECTOMY      BACK SURGERY  2013    laminectomy    ENDOBRONCHIAL ULTRASOUND Left 07/15/2022    Procedure: ENDOBRONCHIAL ULTRASOUND (EBUS);  Surgeon: Cm Freitas MD;  Location: [facility];  Service: Pulmonary;  Laterality: Left;    ENDOBRONCHIAL ULTRASOUND Bilateral 11/09/2023    Procedure: ENDOBRONCHIAL ULTRASOUND (EBUS);  Surgeon: Cm Freitas MD;  Location: [facility];  Service: Pulmonary;  Laterality: Bilateral;    ENDOSCOPIC ULTRASOUND OF UPPER  GASTROINTESTINAL TRACT N/A 05/13/2024    Procedure: ULTRASOUND, UPPER GI TRACT, ENDOSCOPIC;  Surgeon: Andi Bazzi III, MD;  Location: [facility];  Service: Endoscopy;  Laterality: N/A;    HYSTERECTOMY  1975    CARY/BSO. Due to endometriosis.    INJECTION OF JOINT Right 05/27/2019    Procedure: Injection, Joint, Shoulder, Hip, Or Knee;  Surgeon: Zach Wilder MD;  Location: [facility] OR;  Service: Pain Management;  Laterality: Right;  right hip intra-articular injection     INSERTION OF TUNNELED CENTRAL VENOUS CATHETER (CVC) WITH SUBCUTANEOUS PORT N/A 5/31/2024    Procedure: [id number];  Surgeon: Terry Kim MD;  Location: [facility] OR;  Service: General;  Laterality: N/A;    LUNG LOBECTOMY Left     LOWER LOBE    NEPHRECTOMY Left 2005    Entire kidney due to cancer.    ROBOTIC BRONCHOSCOPY Bilateral 11/09/2023    Procedure: ROBOTIC BRONCHOSCOPY;  Surgeon: Cm Freitas MD;  Location: [facility] OR;  Service: Pulmonary;  Laterality: Bilateral;    TONSILLECTOMY           Social  History     reports that she has never smoked. She has never used smokeless tobacco. She reports that she does not drink alcohol and does not use drugs.  Tobacco Use    Smoking status: Never    Smokeless tobacco: Never   Substance and Sexual Activity    Alcohol use: No    Drug use: No    Sexual activity: Not Currently         Family History    family history includes Breast cancer in her maternal aunt and maternal aunt; Hypertension in her mother.     Family History       Problem Relation (Age of Onset)    Breast cancer Maternal Aunt, Maternal Aunt    Hypertension Mother                       . ALLERGIES.       Review of patient's allergies indicates:   Allergen Reactions    Hydrocodone Hallucinations    Oxycodone-acetaminophen Hallucinations and Other (See Comments)              . MEDICATIONS.   Home Medications:       Current Facility-Administered Medications on File Prior to Encounter   Medication    LIDOcaine (PF) 10 mg/ml (1%)  injection 10 mg     Current Outpatient Medications on File Prior to Encounter   Medication Sig    acetaminophen (TYLENOL) 500 MG tablet Take 1,000 mg by mouth 2 (two) times daily as needed.    amLODIPine (NORVASC) 5 MG tablet Take 1 tablet (5 mg total) by mouth 2 (two) times daily. (Patient taking differently: Take 5 mg by mouth every evening.)    axitinib (INLYTA) 5 mg Tab Take 1 tablet (5 mg) by mouth 2 (two) times a day.    cloNIDine (CATAPRES) 0.1 MG tablet Take 1 tablet (0.1 mg total) by mouth 3 (three) times daily as needed. For systolic BP over 180    cyanocobalamin (VITAMIN B-12) 1000 MCG tablet Take 100 mcg by mouth once daily.    duloxetine (CYMBALTA) 60 MG capsule Take 60 mg by mouth once daily.    hydroCHLOROthiazide (HYDRODIURIL) 12.5 MG Tab Take 1 tablet (12.5 mg total) by mouth daily as needed (Leg swelling).    labetaloL (NORMODYNE) 200 MG tablet TAKE 1 TABLET BY MOUTH TWICE  DAILY    losartan (COZAAR) 50 MG tablet Take 1 tablet (50 mg total) by mouth 2 (two) times a day.    ondansetron (ZOFRAN) 8 MG tablet Take 1 tablet (8 mg total) by mouth every 8 (eight) hours as needed for Nausea.    pantoprazole (PROTONIX) 40 MG tablet Take 40 mg by mouth once daily.    promethazine (PHENERGAN) 25 MG tablet Take 1 tablet (25 mg total) by mouth every 6 (six) hours as needed for Nausea.    rosuvastatin (CRESTOR) 10 MG tablet Take 10 mg by mouth every evening.    UNABLE TO FIND Take 1 capsule by mouth once daily. Prevagen  otc                  . PHYSICAL EXAM.         Vital Signs (24h Range): Vital Signs (Most Recent):   Temp:  [97.7 °F (36.5 °C)] 97.7 °F (36.5 °C)  Pulse:  [75-83] 80  Resp:  [14-18] 15  SpO2:  [95 %-98 %] 95 %  BP: (153-209)/(63-95) 153/63 Temp: 97.7 °F (36.5 °C) (07/30/24 2138)  Pulse: 80 (07/31/24 0128)  Resp: 15 (07/31/24 0128)  BP: (!) 153/63 (07/31/24 0128)  SpO2: 95 % (07/31/24 0128)      Vitals:    07/31/24 0026 07/31/24 0028 07/31/24 0030 07/31/24 0128   BP: (!) 197/95  (!) 175/85 (!)  153/63   Pulse: 79 81 79 80   Resp:  16 17 15   Temp:       SpO2:  95%  95%   Weight:               Weight: 63.5 kg (140 lb)  Body mass index is 22.6 kg/m².  Wt Readings from Last 3 Encounters:   07/30/24 63.5 kg (140 lb)   07/19/24 64.6 kg (142 lb 6.4 oz)   07/09/24 63.8 kg (140 lb 9.6 oz)         --    Physical Exam                DIAGNOSTIC DATA   Summary    Laboratory Studies:    CBC BMP   Recent Labs   Lab 07/30/24  2224   WBC 8.76   HGB 11.4*   HCT 34.7*       Recent Labs   Lab 07/30/24  2224      K 3.8      CO2 28   BUN 21   CREATININE 1.4   CALCIUM 8.7           Recent Lab Results         07/31/24  0511   07/30/24  2224   07/30/24  2215   07/30/24  2211        Albumin 3.8   3.9           ALP 57   58           ALT 10   11           Anion Gap 8   7           PTT 26.0  Comment: Refer to local heparin nomogram for intensity/dose specific   therapeutic   range.               AST 16   16           Baso # 0.02   0.03           Basophil % 0.3   0.3           BILIRUBIN TOTAL 0.4  Comment: For infants and newborns, interpretation of results should be based  on gestational age, weight and in agreement with clinical  observations.    Premature Infant recommended reference ranges:  Up to 24 hours.............<8.0 mg/dL  Up to 48 hours............<12.0 mg/dL  3-5 days..................<15.0 mg/dL  6-29 days.................<15.0 mg/dL     0.4  Comment: For infants and newborns, interpretation of results should be based  on gestational age, weight and in agreement with clinical  observations.    Premature Infant recommended reference ranges:  Up to 24 hours.............<8.0 mg/dL  Up to 48 hours............<12.0 mg/dL  3-5 days..................<15.0 mg/dL  6-29 days.................<15.0 mg/dL             BNP   92  Comment: Values of less than 100 pg/ml are consistent with non-CHF populations.           BUN 21   21           Calcium 8.6   8.7           Chloride 107   105           Cholesterol Total    176  Comment: The National Cholesterol Education Program (NCEP) has set the  following guidelines (reference ranges) for Cholesterol:  Optimal.....................<200 mg/dL  Borderline High.............200-239 mg/dL  High........................> or = 240 mg/dL             CO2 26   28           Creatinine 1.2   1.4           Differential Method Automated   Automated           eGFR 44.6   37.1           Eos # 0.3   0.3           Eos % 3.4   3.5           Free T4   0.66           Glucose 96   96           Gran # (ANC) 4.9   6.0           Gran % 63.8   67.9           HDL   40  Comment: The National Cholesterol Education Program (NCEP) has set the  following guidelines (reference values) for HDL Cholesterol:  Low...............<40 mg/dL  Optimal...........>60 mg/dL             HDL/Cholesterol Ratio   22.7           Hematocrit 34.1   34.7           Hemoglobin 11.2   11.4           Immature Grans (Abs) 0.03  Comment: Mild elevation in immature granulocytes is non specific and   can be seen in a variety of conditions including stress response,   acute inflammation, trauma and pregnancy. Correlation with other   laboratory and clinical findings is essential.     0.04  Comment: Mild elevation in immature granulocytes is non specific and   can be seen in a variety of conditions including stress response,   acute inflammation, trauma and pregnancy. Correlation with other   laboratory and clinical findings is essential.             Immature Granulocytes 0.4   0.5           INR 0.9  Comment: Coumadin Therapy:  2.0 - 3.0 for INR for all indicators except mechanical heart valves  and antiphospholipid syndromes which should use 2.5 - 3.5.     0.9  Comment: Coumadin Therapy:  2.0 - 3.0 for INR for all indicators except mechanical heart valves  and antiphospholipid syndromes which should use 2.5 - 3.5.             LDL Cholesterol   Invalid, Trig>400.0  Comment: The National Cholesterol Education Program (NCEP) has set the  following  guidelines (reference values) for LDL Cholesterol:  Optimal.......................<130 mg/dL  Borderline High...............130-159 mg/dL  High..........................160-189 mg/dL  Very High.....................>190 mg/dL             Lymph # 1.8   1.7           Lymph % 23.8   19.1           Magnesium  1.8             MCH 28.1   28.1           MCHC 32.8   32.9           MCV 86   86           Mono # 0.6   0.8           Mono % 8.3   8.7           MPV 9.0   9.3           Non-HDL Cholesterol   136  Comment: Risk category and Non-HDL cholesterol goals:  Coronary heart disease (CHD)or equivalent (10-year risk of CHD >20%):  Non-HDL cholesterol goal     <130 mg/dL  Two or more CHD risk factors and 10-year risk of CHD <= 20%:  Non-HDL cholesterol goal     <160 mg/dL  0 to 1 CHD risk factor:  Non-HDL cholesterol goal     <190 mg/dL             nRBC 0   0           Platelet Count 198   215           POC Creatinine       1.4       POC Glucose     96         Potassium 3.9   3.8           PROTEIN TOTAL 6.0   6.3           PT 10.6   10.6           RBC 3.99   4.06           RDW 13.7   13.9           Sample       VENOUS       Sodium 141   140           Total Cholesterol/HDL Ratio   4.4           Triglycerides   417  Comment: The National Cholesterol Education Program (NCEP) has set the  following guidelines (reference values) for triglycerides:  Normal......................<150 mg/dL  Borderline High.............150-199 mg/dL  High........................200-499 mg/dL             Troponin I High Sensitivity   8.5  Comment: Troponin results differ between methods. Do not use   results between Troponin methods interchangeably.    Alkaline Phospatase levels above 400 U/L may   cause false positive results.    Access hsTnI should not be used for patients taking   Asfotase cj (Strensiq).             TSH   12.396           WBC 7.62   8.76                 .    Other Studies:          ------  Diagnostic Data Details          CBC  8.76  "11.4 215    34.7     Coag  10.6     0.9        BMP  140 105 21 96   3.8 28 1.4     CaMgPhos  8.7              Last labs from current encounter as of 07/31/2024-1:55 AM             Laboratory Studies:    Blood Gas:  No results for input(s): "PH", "PCO2", "PO2", "HCO3", "POCSATURATED", "BE" in the last 72 hours.      Recent Labs   Lab 07/30/24  2224   WBC 8.76   HGB 11.4*   HCT 34.7*      MONO 8.7  0.8   EOSINOPHIL 3.5     Recent Labs   Lab 07/30/24  2224   INR 0.9        Recent Labs   Lab 07/30/24  2224      K 3.8      CO2 28   BUN 21   CREATININE 1.4   CALCIUM 8.7   PROT 6.3   BILITOT 0.4   ALKPHOS 58   ALT 11   AST 16     No results for input(s): "MG" in the last 168 hours.     Recent Labs   Lab 07/30/24  2224   TROPONINIHS 8.5             Lab Results   Component Value Date    COLORU Yellow 09/16/2023    COLORU Yellow 09/02/2021    COLORU Yellow 08/15/2019    SPECGRAV 1.005 09/16/2023    SPECGRAV 1.020 09/02/2021    SPECGRAV 1.010 08/15/2019    PHUR 5.0 09/16/2023    PHUR 6.0 09/02/2021    PHUR 7.0 08/15/2019    NITRITE Negative 09/16/2023    NITRITE Negative 09/02/2021    NITRITE Negative 08/15/2019    KETONESU Negative 09/16/2023    KETONESU Negative 09/02/2021    KETONESU Negative 08/15/2019    UROBILINOGEN Negative 09/16/2023    UROBILINOGEN Negative 09/02/2021    UROBILINOGEN Negative 08/15/2019      Recent Labs   Lab 07/30/24  2224   TSH 12.396*   FREET4 0.66*         No results found for: "HGBA1C", "KGBUDAM9W"  The ASCVD Risk score (Ruperto DK, et al., 2019) failed to calculate for the following reasons:    The 2019 ASCVD risk score is only valid for ages 40 to 79           ECG:        RADIOLOGY STUDIES:     X-Ray Chest AP Portable  Narrative: EXAMINATION:  XR CHEST AP PORTABLE    CLINICAL HISTORY:  CHF;    TECHNIQUE:  Single frontal view of the chest was performed.    COMPARISON:  05/31/2024.    FINDINGS:  Monitoring EKG leads are present.  There has been slight retraction of the " left-sided chest port.    The trachea is unremarkable.  The cardiomediastinal silhouette is within normal limits.  There is no evidence of free air beneath the hemidiaphragms.  There are no pleural effusions.  There is no evidence of a pneumothorax.  There is no evidence of pneumomediastinum.  There is unchanged appearance of subsegmental atelectasis in the right lung base.  There are degenerative changes in the osseous structures.  Impression: No acute intrathoracic process.    No radiographic evidence of CHF.    Slight interval retraction of the left-sided chest port.    Electronically signed by: Wiley Domínguez MD  Date:    07/30/2024  Time:    23:01  CT HEAD FOR STROKE  Narrative: EXAMINATION:  CT HEAD FOR STROKE    CLINICAL HISTORY:  Neuro deficit, acute, stroke suspected;    TECHNIQUE:  Low dose axial images were obtained through the head.  Coronal and sagittal reformations were also performed. Contrast was not administered.    COMPARISON:  MRI brain 12/21/2023, head CT 10/15/2014    FINDINGS:  There is no acute intracranial hemorrhage, hydrocephalus, midline shift or mass effect. There is generalized cerebral volume loss.  There is periventricular and deep cerebral white matter hypoattenuation, which appears to coincide with findings on prior MRI of 2023 and could reflect sequela of chronic microvascular ischemic change.  More focal hypodensities in the basal ganglia, none pronounced on the right, suggestive of sequela of prior lacunar type infarct.  If there is clinical concern for acute ischemia, further assessment with MRI is advised if there are no clinical contraindications.  The basal cisterns are patent. The visualized paranasal sinuses and mastoid air cells are clear of acute process.  The visualized bones of the calvarium demonstrate no acute osseous abnormality.  Impression: 1. Generalized cerebral volume loss with findings of chronic microvascular ischemic change and remote lacunar type infarcts  similar to prior MRI of 2023.  If there is clinical concern for acute ischemia, further assessment with MRI is advised if there are no clinical contraindications.  2. No compelling CT evidence of acute intracranial abnormality.  Further assessment as warranted.    Electronically signed by: Daly Isbell MD  Date:    07/30/2024  Time:    22:45          CARDIAC DIAGNOSTIC INFORMATION REVIEW:  Most Recent Echocardiogram:  Results for orders placed during the hospital encounter of 07/05/24    Echo Saline Bubble? No    Interpretation Summary    Left Ventricle: The left ventricle is normal in size. Mild basal septal thickening. There is mild asymmetric hypertrophy. There is normal systolic function. Ejection fraction by visual approximation is 60%. Grade I diastolic dysfunction.    Right Ventricle: Normal right ventricular cavity size. Wall thickness is normal. Systolic function is normal.    Aortic Valve: There is mild aortic regurgitation with a centrally directed jet.    Mitral Valve: There is mild regurgitation with a centrally directed jet.    Tricuspid Valve: There is mild regurgitation.    IVC/SVC: Normal venous pressure at 3 mmHg.    The estimated pulmonary artery systolic pressure is 23 mmHg.      Last Cardiac Cath  No results found for this or any previous visit.        Last Stress Test   Results for orders placed during the hospital encounter of 10/18/22    Nuclear Stress - Cardiology Interpreted    Interpretation Summary    Normal myocardial perfusion scan. There is no evidence of myocardial ischemia or infarction.    The gated perfusion images showed an ejection fraction of 85% post stress. Normal ejection fraction is greater than 47%.    There is normal wall motion post stress.    The EKG portion of this study is negative for ischemia.    The patient reported no chest pain during the stress test.    There were no arrhythmias during stress.    T.i.d. 0.93          -------                     . ASSESSMENT &  "PLAN.   Patient Summary:       Patient Problem List:  Active Diagnoses:    Diagnosis Date Noted POA    Confusion [R41.0] 07/30/2024 Unknown      Problems Resolved During this Admission:         Current Medications:       7/30/2024   Medications   aspirin tablet 325 mg (325 mg Oral Given 7/30/24 2309)   acetaminophen tablet 1,000 mg (1,000 mg Oral Given 7/30/24 2309)   LORazepam injection 1 mg (1 mg Intravenous Given 7/30/24 2314)   labetaloL tablet 100 mg (100 mg Oral Given 7/31/24 0026)               Assessment & Plan:    No notes have been filed under this hospital service.  Service: Hospital Medicine         CVA CVA, treat via CVA protocol, MRI, echo, carotid.  Continue antiplatelets, permissive hypertension, and discuss case with neuro            Vaginal bleeding  Monitor patient's hemoglobin.  Follow up with patient as no bleeding currently occurring at this time.    Hypertension Hold on antihypertensive meds, permissive hypertension             EDITABLEPROBLIST     SUBJOBJ    VTE PPX:    VTE Risk Mitigation (From admission, onward)      None          Fluids:    Nutrition:   Code Status:   Code Status: Prior   Admit Status: OP- Observation    Admitting MD:  Moreno Eaton MD -  Department of Hospital Medicine  [facility]  ED MD:   Consultants:   Treatment Teams This Admission:  PCP: Tao Orozco MD    PATHWAYS ORDERED      [] COPD       [] DKA      [] CHF      [] CVA Ischemic      [] GI Bleed      [x] NSTEMI      [] NONE                 Electronically Signed By: Moreno Eaton 7/31/2024 1:55 AM  July 31, 2024     Portions of this chart may have been created with Dragon Voice Recognition Software . Occasional wrong-word or "sound-alike" substitutions may have occurred due to the inherent limitations of voice recognition software. Please read the chart carefully and recognize, using context, where these substitutions have occurred.               "

## 2024-07-31 NOTE — DISCHARGE SUMMARY
[facility] - Emergency Dept  Hospital Medicine  Discharge Summary      Patient Name: Karlie Hess  MRN: [id number]  VIJAY: [id number]  Patient Class: OP- Observation  Admission Date: 7/30/2024  Hospital Length of Stay: 0 days  Discharge Date and Time:  07/31/2024 3:35 PM  Attending Physician: Awilda oTro DO   Discharging Provider: Awilda Toro DO  Primary Care Provider: Tao Orozco MD    Primary Care Team: Networked reference to record PCT     HPI:   No notes on file    * No surgery found *      Hospital Course:   83-year-old female with history of lung cancer and kidney cancer and recent vaginal bleeding who completed a cervical biopsy approximately 4 days ago.  She was having increasing vaginal bleeding and yesterday was passing blood clots per vagina.  After this happened she stood up and felt faint and became really dizzy and confused but did not fall down and did not pass out.  By the time she arrived in the ED near-syncope symptoms had resolved and she was seen by tele stroke/neurosurgery who agreed that there were no focal deficits and all symptoms had stopped.  She had a vaginal exam in the ED which showed minimal bleeding and patient believes the bleeding is drastically slowing down.  Hemoglobin remained stable here she had a MRI of the head that was negative for CVA and carotid ultrasounds which is not show any significant stenosis.  TTE was within normal limits EF 55% and normal wall motion.  Blood pressures were elevated and we restarted her home blood pressure medicines.  Patient was discharged home in stable condition follow up with PCP in 1 week and to return to ED if any recurrence of symptoms.     Goals of Care Treatment Preferences:  Code Status: Full Code      Consults:   Consults (From admission, onward)          Status Ordering Provider     Inpatient consult to Physical Medicine Rehab  Once        Provider:  Pat Ochoa MD Acknowledged ETIENNE  YUSRA     Inpatient consult to Registered Dietitian/Nutritionist  Once        Provider:  (Not yet assigned)    Acknowledged YUSRA MENARD     IP consult to case management/social work  Once        Provider:  (Not yet assigned)    Acknowledged YUSRA MENARD     Consult to Telemedicine - Acute Stroke  Once        Provider:  (Not yet assigned)    Completed DANTE SMITH            Cardiac/Vascular  * Vasovagal near syncope  Given negative MRI, carotid ultrasound, TTE and passing blood clots per vagina with stable hemoglobin this is most likely working diagnosis.  TIA still on the differential but less likely given previous findings.  Given this neurosurgery recommends aspirin but not Plavix due to vaginal bleeding and follow up with PCP in consider neurology referral unless symptoms completely have resolved.      Renal/  Vaginal bleeding  This appears to be slowing down.  Confirmed on vaginal examined ED. patient declines further vaginal examined we will follow up outpatient.      Oncology  History of lung cancer    Known, chronic      Final Active Diagnoses:    Diagnosis Date Noted POA    PRINCIPAL PROBLEM:  Vasovagal near syncope [R55] 07/31/2024 Yes    Vaginal bleeding [N93.9] 07/31/2024 Yes    History of lung cancer [Z85.118] 08/14/2019 Not Applicable      Problems Resolved During this Admission:    Diagnosis Date Noted Date Resolved POA    Confusion [R41.0] 07/30/2024 07/31/2024 Yes       Discharged Condition: good    Disposition: Home or Self Care    Follow Up:   Follow-up Information       Tao Orozco MD Follow up in 1 week(s).    Specialty: Family Medicine  Contact information:  [address] [place] [address]  [phone]                           Patient Instructions:      Diet Cardiac   Order Comments: See Stroke Patient Education Guide Booklet for details.     Call 911 for any of the following:   Order Comments: Call 911  right away if any of the following warning signs come on suddenly,  "even if the symptoms only last for a few minutes. With stroke, timing is very important.   - Warning Signs of Stroke:  - Weakness: You may feel a sudden weakness, tingling or loss of feeling on one side of your face or body.  - Vision Problems: You may have sudden double vision or trouble seeing in one or both eyes.  - Speech Problems: You may have sudden trouble talking, slured speech, or problems understanding others.  - Headache: You may have sudden, severe headache.  - Movement Problems: You may experience dizziness, a feeling of spinning, a loss of balance, a feeling of falling or blackouts.     Activity as tolerated     Call MD for:  temperature >100.4     Call MD for:  persistent nausea and vomiting or diarrhea     Call MD for:  severe uncontrolled pain     Call MD for:  persistent dizziness, light-headedness, or visual disturbances     Call MD for:  increased confusion or weakness       Significant Diagnostic Studies: Labs: BMP:   Recent Labs   Lab 07/30/24 2224 07/31/24  0511   GLU 96 96    141   K 3.8 3.9    107   CO2 28 26   BUN 21 21   CREATININE 1.4 1.2   CALCIUM 8.7 8.6*   MG  --  1.8   , CMP   Recent Labs   Lab 07/30/24 2224 07/31/24  0511    141   K 3.8 3.9    107   CO2 28 26   GLU 96 96   BUN 21 21   CREATININE 1.4 1.2   CALCIUM 8.7 8.6*   PROT 6.3 6.0   ALBUMIN 3.9 3.8   BILITOT 0.4 0.4   ALKPHOS 58 57   AST 16 16   ALT 11 10   ANIONGAP 7* 8   , CBC   Recent Labs   Lab 07/30/24 2224 07/31/24  0511   WBC 8.76 7.62   HGB 11.4* 11.2*   HCT 34.7* 34.1*    198   , INR   Lab Results   Component Value Date    INR 0.9 07/31/2024    INR 0.9 07/30/2024    INR 0.9 01/05/2024   , Lipid Panel   Lab Results   Component Value Date    CHOL 176 07/30/2024    HDL 40 07/30/2024    LDLCALC Invalid, Trig>400.0 07/30/2024    TRIG 417 (H) 07/30/2024    CHOLHDL 22.7 07/30/2024   , Troponin No results for input(s): "TROPONINI" in the last 168 hours., and A1C:   Recent Labs   Lab " 07/31/24  0511   HGBA1C 5.7     Radiology:   Imaging Results              MRI Brain Without Contrast (Final result)  Result time 07/31/24 08:30:23      Final result by Bradley Monahan MD (07/31/24 08:30:23)                   Impression:      1.  No finding to suggest an acute infarct or intracranial bleed.    2.  Very mild chronic/involutional findings as above.      Electronically signed by: Bradley Monahan  Date:    07/31/2024  Time:    08:30               Narrative:    CLINICAL HISTORY:  ([id number])84 y/o  (1940) F    Transient ischemic attack (TIA);    TECHNIQUE:  (A#[id number], exam time 7/31/2024 8:26)    MRI BRAIN WITHOUT CONTRAST [id number]    Multiplanar multisequence MRI of the brain was performed.    CONTRAST:    No contrast was administered.    COMPARISON:  CT from 07/30/2024.    FINDINGS:  No abnormal diffusion restriction to suggest an acute infarct, and no abnormal signal intensity to suggest intracranial bleed.  No hydrocephalus, herniation or midline shift in the basal/suprasellar cisterns are patent.  No acute osseous abnormality is identified.    Overall there is only mild cerebral sinus cerebellar atrophy when accounting for age, with very mild nonspecific periventricular deep cerebral white matter T2 FLAIR hyperintensity, a nonspecific finding which can be seen in diffuse white matter process, but 1 which is most commonly associated with (very mild) small vessel ischemic disease.    There is a small rounded defect along the posterior right caudate head suggesting a tiny remote lacunar infarct.    The pituitary gland and infundibulum is normal in size without abnormal signal pattern. The craniocervical junction is unremarkable. The temporal bones, internal and external meati, and semicircular canals appear normal. The orbital contents are within normal limits noting bilateral lens replacements/cataracts surgery..  The visualized paranasal sinuses and mastoid air cells are clear.                                        US Carotid Bilateral (Final result)  Result time 07/31/24 06:50:01      Final result by Bradley Monahan MD (07/31/24 06:50:01)                   Impression:      No clinically significant degree of stenosis by velocities or ratios in the carotid arteries and bilateral antegrade vertebral artery flow.      Electronically signed by: Bradley Monahan  Date:    07/31/2024  Time:    06:50               Narrative:    CLINICAL HISTORY:  ([id number])84 y/o  (1940) F    Velocities evaluation;    TECHNIQUE:  (A#[id number], exam time 7/31/2024 3:00)    US CAROTID BILATERAL [id number]    Duplex sonographic ultrasound of the bilateral carotid arteries.  Color and grayscale images were obtained.    CMS Mandated Quality Data - Carotid - 195    All measurements and percent stenosis described below were determined using NASCET criteria or criteria similar to NASCET, as defined by the Society of Radiologists in Ultrasound Consensus Conference, Radiology, 2003    COMPARISON:  None available.    FINDINGS:  For the RIGHT carotid artery,  there is diffuse intimal thickening with tiny calcified plaques in the right bulb without focal stenosis. The highest peak systolic velocity noted in the right internal carotid artery is 53 cm/s. The highest peak diastolic velocity noted in the right internal carotid artery is 14 cm/s. The ICA/CCA peak systolic ratio is 1. These values suggest no clinically significant degree of stenosis. The right vertebral artery shows antegrade flow. The external carotid artery is patent.    For the LEFT carotid artery,  there is diffuse intimal thickening she with mild partially calcified plaque in the left bulb without focal stenosis. The highest peak systolic velocity noted in the left internal carotid artery is 61 cm/s. The highest peak diastolic velocity noted in the left internal carotid artery is 13 cm/s. The ICA/CCA peak systolic ratio is 1.4. These values suggest no clinically  significant degree of stenosis. The left vertebral artery shows antegrade flow. The external carotid artery is patent.                                       X-Ray Chest AP Portable (Final result)  Result time 07/30/24 23:01:08      Final result by Wiley Domínguez MD (07/30/24 23:01:08)                   Impression:      No acute intrathoracic process.    No radiographic evidence of CHF.    Slight interval retraction of the left-sided chest port.      Electronically signed by: Wiley Domínguez MD  Date:    07/30/2024  Time:    23:01               Narrative:    EXAMINATION:  XR CHEST AP PORTABLE    CLINICAL HISTORY:  CHF;    TECHNIQUE:  Single frontal view of the chest was performed.    COMPARISON:  05/31/2024.    FINDINGS:  Monitoring EKG leads are present.  There has been slight retraction of the left-sided chest port.    The trachea is unremarkable.  The cardiomediastinal silhouette is within normal limits.  There is no evidence of free air beneath the hemidiaphragms.  There are no pleural effusions.  There is no evidence of a pneumothorax.  There is no evidence of pneumomediastinum.  There is unchanged appearance of subsegmental atelectasis in the right lung base.  There are degenerative changes in the osseous structures.                                       CT HEAD FOR STROKE (Final result)  Result time 07/30/24 22:45:18      Final result by Daly Isbell MD (07/30/24 22:45:18)                   Impression:      1. Generalized cerebral volume loss with findings of chronic microvascular ischemic change and remote lacunar type infarcts similar to prior MRI of 2023.  If there is clinical concern for acute ischemia, further assessment with MRI is advised if there are no clinical contraindications.  2. No compelling CT evidence of acute intracranial abnormality.  Further assessment as warranted.      Electronically signed by: Daly Isbell MD  Date:    07/30/2024  Time:    22:45               Narrative:     EXAMINATION:  CT HEAD FOR STROKE    CLINICAL HISTORY:  Neuro deficit, acute, stroke suspected;    TECHNIQUE:  Low dose axial images were obtained through the head.  Coronal and sagittal reformations were also performed. Contrast was not administered.    COMPARISON:  MRI brain 12/21/2023, head CT 10/15/2014    FINDINGS:  There is no acute intracranial hemorrhage, hydrocephalus, midline shift or mass effect. There is generalized cerebral volume loss.  There is periventricular and deep cerebral white matter hypoattenuation, which appears to coincide with findings on prior MRI of 2023 and could reflect sequela of chronic microvascular ischemic change.  More focal hypodensities in the basal ganglia, none pronounced on the right, suggestive of sequela of prior lacunar type infarct.  If there is clinical concern for acute ischemia, further assessment with MRI is advised if there are no clinical contraindications.  The basal cisterns are patent. The visualized paranasal sinuses and mastoid air cells are clear of acute process.  The visualized bones of the calvarium demonstrate no acute osseous abnormality.                                       Pending Diagnostic Studies:       None           Medications:  Reconciled Home Medications:      Medication List        START taking these medications      aspirin 81 MG EC tablet  Commonly known as: ECOTRIN  Take 1 tablet (81 mg total) by mouth once daily.     lisinopriL 20 MG tablet  Commonly known as: PRINIVIL,ZESTRIL  Take 1 tablet (20 mg total) by mouth once daily.            CONTINUE taking these medications      acetaminophen 500 MG tablet  Commonly known as: TYLENOL  Take 1,000 mg by mouth 2 (two) times daily as needed.     amLODIPine 5 MG tablet  Commonly known as: NORVASC  Take 1 tablet (5 mg total) by mouth 2 (two) times daily.     cloNIDine 0.1 MG tablet  Commonly known as: CATAPRES  Take 1 tablet (0.1 mg total) by mouth 3 (three) times daily as needed. For systolic BP  over 180     cyanocobalamin 1000 MCG tablet  Commonly known as: VITAMIN B-12  Take 100 mcg by mouth once daily.     DULoxetine 60 MG capsule  Commonly known as: CYMBALTA  Take 60 mg by mouth once daily.     hydroCHLOROthiazide 12.5 MG Tab  Commonly known as: HYDRODIURIL  Take 1 tablet (12.5 mg total) by mouth daily as needed (Leg swelling).     INLYTA 5 mg Tab  Generic drug: axitinib  Take 1 tablet (5 mg) by mouth 2 (two) times a day.     labetaloL 200 MG tablet  Commonly known as: NORMODYNE  TAKE 1 TABLET BY MOUTH TWICE  DAILY     ondansetron 8 MG tablet  Commonly known as: ZOFRAN  Take 1 tablet (8 mg total) by mouth every 8 (eight) hours as needed for Nausea.     pantoprazole 40 MG tablet  Commonly known as: PROTONIX  Take 40 mg by mouth once daily.     promethazine 25 MG tablet  Commonly known as: PHENERGAN  Take 1 tablet (25 mg total) by mouth every 6 (six) hours as needed for Nausea.     rosuvastatin 10 MG tablet  Commonly known as: CRESTOR  Take 10 mg by mouth every evening.            STOP taking these medications      losartan 50 MG tablet  Commonly known as: COZAAR     UNABLE TO FIND              Indwelling Lines/Drains at time of discharge:   Lines/Drains/Airways       Central Venous Catheter Line  Duration                  PowerPort A Cath Single Lumen 05/31/24 0739 Internal Jugular Left 61 days              Airway  Duration                  Airway - Non-Surgical 05/13/24 0907 Other (Comment) 79 days                    Time spent on the discharge of patient: 32 minutes         Awilda Toro DO  Department of Hospital Medicine  [facility] - Emergency Dept

## 2024-07-31 NOTE — PLAN OF CARE
07/31/24 0858   DUMONT Message   Medicare Outpatient and Observation Notification regarding financial responsibility Given to patient/caregiver;Explained to patient/caregiver;Signed/date by patient/caregiver   Date DUMONT was signed 07/31/24   Time DUMONT was signed 0858

## 2024-07-31 NOTE — ED PROVIDER NOTES
Encounter Date: 7/30/2024       History     Chief Complaint   Patient presents with    Vaginal Bleeding    Altered Mental Status     Start having vaginal bleeding passing large clots at approx 1800. Had an ablation done in [place]. Pt has had new onset confusion approx 30min ago having trouble formulating words/thoughts.      83-year-old female presented emergency department with vaginal bleeding.  Patient did have a cervical biopsy today and after that after she went home she started having some vaginal bleeding.  Patient passed blood clots so concerned and came here.  Patient still has some bleeding and clots but does not appear to have any active hemorrhaging at this time.  Patient while on the way here daughter noticed that she was not herself and she was confused.  Patient currently able to answer all questions appropriately however is taking a long time to think and answer questions.  Patient could tell me who the president is and her name and where she is but she states she still can not think straight.  Patient denies any other complaints.  Denies fever chills nausea vomiting.  Patient said she had history of cancer and getting immunotherapy.  Patient denies any other physical complaints other than vaginal bleeding patient also noted to be hypotensive at this time and very anxious      Review of patient's allergies indicates:   Allergen Reactions    Hydrocodone Hallucinations    Oxycodone-acetaminophen Hallucinations and Other (See Comments)     Past Medical History:   Diagnosis Date    Aortic insufficiency     Cancer 2005    renal ca, L kidney removed    COVID-19 2022    Diastolic heart failure     Diffusion capacity of lung (dl), decreased     Encounter for blood transfusion 1963    with miscarriage    Fibromyalgia     GERD (gastroesophageal reflux disease)     Hypercholesterolemia     Hyperlipidemia     Hypertension     Lung cancer 2015    adenocarcinoma ; upper left lobe    Renal disorder 2005    Left  Kidney cancer    Sinusitis      Past Surgical History:   Procedure Laterality Date    ADENOIDECTOMY      BACK SURGERY  2013    laminectomy    ENDOBRONCHIAL ULTRASOUND Left 07/15/2022    Procedure: ENDOBRONCHIAL ULTRASOUND (EBUS);  Surgeon: Cm Freitas MD;  Location: [facility];  Service: Pulmonary;  Laterality: Left;    ENDOBRONCHIAL ULTRASOUND Bilateral 11/09/2023    Procedure: ENDOBRONCHIAL ULTRASOUND (EBUS);  Surgeon: Cm Freitas MD;  Location: [facility] OR;  Service: Pulmonary;  Laterality: Bilateral;    ENDOSCOPIC ULTRASOUND OF UPPER GASTROINTESTINAL TRACT N/A 05/13/2024    Procedure: ULTRASOUND, UPPER GI TRACT, ENDOSCOPIC;  Surgeon: Andi Bazzi III, MD;  Location: [facility] ENDO;  Service: Endoscopy;  Laterality: N/A;    HYSTERECTOMY  1975    CARY/BSO. Due to endometriosis.    INJECTION OF JOINT Right 05/27/2019    Procedure: Injection, Joint, Shoulder, Hip, Or Knee;  Surgeon: Zach Wilder MD;  Location: [facility] OR;  Service: Pain Management;  Laterality: Right;  right hip intra-articular injection     INSERTION OF TUNNELED CENTRAL VENOUS CATHETER (CVC) WITH SUBCUTANEOUS PORT N/A 5/31/2024    Procedure: [id number];  Surgeon: Terry Kim MD;  Location: [facility] OR;  Service: General;  Laterality: N/A;    LUNG LOBECTOMY Left     LOWER LOBE    NEPHRECTOMY Left 2005    Entire kidney due to cancer.    ROBOTIC BRONCHOSCOPY Bilateral 11/09/2023    Procedure: ROBOTIC BRONCHOSCOPY;  Surgeon: Cm Freitas MD;  Location: [facility];  Service: Pulmonary;  Laterality: Bilateral;    TONSILLECTOMY       Family History   Problem Relation Name Age of Onset    Hypertension Mother      Breast cancer Maternal Aunt      Breast cancer Maternal Aunt       Social History     Tobacco Use    Smoking status: Never    Smokeless tobacco: Never   Substance Use Topics    Alcohol use: No    Drug use: No     Review of Systems   Constitutional: Negative.    HENT: Negative.     Eyes: Negative.    Respiratory: Negative.      Cardiovascular: Negative.  Negative for chest pain.   Gastrointestinal: Negative.    Endocrine: Negative.    Genitourinary:  Positive for vaginal bleeding.   Musculoskeletal: Negative.    Skin: Negative.    Allergic/Immunologic: Negative.    Neurological:  Positive for speech difficulty.   Hematological: Negative.    Psychiatric/Behavioral:  Positive for confusion.    All other systems reviewed and are negative.      Physical Exam     Initial Vitals [07/30/24 2138]   BP Pulse Resp Temp SpO2   (!) 175/84 82 18 97.7 °F (36.5 °C) 98 %      MAP       --         Physical Exam    Nursing note and vitals reviewed.  Constitutional: She appears well-developed and well-nourished.   HENT:   Head: Normocephalic and atraumatic.   Nose: Nose normal.   Mouth/Throat: Oropharynx is clear and moist.   Eyes: Conjunctivae and EOM are normal. Pupils are equal, round, and reactive to light.   Neck: Neck supple. No thyromegaly present. No tracheal deviation present. No JVD present.   Normal range of motion.  Cardiovascular:  Normal rate, regular rhythm, normal heart sounds and intact distal pulses.           No murmur heard.  Pulmonary/Chest: Breath sounds normal. No stridor. No respiratory distress. She has no wheezes. She has no rales.   Abdominal: Abdomen is soft. Bowel sounds are normal. She exhibits no distension. There is no abdominal tenderness.   Genitourinary:    Genitourinary Comments: Blood in the vagina.  Patient would not tolerate exam with speculum but general old clots and no active hemorrhage noted at this time.  Could not get a great look at the vaginal vault as patient not tolerating because of discomfort after the biopsy.  Chaperone next to the bed     Musculoskeletal:         General: No edema. Normal range of motion.      Cervical back: Normal range of motion and neck supple.     Neurological: She is alert and oriented to person, place, and time. She has normal strength. GCS score is 15. GCS eye subscore is 4. GCS  verbal subscore is 5. GCS motor subscore is 6.   Slight sensory aphasia noted and patient has difficulty finding answers at times however is able to follow commands without difficulty and seems to be improving.  Patient said her symptoms significantly improved since she initially felt confused.  Patient is oriented to place person and time and could tell me everything that happened today   Skin: Skin is warm. Capillary refill takes less than 2 seconds.   Psychiatric: She has a normal mood and affect. Thought content normal.         ED Course   Procedures  Labs Reviewed   CBC W/ AUTO DIFFERENTIAL - Abnormal       Result Value    WBC 8.76      RBC 4.06      Hemoglobin 11.4 (*)     Hematocrit 34.7 (*)     MCV 86      MCH 28.1      MCHC 32.9      RDW 13.9      Platelets 215      MPV 9.3      Immature Granulocytes 0.5      Gran # (ANC) 6.0      Immature Grans (Abs) 0.04      Lymph # 1.7      Mono # 0.8      Eos # 0.3      Baso # 0.03      nRBC 0      Gran % 67.9      Lymph % 19.1      Mono % 8.7      Eosinophil % 3.5      Basophil % 0.3      Differential Method Automated     COMPREHENSIVE METABOLIC PANEL - Abnormal    Sodium 140      Potassium 3.8      Chloride 105      CO2 28      Glucose 96      BUN 21      Creatinine 1.4      Calcium 8.7      Total Protein 6.3      Albumin 3.9      Total Bilirubin 0.4      Alkaline Phosphatase 58      AST 16      ALT 11      eGFR 37.1 (*)     Anion Gap 7 (*)    TSH - Abnormal    TSH 12.396 (*)    LIPID PANEL - Abnormal    Cholesterol 176      Triglycerides 417 (*)     HDL 40      LDL Cholesterol Invalid, Trig>400.0      HDL/Cholesterol Ratio 22.7      Total Cholesterol/HDL Ratio 4.4      Non-HDL Cholesterol 136     PROTIME-INR    Prothrombin Time 10.6      INR 0.9     TROPONIN I HIGH SENSITIVITY    Troponin I High Sensitivity 8.5     B-TYPE NATRIURETIC PEPTIDE    BNP 92     T4, FREE   POCT GLUCOSE    POC Glucose 96     ISTAT CREATININE    POC Creatinine 1.4      Sample VENOUS     POCT  GLUCOSE MONITORING CONTINUOUS   POCT CREATININE     EKG Readings: (Independently Interpreted)   Initial Reading: No STEMI. Rhythm: Normal Sinus Rhythm. Ectopy: No Ectopy. Conduction: Normal.       Imaging Results              X-Ray Chest AP Portable (Final result)  Result time 07/30/24 23:01:08      Final result by Wiley Domínguez MD (07/30/24 23:01:08)                   Impression:      No acute intrathoracic process.    No radiographic evidence of CHF.    Slight interval retraction of the left-sided chest port.      Electronically signed by: Wiley Domínguez MD  Date:    07/30/2024  Time:    23:01               Narrative:    EXAMINATION:  XR CHEST AP PORTABLE    CLINICAL HISTORY:  CHF;    TECHNIQUE:  Single frontal view of the chest was performed.    COMPARISON:  05/31/2024.    FINDINGS:  Monitoring EKG leads are present.  There has been slight retraction of the left-sided chest port.    The trachea is unremarkable.  The cardiomediastinal silhouette is within normal limits.  There is no evidence of free air beneath the hemidiaphragms.  There are no pleural effusions.  There is no evidence of a pneumothorax.  There is no evidence of pneumomediastinum.  There is unchanged appearance of subsegmental atelectasis in the right lung base.  There are degenerative changes in the osseous structures.                                       CT HEAD FOR STROKE (Final result)  Result time 07/30/24 22:45:18      Final result by Daly Isbell MD (07/30/24 22:45:18)                   Impression:      1. Generalized cerebral volume loss with findings of chronic microvascular ischemic change and remote lacunar type infarcts similar to prior MRI of 2023.  If there is clinical concern for acute ischemia, further assessment with MRI is advised if there are no clinical contraindications.  2. No compelling CT evidence of acute intracranial abnormality.  Further assessment as warranted.      Electronically signed by: Daly Isbell  MD  Date:    07/30/2024  Time:    22:45               Narrative:    EXAMINATION:  CT HEAD FOR STROKE    CLINICAL HISTORY:  Neuro deficit, acute, stroke suspected;    TECHNIQUE:  Low dose axial images were obtained through the head.  Coronal and sagittal reformations were also performed. Contrast was not administered.    COMPARISON:  MRI brain 12/21/2023, head CT 10/15/2014    FINDINGS:  There is no acute intracranial hemorrhage, hydrocephalus, midline shift or mass effect. There is generalized cerebral volume loss.  There is periventricular and deep cerebral white matter hypoattenuation, which appears to coincide with findings on prior MRI of 2023 and could reflect sequela of chronic microvascular ischemic change.  More focal hypodensities in the basal ganglia, none pronounced on the right, suggestive of sequela of prior lacunar type infarct.  If there is clinical concern for acute ischemia, further assessment with MRI is advised if there are no clinical contraindications.  The basal cisterns are patent. The visualized paranasal sinuses and mastoid air cells are clear of acute process.  The visualized bones of the calvarium demonstrate no acute osseous abnormality.                                       Medications   aspirin tablet 325 mg (has no administration in time range)   acetaminophen tablet 1,000 mg (has no administration in time range)     Medical Decision Making  83-year-old female had a biopsy of her cervix and had bleeding.  Patient was extremely anxious and on the way to the hospital was confused.  Patient had sensory aphasia along with confusion.  Patient has symptoms completely resolved by the time seen by neurologist.  Patient is back at baseline.  Pelvic exam did not show any active bleeding however has blood clots in that area and would not tolerate exam with speculum as just had biopsy.  Patient otherwise hemodynamically stable.  Head CT unremarkable.  Aspirin given.  Anxiety treated.  As  recommended by neurologist Hospital Medicine consulted for evaluation for admission and to get MRI in the hospital    Amount and/or Complexity of Data Reviewed  Labs: ordered. Decision-making details documented in ED Course.  Radiology: ordered. Decision-making details documented in ED Course.  ECG/medicine tests: ordered. Decision-making details documented in ED Course.  Discussion of management or test interpretation with external provider(s): Thrombolysis Candidate? No, Patient back to neurological baseline     Delays to Thrombolysis?  Not Applicable not a candidate for thrombolytics as symptoms resolved completely    Risk  OTC drugs.  Decision regarding hospitalization.                                      Clinical Impression:  Final diagnoses:  [G45.9] TIA (transient ischemic attack) (Primary)  [I10] Hypertension, unspecified type          ED Disposition Condition    Admit Fair                Maksim Stern MD  07/30/24 [id number]       Maksim Stern MD  07/30/24 0116

## 2024-07-31 NOTE — SUBJECTIVE & OBJECTIVE
HPI:  83 y.o. female with a history of metastatic RCC w/ lung mets, HTN who presents with AMS.    A cervical biopsy done today morning after which she had some vaginal bleeding.    30 minutes PTA, she reportedly became confused.  In the ER she seemed confused and was taking longer than normal to respond to questions (?word finding difficulty).  A component of anxiety was suspected as well.    /83     Images personally reviewed and interpreted:  Study: Head CT and CTA Head & Neck  Study Interpretation: Chronic infarcts in the left basal ganglia and the right caudate     Assessment and plan:  84 y/o female with history as above who presents with confusion, slow responses.      Symptoms appear to have resolved.  Her exam is normal right now.    DDX includes TIA vs mimic  Avoid DAPT due to recent vaginal bleeding. Start ASA 81mg daily.  MRI brain w/ MRA head and neck.       Lytics recommendation: Thrombolytic therapy not recommended due to Patient back to neurological baseline  Thrombectomy recommendation: No; at this time symptoms not suggestive of large vessel occlusion  Placement recommendation: admit to inpatient

## 2024-07-31 NOTE — PT/OT/SLP EVAL
Occupational Therapy   Evaluation and Discharge Note    Name: Karlie Hess  MRN: [id number]  Admitting Diagnosis: <principal problem not specified>  Recent Surgery: * No surgery found *      Recommendations:     Discharge Recommendations: No Therapy Indicated  Discharge Equipment Recommendations: none  Barriers to discharge:  None    Assessment:     Karlie Hess is a 83 y.o. female with a medical diagnosis of <principal problem not specified>. At this time, patient is functioning at their prior level of function and does not require further acute OT services.     Plan:     During this hospitalization, patient does not require further acute OT services.  Please re-consult if situation changes.    Plan of Care Reviewed with: patient, family    Subjective     Chief Complaint: none stated  Patient/Family Comments/goals: to go home    Occupational Profile:  Living Environment: Pt lives with granddaughter in a raised home with 17 LILIAN. Bathroom includes tub/shower and grab bars.    Previous level of function: Independent with no AD for ADLs and mobility.  Roles and Routines: book keeper; self care; drives  Equipment Used at home: grab bar, blood pressure machine  Assistance upon Discharge: from family     Pain/Comfort:  Pain Rating 1: 0/10      Objective:     Communicated with: nurse prior to session.  Patient found supine with pulse ox (continuous), blood pressure cuff, telemetry upon OT entry to room.    General Precautions: Standard, fall  Orthopedic Precautions: N/A  Braces: N/A  Respiratory Status: Room air     Occupational Performance:    Bed Mobility:    Patient completed Scooting/Bridging with supervision  Patient completed Supine to Sit with supervision  Patient completed Sit to Supine with supervision    Functional Mobility/Transfers:  Patient completed Sit <> Stand Transfer with supervision  with  rolling walker   Functional Mobility: Pt ambulated with and without RW with SBA and no LOB/SOB.      Activities of Daily Living:  Lower Body Dressing: supervision to don shoes while seated EOB.  Pt declined grooming/hygiene tasks     Cognitive/Visual Perceptual:  Cognitive/Psychosocial Skills:     -       Follows Commands/attention:Follows multistep  commands  -       Communication: clear/fluent  -       Memory: No Deficits noted  -       Safety awareness/insight to disability: impaired   -       Mood/Affect/Coping skills/emotional control: Appropriate to situation, Cooperative, and Pleasant    Physical Exam:  Balance:    -       sitting balance supervision; standing balance supervision   Upper Extremity Range of Motion:     -       Right Upper Extremity: WFL  -       Left Upper Extremity: WFL  Upper Extremity Strength:    -       Right Upper Extremity: WFL  -       Left Upper Extremity: WFL   Strength:    -       Right Upper Extremity: WFL  -       Left Upper Extremity: WFL  Fine Motor Coordination:    -       Intact  Gross motor coordination:   WFL    AMPAC 6 Click ADL:  AMPAC Total Score: 24    Treatment & Education:  Pt educated on role of OT/POC, importance of OOB/EOB activity, use of call bell, and safety during ADLs, transfers, and functional mobility.      Patient left HOB elevated with all lines intact, call button in reach, and family present    GOALS:   Multidisciplinary Problems       Occupational Therapy Goals       Not on file                    History:     Past Medical History:   Diagnosis Date    Aortic insufficiency     Cancer 2005    renal ca, L kidney removed    COVID-19 2022    Diastolic heart failure     Diffusion capacity of lung (dl), decreased     Encounter for blood transfusion 1963    with miscarriage    Fibromyalgia     GERD (gastroesophageal reflux disease)     Hypercholesterolemia     Hyperlipidemia     Hypertension     Lung cancer 2015    adenocarcinoma ; upper left lobe    Renal disorder 2005    Left Kidney cancer    Sinusitis          Past Surgical History:   Procedure  Laterality Date    ADENOIDECTOMY      BACK SURGERY  2013    laminectomy    ENDOBRONCHIAL ULTRASOUND Left 07/15/2022    Procedure: ENDOBRONCHIAL ULTRASOUND (EBUS);  Surgeon: Cm Freitas MD;  Location: [facility];  Service: Pulmonary;  Laterality: Left;    ENDOBRONCHIAL ULTRASOUND Bilateral 11/09/2023    Procedure: ENDOBRONCHIAL ULTRASOUND (EBUS);  Surgeon: Cm Freitas MD;  Location: [facility] OR;  Service: Pulmonary;  Laterality: Bilateral;    ENDOSCOPIC ULTRASOUND OF UPPER GASTROINTESTINAL TRACT N/A 05/13/2024    Procedure: ULTRASOUND, UPPER GI TRACT, ENDOSCOPIC;  Surgeon: Andi Bazzi III, MD;  Location: [facility] ENDO;  Service: Endoscopy;  Laterality: N/A;    HYSTERECTOMY  1975    CARY/BSO. Due to endometriosis.    INJECTION OF JOINT Right 05/27/2019    Procedure: Injection, Joint, Shoulder, Hip, Or Knee;  Surgeon: Zach Wilder MD;  Location: [facility];  Service: Pain Management;  Laterality: Right;  right hip intra-articular injection     INSERTION OF TUNNELED CENTRAL VENOUS CATHETER (CVC) WITH SUBCUTANEOUS PORT N/A 5/31/2024    Procedure: [id number];  Surgeon: Terry Kim MD;  Location: [facility];  Service: General;  Laterality: N/A;    LUNG LOBECTOMY Left     LOWER LOBE    NEPHRECTOMY Left 2005    Entire kidney due to cancer.    ROBOTIC BRONCHOSCOPY Bilateral 11/09/2023    Procedure: ROBOTIC BRONCHOSCOPY;  Surgeon: Cm Freitas MD;  Location: [facility];  Service: Pulmonary;  Laterality: Bilateral;    TONSILLECTOMY         Time Tracking:     OT Date of Treatment: 07/31/24  OT Start Time: 0940  OT Stop Time: 0953  OT Total Time (min): 13 min    Billable Minutes:Evaluation 5  Therapeutic Activity 8    7/31/2024

## 2024-07-31 NOTE — PT/OT/SLP EVAL
Speech Language Pathology Evaluation  Cognitive/Bedside Swallow    Patient Name:  Karlie Hess   MRN:  [id number]  Admitting Diagnosis: <principal problem not specified>    Recommendations:                  General Recommendations:  Cognitive-linguistic therapy  Diet recommendations:  Regular Diet - IDDSI Level 7, Thin   Aspiration Precautions: Standard aspiration precautions   General Precautions: Standard,    Communication strategies:  none    Assessment:     Karlie Hess is a 83 y.o. female with an SLP diagnosis of Mild Cognitive-Linguistic Impairment.  She presented with c/o severe headache, hearing impairment without aids present, severe delayed memory deficits, reduced divergent naming, moderate repeats errors and difficulty with multi-step commands/complex information. She would benefit from outpatient cognitive-linguistic tx.  Recommend regular texture diet.  RN informed of pt c/o headache.     History:     Past Medical History:   Diagnosis Date    Aortic insufficiency     Cancer 2005    renal ca, L kidney removed    COVID-19 2022    Diastolic heart failure     Diffusion capacity of lung (dl), decreased     Encounter for blood transfusion 1963    with miscarriage    Fibromyalgia     GERD (gastroesophageal reflux disease)     Hypercholesterolemia     Hyperlipidemia     Hypertension     Lung cancer 2015    adenocarcinoma ; upper left lobe    Renal disorder 2005    Left Kidney cancer    Sinusitis        Past Surgical History:   Procedure Laterality Date    ADENOIDECTOMY      BACK SURGERY  2013    laminectomy    ENDOBRONCHIAL ULTRASOUND Left 07/15/2022    Procedure: ENDOBRONCHIAL ULTRASOUND (EBUS);  Surgeon: Cm Freitas MD;  Location: [facility];  Service: Pulmonary;  Laterality: Left;    ENDOBRONCHIAL ULTRASOUND Bilateral 11/09/2023    Procedure: ENDOBRONCHIAL ULTRASOUND (EBUS);  Surgeon: Cm Freitas MD;  Location: [facility] OR;  Service: Pulmonary;  Laterality: Bilateral;    ENDOSCOPIC  ULTRASOUND OF UPPER GASTROINTESTINAL TRACT N/A 05/13/2024    Procedure: ULTRASOUND, UPPER GI TRACT, ENDOSCOPIC;  Surgeon: Andi Bazzi III, MD;  Location: [facility];  Service: Endoscopy;  Laterality: N/A;    HYSTERECTOMY  1975    CARY/BSO. Due to endometriosis.    INJECTION OF JOINT Right 05/27/2019    Procedure: Injection, Joint, Shoulder, Hip, Or Knee;  Surgeon: Zach Wilder MD;  [facility];  Service: Pain Management;  Laterality: Right;  right hip intra-articular injection     INSERTION OF TUNNELED CENTRAL VENOUS CATHETER (CVC) WITH SUBCUTANEOUS PORT N/A 5/31/2024    Procedure: [id number];  Surgeon: Terry Kim MD;  Location: [facility] OR;  Service: General;  Laterality: N/A;    LUNG LOBECTOMY Left     LOWER LOBE    NEPHRECTOMY Left 2005    Entire kidney due to cancer.    ROBOTIC BRONCHOSCOPY Bilateral 11/09/2023    Procedure: ROBOTIC BRONCHOSCOPY;  Surgeon: Cm Freitas MD;  Location: [facility] OR;  Service: Pulmonary;  Laterality: Bilateral;    TONSILLECTOMY         Social History: Patient lives in the community.    Prior Intubation HX:  none this admission    Modified Barium Swallow: none in Epic, denied prior dysphagia    Chest X-Rays: No acute intrathoracic process.     MRI Brain - No abnormal diffusion restriction to suggest an acute infarct, and no abnormal signal intensity to suggest intracranial bleed.  No hydrocephalus, herniation or midline shift in the basal/suprasellar cisterns are patent.  No acute osseous abnormality is identified.  Overall there is only mild cerebral sinus cerebellar atrophy when accounting for age, with very mild nonspecific periventricular deep cerebral white matter T2 FLAIR hyperintensity, a nonspecific finding which can be seen in diffuse white matter process, but 1 which is most commonly associated with (very mild) small vessel ischemic disease.  There is a small rounded defect along the posterior right caudate head suggesting a tiny remote lacunar  infarct.    Prior diet: regular textures &liquids.    Occupation/hobbies/homemaking: indep.    Subjective     I was a  for 25 years  Patient goals: eat something.  Headache relief.     Objective:     Cognitive Status:  Alert, cooperative, oriented x4. Mild immediate, severe delayed memory deficits, intact long term memory.  Difficulty with alternating attention for calculation (son stated it was baseline).     Sidney Cognitive Assessment (MoCA) score (adjusted for education level) -  21 out of 30 indicating mild cognitive-linguistic deficits     Receptive Language/Comprehension: mild deficit likely related to unaided hearing loss, and reduced immediate memory, as well as attention    Pragmatics:  intact    Expressive Language: errors in long repeats, severe divergent naming deficits      Motor Speech: WFL    Voice: WFL    Visual-Spatial: WFL    Reading: WFL     Written Expression: WFL    Oral Musculature Evaluation  Oral Musculature: WNL  Dentition: present and adequate (has upper and lower partial molars)  Secretion Management: adequate  Mucosal Quality: adequate  Mandibular Strength and Mobility: WNL  Oral Labial Strength and Mobility: WNL  Lingual Strength and Mobility: WNL  Velar Elevation: WNL  Buccal Strength and Mobility: WNL  Volitional Cough: adequate  Volitional Swallow: rise and tilt palpated  Voice Prior to PO Intake: clear, no dysarthria    Bedside Swallow Eval:   Consistencies Assessed:  Thin liquids via cup nadd straw and 3 oz water challenge  Puree applesauce  Mixed consistencies fruit in juice  Solids cracker      Oral Phase:   WFL    Pharyngeal Phase:   no overt clinical signs/symptoms of aspiration  no overt clinical signs/symptoms of pharyngeal dysphagia    Compensatory Strategies  None    Treatment: education re impressions and recommendations    Goals:   Multidisciplinary Problems       SLP Goals       Not on file                    Plan:     Patient to be seen:  3 x/week   Plan of  Care expires:  09/06/24  Plan of Care reviewed with:  patient, family   SLP Follow-Up:  Yes       Discharge recommendations:  Therapy Intensity Recommendations at Discharge: Low Intensity Therapy   Barriers to Discharge:  None    Time Tracking:     SLP Treatment Date:   07/31/24  Speech Start Time:  1115  Speech Stop Time:  1202     Speech Total Time (min):  47 min    Billable Minutes: Eval 37  and Eval Swallow and Oral Function 10    07/31/2024

## 2024-08-01 ENCOUNTER — HOSPITAL ENCOUNTER (EMERGENCY)
Facility: HOSPITAL | Age: 84
Discharge: HOME | End: 2024-08-02
Attending: STUDENT IN AN ORGANIZED HEALTH CARE EDUCATION/TRAINING PROGRAM
Payer: MEDICARE

## 2024-08-01 DIAGNOSIS — R51.9 NONINTRACTABLE HEADACHE, UNSPECIFIED CHRONICITY PATTERN, UNSPECIFIED HEADACHE TYPE: Primary | ICD-10-CM

## 2024-08-01 LAB
ALBUMIN SERPL BCP-MCNC: 3.8 G/DL (ref 3.5–5.2)
ALP SERPL-CCNC: 56 U/L (ref 55–135)
ALT SERPL W/O P-5'-P-CCNC: 10 U/L (ref 10–44)
ANION GAP SERPL CALC-SCNC: 9 MMOL/L (ref 8–16)
AST SERPL-CCNC: 15 U/L (ref 10–40)
BASOPHILS # BLD AUTO: 0.03 K/UL (ref 0–0.2)
BASOPHILS NFR BLD: 0.4 % (ref 0–1.9)
BILIRUB SERPL-MCNC: 0.6 MG/DL (ref 0.1–1)
BNP SERPL-MCNC: 82 PG/ML (ref 0–99)
BUN SERPL-MCNC: 25 MG/DL (ref 8–23)
CALCIUM SERPL-MCNC: 8.8 MG/DL (ref 8.7–10.5)
CHLORIDE SERPL-SCNC: 101 MMOL/L (ref 95–110)
CO2 SERPL-SCNC: 28 MMOL/L (ref 23–29)
CREAT SERPL-MCNC: 1.4 MG/DL (ref 0.5–1.4)
DIFFERENTIAL METHOD BLD: ABNORMAL
EOSINOPHIL # BLD AUTO: 0.1 K/UL (ref 0–0.5)
EOSINOPHIL NFR BLD: 1.6 % (ref 0–8)
ERYTHROCYTE [DISTWIDTH] IN BLOOD BY AUTOMATED COUNT: 14.1 % (ref 11.5–14.5)
EST. GFR  (NO RACE VARIABLE): 37.1 ML/MIN/1.73 M^2
GLUCOSE SERPL-MCNC: 108 MG/DL (ref 70–110)
HCT VFR BLD AUTO: 36.1 % (ref 37–48.5)
HGB BLD-MCNC: 11.8 G/DL (ref 12–16)
IMM GRANULOCYTES # BLD AUTO: 0.02 K/UL (ref 0–0.04)
IMM GRANULOCYTES NFR BLD AUTO: 0.2 % (ref 0–0.5)
LYMPHOCYTES # BLD AUTO: 1.4 K/UL (ref 1–4.8)
LYMPHOCYTES NFR BLD: 16.5 % (ref 18–48)
MCH RBC QN AUTO: 28.1 PG (ref 27–31)
MCHC RBC AUTO-ENTMCNC: 32.7 G/DL (ref 32–36)
MCV RBC AUTO: 86 FL (ref 82–98)
MONOCYTES # BLD AUTO: 0.5 K/UL (ref 0.3–1)
MONOCYTES NFR BLD: 6.1 % (ref 4–15)
NEUTROPHILS # BLD AUTO: 6.4 K/UL (ref 1.8–7.7)
NEUTROPHILS NFR BLD: 75.2 % (ref 38–73)
NRBC BLD-RTO: 0 /100 WBC
PLATELET # BLD AUTO: 213 K/UL (ref 150–450)
PMV BLD AUTO: 9.4 FL (ref 9.2–12.9)
POTASSIUM SERPL-SCNC: 4.2 MMOL/L (ref 3.5–5.1)
PROT SERPL-MCNC: 6.4 G/DL (ref 6–8.4)
RBC # BLD AUTO: 4.2 M/UL (ref 4–5.4)
SODIUM SERPL-SCNC: 138 MMOL/L (ref 136–145)
TROPONIN I SERPL HS-MCNC: 8.8 PG/ML (ref 0–14.9)
WBC # BLD AUTO: 8.52 K/UL (ref 3.9–12.7)

## 2024-08-01 PROCEDURE — 84484 ASSAY OF TROPONIN QUANT: CPT | Performed by: EMERGENCY MEDICINE

## 2024-08-01 PROCEDURE — 96361 HYDRATE IV INFUSION ADD-ON: CPT

## 2024-08-01 PROCEDURE — 80053 COMPREHEN METABOLIC PANEL: CPT | Performed by: EMERGENCY MEDICINE

## 2024-08-01 PROCEDURE — 93010 ELECTROCARDIOGRAM REPORT: CPT | Mod: ,,, | Performed by: INTERNAL MEDICINE

## 2024-08-01 PROCEDURE — 63600175 PHARM REV CODE 636 W HCPCS: Performed by: STUDENT IN AN ORGANIZED HEALTH CARE EDUCATION/TRAINING PROGRAM

## 2024-08-01 PROCEDURE — 96366 THER/PROPH/DIAG IV INF ADDON: CPT

## 2024-08-01 PROCEDURE — 85025 COMPLETE CBC W/AUTO DIFF WBC: CPT | Performed by: EMERGENCY MEDICINE

## 2024-08-01 PROCEDURE — 99285 EMERGENCY DEPT VISIT HI MDM: CPT | Mod: 25

## 2024-08-01 PROCEDURE — 63600175 PHARM REV CODE 636 W HCPCS: Performed by: EMERGENCY MEDICINE

## 2024-08-01 PROCEDURE — 25000003 PHARM REV CODE 250: Performed by: STUDENT IN AN ORGANIZED HEALTH CARE EDUCATION/TRAINING PROGRAM

## 2024-08-01 PROCEDURE — 25000003 PHARM REV CODE 250: Performed by: NURSE PRACTITIONER

## 2024-08-01 PROCEDURE — 25000003 PHARM REV CODE 250: Performed by: EMERGENCY MEDICINE

## 2024-08-01 PROCEDURE — 83880 ASSAY OF NATRIURETIC PEPTIDE: CPT | Performed by: EMERGENCY MEDICINE

## 2024-08-01 PROCEDURE — 96365 THER/PROPH/DIAG IV INF INIT: CPT

## 2024-08-01 PROCEDURE — 96375 TX/PRO/DX INJ NEW DRUG ADDON: CPT

## 2024-08-01 PROCEDURE — 93005 ELECTROCARDIOGRAM TRACING: CPT | Performed by: INTERNAL MEDICINE

## 2024-08-01 RX ORDER — TOPIRAMATE 25 MG/1
25 TABLET ORAL NIGHTLY
Qty: 14 TABLET | Refills: 0 | Status: SHIPPED | OUTPATIENT
Start: 2024-08-01 | End: 2024-08-15

## 2024-08-01 RX ORDER — SODIUM CHLORIDE 9 MG/ML
500 INJECTION, SOLUTION INTRAVENOUS
Status: COMPLETED | OUTPATIENT
Start: 2024-08-01 | End: 2024-08-01

## 2024-08-01 RX ORDER — DIPHENHYDRAMINE HYDROCHLORIDE 50 MG/ML
12.5 INJECTION INTRAMUSCULAR; INTRAVENOUS
Status: COMPLETED | OUTPATIENT
Start: 2024-08-01 | End: 2024-08-01

## 2024-08-01 RX ORDER — DEXAMETHASONE SODIUM PHOSPHATE 4 MG/ML
4 INJECTION, SOLUTION INTRA-ARTICULAR; INTRALESIONAL; INTRAMUSCULAR; INTRAVENOUS; SOFT TISSUE
Status: COMPLETED | OUTPATIENT
Start: 2024-08-01 | End: 2024-08-01

## 2024-08-01 RX ORDER — MAGNESIUM SULFATE HEPTAHYDRATE 40 MG/ML
2 INJECTION, SOLUTION INTRAVENOUS ONCE
Status: COMPLETED | OUTPATIENT
Start: 2024-08-01 | End: 2024-08-01

## 2024-08-01 RX ORDER — METOCLOPRAMIDE HYDROCHLORIDE 5 MG/ML
10 INJECTION INTRAMUSCULAR; INTRAVENOUS
Status: COMPLETED | OUTPATIENT
Start: 2024-08-01 | End: 2024-08-01

## 2024-08-01 RX ORDER — LABETALOL HYDROCHLORIDE 5 MG/ML
10 INJECTION, SOLUTION INTRAVENOUS
Status: COMPLETED | OUTPATIENT
Start: 2024-08-01 | End: 2024-08-01

## 2024-08-01 RX ORDER — ACETAMINOPHEN 325 MG/1
650 TABLET ORAL
Status: COMPLETED | OUTPATIENT
Start: 2024-08-01 | End: 2024-08-01

## 2024-08-01 RX ORDER — PROCHLORPERAZINE EDISYLATE 5 MG/ML
10 INJECTION INTRAMUSCULAR; INTRAVENOUS
Status: COMPLETED | OUTPATIENT
Start: 2024-08-01 | End: 2024-08-01

## 2024-08-01 RX ORDER — TOPIRAMATE 25 MG/1
25 TABLET ORAL ONCE
Status: COMPLETED | OUTPATIENT
Start: 2024-08-01 | End: 2024-08-01

## 2024-08-01 RX ADMIN — MAGNESIUM SULFATE 2 G: 2 INJECTION INTRAVENOUS at 08:08

## 2024-08-01 RX ADMIN — SODIUM CHLORIDE 500 ML: 9 INJECTION, SOLUTION INTRAVENOUS at 04:08

## 2024-08-01 RX ADMIN — LABETALOL HYDROCHLORIDE 10 MG: 5 INJECTION, SOLUTION INTRAVENOUS at 06:08

## 2024-08-01 RX ADMIN — ACETAMINOPHEN 650 MG: 325 TABLET ORAL at 08:08

## 2024-08-01 RX ADMIN — DIPHENHYDRAMINE HYDROCHLORIDE 12.5 MG: 50 INJECTION INTRAMUSCULAR; INTRAVENOUS at 04:08

## 2024-08-01 RX ADMIN — DEXAMETHASONE SODIUM PHOSPHATE 4 MG: 4 INJECTION INTRA-ARTICULAR; INTRALESIONAL; INTRAMUSCULAR; INTRAVENOUS; SOFT TISSUE at 08:08

## 2024-08-01 RX ADMIN — TOPIRAMATE 25 MG: 25 TABLET, FILM COATED ORAL at 10:08

## 2024-08-01 RX ADMIN — PROCHLORPERAZINE EDISYLATE 10 MG: 5 INJECTION INTRAMUSCULAR; INTRAVENOUS at 04:08

## 2024-08-01 RX ADMIN — DEXTROSE MONOHYDRATE 500 MG: 50 INJECTION, SOLUTION INTRAVENOUS at 08:08

## 2024-08-01 RX ADMIN — METOCLOPRAMIDE 10 MG: 5 INJECTION, SOLUTION INTRAMUSCULAR; INTRAVENOUS at 08:08

## 2024-08-02 ENCOUNTER — TELEPHONE (OUTPATIENT)
Facility: CLINIC | Age: 84
End: 2024-08-02
Payer: MEDICARE

## 2024-08-02 VITALS
WEIGHT: 140 LBS | OXYGEN SATURATION: 99 % | TEMPERATURE: 99 F | HEART RATE: 99 BPM | HEIGHT: 66 IN | DIASTOLIC BLOOD PRESSURE: 86 MMHG | RESPIRATION RATE: 18 BRPM | BODY MASS INDEX: 22.5 KG/M2 | SYSTOLIC BLOOD PRESSURE: 170 MMHG

## 2024-08-02 LAB
OHS QRS DURATION: 82 MS
OHS QTC CALCULATION: 483 MS

## 2024-08-02 NOTE — TELEPHONE ENCOUNTER
Patient present in office 08/02/2024 with medication prescribed at ER early this morning for headache Topiramate 25 mg PO at night time, patient concern that it can interact with cancer medication, per Dr. Wasserman is ok to take Totipalmate, patient will see Dr. Wasserman on 08/05/2024 and will talk about referral to neurologist.

## 2024-08-05 ENCOUNTER — OFFICE VISIT (OUTPATIENT)
Facility: CLINIC | Age: 84
End: 2024-08-05
Payer: MEDICARE

## 2024-08-05 VITALS
RESPIRATION RATE: 17 BRPM | DIASTOLIC BLOOD PRESSURE: 81 MMHG | SYSTOLIC BLOOD PRESSURE: 176 MMHG | HEART RATE: 82 BPM | TEMPERATURE: 98 F | WEIGHT: 140 LBS | BODY MASS INDEX: 22.6 KG/M2

## 2024-08-05 DIAGNOSIS — C78.01 METASTATIC RENAL CELL CARCINOMA TO LUNG, RIGHT: Primary | ICD-10-CM

## 2024-08-05 DIAGNOSIS — C64.9 METASTATIC RENAL CELL CARCINOMA TO LUNG, RIGHT: Primary | ICD-10-CM

## 2024-08-05 LAB
OHS QRS DURATION: 86 MS
OHS QTC CALCULATION: 469 MS

## 2024-08-05 PROCEDURE — G2211 COMPLEX E/M VISIT ADD ON: HCPCS | Mod: S$PBB,,, | Performed by: INTERNAL MEDICINE

## 2024-08-05 PROCEDURE — 99213 OFFICE O/P EST LOW 20 MIN: CPT | Mod: PBBFAC,PN | Performed by: INTERNAL MEDICINE

## 2024-08-05 PROCEDURE — 99215 OFFICE O/P EST HI 40 MIN: CPT | Mod: S$PBB,,, | Performed by: INTERNAL MEDICINE

## 2024-08-05 PROCEDURE — 99999 PR PBB SHADOW E&M-EST. PATIENT-LVL III: CPT | Mod: PBBFAC,,, | Performed by: INTERNAL MEDICINE

## 2024-08-05 RX ORDER — PREDNISONE 20 MG/1
40 TABLET ORAL DAILY
Qty: 20 TABLET | Refills: 1 | Status: SHIPPED | OUTPATIENT
Start: 2024-08-05

## 2024-08-06 RX ORDER — LISINOPRIL 20 MG/1
20 TABLET ORAL 2 TIMES DAILY
Qty: 180 TABLET | Refills: 3 | Status: SHIPPED | OUTPATIENT
Start: 2024-08-06

## 2024-08-09 ENCOUNTER — TELEPHONE (OUTPATIENT)
Facility: CLINIC | Age: 84
End: 2024-08-09
Payer: MEDICARE

## 2024-08-09 RX ORDER — EPINEPHRINE 0.3 MG/.3ML
0.3 INJECTION SUBCUTANEOUS ONCE AS NEEDED
OUTPATIENT
Start: 2024-08-14

## 2024-08-09 RX ORDER — HEPARIN 100 UNIT/ML
500 SYRINGE INTRAVENOUS
OUTPATIENT
Start: 2024-08-14

## 2024-08-09 RX ORDER — DIPHENHYDRAMINE HYDROCHLORIDE 50 MG/ML
50 INJECTION INTRAMUSCULAR; INTRAVENOUS ONCE AS NEEDED
OUTPATIENT
Start: 2024-08-14

## 2024-08-09 RX ORDER — SODIUM CHLORIDE 0.9 % (FLUSH) 0.9 %
10 SYRINGE (ML) INJECTION
OUTPATIENT
Start: 2024-08-14

## 2024-08-13 ENCOUNTER — OFFICE VISIT (OUTPATIENT)
Facility: CLINIC | Age: 84
End: 2024-08-13
Payer: MEDICARE

## 2024-08-13 VITALS
BODY MASS INDEX: 22.35 KG/M2 | WEIGHT: 138.5 LBS | RESPIRATION RATE: 16 BRPM | TEMPERATURE: 98 F | HEART RATE: 82 BPM | SYSTOLIC BLOOD PRESSURE: 159 MMHG | DIASTOLIC BLOOD PRESSURE: 78 MMHG

## 2024-08-13 DIAGNOSIS — C78.01 METASTATIC RENAL CELL CARCINOMA TO LUNG, RIGHT: Primary | ICD-10-CM

## 2024-08-13 DIAGNOSIS — C64.9 METASTATIC RENAL CELL CARCINOMA TO LUNG, RIGHT: Primary | ICD-10-CM

## 2024-08-13 PROCEDURE — 99999 PR PBB SHADOW E&M-EST. PATIENT-LVL III: CPT | Mod: PBBFAC,,, | Performed by: INTERNAL MEDICINE

## 2024-08-13 PROCEDURE — G2211 COMPLEX E/M VISIT ADD ON: HCPCS | Mod: S$PBB,,, | Performed by: INTERNAL MEDICINE

## 2024-08-13 PROCEDURE — 99213 OFFICE O/P EST LOW 20 MIN: CPT | Mod: PBBFAC,PN | Performed by: INTERNAL MEDICINE

## 2024-08-13 PROCEDURE — 99214 OFFICE O/P EST MOD 30 MIN: CPT | Mod: S$PBB,,, | Performed by: INTERNAL MEDICINE

## 2024-08-13 NOTE — PROGRESS NOTES
PROGRESS NOTE    Subjective:       Patient ID: Karlie Hess is a 83 y.o. female.    History of Left RCC, treated [id number] further treatment     History of JAX lung cancer-treated with lobectomy-7/2/2015-no chemo/xrt-Path c/w mucin producing adenocarcinoma. [id number]     History of left hilar mass, 9/2020 EBUS negative     PET 10/18/2023:  Nodular densities are as outlined below:  -21 x 17 mm right infrahilar nodule with SUV max 2.5 (image 119), previously measuring 17 x 16 mm (August 20, 2023)  -27 x 27 mm marginated nodule in the posterior inferior left hilum with SUV max 2.7 (image 109)  -13 x 7 mm nodular density along the right posterior pararenal space with SUV max 0.8 (image 179), seen on studies dating back to 1/12/2021.     11/9/2023-EBUS  1. LUNG, RIGHT LOWER LOBE MASS, FINE NEEDLE ASPIRATION   - NEGATIVE FOR MALIGNANT CELLS.     2. LUNG, RIGHT LOWER LOBE MASS, BRUSHING   - RARE ATYPICAL CELLS PRESENT     3. LUNG, RIGHT LOWER LOBE MASS, BIOPSY TOUCH PREP   - POSITIVE FOR CARCINOMA.   - SEE CONCURRENT SURGICAL BIOPSY ([id number])     4. LUNG, RIGHT LOWER LOBE, BRONCHOALVEOLAR LAVAGE   - RARE ATYPICAL CELLS PRESENT.     5. LUNG, LEFT LOWER LOBE MASS, FINE NEEDLE ASPIRATION   - RARE ATYPICAL CELLS PRESENT     6. LYMPH NODE, STATION 11RS, FINE NEEDLE ASPIRATION   - NEGATIVE FOR MALIGNANT CELLS.   - SCANT KATARZYNA MATERIAL PRESENT      Karnofsky performance status score <80   Time from original diagnosis to initiation of targeted therapy <1 year   Hemoglobin less than the lower limit of normal   Serum calcium greater than the upper limit of normal   Neutrophil count greater than the upper limit of normal   Platelet count greater than the upper limit of normal   Favorable risk: None of the above risk factors present.  Intermediate risk: 1 or 2 of the above risk factors present.  Poor risk: 3 or more risk factors present.     1/5/2024:  RIGHT  PARARENAL MASS, CT GUIDED CORE BIOPSY:   - CLEAR CELL RENAL CELL CARCINOMA     12/27/2023-1/5/2024  RLL SBRT    1/30/2024-2/7/2024  XRT to Rt. Renal bed lesion    5/3/2024-MRI Abd:--Progressive Disease  Right kidney mass  2nd mass in lower pole of right kidney  Pancreatic Tail mass    5/13/2024-Bx:  PANCREATIC TAIL MASS, BIOPSY:   - METASTATIC CLEAR CELL RENAL CELL CARCINOMA.     7/31/2024-Patient went to ED with severe HA, MRI Brain:  No metastatic disease    6/7/2024-Pembro/Axitinib:  Cycle 1: 6/7/2024  Cycle 2: 6/28/2024  Cycle 3: 7/19/2024  Cycle 4: 8/7/2024-due    No apparent drug interactions with topirimate/Axitinib    Prednisone and nurtec??    Chief Complaint:  No chief complaint on file.  Metastatic Renal Cell Carcinoma follow up    History of Present Illness:   Karlie Hess is a 83 y.o. female who presents for follow up of above.      Ms. Hess was started on prednisone after last visit and she went to neurology who started her on Nurtec.  She states she had a HA which was greatly relieved after taking the Nurtec but she has only taken this on one occasion and has not had a severe HA since.     BP regimen  Lebtolol 100mg bid  Lisinopril 20mg bid  Amlodipine 5mg qhs    Family and Social history reviewed and is unchanged from 11/21/2023             Current Outpatient Medications:     acetaminophen (TYLENOL) 500 MG tablet, Take 1,000 mg by mouth 2 (two) times daily as needed., Disp: , Rfl:     amLODIPine (NORVASC) 5 MG tablet, Take 1 tablet (5 mg total) by mouth 2 (two) times daily. (Patient taking differently: Take 5 mg by mouth every evening.), Disp: 180 tablet, Rfl: 3    aspirin (ECOTRIN) 81 MG EC tablet, Take 1 tablet (81 mg total) by mouth once daily., Disp: , Rfl:     axitinib (INLYTA) 5 mg Tab, Take 1 tablet (5 mg) by mouth 2 (two) times a day., Disp: 60 tablet, Rfl: 6    cloNIDine (CATAPRES) 0.1 MG tablet, Take 1 tablet (0.1 mg total) by mouth 3 (three) times daily as needed. For systolic BP  over 180, Disp: 90 tablet, Rfl: 0    cyanocobalamin (VITAMIN B-12) 1000 MCG tablet, Take 100 mcg by mouth once daily., Disp: , Rfl:     duloxetine (CYMBALTA) 60 MG capsule, Take 60 mg by mouth once daily., Disp: , Rfl:     hydroCHLOROthiazide (HYDRODIURIL) 12.5 MG Tab, Take 1 tablet (12.5 mg total) by mouth daily as needed (Leg swelling)., Disp: 30 tablet, Rfl: 11    labetaloL (NORMODYNE) 200 MG tablet, TAKE 1 TABLET BY MOUTH TWICE  DAILY, Disp: 180 tablet, Rfl: 3    lisinopriL (PRINIVIL,ZESTRIL) 20 MG tablet, TAKE 1 TABLET BY MOUTH TWICE  DAILY, Disp: 180 tablet, Rfl: 3    ondansetron (ZOFRAN) 8 MG tablet, Take 1 tablet (8 mg total) by mouth every 8 (eight) hours as needed for Nausea., Disp: 30 tablet, Rfl: 5    pantoprazole (PROTONIX) 40 MG tablet, Take 40 mg by mouth once daily., Disp: , Rfl:     predniSONE (DELTASONE) 20 MG tablet, Take 2 tablets (40 mg total) by mouth once daily., Disp: 20 tablet, Rfl: 1    promethazine (PHENERGAN) 25 MG tablet, Take 1 tablet (25 mg total) by mouth every 6 (six) hours as needed for Nausea., Disp: 30 tablet, Rfl: 5    rosuvastatin (CRESTOR) 10 MG tablet, Take 10 mg by mouth every evening., Disp: , Rfl:     topiramate (TOPAMAX) 25 MG tablet, Take 1 tablet (25 mg total) by mouth every evening. for 14 doses, Disp: 14 tablet, Rfl: 0  No current facility-administered medications for this visit.    Facility-Administered Medications Ordered in Other Visits:     LIDOcaine (PF) 10 mg/ml (1%) injection 10 mg, 1 mL, Intradermal, Once, Gabriel Loaiza MD        Objective:       Physical Examination:     BP (!) 159/78   Pulse 82   Temp 98.1 °F (36.7 °C)   Resp 16   Wt 62.8 kg (138 lb 8 oz)   BMI 22.35 kg/m²     Physical Exam  Constitutional:       Appearance: Normal appearance.   HENT:      Head: Normocephalic and atraumatic.   Eyes:      General: No scleral icterus.     Conjunctiva/sclera: Conjunctivae normal.   Cardiovascular:      Rate and Rhythm: Normal rate.   Pulmonary:       Effort: Pulmonary effort is normal.   Abdominal:      General: Abdomen is flat.   Neurological:      General: No focal deficit present.      Mental Status: She is alert and oriented to person, place, and time.   Psychiatric:         Mood and Affect: Mood normal.         Behavior: Behavior normal.         Labs:   Recent Results (from the past 336 hour(s))   CBC auto differential    Collection Time: 08/01/24  3:33 PM   Result Value Ref Range    WBC 8.52 3.90 - 12.70 K/uL    Hemoglobin 11.8 (L) 12.0 - 16.0 g/dL    Hematocrit 36.1 (L) 37.0 - 48.5 %    Platelets 213 150 - 450 K/uL   CBC with Automated Differential    Collection Time: 07/31/24  5:11 AM   Result Value Ref Range    WBC 7.62 3.90 - 12.70 K/uL    Hemoglobin 11.2 (L) 12.0 - 16.0 g/dL    Hematocrit 34.1 (L) 37.0 - 48.5 %    Platelets 198 150 - 450 K/uL   CBC W/ AUTO DIFFERENTIAL    Collection Time: 07/30/24 10:24 PM   Result Value Ref Range    WBC 8.76 3.90 - 12.70 K/uL    Hemoglobin 11.4 (L) 12.0 - 16.0 g/dL    Hematocrit 34.7 (L) 37.0 - 48.5 %    Platelets 215 150 - 450 K/uL       CMP  Sodium   Date Value Ref Range Status   08/01/2024 138 136 - 145 mmol/L Final     Potassium   Date Value Ref Range Status   08/01/2024 4.2 3.5 - 5.1 mmol/L Final     Chloride   Date Value Ref Range Status   08/01/2024 101 95 - 110 mmol/L Final     CO2   Date Value Ref Range Status   08/01/2024 28 23 - 29 mmol/L Final     Glucose   Date Value Ref Range Status   08/01/2024 108 70 - 110 mg/dL Final     BUN   Date Value Ref Range Status   08/01/2024 25 (H) 8 - 23 mg/dL Final     Creatinine   Date Value Ref Range Status   08/01/2024 1.4 0.5 - 1.4 mg/dL Final   12/31/2012 1.2 0.5 - 1.4 mg/dL Final     Calcium   Date Value Ref Range Status   08/01/2024 8.8 8.7 - 10.5 mg/dL Final   12/31/2012 9.7 8.7 - 10.5 mg/dL Final     Total Protein   Date Value Ref Range Status   08/01/2024 6.4 6.0 - 8.4 g/dL Final     Albumin   Date Value Ref Range Status   08/01/2024 3.8 3.5 - 5.2 g/dL Final  "    Total Bilirubin   Date Value Ref Range Status   08/01/2024 0.6 0.1 - 1.0 mg/dL Final     Comment:     For infants and newborns, interpretation of results should be based  on gestational age, weight and in agreement with clinical  observations.    Premature Infant recommended reference ranges:  Up to 24 hours.............<8.0 mg/dL  Up to 48 hours............<12.0 mg/dL  3-5 days..................<15.0 mg/dL  6-29 days.................<15.0 mg/dL       Alkaline Phosphatase   Date Value Ref Range Status   08/01/2024 56 55 - 135 U/L Final     AST   Date Value Ref Range Status   08/01/2024 15 10 - 40 U/L Final     ALT   Date Value Ref Range Status   08/01/2024 10 10 - 44 U/L Final     Anion Gap   Date Value Ref Range Status   08/01/2024 9 8 - 16 mmol/L Final   12/31/2012 14 5 - 15 meq/L Final     eGFR if    Date Value Ref Range Status   07/18/2022 >60.0 >60 mL/min/1.73 m^2 Final     eGFR if non    Date Value Ref Range Status   07/18/2022 52.6 (A) >60 mL/min/1.73 m^2 Final     Comment:     Calculation used to obtain the estimated glomerular filtration  rate (eGFR) is the CKD-EPI equation.        No results found for: "CEA"  No results found for: "PSA"        Assessment/Plan:     Problem List Items Addressed This Visit       Metastatic renal cell carcinoma to lung, right - Primary     She is now on Prednisone therapy and her HAs are greatly improved.  She did take a single dose of nurtec last week which she states relieved the HA quickly.  This is confusing but it seems the prednisone may be the agent that is truly helping here and if so, then AI is more likely as a cause.  Will hold the prednisone and see her again in 2 weeks.  No Pembro for now.  I will have her take notes on what she does but can use nurtec for any HAs.                      Discussion:     Follow up in about 2 weeks (around 8/27/2024).      Electronically signed by Del Nathan      "

## 2024-08-13 NOTE — ASSESSMENT & PLAN NOTE
She is now on Prednisone therapy and her HAs are greatly improved.  She did take a single dose of nurtec last week which she states relieved the HA quickly.  This is confusing but it seems the prednisone may be the agent that is truly helping here and if so, then AI is more likely as a cause.  Will hold the prednisone and see her again in 2 weeks.  No Pembro for now.  I will have her take notes on what she does but can use nurtec for any HAs.

## 2024-08-15 ENCOUNTER — TELEPHONE (OUTPATIENT)
Facility: CLINIC | Age: 84
End: 2024-08-15
Payer: MEDICARE

## 2024-08-22 NOTE — PROGRESS NOTES
PROGRESS NOTE    Subjective:       Patient ID: Karlie Hess is a 83 y.o. female.    History of Left RCC, treated [id number] further treatment     History of JAX lung cancer-treated with lobectomy-7/2/2015-no chemo/xrt-Path c/w mucin producing adenocarcinoma. [id number]     History of left hilar mass, 9/2020 EBUS negative     PET 10/18/2023:  Nodular densities are as outlined below:  -21 x 17 mm right infrahilar nodule with SUV max 2.5 (image 119), previously measuring 17 x 16 mm (August 20, 2023)  -27 x 27 mm marginated nodule in the posterior inferior left hilum with SUV max 2.7 (image 109)  -13 x 7 mm nodular density along the right posterior pararenal space with SUV max 0.8 (image 179), seen on studies dating back to 1/12/2021.     11/9/2023-EBUS  1. LUNG, RIGHT LOWER LOBE MASS, FINE NEEDLE ASPIRATION   - NEGATIVE FOR MALIGNANT CELLS.     2. LUNG, RIGHT LOWER LOBE MASS, BRUSHING   - RARE ATYPICAL CELLS PRESENT     3. LUNG, RIGHT LOWER LOBE MASS, BIOPSY TOUCH PREP   - POSITIVE FOR CARCINOMA.   - SEE CONCURRENT SURGICAL BIOPSY ([id number])     4. LUNG, RIGHT LOWER LOBE, BRONCHOALVEOLAR LAVAGE   - RARE ATYPICAL CELLS PRESENT.     5. LUNG, LEFT LOWER LOBE MASS, FINE NEEDLE ASPIRATION   - RARE ATYPICAL CELLS PRESENT     6. LYMPH NODE, STATION 11RS, FINE NEEDLE ASPIRATION   - NEGATIVE FOR MALIGNANT CELLS.   - SCANT KATARZYNA MATERIAL PRESENT      Karnofsky performance status score <80   Time from original diagnosis to initiation of targeted therapy <1 year   Hemoglobin less than the lower limit of normal   Serum calcium greater than the upper limit of normal   Neutrophil count greater than the upper limit of normal   Platelet count greater than the upper limit of normal   Favorable risk: None of the above risk factors present.  Intermediate risk: 1 or 2 of the above risk factors present.  Poor risk: 3 or more risk factors present.     1/5/2024:  RIGHT  PARARENAL MASS, CT GUIDED CORE BIOPSY:   - CLEAR CELL RENAL CELL CARCINOMA     12/27/2023-1/5/2024  RLL SBRT    1/30/2024-2/7/2024  XRT to Rt. Renal bed lesion    5/3/2024-MRI Abd:--Progressive Disease  Right kidney mass  2nd mass in lower pole of right kidney  Pancreatic Tail mass    5/13/2024-Bx:  PANCREATIC TAIL MASS, BIOPSY:   - METASTATIC CLEAR CELL RENAL CELL CARCINOMA.     7/31/2024-Patient went to ED with severe HA, MRI Brain:  No metastatic disease    6/7/2024-Pembro/Axitinib:  Cycle 1: 6/7/2024  Cycle 2: 6/28/2024  Cycle 3: 7/19/2024  Cycle 4: 8/7/2024    No apparent drug interactions with topirimate/Axitinib    Prednisone and nurtec??    Chief Complaint:  No chief complaint on file.  Metastatic Renal Cell Carcinoma follow up    History of Present Illness:   Karlie Hess is a 83 y.o. female who presents for follow up of above.      Ms. Hess  states since starting the steroids Headaches and fatigue have resolved. Stopped the Prednisone 8/13/2024 when she saw Dr. Wasserman.    BP high today but she states it has been running 150-160s/80s at home.    BP regimen  Lebtolol 100mg bid  Lisinopril 20mg bid  Amlodipine 5mg qhs    Family and Social history reviewed and is unchanged from 11/21/2023       Current Outpatient Medications:     acetaminophen (TYLENOL) 500 MG tablet, Take 1,000 mg by mouth 2 (two) times daily as needed., Disp: , Rfl:     amLODIPine (NORVASC) 5 MG tablet, Take 1 tablet (5 mg total) by mouth 2 (two) times daily. (Patient taking differently: Take 5 mg by mouth every evening.), Disp: 180 tablet, Rfl: 3    aspirin (ECOTRIN) 81 MG EC tablet, Take 1 tablet (81 mg total) by mouth once daily., Disp: , Rfl:     axitinib (INLYTA) 5 mg Tab, Take 1 tablet (5 mg) by mouth 2 (two) times a day., Disp: 60 tablet, Rfl: 6    cloNIDine (CATAPRES) 0.1 MG tablet, Take 1 tablet (0.1 mg total) by mouth 3 (three) times daily as needed. For systolic BP over 180, Disp: 90 tablet, Rfl: 0    cyanocobalamin  (VITAMIN B-12) 1000 MCG tablet, Take 100 mcg by mouth once daily., Disp: , Rfl:     duloxetine (CYMBALTA) 60 MG capsule, Take 60 mg by mouth once daily., Disp: , Rfl:     hydroCHLOROthiazide (HYDRODIURIL) 12.5 MG Tab, Take 1 tablet (12.5 mg total) by mouth daily as needed (Leg swelling)., Disp: 30 tablet, Rfl: 11    labetaloL (NORMODYNE) 200 MG tablet, TAKE 1 TABLET BY MOUTH TWICE  DAILY, Disp: 180 tablet, Rfl: 3    lisinopriL (PRINIVIL,ZESTRIL) 20 MG tablet, TAKE 1 TABLET BY MOUTH TWICE  DAILY, Disp: 180 tablet, Rfl: 3    ondansetron (ZOFRAN) 8 MG tablet, Take 1 tablet (8 mg total) by mouth every 8 (eight) hours as needed for Nausea., Disp: 30 tablet, Rfl: 5    pantoprazole (PROTONIX) 40 MG tablet, Take 40 mg by mouth once daily., Disp: , Rfl:     predniSONE (DELTASONE) 20 MG tablet, Take 2 tablets (40 mg total) by mouth once daily., Disp: 20 tablet, Rfl: 1    promethazine (PHENERGAN) 25 MG tablet, Take 1 tablet (25 mg total) by mouth every 6 (six) hours as needed for Nausea., Disp: 30 tablet, Rfl: 5    rosuvastatin (CRESTOR) 10 MG tablet, Take 10 mg by mouth every evening., Disp: , Rfl:     topiramate (TOPAMAX) 25 MG tablet, Take 1 tablet (25 mg total) by mouth every evening. for 14 doses, Disp: 14 tablet, Rfl: 0  No current facility-administered medications for this visit.    Facility-Administered Medications Ordered in Other Visits:     LIDOcaine (PF) 10 mg/ml (1%) injection 10 mg, 1 mL, Intradermal, Once, Gabriel Loaiza MD    Review of Systems   Respiratory:  Negative for cough and shortness of breath.    Cardiovascular:  Positive for chest pain.   Gastrointestinal:  Positive for abdominal pain. Negative for diarrhea.   Genitourinary:  Positive for frequency.   Musculoskeletal:  Negative for back pain.   Skin:  Negative for rash.   Neurological:  Positive for headaches.   Psychiatric/Behavioral:  The patient is not nervous/anxious.          Objective:       Physical Examination:     BP (!) 185/82   Pulse  86   Temp 98 °F (36.7 °C)   Resp 17   Wt 62.1 kg (137 lb)   BMI 22.11 kg/m²     Physical Exam  Constitutional:       Appearance: Normal appearance.   HENT:      Head: Normocephalic and atraumatic.   Eyes:      General: No scleral icterus.     Conjunctiva/sclera: Conjunctivae normal.   Cardiovascular:      Rate and Rhythm: Normal rate.   Pulmonary:      Effort: Pulmonary effort is normal.   Abdominal:      General: Abdomen is flat.   Neurological:      General: No focal deficit present.      Mental Status: She is alert and oriented to person, place, and time.   Psychiatric:         Mood and Affect: Mood normal.         Behavior: Behavior normal.         Labs:   No results found for this or any previous visit (from the past 336 hour(s)).      CMP  Sodium   Date Value Ref Range Status   08/01/2024 138 136 - 145 mmol/L Final     Potassium   Date Value Ref Range Status   08/01/2024 4.2 3.5 - 5.1 mmol/L Final     Chloride   Date Value Ref Range Status   08/01/2024 101 95 - 110 mmol/L Final     CO2   Date Value Ref Range Status   08/01/2024 28 23 - 29 mmol/L Final     Glucose   Date Value Ref Range Status   08/01/2024 108 70 - 110 mg/dL Final     BUN   Date Value Ref Range Status   08/01/2024 25 (H) 8 - 23 mg/dL Final     Creatinine   Date Value Ref Range Status   08/01/2024 1.4 0.5 - 1.4 mg/dL Final   12/31/2012 1.2 0.5 - 1.4 mg/dL Final     Calcium   Date Value Ref Range Status   08/01/2024 8.8 8.7 - 10.5 mg/dL Final   12/31/2012 9.7 8.7 - 10.5 mg/dL Final     Total Protein   Date Value Ref Range Status   08/01/2024 6.4 6.0 - 8.4 g/dL Final     Albumin   Date Value Ref Range Status   08/01/2024 3.8 3.5 - 5.2 g/dL Final     Total Bilirubin   Date Value Ref Range Status   08/01/2024 0.6 0.1 - 1.0 mg/dL Final     Comment:     For infants and newborns, interpretation of results should be based  on gestational age, weight and in agreement with clinical  observations.    Premature Infant recommended reference ranges:  Up  "to 24 hours.............<8.0 mg/dL  Up to 48 hours............<12.0 mg/dL  3-5 days..................<15.0 mg/dL  6-29 days.................<15.0 mg/dL       Alkaline Phosphatase   Date Value Ref Range Status   08/01/2024 56 55 - 135 U/L Final     AST   Date Value Ref Range Status   08/01/2024 15 10 - 40 U/L Final     ALT   Date Value Ref Range Status   08/01/2024 10 10 - 44 U/L Final     Anion Gap   Date Value Ref Range Status   08/01/2024 9 8 - 16 mmol/L Final   12/31/2012 14 5 - 15 meq/L Final     eGFR if    Date Value Ref Range Status   07/18/2022 >60.0 >60 mL/min/1.73 m^2 Final     eGFR if non    Date Value Ref Range Status   07/18/2022 52.6 (A) >60 mL/min/1.73 m^2 Final     Comment:     Calculation used to obtain the estimated glomerular filtration  rate (eGFR) is the CKD-EPI equation.        No results found for: "CEA"    Assessment/Plan:     Problem List Items Addressed This Visit          Cardiac/Vascular    Primary hypertension       Oncology    Metastatic renal cell carcinoma to lung, right - Primary       Renal Cell Carcinoma- Discuss restarting the Keytruda; Continue the Axtinib BID  2. HTN- Continue follow up with PCP          Discussion:     Follow up in about 3 weeks (around 9/16/2024) for with Dr. Wasserman and 6 weeks with me.      Electronically signed by Mateusz sanon, MSN, APRN, AGNP-C, OCN        "

## 2024-08-26 ENCOUNTER — OFFICE VISIT (OUTPATIENT)
Facility: CLINIC | Age: 84
End: 2024-08-26
Payer: MEDICARE

## 2024-08-26 ENCOUNTER — OFFICE VISIT (OUTPATIENT)
Dept: RADIATION ONCOLOGY | Facility: CLINIC | Age: 84
End: 2024-08-26
Payer: MEDICARE

## 2024-08-26 VITALS
BODY MASS INDEX: 22.11 KG/M2 | RESPIRATION RATE: 17 BRPM | TEMPERATURE: 98 F | SYSTOLIC BLOOD PRESSURE: 185 MMHG | HEART RATE: 86 BPM | DIASTOLIC BLOOD PRESSURE: 82 MMHG | WEIGHT: 137 LBS

## 2024-08-26 VITALS
DIASTOLIC BLOOD PRESSURE: 101 MMHG | HEART RATE: 94 BPM | RESPIRATION RATE: 16 BRPM | SYSTOLIC BLOOD PRESSURE: 176 MMHG | WEIGHT: 137 LBS | BODY MASS INDEX: 22.11 KG/M2 | OXYGEN SATURATION: 98 %

## 2024-08-26 DIAGNOSIS — I10 PRIMARY HYPERTENSION: ICD-10-CM

## 2024-08-26 DIAGNOSIS — C64.9 METASTATIC RENAL CELL CARCINOMA TO LUNG, RIGHT: Primary | ICD-10-CM

## 2024-08-26 DIAGNOSIS — C78.01 METASTATIC RENAL CELL CARCINOMA TO LUNG, RIGHT: Primary | ICD-10-CM

## 2024-08-26 PROCEDURE — 99213 OFFICE O/P EST LOW 20 MIN: CPT | Mod: PBBFAC,PN | Performed by: NURSE PRACTITIONER

## 2024-08-26 PROCEDURE — 99214 OFFICE O/P EST MOD 30 MIN: CPT | Mod: S$GLB,,, | Performed by: RADIOLOGY

## 2024-08-26 PROCEDURE — G2211 COMPLEX E/M VISIT ADD ON: HCPCS | Mod: S$PBB,,, | Performed by: NURSE PRACTITIONER

## 2024-08-26 PROCEDURE — 99999 PR PBB SHADOW E&M-EST. PATIENT-LVL III: CPT | Mod: PBBFAC,,, | Performed by: NURSE PRACTITIONER

## 2024-08-26 PROCEDURE — 99215 OFFICE O/P EST HI 40 MIN: CPT | Mod: S$PBB,,, | Performed by: NURSE PRACTITIONER

## 2024-08-26 PROCEDURE — G2211 COMPLEX E/M VISIT ADD ON: HCPCS | Mod: S$GLB,,, | Performed by: RADIOLOGY

## 2024-08-26 NOTE — PROGRESS NOTES
Karlie Hess  [id number]  1940 8/26/2024    DIAGNOSIS: Oligometastatic clear cell RCC L kidney     REASON FOR VISIT: Routine scheduled follow-up.    HISTORY OF PRESENT ILLNESS:   Karlie Hess is a 83 y.o. 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 Freitas).  Staging studies have been stable until CT abdomen pelvis from September of this year appreciated soft tissue nodule right pararenal space, slowly enlarged to 1.3 cm (0.6 cm in 2021).  CT chest bilateral kathleen stable.  PET-CT demonstrated SUV 0.8 at 1.3 cm right pararenal, SUV 2.5 at enlarging 2.1 x 1.7 cm right infrahilar nodule and SUV 2.7 at inferior left hilum suspicious for benign etiology.  She underwent repeat EBUS with Dr. Freitas that returned metastatic clear cell renal cell carcinoma from right lung mass with rare atypical cells appreciated from left lower lobe FNA; 11R(-).     Case was reviewed at Multidisciplinary Tumor Conference with recommendation for core needle biopsy of pararenal lesion.       Completed SBRT to right lower lobe, 50 Gy in 5 fractions ending January 5, 2024.  Treatment well tolerated.     Core needle biopsy of right pararenal (contralateral from primary) lesion returned clear cell RCC.  Case discussed with Dr. Nathan with plan for SBRT to this site followed by observation.      Completed SBRT, 50 Gy in 5 fractions to right pararenal mass ending February 7, 2024. Treatment well tolerated.     INTERVAL HISTORY:   Since her last visit, the patient has been undergoing systemic therapy w/ Keytruda and Axtinib.  Also meeting w/ MedOnc today re: continuation of this therapy.  Pt denies any noted toxicity from recent SBRT and otherwise stable condition.    Review of systems otherwise negative unless indicated in  HPI/interval history.    Past Medical History:   Diagnosis Date    Aortic insufficiency     Cancer 2005    renal ca, L kidney removed    COVID-19 2022    Diastolic heart failure     Diffusion capacity of lung (dl), decreased     Encounter for blood transfusion 1963    with miscarriage    Fibromyalgia     GERD (gastroesophageal reflux disease)     Hypercholesterolemia     Hyperlipidemia     Hypertension     Lung cancer 2015    adenocarcinoma ; upper left lobe    Renal disorder 2005    Left Kidney cancer    Sinusitis      Past Surgical History:   Procedure Laterality Date    ADENOIDECTOMY      BACK SURGERY  2013    laminectomy    ENDOBRONCHIAL ULTRASOUND Left 07/15/2022    Procedure: ENDOBRONCHIAL ULTRASOUND (EBUS);  Surgeon: Cm Freitas MD;  Location[facility];  Service: Pulmonary;  Laterality: Left;    ENDOBRONCHIAL ULTRASOUND Bilateral 11/09/2023    Procedure: ENDOBRONCHIAL ULTRASOUND (EBUS);  Surgeon: Cm Freitas MD;  Location: [facility];  Service: Pulmonary;  Laterality: Bilateral;    ENDOSCOPIC ULTRASOUND OF UPPER GASTROINTESTINAL TRACT N/A 05/13/2024    Procedure: ULTRASOUND, UPPER GI TRACT, ENDOSCOPIC;  Surgeon: Andi Bazzi III, MD;  Location: [facility] ENDO;  Service: Endoscopy;  Laterality: N/A;    HYSTERECTOMY  1975    CARY/BSO. Due to endometriosis.    INJECTION OF JOINT Right 05/27/2019    Procedure: Injection, Joint, Shoulder, Hip, Or Knee;  Surgeon: Zach Wilder MD;  Location: [facility] OR;  Service: Pain Management;  Laterality: Right;  right hip intra-articular injection     INSERTION OF TUNNELED CENTRAL VENOUS CATHETER (CVC) WITH SUBCUTANEOUS PORT N/A 5/31/2024    Procedure: JOMYPAUHO-ABHF-Y-CATH;  Surgeon: Terry Kim MD;  Location: [facility] OR;  Service: General;  Laterality: N/A;    LUNG LOBECTOMY Left     LOWER LOBE    NEPHRECTOMY Left 2005    Entire kidney due to cancer.    ROBOTIC BRONCHOSCOPY Bilateral 11/09/2023    Procedure: ROBOTIC BRONCHOSCOPY;  Surgeon: Cm Freitas MD;   Location: [facility] OR;  Service: Pulmonary;  Laterality: Bilateral;    TONSILLECTOMY       Social History     Socioeconomic History    Marital status:    Tobacco Use    Smoking status: Never    Smokeless tobacco: Never   Substance and Sexual Activity    Alcohol use: No    Drug use: No    Sexual activity: Not Currently     Social Determinants of Health     Financial Resource Strain: Low Risk  (1/15/2024)    Overall Financial Resource Strain (CARDIA)     Difficulty of Paying Living Expenses: Not hard at all   Food Insecurity: No Food Insecurity (1/15/2024)    Hunger Vital Sign     Worried About Running Out of Food in the Last Year: Never true     Ran Out of Food in the Last Year: Never true   Transportation Needs: No Transportation Needs (7/31/2024)    TRANSPORTATION NEEDS     Transportation : No   Physical Activity: Inactive (1/15/2024)    Exercise Vital Sign     Days of Exercise per Week: 0 days     Minutes of Exercise per Session: 0 min   Stress: No Stress Concern Present (1/15/2024)    [employer] [place] - Occupational Stress Questionnaire     Feeling of Stress : Not at all   Housing Stability: Low Risk  (1/15/2024)    Housing Stability Vital Sign     Unable to Pay for Housing in the Last Year: No     Number of Places Lived in the Last Year: 1     Unstable Housing in the Last Year: No     Family History   Problem Relation Name Age of Onset    Hypertension Mother      Breast cancer Maternal Aunt      Breast cancer Maternal Aunt       Medication List with Changes/Refills   Current Medications    ACETAMINOPHEN (TYLENOL) 500 MG TABLET    Take 1,000 mg by mouth 2 (two) times daily as needed.    AMLODIPINE (NORVASC) 5 MG TABLET    Take 1 tablet (5 mg total) by mouth 2 (two) times daily.    ASPIRIN (ECOTRIN) 81 MG EC TABLET    Take 1 tablet (81 mg total) by mouth once daily.    AXITINIB (INLYTA) 5 MG TAB    Take 1 tablet (5 mg) by mouth 2 (two) times a day.    CLONIDINE (CATAPRES) 0.1 MG TABLET     Take 1 tablet (0.1 mg total) by mouth 3 (three) times daily as needed. For systolic BP over 180    CYANOCOBALAMIN (VITAMIN B-12) 1000 MCG TABLET    Take 100 mcg by mouth once daily.    DULOXETINE (CYMBALTA) 60 MG CAPSULE    Take 60 mg by mouth once daily.    HYDROCHLOROTHIAZIDE (HYDRODIURIL) 12.5 MG TAB    Take 1 tablet (12.5 mg total) by mouth daily as needed (Leg swelling).    LABETALOL (NORMODYNE) 200 MG TABLET    TAKE 1 TABLET BY MOUTH TWICE  DAILY    LISINOPRIL (PRINIVIL,ZESTRIL) 20 MG TABLET    TAKE 1 TABLET BY MOUTH TWICE  DAILY    ONDANSETRON (ZOFRAN) 8 MG TABLET    Take 1 tablet (8 mg total) by mouth every 8 (eight) hours as needed for Nausea.    PANTOPRAZOLE (PROTONIX) 40 MG TABLET    Take 40 mg by mouth once daily.    PREDNISONE (DELTASONE) 20 MG TABLET    Take 2 tablets (40 mg total) by mouth once daily.    PROMETHAZINE (PHENERGAN) 25 MG TABLET    Take 1 tablet (25 mg total) by mouth every 6 (six) hours as needed for Nausea.    ROSUVASTATIN (CRESTOR) 10 MG TABLET    Take 10 mg by mouth every evening.    TOPIRAMATE (TOPAMAX) 25 MG TABLET    Take 1 tablet (25 mg total) by mouth every evening. for 14 doses     Review of patient's allergies indicates:   Allergen Reactions    Hydrocodone Hallucinations    Oxycodone-acetaminophen Hallucinations and Other (See Comments)       QUALITY OF LIFE: 80%- Normal Activity with Effort: Some Symptoms of Disease    Vitals:    08/26/24 1324   BP: (!) 176/101   Pulse: 94   Resp: 16   SpO2: 98%   Weight: 62.1 kg (137 lb)   PainSc: 0-No pain     Body mass index is 22.11 kg/m².    PHYSICAL EXAM:  GENERAL: alert; in no apparent distress.   HEAD: normocephalic, atraumatic.  EYES: pupils are equal, round, reactive to light and accommodation. Sclera anicteric. Conjunctiva not injected.   NOSE/THROAT: no nasal erythema or rhinorrhea. Oropharynx pink, without erythema, ulcerations or thrush.   NECK: no cervical motion rigidity; supple with no masses.  CHEST: Patient is speaking  comfortably on room air with normal work of breathing without using accessory muscles of respiration.  CARDIOVASCULAR: regular rate and rhythm  ABDOMEN: soft, nontender, nondistended.   MUSCULOSKELETAL: no tenderness to palpation along the spine or scapulae. Normal range of motion.  NEUROLOGIC: cranial nerves II-XII intact bilaterally. Strength 5/5 in bilateral upper and lower extremities. No sensory deficits appreciated. Normal gait.  LYMPHATIC: no cervical, supraclavicular or axillary adenopathy appreciated.   EXTREMITIES: no clubbing, cyanosis, edema.  SKIN: no erythema, rashes, ulcerations noted.       ASSESSMENT: Karlie Hess is a female with Oligometastatic clear cell RCC L kidney     PLAN:   - Good tolerance and recovery from recent SBRT  - Continue f/u and tx w/ MedOnc as directed  - MedOnc also to direct imaging/surveillance needs  - RTC in 6m    All questions answered and contact information provided. Patient understands free to call us anytime with any questions or concerns regarding radiation therapy.    I have personally seen and evaluated this patient with a moderate to high complexity diagnosis.     PHYSICIAN: Heladio Reece III, MD

## 2024-09-05 ENCOUNTER — TELEPHONE (OUTPATIENT)
Facility: CLINIC | Age: 84
End: 2024-09-05
Payer: MEDICARE

## 2024-09-05 DIAGNOSIS — C64.9 SECONDARY RENAL CELL CARCINOMA OF RIGHT LUNG: ICD-10-CM

## 2024-09-05 DIAGNOSIS — C64.9 METASTATIC RENAL CELL CARCINOMA TO LUNG, RIGHT: Primary | ICD-10-CM

## 2024-09-05 DIAGNOSIS — C78.01 METASTATIC RENAL CELL CARCINOMA TO LUNG, RIGHT: Primary | ICD-10-CM

## 2024-09-05 DIAGNOSIS — C78.01 SECONDARY RENAL CELL CARCINOMA OF RIGHT LUNG: ICD-10-CM

## 2024-09-05 NOTE — TELEPHONE ENCOUNTER
Called patient on regard to chemotherapy infusion. Per Dr. Wasserman patient on hold for chemotherapy and have to update  blood work, orders for CMP and CBC are in. Patient agreed and stated understanding.

## 2024-09-09 ENCOUNTER — LAB VISIT (OUTPATIENT)
Dept: LAB | Facility: HOSPITAL | Age: 84
End: 2024-09-09
Attending: INTERNAL MEDICINE
Payer: MEDICARE

## 2024-09-09 DIAGNOSIS — C64.9 SECONDARY RENAL CELL CARCINOMA OF RIGHT LUNG: ICD-10-CM

## 2024-09-09 DIAGNOSIS — C78.01 SECONDARY RENAL CELL CARCINOMA OF RIGHT LUNG: ICD-10-CM

## 2024-09-09 DIAGNOSIS — C78.01 METASTATIC RENAL CELL CARCINOMA TO LUNG, RIGHT: ICD-10-CM

## 2024-09-09 DIAGNOSIS — C64.9 METASTATIC RENAL CELL CARCINOMA TO LUNG, RIGHT: ICD-10-CM

## 2024-09-09 LAB
ALBUMIN SERPL BCP-MCNC: 4 G/DL (ref 3.5–5.2)
ALP SERPL-CCNC: 61 U/L (ref 55–135)
ALT SERPL W/O P-5'-P-CCNC: 9 U/L (ref 10–44)
ANION GAP SERPL CALC-SCNC: 9 MMOL/L (ref 8–16)
AST SERPL-CCNC: 14 U/L (ref 10–40)
BASOPHILS # BLD AUTO: 0.03 K/UL (ref 0–0.2)
BASOPHILS NFR BLD: 0.5 % (ref 0–1.9)
BILIRUB SERPL-MCNC: 0.5 MG/DL (ref 0.1–1)
BUN SERPL-MCNC: 18 MG/DL (ref 8–23)
CALCIUM SERPL-MCNC: 8.9 MG/DL (ref 8.7–10.5)
CHLORIDE SERPL-SCNC: 104 MMOL/L (ref 95–110)
CO2 SERPL-SCNC: 29 MMOL/L (ref 23–29)
CREAT SERPL-MCNC: 1.3 MG/DL (ref 0.5–1.4)
DIFFERENTIAL METHOD BLD: ABNORMAL
EOSINOPHIL # BLD AUTO: 0.5 K/UL (ref 0–0.5)
EOSINOPHIL NFR BLD: 7.6 % (ref 0–8)
ERYTHROCYTE [DISTWIDTH] IN BLOOD BY AUTOMATED COUNT: 14.9 % (ref 11.5–14.5)
EST. GFR  (NO RACE VARIABLE): 40.5 ML/MIN/1.73 M^2
GLUCOSE SERPL-MCNC: 99 MG/DL (ref 70–110)
HCT VFR BLD AUTO: 38.3 % (ref 37–48.5)
HGB BLD-MCNC: 12.5 G/DL (ref 12–16)
IMM GRANULOCYTES # BLD AUTO: 0.02 K/UL (ref 0–0.04)
IMM GRANULOCYTES NFR BLD AUTO: 0.3 % (ref 0–0.5)
LYMPHOCYTES # BLD AUTO: 1.6 K/UL (ref 1–4.8)
LYMPHOCYTES NFR BLD: 24.5 % (ref 18–48)
MCH RBC QN AUTO: 28 PG (ref 27–31)
MCHC RBC AUTO-ENTMCNC: 32.6 G/DL (ref 32–36)
MCV RBC AUTO: 86 FL (ref 82–98)
MONOCYTES # BLD AUTO: 0.7 K/UL (ref 0.3–1)
MONOCYTES NFR BLD: 10.1 % (ref 4–15)
NEUTROPHILS # BLD AUTO: 3.7 K/UL (ref 1.8–7.7)
NEUTROPHILS NFR BLD: 57 % (ref 38–73)
NRBC BLD-RTO: 0 /100 WBC
PLATELET # BLD AUTO: 196 K/UL (ref 150–450)
PMV BLD AUTO: 8.5 FL (ref 9.2–12.9)
POTASSIUM SERPL-SCNC: 4 MMOL/L (ref 3.5–5.1)
PROT SERPL-MCNC: 6.4 G/DL (ref 6–8.4)
RBC # BLD AUTO: 4.46 M/UL (ref 4–5.4)
SODIUM SERPL-SCNC: 142 MMOL/L (ref 136–145)
WBC # BLD AUTO: 6.44 K/UL (ref 3.9–12.7)

## 2024-09-09 PROCEDURE — 36415 COLL VENOUS BLD VENIPUNCTURE: CPT | Performed by: INTERNAL MEDICINE

## 2024-09-09 PROCEDURE — 85025 COMPLETE CBC W/AUTO DIFF WBC: CPT | Performed by: INTERNAL MEDICINE

## 2024-09-09 PROCEDURE — 80053 COMPREHEN METABOLIC PANEL: CPT | Performed by: INTERNAL MEDICINE

## 2024-09-13 ENCOUNTER — HOSPITAL ENCOUNTER (EMERGENCY)
Facility: HOSPITAL | Age: 84
Discharge: HOME | End: 2024-09-13
Attending: FAMILY MEDICINE
Payer: MEDICARE

## 2024-09-13 VITALS
SYSTOLIC BLOOD PRESSURE: 183 MMHG | HEIGHT: 66 IN | DIASTOLIC BLOOD PRESSURE: 82 MMHG | WEIGHT: 150 LBS | OXYGEN SATURATION: 96 % | TEMPERATURE: 98 F | HEART RATE: 77 BPM | BODY MASS INDEX: 24.11 KG/M2 | RESPIRATION RATE: 16 BRPM

## 2024-09-13 DIAGNOSIS — R51.9 NONINTRACTABLE EPISODIC HEADACHE, UNSPECIFIED HEADACHE TYPE: Primary | ICD-10-CM

## 2024-09-13 DIAGNOSIS — I10 HYPERTENSION: ICD-10-CM

## 2024-09-13 LAB
ALBUMIN SERPL BCP-MCNC: 3.4 G/DL (ref 3.5–5.2)
ALP SERPL-CCNC: 66 U/L (ref 55–135)
ALT SERPL W/O P-5'-P-CCNC: 9 U/L (ref 10–44)
ANION GAP SERPL CALC-SCNC: 11 MMOL/L (ref 8–16)
AST SERPL-CCNC: 15 U/L (ref 10–40)
BACTERIA #/AREA URNS HPF: ABNORMAL /HPF
BASOPHILS # BLD AUTO: 0.05 K/UL (ref 0–0.2)
BASOPHILS NFR BLD: 0.6 % (ref 0–1.9)
BILIRUB SERPL-MCNC: 0.4 MG/DL (ref 0.1–1)
BILIRUB UR QL STRIP: NEGATIVE
BNP SERPL-MCNC: 112 PG/ML (ref 0–99)
BUN SERPL-MCNC: 17 MG/DL (ref 8–23)
CALCIUM SERPL-MCNC: 8.9 MG/DL (ref 8.7–10.5)
CHLORIDE SERPL-SCNC: 108 MMOL/L (ref 95–110)
CLARITY UR: CLEAR
CO2 SERPL-SCNC: 23 MMOL/L (ref 23–29)
COLOR UR: YELLOW
CREAT SERPL-MCNC: 1.2 MG/DL (ref 0.5–1.4)
CRP SERPL-MCNC: 7.2 MG/L (ref 0–8.2)
DIFFERENTIAL METHOD BLD: ABNORMAL
EOSINOPHIL # BLD AUTO: 0.5 K/UL (ref 0–0.5)
EOSINOPHIL NFR BLD: 6.7 % (ref 0–8)
ERYTHROCYTE [DISTWIDTH] IN BLOOD BY AUTOMATED COUNT: 14.6 % (ref 11.5–14.5)
ERYTHROCYTE [SEDIMENTATION RATE] IN BLOOD BY WESTERGREN METHOD: 16 MM/HR (ref 0–20)
EST. GFR  (NO RACE VARIABLE): 45 ML/MIN/1.73 M^2
GLUCOSE SERPL-MCNC: 125 MG/DL (ref 70–110)
GLUCOSE UR QL STRIP: NEGATIVE
HCT VFR BLD AUTO: 37.1 % (ref 37–48.5)
HGB BLD-MCNC: 12 G/DL (ref 12–16)
HGB UR QL STRIP: ABNORMAL
HYALINE CASTS #/AREA URNS LPF: 0 /LPF
IMM GRANULOCYTES # BLD AUTO: 0.02 K/UL (ref 0–0.04)
IMM GRANULOCYTES NFR BLD AUTO: 0.2 % (ref 0–0.5)
KETONES UR QL STRIP: NEGATIVE
LEUKOCYTE ESTERASE UR QL STRIP: ABNORMAL
LYMPHOCYTES # BLD AUTO: 1.2 K/UL (ref 1–4.8)
LYMPHOCYTES NFR BLD: 15.1 % (ref 18–48)
MAGNESIUM SERPL-MCNC: 1.7 MG/DL (ref 1.6–2.6)
MCH RBC QN AUTO: 27.9 PG (ref 27–31)
MCHC RBC AUTO-ENTMCNC: 32.3 G/DL (ref 32–36)
MCV RBC AUTO: 86 FL (ref 82–98)
MICROSCOPIC COMMENT: ABNORMAL
MONOCYTES # BLD AUTO: 0.7 K/UL (ref 0.3–1)
MONOCYTES NFR BLD: 8.1 % (ref 4–15)
NEUTROPHILS # BLD AUTO: 5.6 K/UL (ref 1.8–7.7)
NEUTROPHILS NFR BLD: 69.3 % (ref 38–73)
NITRITE UR QL STRIP: NEGATIVE
NRBC BLD-RTO: 0 /100 WBC
PH UR STRIP: 6 [PH] (ref 5–8)
PLATELET # BLD AUTO: 185 K/UL (ref 150–450)
PMV BLD AUTO: 9.1 FL (ref 9.2–12.9)
POTASSIUM SERPL-SCNC: 3.6 MMOL/L (ref 3.5–5.1)
PROT SERPL-MCNC: 6.1 G/DL (ref 6–8.4)
PROT UR QL STRIP: ABNORMAL
RBC # BLD AUTO: 4.3 M/UL (ref 4–5.4)
RBC #/AREA URNS HPF: 3 /HPF (ref 0–4)
SODIUM SERPL-SCNC: 142 MMOL/L (ref 136–145)
SP GR UR STRIP: 1.01 (ref 1–1.03)
SQUAMOUS #/AREA URNS HPF: 2 /HPF
TROPONIN I SERPL DL<=0.01 NG/ML-MCNC: <0.006 NG/ML (ref 0–0.03)
URN SPEC COLLECT METH UR: ABNORMAL
UROBILINOGEN UR STRIP-ACNC: NEGATIVE EU/DL
WBC # BLD AUTO: 8.1 K/UL (ref 3.9–12.7)
WBC #/AREA URNS HPF: 41 /HPF (ref 0–5)

## 2024-09-13 PROCEDURE — 84484 ASSAY OF TROPONIN QUANT: CPT | Performed by: FAMILY MEDICINE

## 2024-09-13 PROCEDURE — 96375 TX/PRO/DX INJ NEW DRUG ADDON: CPT

## 2024-09-13 PROCEDURE — 81000 URINALYSIS NONAUTO W/SCOPE: CPT | Performed by: FAMILY MEDICINE

## 2024-09-13 PROCEDURE — 85651 RBC SED RATE NONAUTOMATED: CPT | Performed by: FAMILY MEDICINE

## 2024-09-13 PROCEDURE — 83880 ASSAY OF NATRIURETIC PEPTIDE: CPT | Performed by: FAMILY MEDICINE

## 2024-09-13 PROCEDURE — 99285 EMERGENCY DEPT VISIT HI MDM: CPT | Mod: 25

## 2024-09-13 PROCEDURE — 85025 COMPLETE CBC W/AUTO DIFF WBC: CPT | Performed by: FAMILY MEDICINE

## 2024-09-13 PROCEDURE — 87086 URINE CULTURE/COLONY COUNT: CPT | Performed by: FAMILY MEDICINE

## 2024-09-13 PROCEDURE — 83735 ASSAY OF MAGNESIUM: CPT | Performed by: FAMILY MEDICINE

## 2024-09-13 PROCEDURE — 96361 HYDRATE IV INFUSION ADD-ON: CPT

## 2024-09-13 PROCEDURE — 25000003 PHARM REV CODE 250: Performed by: FAMILY MEDICINE

## 2024-09-13 PROCEDURE — 63600175 PHARM REV CODE 636 W HCPCS: Performed by: FAMILY MEDICINE

## 2024-09-13 PROCEDURE — 93010 ELECTROCARDIOGRAM REPORT: CPT | Mod: ,,, | Performed by: GENERAL PRACTICE

## 2024-09-13 PROCEDURE — 96365 THER/PROPH/DIAG IV INF INIT: CPT

## 2024-09-13 PROCEDURE — 96366 THER/PROPH/DIAG IV INF ADDON: CPT

## 2024-09-13 PROCEDURE — 80053 COMPREHEN METABOLIC PANEL: CPT | Performed by: FAMILY MEDICINE

## 2024-09-13 PROCEDURE — 93005 ELECTROCARDIOGRAM TRACING: CPT

## 2024-09-13 PROCEDURE — 36415 COLL VENOUS BLD VENIPUNCTURE: CPT | Performed by: FAMILY MEDICINE

## 2024-09-13 PROCEDURE — 86140 C-REACTIVE PROTEIN: CPT | Performed by: FAMILY MEDICINE

## 2024-09-13 RX ORDER — PROCHLORPERAZINE EDISYLATE 5 MG/ML
5 INJECTION INTRAMUSCULAR; INTRAVENOUS
Status: COMPLETED | OUTPATIENT
Start: 2024-09-13 | End: 2024-09-13

## 2024-09-13 RX ORDER — MAGNESIUM SULFATE HEPTAHYDRATE 40 MG/ML
2 INJECTION, SOLUTION INTRAVENOUS ONCE
Status: COMPLETED | OUTPATIENT
Start: 2024-09-13 | End: 2024-09-13

## 2024-09-13 RX ORDER — LABETALOL HYDROCHLORIDE 5 MG/ML
10 INJECTION, SOLUTION INTRAVENOUS
Status: COMPLETED | OUTPATIENT
Start: 2024-09-13 | End: 2024-09-13

## 2024-09-13 RX ORDER — BUTALBITAL, ACETAMINOPHEN AND CAFFEINE 50; 325; 40 MG/1; MG/1; MG/1
1 TABLET ORAL EVERY 6 HOURS PRN
Qty: 12 TABLET | Refills: 0 | Status: SHIPPED | OUTPATIENT
Start: 2024-09-13 | End: 2024-09-27

## 2024-09-13 RX ORDER — BUTALBITAL, ACETAMINOPHEN AND CAFFEINE 50; 325; 40 MG/1; MG/1; MG/1
1 TABLET ORAL
Status: COMPLETED | OUTPATIENT
Start: 2024-09-13 | End: 2024-09-13

## 2024-09-13 RX ORDER — DIPHENHYDRAMINE HYDROCHLORIDE 50 MG/ML
12.5 INJECTION INTRAMUSCULAR; INTRAVENOUS
Status: COMPLETED | OUTPATIENT
Start: 2024-09-13 | End: 2024-09-13

## 2024-09-13 RX ADMIN — DIPHENHYDRAMINE HYDROCHLORIDE 12.5 MG: 50 INJECTION INTRAMUSCULAR; INTRAVENOUS at 10:09

## 2024-09-13 RX ADMIN — MAGNESIUM SULFATE HEPTAHYDRATE 2 G: 40 INJECTION, SOLUTION INTRAVENOUS at 11:09

## 2024-09-13 RX ADMIN — BUTALBITAL, ACETAMINOPHEN, AND CAFFEINE 1 TABLET: 325; 50; 40 TABLET ORAL at 12:09

## 2024-09-13 RX ADMIN — SODIUM CHLORIDE 500 ML: 9 INJECTION, SOLUTION INTRAVENOUS at 10:09

## 2024-09-13 RX ADMIN — PROCHLORPERAZINE EDISYLATE 5 MG: 5 INJECTION INTRAMUSCULAR; INTRAVENOUS at 10:09

## 2024-09-13 RX ADMIN — LABETALOL HYDROCHLORIDE 10 MG: 5 INJECTION, SOLUTION INTRAVENOUS at 11:09

## 2024-09-13 NOTE — ED PROVIDER NOTES
Encounter Date: 9/13/2024       History     Chief Complaint   Patient presents with    Headache     Beginning last night; followed by neurology for migraines     84 yo F w RCC, migraines currently out of Tegretol (neurologist has sent a prescription which is waiting for her today), HTN poorly controllled w home PRN clonidine, presents to the emergency department with headache, describes it as bandlike bitemporal and posterior.  She reports she does have some jaw pain since getting new partials.  She denies any jaw claudication, no fevers no chills no visual changes no neck pain no rash no weakness of the proximal muscles.  Headache feels similar to previous migraines.  She has some episodically over the last several months.  Her chemotherapy regimen was changed taken off of immunotherapy thinking this might be contributing to new worsening frequency of headaches but since stopping medication they have not changed.  She had MRI in the last 2 months did not reveal an etiology of her headache.  She reports she is not surprised that her blood pressure is over 200 as it often is she is not taking clonidine today.  No seizures.  No chest pain shortness of breath no abdominal pain no lower extremity edema.  No change in mental status.  She reports a decline in memory over the last approximately 1 year.        Review of patient's allergies indicates:   Allergen Reactions    Hydrocodone Hallucinations    Oxycodone-acetaminophen Hallucinations and Other (See Comments)     Past Medical History:   Diagnosis Date    Aortic insufficiency     Cancer 2005    renal ca, L kidney removed    COVID-19 2022    Diastolic heart failure     Diffusion capacity of lung (dl), decreased     Encounter for blood transfusion 1963    with miscarriage    Fibromyalgia     GERD (gastroesophageal reflux disease)     Hypercholesterolemia     Hyperlipidemia     Hypertension     Lung cancer 2015    adenocarcinoma ; upper left lobe    Renal disorder 2005     Left Kidney cancer    Sinusitis      Past Surgical History:   Procedure Laterality Date    ADENOIDECTOMY      BACK SURGERY  2013    laminectomy    ENDOBRONCHIAL ULTRASOUND Left 07/15/2022    Procedure: ENDOBRONCHIAL ULTRASOUND (EBUS);  Surgeon: Cm Freitas MD;  Location: [facility];  Service: Pulmonary;  Laterality: Left;    ENDOBRONCHIAL ULTRASOUND Bilateral 11/09/2023    Procedure: ENDOBRONCHIAL ULTRASOUND (EBUS);  Surgeon: Cm Freitas MD;  Location: [facility] OR;  Service: Pulmonary;  Laterality: Bilateral;    ENDOSCOPIC ULTRASOUND OF UPPER GASTROINTESTINAL TRACT N/A 05/13/2024    Procedure: ULTRASOUND, UPPER GI TRACT, ENDOSCOPIC;  Surgeon: Andi Bazzi III, MD;  Location: [facility] ENDO;  Service: Endoscopy;  Laterality: N/A;    HYSTERECTOMY  1975    CARY/BSO. Due to endometriosis.    INJECTION OF JOINT Right 05/27/2019    Procedure: Injection, Joint, Shoulder, Hip, Or Knee;  Surgeon: Zach Wilder MD;  Location: [facility] OR;  Service: Pain Management;  Laterality: Right;  right hip intra-articular injection     INSERTION OF TUNNELED CENTRAL VENOUS CATHETER (CVC) WITH SUBCUTANEOUS PORT N/A 5/31/2024    Procedure: [id number];  Surgeon: Terry Kim MD;  Location: [facility] OR;  Service: General;  Laterality: N/A;    LUNG LOBECTOMY Left     LOWER LOBE    NEPHRECTOMY Left 2005    Entire kidney due to cancer.    ROBOTIC BRONCHOSCOPY Bilateral 11/09/2023    Procedure: ROBOTIC BRONCHOSCOPY;  Surgeon: Cm Freitas MD;  Location: [facility] OR;  Service: Pulmonary;  Laterality: Bilateral;    TONSILLECTOMY       Family History   Problem Relation Name Age of Onset    Hypertension Mother      Breast cancer Maternal Aunt      Breast cancer Maternal Aunt       Social History     Tobacco Use    Smoking status: Never    Smokeless tobacco: Never   Substance Use Topics    Alcohol use: No    Drug use: No     Review of Systems   All other systems reviewed and are negative.      Physical Exam     Initial Vitals  [09/13/24 0939]   BP Pulse Resp Temp SpO2   (!) 227/100 89 18 97.8 °F (36.6 °C) 97 %      MAP       --         Physical Exam    Constitutional: She appears well-developed and well-nourished. She is not diaphoretic. No distress.   HENT:   Head: Normocephalic and atraumatic.   Nose: Nose normal.   Mouth/Throat: Oropharynx is clear and moist.   No temporal artery tenderness   Eyes: Conjunctivae and EOM are normal. Pupils are equal, round, and reactive to light. No scleral icterus.   Neck: Neck supple. No JVD present.   Normal range of motion.  Cardiovascular:  Normal rate and regular rhythm.           Pulmonary/Chest: Breath sounds normal. No respiratory distress.   Abdominal: Abdomen is soft. Bowel sounds are normal. She exhibits no distension. There is no abdominal tenderness. There is no rebound and no guarding.   Musculoskeletal:         General: No tenderness or edema. Normal range of motion.      Cervical back: Normal range of motion and neck supple.     Neurological: She is alert and oriented to person, place, and time. She has normal strength. No cranial nerve deficit or sensory deficit. GCS score is 15. GCS eye subscore is 4. GCS verbal subscore is 5. GCS motor subscore is 6.   Skin: Skin is warm and dry. Capillary refill takes less than 2 seconds. No rash noted.   Psychiatric: She has a normal mood and affect. Thought content normal.         ED Course   Procedures  Labs Reviewed   CBC W/ AUTO DIFFERENTIAL - Abnormal       Result Value    WBC 8.10      RBC 4.30      Hemoglobin 12.0      Hematocrit 37.1      MCV 86      MCH 27.9      MCHC 32.3      RDW 14.6 (*)     Platelets 185      MPV 9.1 (*)     Immature Granulocytes 0.2      Gran # (ANC) 5.6      Immature Grans (Abs) 0.02      Lymph # 1.2      Mono # 0.7      Eos # 0.5      Baso # 0.05      nRBC 0      Gran % 69.3      Lymph % 15.1 (*)     Mono % 8.1      Eosinophil % 6.7      Basophil % 0.6      Differential Method Automated     COMPREHENSIVE METABOLIC  PANEL - Abnormal    Sodium 142      Potassium 3.6      Chloride 108      CO2 23      Glucose 125 (*)     BUN 17      Creatinine 1.2      Calcium 8.9      Total Protein 6.1      Albumin 3.4 (*)     Total Bilirubin 0.4      Alkaline Phosphatase 66      AST 15      ALT 9 (*)     eGFR 45 (*)     Anion Gap 11     B-TYPE NATRIURETIC PEPTIDE - Abnormal     (*)    URINALYSIS, REFLEX TO URINE CULTURE - Abnormal    Specimen UA Urine, Clean Catch      Color, UA Yellow      Appearance, UA Clear      pH, UA 6.0      Specific Gravity, UA 1.010      Protein, UA 1+ (*)     Glucose, UA Negative      Ketones, UA Negative      Bilirubin (UA) Negative      Occult Blood UA Trace (*)     Nitrite, UA Negative      Urobilinogen, UA Negative      Leukocytes, UA 3+ (*)     Narrative:     Specimen Source->Urine   URINALYSIS MICROSCOPIC - Abnormal    RBC, UA 3      WBC, UA 41 (*)     Bacteria Rare      Squam Epithel, UA 2      Hyaline Casts, UA 0      Microscopic Comment SEE COMMENT      Narrative:     Specimen Source->Urine   CULTURE, URINE   C-REACTIVE PROTEIN    CRP 7.2     TROPONIN I    Troponin I <0.006     MAGNESIUM    Magnesium 1.7     SEDIMENTATION RATE    Sed Rate 16            Imaging Results              CT Head Without Contrast (Final result)  Result time 09/13/24 11:58:18      Final result by Son Tomas MD (09/13/24 11:58:18)                   Impression:      1. No evidence of an acute intracranial abnormality.  2. Mild generalized cerebral volume loss and mild chronic small vessel ischemic changes.      Electronically signed by: Son Tomas  Date:    09/13/2024  Time:    11:58               Narrative:    EXAMINATION:  CT HEAD WITHOUT CONTRAST    CLINICAL HISTORY:  Headache, chronic, new features or increased frequency;    TECHNIQUE:  Low dose axial images were obtained through the head.  Coronal and sagittal reformations were also performed. Contrast was not administered.    COMPARISON:  CT head  08/01/2024    FINDINGS:  No acute intracranial hemorrhage.    Prominence of the ventricles and sulci compatible with mild generalized cerebral volume loss.  No hydrocephalus.    Mild scattered hypoattenuation within the supratentorial white matter, nonspecific but probably reflecting sequelae of chronic small vessel ischemic change.  Stable chronic lacunar infarct involving the right caudate body.  Additional stable-appearing prominent perivascular spaces versus chronic lacunar infarcts in the left lentiform nucleus.  Gray-white matter differentiation appears preserved without evidence of acute major vascular territory infarct.  No significant mass effect or midline shift.    No displaced calvarial fracture.    The visualized paranasal sinuses are essentially clear. Mastoid air cells are clear.    Bilateral lens replacements are noted.                                       Medications   prochlorperazine injection Soln 5 mg (5 mg Intravenous Given 9/13/24 1039)   diphenhydrAMINE injection 12.5 mg (12.5 mg Intravenous Given 9/13/24 1039)   sodium chloride 0.9% bolus 500 mL 500 mL (0 mLs Intravenous Stopped 9/13/24 1236)   magnesium sulfate 2g in water 50mL IVPB (premix) (0 g Intravenous Stopped 9/13/24 1330)   labetaloL injection 10 mg (10 mg Intravenous Given 9/13/24 1143)   butalbital-acetaminophen-caffeine -40 mg per tablet 1 tablet (1 tablet Oral Given 9/13/24 1257)     Medical Decision Making  83-year-old female with migraines and poorly controlled blood pressure presents to the emergency department with typical migraine headache, no new neurologic symptoms, extremely elevated blood pressure though she reports this is not outside of what she has been measuring at home.  Her primary care doctors are making changes to her blood pressure regimen for this.  She takes p.r.n. clonidine at home.  Given dose of labetalol in the emergency department, this along with headache control improved blood pressure.  She was  given magnesium, Benadryl, Compazine and hydration.    CT head without acute bleed or other acute findings    ESR normal physical exam not highly suggestive of temporal arteritis    Her workup was largely unremarkable highly reassuring, her symptoms are controlled.  Her CT head is negative.  She has Tegretol waiting for her at the pharmacy I have added as needed Fioricet.  She will follow up with her neurologist, her oncologist as planned if she has questions about her current chemotherapy regimen and whether it may be contributing to headaches, she will follow up with her primary care doctor for further blood pressure management    Differential included but not limited to temporal arteritis, intracranial hemorrhage, migraine headache, tension headache sinusitis among many other considerations    Amount and/or Complexity of Data Reviewed  External Data Reviewed: radiology and notes.     Details: Previous reviewed for context  Labs: ordered.     Details: Troponin negative, renal function intact, electrolytes not work replacement  Radiology: ordered. Decision-making details documented in ED Course.  ECG/medicine tests: ordered and independent interpretation performed. Decision-making details documented in ED Course.    Risk  Prescription drug management.  Decision regarding hospitalization.  Risk Details: Consideration given to hospital admission and inpatient management, particularly for uncontrolled blood pressure patient not desire this, has upcoming appointment and blood pressure is improved during ER stay               ED Course as of 09/13/24 1632   Fri Sep 13, 2024   1025 MRI reviewed from 7/31/24- no metastatic disease, no bleed, no etiology of HAs identified [LW]   1104 Patient reports headache is improving [LW]   1104 EKG normal sinus rhythm 77 beats per minute no ST elevation or depression  [LW]      ED Course User Index  [LW] Natalie Weems MD                           Clinical Impression:  Final  diagnoses:  [I10] Hypertension  [R51.9] Nonintractable episodic headache, unspecified headache type (Primary)          ED Disposition Condition    Discharge Stable          ED Prescriptions       Medication Sig Dispense Start Date End Date Auth. Provider    butalbital-acetaminophen-caffeine -40 mg (FIORICET, ESGIC) -40 mg per tablet Take 1 tablet by mouth every 6 (six) hours as needed for Pain or Headaches. 12 tablet 9/13/2024 9/27/2024 Natalie Weems MD          Follow-up Information       Follow up With Specialties Details Why Contact Info Additional Information    Tao Orozco MD Family Medicine   [address] [facility] [address] [phone]       [facility] Emergency Medicine Go to  As needed, If symptoms worsen, failure to resolve, or any new concerns [address] [place] [address] [address] [place] [address] 1st floor             Natalie Weems MD  09/13/24 [id number]

## 2024-09-14 LAB
BACTERIA UR CULT: NORMAL
BACTERIA UR CULT: NORMAL

## 2024-09-16 ENCOUNTER — OFFICE VISIT (OUTPATIENT)
Facility: CLINIC | Age: 84
End: 2024-09-16
Payer: MEDICARE

## 2024-09-16 VITALS
DIASTOLIC BLOOD PRESSURE: 91 MMHG | RESPIRATION RATE: 18 BRPM | TEMPERATURE: 98 F | WEIGHT: 138 LBS | BODY MASS INDEX: 22.27 KG/M2 | OXYGEN SATURATION: 97 % | HEART RATE: 84 BPM | SYSTOLIC BLOOD PRESSURE: 190 MMHG

## 2024-09-16 DIAGNOSIS — C64.9 METASTATIC RENAL CELL CARCINOMA TO LUNG, RIGHT: Primary | ICD-10-CM

## 2024-09-16 DIAGNOSIS — I10 PRIMARY HYPERTENSION: ICD-10-CM

## 2024-09-16 DIAGNOSIS — C78.01 METASTATIC RENAL CELL CARCINOMA TO LUNG, RIGHT: Primary | ICD-10-CM

## 2024-09-16 PROCEDURE — 99999 PR PBB SHADOW E&M-EST. PATIENT-LVL II: CPT | Mod: PBBFAC,,, | Performed by: INTERNAL MEDICINE

## 2024-09-16 PROCEDURE — 99212 OFFICE O/P EST SF 10 MIN: CPT | Mod: PBBFAC,PN | Performed by: INTERNAL MEDICINE

## 2024-09-16 PROCEDURE — 99215 OFFICE O/P EST HI 40 MIN: CPT | Mod: S$PBB,,, | Performed by: INTERNAL MEDICINE

## 2024-09-16 RX ORDER — PREDNISONE 20 MG/1
20 TABLET ORAL DAILY
Qty: 14 TABLET | Refills: 0 | Status: SHIPPED | OUTPATIENT
Start: 2024-09-16

## 2024-09-16 NOTE — ASSESSMENT & PLAN NOTE
Patients bp is again high today but she did not take her meds because her BP was normal this am and she was afraid it would make her drop low.  Explained that she is to continue her regimen unless the systolic is below 100 then hold and call.  O/w continue therapy.

## 2024-09-16 NOTE — PROGRESS NOTES
PROGRESS NOTE    Subjective:       Patient ID: Karlie Hess is a 83 y.o. female.    History of Left RCC, treated [id number] further treatment     History of JAX lung cancer-treated with lobectomy-7/2/2015-no chemo/xrt-Path c/w mucin producing adenocarcinoma. [id number]     History of left hilar mass, 9/2020 EBUS negative     PET 10/18/2023:  Nodular densities are as outlined below:  -21 x 17 mm right infrahilar nodule with SUV max 2.5 (image 119), previously measuring 17 x 16 mm (August 20, 2023)  -27 x 27 mm marginated nodule in the posterior inferior left hilum with SUV max 2.7 (image 109)  -13 x 7 mm nodular density along the right posterior pararenal space with SUV max 0.8 (image 179), seen on studies dating back to 1/12/2021.     11/9/2023-EBUS  1. LUNG, RIGHT LOWER LOBE MASS, FINE NEEDLE ASPIRATION   - NEGATIVE FOR MALIGNANT CELLS.     2. LUNG, RIGHT LOWER LOBE MASS, BRUSHING   - RARE ATYPICAL CELLS PRESENT     3. LUNG, RIGHT LOWER LOBE MASS, BIOPSY TOUCH PREP   - POSITIVE FOR CARCINOMA.   - SEE CONCURRENT SURGICAL BIOPSY ([id number])     4. LUNG, RIGHT LOWER LOBE, BRONCHOALVEOLAR LAVAGE   - RARE ATYPICAL CELLS PRESENT.     5. LUNG, LEFT LOWER LOBE MASS, FINE NEEDLE ASPIRATION   - RARE ATYPICAL CELLS PRESENT     6. LYMPH NODE, STATION 11RS, FINE NEEDLE ASPIRATION   - NEGATIVE FOR MALIGNANT CELLS.   - SCANT KATARZYNA MATERIAL PRESENT      Karnofsky performance status score <80   Time from original diagnosis to initiation of targeted therapy <1 year   Hemoglobin less than the lower limit of normal   Serum calcium greater than the upper limit of normal   Neutrophil count greater than the upper limit of normal   Platelet count greater than the upper limit of normal   Favorable risk: None of the above risk factors present.  Intermediate risk: 1 or 2 of the above risk factors present.  Poor risk: 3 or more risk factors present.     1/5/2024:  RIGHT  PARARENAL MASS, CT GUIDED CORE BIOPSY:   - CLEAR CELL RENAL CELL CARCINOMA     12/27/2023-1/5/2024  RLL SBRT    1/30/2024-2/7/2024  XRT to Rt. Renal bed lesion    5/3/2024-MRI Abd:--Progressive Disease  Right kidney mass  2nd mass in lower pole of right kidney  Pancreatic Tail mass    5/13/2024-Bx:  PANCREATIC TAIL MASS, BIOPSY:   - METASTATIC CLEAR CELL RENAL CELL CARCINOMA.     7/31/2024-Patient went to ED with severe HA, MRI Brain:  No metastatic disease    6/7/2024-Pembro/Axitinib:  Cycle 1: 6/7/2024  Cycle 2: 6/28/2024  Cycle 3: 7/19/2024  Cycle 4: 8/7/2024-due    No apparent drug interactions with topirimate/Axitinib    Prednisone and nurtec    Chief Complaint:  No chief complaint on file.  Metastatic Renal Cell Carcinoma follow up    History of Present Illness:   Karlie Hess is a 83 y.o. female who presents for follow up of above.      Ms. Hess has been off prednisone for the last 3 weeks.  She notes she is still having head aches and went to the ER one week ago.      BP regimen  Lebtolol 100mg bid  Lisinopril 20mg bid  Amlodipine 5mg qhs    Family and Social history reviewed and is unchanged from 11/21/2023             Current Outpatient Medications:     acetaminophen (TYLENOL) 500 MG tablet, Take 1,000 mg by mouth 2 (two) times daily as needed., Disp: , Rfl:     amLODIPine (NORVASC) 5 MG tablet, Take 1 tablet (5 mg total) by mouth 2 (two) times daily. (Patient taking differently: Take 5 mg by mouth every evening.), Disp: 180 tablet, Rfl: 3    aspirin (ECOTRIN) 81 MG EC tablet, Take 1 tablet (81 mg total) by mouth once daily., Disp: , Rfl:     axitinib (INLYTA) 5 mg Tab, Take 1 tablet (5 mg) by mouth 2 (two) times a day., Disp: 60 tablet, Rfl: 6    butalbital-acetaminophen-caffeine -40 mg (FIORICET, ESGIC) -40 mg per tablet, Take 1 tablet by mouth every 6 (six) hours as needed for Pain or Headaches., Disp: 12 tablet, Rfl: 0    cloNIDine (CATAPRES) 0.1 MG tablet, Take 1 tablet (0.1 mg  total) by mouth 3 (three) times daily as needed. For systolic BP over 180, Disp: 90 tablet, Rfl: 0    cyanocobalamin (VITAMIN B-12) 1000 MCG tablet, Take 100 mcg by mouth once daily., Disp: , Rfl:     duloxetine (CYMBALTA) 60 MG capsule, Take 60 mg by mouth once daily., Disp: , Rfl:     hydroCHLOROthiazide (HYDRODIURIL) 12.5 MG Tab, Take 1 tablet (12.5 mg total) by mouth daily as needed (Leg swelling)., Disp: 30 tablet, Rfl: 11    labetaloL (NORMODYNE) 200 MG tablet, TAKE 1 TABLET BY MOUTH TWICE  DAILY, Disp: 180 tablet, Rfl: 3    lisinopriL (PRINIVIL,ZESTRIL) 20 MG tablet, TAKE 1 TABLET BY MOUTH TWICE  DAILY, Disp: 180 tablet, Rfl: 3    ondansetron (ZOFRAN) 8 MG tablet, Take 1 tablet (8 mg total) by mouth every 8 (eight) hours as needed for Nausea., Disp: 30 tablet, Rfl: 5    pantoprazole (PROTONIX) 40 MG tablet, Take 40 mg by mouth once daily., Disp: , Rfl:     predniSONE (DELTASONE) 20 MG tablet, Take 1 tablet (20 mg total) by mouth once daily., Disp: 14 tablet, Rfl: 0    promethazine (PHENERGAN) 25 MG tablet, Take 1 tablet (25 mg total) by mouth every 6 (six) hours as needed for Nausea., Disp: 30 tablet, Rfl: 5    rosuvastatin (CRESTOR) 10 MG tablet, Take 10 mg by mouth every evening., Disp: , Rfl:     topiramate (TOPAMAX) 25 MG tablet, Take 1 tablet (25 mg total) by mouth every evening. for 14 doses, Disp: 14 tablet, Rfl: 0  No current facility-administered medications for this visit.    Facility-Administered Medications Ordered in Other Visits:     LIDOcaine (PF) 10 mg/ml (1%) injection 10 mg, 1 mL, Intradermal, Once, Gabriel Loaiza MD        Objective:       Physical Examination:     BP (!) 190/91   Pulse 84   Temp 97.7 °F (36.5 °C)   Resp 18   Wt 62.6 kg (138 lb)   SpO2 97%   BMI 22.27 kg/m²     Physical Exam  Constitutional:       Appearance: Normal appearance.   HENT:      Head: Normocephalic and atraumatic.   Eyes:      General: No scleral icterus.     Conjunctiva/sclera: Conjunctivae normal.    Cardiovascular:      Rate and Rhythm: Normal rate.   Pulmonary:      Effort: Pulmonary effort is normal.   Abdominal:      General: Abdomen is flat.   Neurological:      General: No focal deficit present.      Mental Status: She is alert and oriented to person, place, and time.   Psychiatric:         Mood and Affect: Mood normal.         Behavior: Behavior normal.         Labs:   Recent Results (from the past 336 hour(s))   CBC auto differential    Collection Time: 09/13/24 10:25 AM   Result Value Ref Range    WBC 8.10 3.90 - 12.70 K/uL    Hemoglobin 12.0 12.0 - 16.0 g/dL    Hematocrit 37.1 37.0 - 48.5 %    Platelets 185 150 - 450 K/uL   CBC W/ AUTO DIFFERENTIAL    Collection Time: 09/09/24  1:56 PM   Result Value Ref Range    WBC 6.44 3.90 - 12.70 K/uL    Hemoglobin 12.5 12.0 - 16.0 g/dL    Hematocrit 38.3 37.0 - 48.5 %    Platelets 196 150 - 450 K/uL       CMP  Sodium   Date Value Ref Range Status   09/13/2024 142 136 - 145 mmol/L Final     Potassium   Date Value Ref Range Status   09/13/2024 3.6 3.5 - 5.1 mmol/L Final     Chloride   Date Value Ref Range Status   09/13/2024 108 95 - 110 mmol/L Final     CO2   Date Value Ref Range Status   09/13/2024 23 23 - 29 mmol/L Final     Glucose   Date Value Ref Range Status   09/13/2024 125 (H) 70 - 110 mg/dL Final     BUN   Date Value Ref Range Status   09/13/2024 17 8 - 23 mg/dL Final     Creatinine   Date Value Ref Range Status   09/13/2024 1.2 0.5 - 1.4 mg/dL Final   12/31/2012 1.2 0.5 - 1.4 mg/dL Final     Calcium   Date Value Ref Range Status   09/13/2024 8.9 8.7 - 10.5 mg/dL Final   12/31/2012 9.7 8.7 - 10.5 mg/dL Final     Total Protein   Date Value Ref Range Status   09/13/2024 6.1 6.0 - 8.4 g/dL Final     Albumin   Date Value Ref Range Status   09/13/2024 3.4 (L) 3.5 - 5.2 g/dL Final     Total Bilirubin   Date Value Ref Range Status   09/13/2024 0.4 0.1 - 1.0 mg/dL Final     Comment:     For infants and newborns, interpretation of results should be based  on  "gestational age, weight and in agreement with clinical  observations.    Premature Infant recommended reference ranges:  Up to 24 hours.............<8.0 mg/dL  Up to 48 hours............<12.0 mg/dL  3-5 days..................<15.0 mg/dL  6-29 days.................<15.0 mg/dL       Alkaline Phosphatase   Date Value Ref Range Status   09/13/2024 66 55 - 135 U/L Final     AST   Date Value Ref Range Status   09/13/2024 15 10 - 40 U/L Final     ALT   Date Value Ref Range Status   09/13/2024 9 (L) 10 - 44 U/L Final     Anion Gap   Date Value Ref Range Status   09/13/2024 11 8 - 16 mmol/L Final   12/31/2012 14 5 - 15 meq/L Final     eGFR if    Date Value Ref Range Status   07/18/2022 >60.0 >60 mL/min/1.73 m^2 Final     eGFR if non    Date Value Ref Range Status   07/18/2022 52.6 (A) >60 mL/min/1.73 m^2 Final     Comment:     Calculation used to obtain the estimated glomerular filtration  rate (eGFR) is the CKD-EPI equation.        No results found for: "CEA"  No results found for: "PSA"        Assessment/Plan:     Problem List Items Addressed This Visit       Primary hypertension     Patients bp is again high today but she did not take her meds because her BP was normal this am and she was afraid it would make her drop low.  Explained that she is to continue her regimen unless the systolic is below 100 then hold and call.  O/w continue therapy.           Metastatic renal cell carcinoma to lung, right - Primary     Patient is off the prednisone and her HAs are again giving her problems.  She had improved greatly while on this which supports an autoimmune disorder as a cause.  I will again place her back on prednisone and see her again in one week to see if this resolves them again which would be diagnostic in my opinion.      Will hold on pembro for now but will continue the Axitinib which she is continuing to take.  Will see her again in one week.          Relevant Medications    predniSONE " (DELTASONE) 20 MG tablet                 Discussion:     Follow up in about 1 week (around 9/23/2024).      Electronically signed by Del Nathan

## 2024-09-16 NOTE — ASSESSMENT & PLAN NOTE
Patient is off the prednisone and her HAs are again giving her problems.  She had improved greatly while on this which supports an autoimmune disorder as a cause.  I will again place her back on prednisone and see her again in one week to see if this resolves them again which would be diagnostic in my opinion.      Will hold on pembro for now but will continue the Axitinib which she is continuing to take.  Will see her again in one week.

## 2024-09-17 LAB
OHS QRS DURATION: 84 MS
OHS QTC CALCULATION: 470 MS

## 2024-09-25 ENCOUNTER — HOSPITAL ENCOUNTER (EMERGENCY)
Facility: HOSPITAL | Age: 84
Discharge: HOME | End: 2024-09-25
Attending: EMERGENCY MEDICINE
Payer: MEDICARE

## 2024-09-25 VITALS
SYSTOLIC BLOOD PRESSURE: 175 MMHG | WEIGHT: 139.75 LBS | DIASTOLIC BLOOD PRESSURE: 78 MMHG | BODY MASS INDEX: 22.46 KG/M2 | HEIGHT: 66 IN | HEART RATE: 76 BPM | RESPIRATION RATE: 16 BRPM | TEMPERATURE: 98 F | OXYGEN SATURATION: 95 %

## 2024-09-25 DIAGNOSIS — I10 HYPERTENSION, UNSPECIFIED TYPE: Primary | ICD-10-CM

## 2024-09-25 DIAGNOSIS — G89.29 CHRONIC NONINTRACTABLE HEADACHE, UNSPECIFIED HEADACHE TYPE: ICD-10-CM

## 2024-09-25 DIAGNOSIS — R51.9 CHRONIC NONINTRACTABLE HEADACHE, UNSPECIFIED HEADACHE TYPE: ICD-10-CM

## 2024-09-25 LAB
ALBUMIN SERPL BCP-MCNC: 3.2 G/DL (ref 3.5–5.2)
ALP SERPL-CCNC: 59 U/L (ref 55–135)
ALT SERPL W/O P-5'-P-CCNC: 11 U/L (ref 10–44)
ANION GAP SERPL CALC-SCNC: 13 MMOL/L (ref 8–16)
AST SERPL-CCNC: 17 U/L (ref 10–40)
BASOPHILS # BLD AUTO: 0.04 K/UL (ref 0–0.2)
BASOPHILS NFR BLD: 0.4 % (ref 0–1.9)
BILIRUB SERPL-MCNC: 0.4 MG/DL (ref 0.1–1)
BNP SERPL-MCNC: 110 PG/ML (ref 0–99)
BUN SERPL-MCNC: 18 MG/DL (ref 8–23)
CALCIUM SERPL-MCNC: 8.3 MG/DL (ref 8.7–10.5)
CHLORIDE SERPL-SCNC: 103 MMOL/L (ref 95–110)
CO2 SERPL-SCNC: 23 MMOL/L (ref 23–29)
CREAT SERPL-MCNC: 1 MG/DL (ref 0.5–1.4)
DIFFERENTIAL METHOD BLD: ABNORMAL
EOSINOPHIL # BLD AUTO: 0.3 K/UL (ref 0–0.5)
EOSINOPHIL NFR BLD: 3.1 % (ref 0–8)
ERYTHROCYTE [DISTWIDTH] IN BLOOD BY AUTOMATED COUNT: 15 % (ref 11.5–14.5)
EST. GFR  (NO RACE VARIABLE): 55 ML/MIN/1.73 M^2
GLUCOSE SERPL-MCNC: 113 MG/DL (ref 70–110)
HCT VFR BLD AUTO: 39.6 % (ref 37–48.5)
HGB BLD-MCNC: 12.7 G/DL (ref 12–16)
IMM GRANULOCYTES # BLD AUTO: 0.08 K/UL (ref 0–0.04)
IMM GRANULOCYTES NFR BLD AUTO: 0.7 % (ref 0–0.5)
LYMPHOCYTES # BLD AUTO: 0.6 K/UL (ref 1–4.8)
LYMPHOCYTES NFR BLD: 5.1 % (ref 18–48)
MCH RBC QN AUTO: 28.7 PG (ref 27–31)
MCHC RBC AUTO-ENTMCNC: 32.1 G/DL (ref 32–36)
MCV RBC AUTO: 89 FL (ref 82–98)
MONOCYTES # BLD AUTO: 0.5 K/UL (ref 0.3–1)
MONOCYTES NFR BLD: 4.8 % (ref 4–15)
NEUTROPHILS # BLD AUTO: 9.4 K/UL (ref 1.8–7.7)
NEUTROPHILS NFR BLD: 85.9 % (ref 38–73)
NRBC BLD-RTO: 0 /100 WBC
OHS QRS DURATION: 76 MS
OHS QTC CALCULATION: 450 MS
PLATELET # BLD AUTO: 172 K/UL (ref 150–450)
PMV BLD AUTO: 9.1 FL (ref 9.2–12.9)
POTASSIUM SERPL-SCNC: 3.5 MMOL/L (ref 3.5–5.1)
PROT SERPL-MCNC: 5.9 G/DL (ref 6–8.4)
RBC # BLD AUTO: 4.43 M/UL (ref 4–5.4)
SODIUM SERPL-SCNC: 139 MMOL/L (ref 136–145)
TROPONIN I SERPL DL<=0.01 NG/ML-MCNC: 0.01 NG/ML (ref 0–0.03)
WBC # BLD AUTO: 10.94 K/UL (ref 3.9–12.7)

## 2024-09-25 PROCEDURE — 80053 COMPREHEN METABOLIC PANEL: CPT | Performed by: EMERGENCY MEDICINE

## 2024-09-25 PROCEDURE — 85025 COMPLETE CBC W/AUTO DIFF WBC: CPT | Performed by: EMERGENCY MEDICINE

## 2024-09-25 PROCEDURE — 96374 THER/PROPH/DIAG INJ IV PUSH: CPT

## 2024-09-25 PROCEDURE — 36415 COLL VENOUS BLD VENIPUNCTURE: CPT | Performed by: EMERGENCY MEDICINE

## 2024-09-25 PROCEDURE — 84484 ASSAY OF TROPONIN QUANT: CPT | Performed by: EMERGENCY MEDICINE

## 2024-09-25 PROCEDURE — 99285 EMERGENCY DEPT VISIT HI MDM: CPT | Mod: 25

## 2024-09-25 PROCEDURE — 25000003 PHARM REV CODE 250: Performed by: EMERGENCY MEDICINE

## 2024-09-25 PROCEDURE — 94760 N-INVAS EAR/PLS OXIMETRY 1: CPT

## 2024-09-25 PROCEDURE — 93010 ELECTROCARDIOGRAM REPORT: CPT | Mod: ,,, | Performed by: INTERNAL MEDICINE

## 2024-09-25 PROCEDURE — 63600175 PHARM REV CODE 636 W HCPCS: Performed by: EMERGENCY MEDICINE

## 2024-09-25 PROCEDURE — 83880 ASSAY OF NATRIURETIC PEPTIDE: CPT | Performed by: EMERGENCY MEDICINE

## 2024-09-25 PROCEDURE — 93005 ELECTROCARDIOGRAM TRACING: CPT

## 2024-09-25 RX ORDER — ACETAMINOPHEN 500 MG
1000 TABLET ORAL
Status: COMPLETED | OUTPATIENT
Start: 2024-09-25 | End: 2024-09-25

## 2024-09-25 RX ORDER — BUTORPHANOL TARTRATE 1 MG/ML
1 INJECTION INTRAMUSCULAR; INTRAVENOUS ONCE
Status: COMPLETED | OUTPATIENT
Start: 2024-09-25 | End: 2024-09-25

## 2024-09-25 RX ADMIN — ACETAMINOPHEN 1000 MG: 500 TABLET ORAL at 01:09

## 2024-09-25 RX ADMIN — BUTORPHANOL TARTRATE 1 MG: 1 INJECTION, SOLUTION INTRAMUSCULAR; INTRAVENOUS at 03:09

## 2024-09-25 NOTE — ED NOTES
"Dr. Rivera notified of pt's BP and c/o HA orders given to insert IV and collect blood specimens administer 1000mg PO tylenol pt denies allergy to tylenol. Pt stated "I take tylenol all the time."  "

## 2024-09-25 NOTE — ED PROVIDER NOTES
Encounter Date: 9/25/2024       History     Chief Complaint   Patient presents with    Headache    Hypertension     Bp high at home. Took regular Bp med before bed and a prn one when Bp elevated at home     Patient presents emergency department with reported elevated blood pressure headache patient states that she has had problems with a pinched nerve in her neck causing headaches in the headaches have been worse over last 2 months she has been seen by Neurology in by Dr. Nathan but the headaches continue states the headaches are making her blood pressure elevated she is on 3 different medications for hypertension there has been no recent changes in her medications she did take 1 of her antihypertensive prior to arrival in the ER Ms. No reported chest pain or shortness of breath no reported visual disturbances no fever chills        Review of patient's allergies indicates:   Allergen Reactions    Hydrocodone Hallucinations    Oxycodone-acetaminophen Hallucinations and Other (See Comments)     Past Medical History:   Diagnosis Date    Aortic insufficiency     Cancer 2005    renal ca, L kidney removed    COVID-19 2022    Diastolic heart failure     Diffusion capacity of lung (dl), decreased     Encounter for blood transfusion 1963    with miscarriage    Fibromyalgia     GERD (gastroesophageal reflux disease)     Hypercholesterolemia     Hyperlipidemia     Hypertension     Lung cancer 2015    adenocarcinoma ; upper left lobe    Renal disorder 2005    Left Kidney cancer    Sinusitis      Past Surgical History:   Procedure Laterality Date    ADENOIDECTOMY      BACK SURGERY  2013    laminectomy    ENDOBRONCHIAL ULTRASOUND Left 07/15/2022    Procedure: ENDOBRONCHIAL ULTRASOUND (EBUS);  Surgeon: Cm Freitas MD;  Location: [facility];  Service: Pulmonary;  Laterality: Left;    ENDOBRONCHIAL ULTRASOUND Bilateral 11/09/2023    Procedure: ENDOBRONCHIAL ULTRASOUND (EBUS);  Surgeon: Cm Freitas MD;  Location: [facility] OR;   Service: Pulmonary;  Laterality: Bilateral;    ENDOSCOPIC ULTRASOUND OF UPPER GASTROINTESTINAL TRACT N/A 05/13/2024    Procedure: ULTRASOUND, UPPER GI TRACT, ENDOSCOPIC;  Surgeon: Andi Bazzi III, MD;  Location: [facility] ENDO;  Service: Endoscopy;  Laterality: N/A;    HYSTERECTOMY  1975    CARY/BSO. Due to endometriosis.    INJECTION OF JOINT Right 05/27/2019    Procedure: Injection, Joint, Shoulder, Hip, Or Knee;  Surgeon: Zach Wilder MD;  Location: [facility] OR;  Service: Pain Management;  Laterality: Right;  right hip intra-articular injection     INSERTION OF TUNNELED CENTRAL VENOUS CATHETER (CVC) WITH SUBCUTANEOUS PORT N/A 5/31/2024    Procedure: [id number];  Surgeon: Terry Kim MD;  Location: [facility] OR;  Service: General;  Laterality: N/A;    LUNG LOBECTOMY Left     LOWER LOBE    NEPHRECTOMY Left 2005    Entire kidney due to cancer.    ROBOTIC BRONCHOSCOPY Bilateral 11/09/2023    Procedure: ROBOTIC BRONCHOSCOPY;  Surgeon: Cm Freitas MD;  [facility]: [facility] OR;  Service: Pulmonary;  Laterality: Bilateral;    TONSILLECTOMY       Family History   Problem Relation Name Age of Onset    Hypertension Mother      Breast cancer Maternal Aunt      Breast cancer Maternal Aunt       Social History     Tobacco Use    Smoking status: Never    Smokeless tobacco: Never   Substance Use Topics    Alcohol use: No    Drug use: No     Review of Systems   Constitutional:  Negative for chills and fever.   Respiratory:  Negative for shortness of breath.    Cardiovascular:  Negative for chest pain.   Gastrointestinal:  Negative for abdominal pain, nausea and vomiting.   Neurological:  Positive for headaches. Negative for syncope, weakness and numbness.       Physical Exam     Initial Vitals [09/25/24 0048]   BP Pulse Resp Temp SpO2   (!) 180/78 80 16 97.6 °F (36.4 °C) 96 %      MAP       --         Physical Exam    Constitutional: She appears well-developed and well-nourished. No distress.   HENT:   Head:  Normocephalic and atraumatic.   Right Ear: External ear normal.   Left Ear: External ear normal.   Mouth/Throat: Oropharynx is clear and moist.   Eyes: Conjunctivae and EOM are normal. Pupils are equal, round, and reactive to light.   Neck: Neck supple.   Normal range of motion.  Cardiovascular:  Normal rate, regular rhythm, normal heart sounds and intact distal pulses.           Pulmonary/Chest: Breath sounds normal. No respiratory distress.   Abdominal: Abdomen is soft. Bowel sounds are normal. There is no abdominal tenderness.   Musculoskeletal:         General: No edema. Normal range of motion.      Cervical back: Normal range of motion and neck supple.     Neurological: She is alert and oriented to person, place, and time. She has normal strength. No cranial nerve deficit. GCS score is 15. GCS eye subscore is 4. GCS verbal subscore is 5. GCS motor subscore is 6.   Skin: Skin is warm and dry. Capillary refill takes less than 2 seconds. No rash noted.   Psychiatric: She has a normal mood and affect. Her behavior is normal.         ED Course   Procedures  Labs Reviewed   CBC W/ AUTO DIFFERENTIAL - Abnormal       Result Value    WBC 10.94      RBC 4.43      Hemoglobin 12.7      Hematocrit 39.6      MCV 89      MCH 28.7      MCHC 32.1      RDW 15.0 (*)     Platelets 172      MPV 9.1 (*)     Immature Granulocytes 0.7 (*)     Gran # (ANC) 9.4 (*)     Immature Grans (Abs) 0.08 (*)     Lymph # 0.6 (*)     Mono # 0.5      Eos # 0.3      Baso # 0.04      nRBC 0      Gran % 85.9 (*)     Lymph % 5.1 (*)     Mono % 4.8      Eosinophil % 3.1      Basophil % 0.4      Differential Method Automated     COMPREHENSIVE METABOLIC PANEL - Abnormal    Sodium 139      Potassium 3.5      Chloride 103      CO2 23      Glucose 113 (*)     BUN 18      Creatinine 1.0      Calcium 8.3 (*)     Total Protein 5.9 (*)     Albumin 3.2 (*)     Total Bilirubin 0.4      Alkaline Phosphatase 59      AST 17      ALT 11      eGFR 55 (*)     Anion Gap  13     B-TYPE NATRIURETIC PEPTIDE - Abnormal     (*)    TROPONIN I    Troponin I 0.009            Imaging Results              X-Ray Chest AP Portable (In process)                      CT Head Without Contrast (In process)                      Medications   acetaminophen tablet 1,000 mg (1,000 mg Oral Given 9/25/24 0153)   butorphanol injection 1 mg (1 mg Intravenous Given 9/25/24 0319)     Medical Decision Making  Laboratory evaluation reviewed CT scan of the head showed no acute intracranial abnormalities patient received state all with significant resolution of her headache blood pressure has improved without intervention will release with outpatient follow-up with her primary care provider her neurologist return to ER for any worsened symptoms or new symptoms    Amount and/or Complexity of Data Reviewed  Labs: ordered. Decision-making details documented in ED Course.  Radiology: ordered. Decision-making details documented in ED Course.  ECG/medicine tests: ordered. Decision-making details documented in ED Course.    Risk  OTC drugs.  Prescription drug management.                                      Clinical Impression:  Final diagnoses:  [I10] Hypertension, unspecified type (Primary)  [R51.9, G89.29] Chronic nonintractable headache, unspecified headache type          ED Disposition Condition    Discharge Stable          ED Prescriptions    None       Follow-up Information    None          Magdiel Rivera MD  09/25/24 [id number]

## 2024-09-26 ENCOUNTER — HOSPITAL ENCOUNTER (EMERGENCY)
Facility: HOSPITAL | Age: 84
Discharge: HOME | End: 2024-09-26
Attending: EMERGENCY MEDICINE
Payer: MEDICARE

## 2024-09-26 VITALS
SYSTOLIC BLOOD PRESSURE: 191 MMHG | OXYGEN SATURATION: 97 % | BODY MASS INDEX: 26.5 KG/M2 | WEIGHT: 135 LBS | HEIGHT: 60 IN | DIASTOLIC BLOOD PRESSURE: 87 MMHG | TEMPERATURE: 98 F | HEART RATE: 85 BPM | RESPIRATION RATE: 16 BRPM

## 2024-09-26 DIAGNOSIS — R51.9 NONINTRACTABLE EPISODIC HEADACHE, UNSPECIFIED HEADACHE TYPE: Primary | ICD-10-CM

## 2024-09-26 LAB
ALBUMIN SERPL BCP-MCNC: 3.3 G/DL (ref 3.5–5.2)
ALP SERPL-CCNC: 51 U/L (ref 55–135)
ALT SERPL W/O P-5'-P-CCNC: 11 U/L (ref 10–44)
ANION GAP SERPL CALC-SCNC: 8 MMOL/L (ref 8–16)
AST SERPL-CCNC: 15 U/L (ref 10–40)
BACTERIA #/AREA URNS HPF: ABNORMAL /HPF
BASOPHILS # BLD AUTO: 0.04 K/UL (ref 0–0.2)
BASOPHILS NFR BLD: 0.5 % (ref 0–1.9)
BILIRUB SERPL-MCNC: 0.5 MG/DL (ref 0.1–1)
BILIRUB UR QL STRIP: NEGATIVE
BUN SERPL-MCNC: 16 MG/DL (ref 8–23)
CALCIUM SERPL-MCNC: 8.3 MG/DL (ref 8.7–10.5)
CHLORIDE SERPL-SCNC: 105 MMOL/L (ref 95–110)
CLARITY UR: CLEAR
CO2 SERPL-SCNC: 29 MMOL/L (ref 23–29)
COLOR UR: YELLOW
CREAT SERPL-MCNC: 1 MG/DL (ref 0.5–1.4)
DIFFERENTIAL METHOD BLD: ABNORMAL
EOSINOPHIL # BLD AUTO: 0.6 K/UL (ref 0–0.5)
EOSINOPHIL NFR BLD: 7.9 % (ref 0–8)
ERYTHROCYTE [DISTWIDTH] IN BLOOD BY AUTOMATED COUNT: 15.4 % (ref 11.5–14.5)
EST. GFR  (NO RACE VARIABLE): 55.2 ML/MIN/1.73 M^2
GLUCOSE SERPL-MCNC: 103 MG/DL (ref 70–110)
GLUCOSE UR QL STRIP: NEGATIVE
HCT VFR BLD AUTO: 38.7 % (ref 37–48.5)
HGB BLD-MCNC: 12.5 G/DL (ref 12–16)
HGB UR QL STRIP: ABNORMAL
HYALINE CASTS #/AREA URNS LPF: 1 /LPF
IMM GRANULOCYTES # BLD AUTO: 0.04 K/UL (ref 0–0.04)
IMM GRANULOCYTES NFR BLD AUTO: 0.5 % (ref 0–0.5)
KETONES UR QL STRIP: NEGATIVE
LEUKOCYTE ESTERASE UR QL STRIP: ABNORMAL
LYMPHOCYTES # BLD AUTO: 1.1 K/UL (ref 1–4.8)
LYMPHOCYTES NFR BLD: 15.3 % (ref 18–48)
MCH RBC QN AUTO: 29 PG (ref 27–31)
MCHC RBC AUTO-ENTMCNC: 32.3 G/DL (ref 32–36)
MCV RBC AUTO: 90 FL (ref 82–98)
MICROSCOPIC COMMENT: ABNORMAL
MONOCYTES # BLD AUTO: 0.9 K/UL (ref 0.3–1)
MONOCYTES NFR BLD: 11.5 % (ref 4–15)
NEUTROPHILS # BLD AUTO: 4.7 K/UL (ref 1.8–7.7)
NEUTROPHILS NFR BLD: 64.3 % (ref 38–73)
NITRITE UR QL STRIP: NEGATIVE
NRBC BLD-RTO: 0 /100 WBC
PH UR STRIP: 6 [PH] (ref 5–8)
PLATELET # BLD AUTO: 165 K/UL (ref 150–450)
PMV BLD AUTO: 9.4 FL (ref 9.2–12.9)
POTASSIUM SERPL-SCNC: 3.4 MMOL/L (ref 3.5–5.1)
PROT SERPL-MCNC: 5.5 G/DL (ref 6–8.4)
PROT UR QL STRIP: ABNORMAL
RBC # BLD AUTO: 4.31 M/UL (ref 4–5.4)
RBC #/AREA URNS HPF: 4 /HPF (ref 0–4)
SODIUM SERPL-SCNC: 142 MMOL/L (ref 136–145)
SP GR UR STRIP: 1.02 (ref 1–1.03)
SQUAMOUS #/AREA URNS HPF: 0 /HPF
URN SPEC COLLECT METH UR: ABNORMAL
UROBILINOGEN UR STRIP-ACNC: NEGATIVE EU/DL
WBC # BLD AUTO: 7.38 K/UL (ref 3.9–12.7)
WBC #/AREA URNS HPF: 3 /HPF (ref 0–5)

## 2024-09-26 PROCEDURE — 96375 TX/PRO/DX INJ NEW DRUG ADDON: CPT

## 2024-09-26 PROCEDURE — 63600175 PHARM REV CODE 636 W HCPCS: Performed by: EMERGENCY MEDICINE

## 2024-09-26 PROCEDURE — 25000003 PHARM REV CODE 250: Performed by: PHYSICIAN ASSISTANT

## 2024-09-26 PROCEDURE — 99284 EMERGENCY DEPT VISIT MOD MDM: CPT | Mod: 25

## 2024-09-26 PROCEDURE — 80053 COMPREHEN METABOLIC PANEL: CPT | Performed by: EMERGENCY MEDICINE

## 2024-09-26 PROCEDURE — 85025 COMPLETE CBC W/AUTO DIFF WBC: CPT | Performed by: EMERGENCY MEDICINE

## 2024-09-26 PROCEDURE — 25000003 PHARM REV CODE 250: Performed by: EMERGENCY MEDICINE

## 2024-09-26 PROCEDURE — 96372 THER/PROPH/DIAG INJ SC/IM: CPT | Performed by: EMERGENCY MEDICINE

## 2024-09-26 PROCEDURE — 96374 THER/PROPH/DIAG INJ IV PUSH: CPT

## 2024-09-26 PROCEDURE — 81001 URINALYSIS AUTO W/SCOPE: CPT | Performed by: EMERGENCY MEDICINE

## 2024-09-26 RX ORDER — HYDROMORPHONE HYDROCHLORIDE 1 MG/ML
0.5 INJECTION, SOLUTION INTRAMUSCULAR; INTRAVENOUS; SUBCUTANEOUS
Status: COMPLETED | OUTPATIENT
Start: 2024-09-26 | End: 2024-09-26

## 2024-09-26 RX ORDER — CLONIDINE HYDROCHLORIDE 0.1 MG/1
0.1 TABLET ORAL
Status: COMPLETED | OUTPATIENT
Start: 2024-09-26 | End: 2024-09-26

## 2024-09-26 RX ORDER — KETOROLAC TROMETHAMINE 30 MG/ML
15 INJECTION, SOLUTION INTRAMUSCULAR; INTRAVENOUS
Status: COMPLETED | OUTPATIENT
Start: 2024-09-26 | End: 2024-09-26

## 2024-09-26 RX ORDER — DIPHENHYDRAMINE HYDROCHLORIDE 50 MG/ML
25 INJECTION INTRAMUSCULAR; INTRAVENOUS
Status: COMPLETED | OUTPATIENT
Start: 2024-09-26 | End: 2024-09-26

## 2024-09-26 RX ORDER — BUTALBITAL, ACETAMINOPHEN AND CAFFEINE 50; 325; 40 MG/1; MG/1; MG/1
1 TABLET ORAL EVERY 6 HOURS PRN
Qty: 12 TABLET | Refills: 0 | Status: SHIPPED | OUTPATIENT
Start: 2024-09-26 | End: 2024-10-10

## 2024-09-26 RX ORDER — PROCHLORPERAZINE EDISYLATE 5 MG/ML
5 INJECTION INTRAMUSCULAR; INTRAVENOUS
Status: COMPLETED | OUTPATIENT
Start: 2024-09-26 | End: 2024-09-26

## 2024-09-26 RX ORDER — LABETALOL 200 MG/1
200 TABLET, FILM COATED ORAL
Status: COMPLETED | OUTPATIENT
Start: 2024-09-26 | End: 2024-09-26

## 2024-09-26 RX ORDER — AMLODIPINE BESYLATE 5 MG/1
5 TABLET ORAL
Status: COMPLETED | OUTPATIENT
Start: 2024-09-26 | End: 2024-09-26

## 2024-09-26 RX ORDER — METHOCARBAMOL 500 MG/1
500 TABLET, FILM COATED ORAL
Status: COMPLETED | OUTPATIENT
Start: 2024-09-26 | End: 2024-09-26

## 2024-09-26 RX ORDER — LISINOPRIL 20 MG/1
20 TABLET ORAL
Status: COMPLETED | OUTPATIENT
Start: 2024-09-26 | End: 2024-09-26

## 2024-09-26 RX ADMIN — AMLODIPINE BESYLATE 5 MG: 5 TABLET ORAL at 04:09

## 2024-09-26 RX ADMIN — METHOCARBAMOL TABLETS 500 MG: 500 TABLET, COATED ORAL at 04:09

## 2024-09-26 RX ADMIN — KETOROLAC TROMETHAMINE 15 MG: 30 INJECTION, SOLUTION INTRAMUSCULAR; INTRAVENOUS at 05:09

## 2024-09-26 RX ADMIN — DIPHENHYDRAMINE HYDROCHLORIDE 25 MG: 50 INJECTION INTRAMUSCULAR; INTRAVENOUS at 08:09

## 2024-09-26 RX ADMIN — HYDROMORPHONE HYDROCHLORIDE 0.5 MG: 0.5 INJECTION, SOLUTION INTRAMUSCULAR; INTRAVENOUS; SUBCUTANEOUS at 09:09

## 2024-09-26 RX ADMIN — LABETALOL HYDROCHLORIDE 200 MG: 200 TABLET, FILM COATED ORAL at 04:09

## 2024-09-26 RX ADMIN — LISINOPRIL 20 MG: 20 TABLET ORAL at 04:09

## 2024-09-26 RX ADMIN — PROCHLORPERAZINE EDISYLATE 5 MG: 5 INJECTION INTRAMUSCULAR; INTRAVENOUS at 08:09

## 2024-09-26 RX ADMIN — CLONIDINE HYDROCHLORIDE 0.1 MG: 0.1 TABLET ORAL at 08:09

## 2024-09-26 NOTE — ED PROVIDER NOTES
Encounter Date: 9/26/2024       History     Chief Complaint   Patient presents with    Headache     States she went to the ER on Tuesday with a headache.  States she has not had an relief from headache.       84-year-old female with a past medical history of hypertension, heart failure, hyperlipidemia, and fibromyalgia presents for evaluation of a headache.  She reports that she has had a headache to the left side of her head for the last 3 days.  She reports that the headache radiates down the left side of her neck and onto her left shoulder.  She believes that she has a pinched nerve in her neck causing her headache.  She denies any associated changes in vision, changes in speech, fever/chills, nausea/vomiting, chest pain, trauma, weakness, shortness of breath, or abdominal pain.  She reports that she was seen 2 days ago for the same headache and given some medication at [facility], with improvement in her headache.  She reports that her headache has returned since that time.  There are no aggravating factors.      Review of patient's allergies indicates:   Allergen Reactions    Hydrocodone Hallucinations    Oxycodone-acetaminophen Hallucinations and Other (See Comments)     Past Medical History:   Diagnosis Date    Aortic insufficiency     Cancer 2005    renal ca, L kidney removed    COVID-19 2022    Diastolic heart failure     Diffusion capacity of lung (dl), decreased     Encounter for blood transfusion 1963    with miscarriage    Fibromyalgia     GERD (gastroesophageal reflux disease)     Hypercholesterolemia     Hyperlipidemia     Hypertension     Lung cancer 2015    adenocarcinoma ; upper left lobe    Renal disorder 2005    Left Kidney cancer    Sinusitis      Past Surgical History:   Procedure Laterality Date    ADENOIDECTOMY      BACK SURGERY  2013    laminectomy    ENDOBRONCHIAL ULTRASOUND Left 07/15/2022    Procedure: ENDOBRONCHIAL ULTRASOUND (EBUS);  Surgeon: Cm Freitas MD;  [place] [facility]  Service: Pulmonary;  Laterality: Left;    ENDOBRONCHIAL ULTRASOUND Bilateral 11/09/2023    Procedure: ENDOBRONCHIAL ULTRASOUND (EBUS);  Surgeon: Cm Freitas MD;  Location: [facility] OR;  Service: Pulmonary;  Laterality: Bilateral;    ENDOSCOPIC ULTRASOUND OF UPPER GASTROINTESTINAL TRACT N/A 05/13/2024    Procedure: ULTRASOUND, UPPER GI TRACT, ENDOSCOPIC;  Surgeon: Andi Bazzi III, MD;  Location: [facility] ENDO;  Service: Endoscopy;  Laterality: N/A;    HYSTERECTOMY  1975    CARY/BSO. Due to endometriosis.    INJECTION OF JOINT Right 05/27/2019    Procedure: Injection, Joint, Shoulder, Hip, Or Knee;  Surgeon: Zach Wilder MD;  Location: [facility] OR;  Service: Pain Management;  Laterality: Right;  right hip intra-articular injection     INSERTION OF TUNNELED CENTRAL VENOUS CATHETER (CVC) WITH SUBCUTANEOUS PORT N/A 5/31/2024    Procedure: [id number];  Surgeon: Terry Kim MD;  Location: [facility] OR;  Service: General;  Laterality: N/A;    LUNG LOBECTOMY Left     LOWER LOBE    NEPHRECTOMY Left 2005    Entire kidney due to cancer.    ROBOTIC BRONCHOSCOPY Bilateral 11/09/2023    Procedure: ROBOTIC BRONCHOSCOPY;  Surgeon: Cm Freitas MD;  Location: [facility] OR;  Service: Pulmonary;  Laterality: Bilateral;    TONSILLECTOMY       Family History   Problem Relation Name Age of Onset    Hypertension Mother      Breast cancer Maternal Aunt      Breast cancer Maternal Aunt       Social History     Tobacco Use    Smoking status: Never    Smokeless tobacco: Never   Substance Use Topics    Alcohol use: No    Drug use: No     Review of Systems   Constitutional:  Negative for chills and fever.   HENT:  Negative for congestion.    Respiratory:  Negative for cough and shortness of breath.    Cardiovascular:  Negative for chest pain.   Gastrointestinal:  Negative for abdominal pain, nausea and vomiting.   Genitourinary:  Negative for dysuria.   Musculoskeletal:  Negative for gait problem.   Skin:  Negative for color  change.   Neurological:  Positive for headaches. Negative for dizziness and numbness.   Psychiatric/Behavioral:  Negative for agitation.        Physical Exam     Initial Vitals [09/26/24 1523]   BP Pulse Resp Temp SpO2   (!) 194/95 80 18 98.4 °F (36.9 °C) (!) 94 %      MAP       --         Physical Exam    Nursing note and vitals reviewed.  Constitutional: She appears well-developed and well-nourished.   HENT:   Head: Atraumatic.   Eyes: EOM are normal. Pupils are equal, round, and reactive to light.   Neck:   Normal range of motion.  Cardiovascular:  Normal rate and regular rhythm.           Pulmonary/Chest: Breath sounds normal.   Abdominal: Abdomen is soft. Bowel sounds are normal. She exhibits no distension. There is no abdominal tenderness. There is no rebound and no guarding.   Musculoskeletal:         General: Normal range of motion.      Right shoulder: Normal.      Left shoulder: Normal.      Cervical back: Normal range of motion.     Neurological: She is alert and oriented to person, place, and time. She has normal strength. No cranial nerve deficit or sensory deficit. GCS score is 15. GCS eye subscore is 4. GCS verbal subscore is 5. GCS motor subscore is 6.   Skin: Skin is warm and dry. No rash noted.   Psychiatric: She has a normal mood and affect.         ED Course   Procedures  Labs Reviewed   COMPREHENSIVE METABOLIC PANEL - Abnormal       Result Value    Sodium 142      Potassium 3.4 (*)     Chloride 105      CO2 29      Glucose 103      BUN 16      Creatinine 1.0      Calcium 8.3 (*)     Total Protein 5.5 (*)     Albumin 3.3 (*)     Total Bilirubin 0.5      Alkaline Phosphatase 51 (*)     AST 15      ALT 11      eGFR 55.2 (*)     Anion Gap 8     CBC W/ AUTO DIFFERENTIAL - Abnormal    WBC 7.38      RBC 4.31      Hemoglobin 12.5      Hematocrit 38.7      MCV 90      MCH 29.0      MCHC 32.3      RDW 15.4 (*)     Platelets 165      MPV 9.4      Immature Granulocytes 0.5      Gran # (ANC) 4.7       Immature Grans (Abs) 0.04      Lymph # 1.1      Mono # 0.9      Eos # 0.6 (*)     Baso # 0.04      nRBC 0      Gran % 64.3      Lymph % 15.3 (*)     Mono % 11.5      Eosinophil % 7.9      Basophil % 0.5      Differential Method Automated     URINALYSIS, REFLEX TO URINE CULTURE - Abnormal    Specimen UA Urine, Clean Catch      Color, UA Yellow      Appearance, UA Clear      pH, UA 6.0      Specific Gravity, UA 1.020      Protein, UA 3+ (*)     Glucose, UA Negative      Ketones, UA Negative      Bilirubin (UA) Negative      Occult Blood UA 1+ (*)     Nitrite, UA Negative      Urobilinogen, UA Negative      Leukocytes, UA Trace (*)     Narrative:     Specimen Source->Urine   URINALYSIS MICROSCOPIC - Abnormal    RBC, UA 4      WBC, UA 3      Bacteria Few (*)     Squam Epithel, UA 0      Hyaline Casts, UA 1      Microscopic Comment SEE COMMENT      Narrative:     Specimen Source->Urine          Imaging Results    None          Medications   methocarbamoL tablet 500 mg (500 mg Oral Given 9/26/24 1642)   lisinopriL tablet 20 mg (20 mg Oral Given 9/26/24 1655)   amLODIPine tablet 5 mg (5 mg Oral Given 9/26/24 1655)   labetaloL tablet 200 mg (200 mg Oral Given 9/26/24 1655)   ketorolac injection 15 mg (15 mg Intramuscular Given 9/26/24 1730)   prochlorperazine injection Soln 5 mg (5 mg Intravenous Given 9/26/24 2003)   diphenhydrAMINE injection 25 mg (25 mg Intravenous Given 9/26/24 2002)   cloNIDine tablet 0.1 mg (0.1 mg Oral Given 9/26/24 2013)   HYDROmorphone injection 0.5 mg (0.5 mg Intravenous Given 9/26/24 2144)     Medical Decision Making  84-year-old female presents for evaluation of a headache.    Initial differential diagnosis included but not limited to tension headache, migraine headache, and pinched nerve.    Amount and/or Complexity of Data Reviewed  Labs: ordered.    Risk  Prescription drug management.  Risk Details: The patient was emergently evaluated in the emergency department, her evaluation was  significant for an elderly female with a normal neurologic exam noted.  The patient's labs showed no acute abnormalities.  The patient's blood pressure was also noted to be elevated here in the emergency department.  She was treated with multiple medications blood pressure as well as her headache, with improvement in them.  The etiology of her headache is likely her chronic pinched nerve to the region.  She is stable for discharge to home.  She does not require further care workup at this time.  Her elevated blood pressure is likely secondary to pain.  She will be discharged home with a refill of her home Fioricet.  She is referred to primary care for follow-up.                                      Clinical Impression:  Final diagnoses:  [R51.9] Nonintractable episodic headache, unspecified headache type (Primary)          ED Disposition Condition    Discharge Stable          ED Prescriptions       Medication Sig Dispense Start Date End Date Auth. Provider    butalbital-acetaminophen-caffeine -40 mg (FIORICET, ESGIC) -40 mg per tablet Take 1 tablet by mouth every 6 (six) hours as needed for Pain or Headaches. 12 tablet 9/26/2024 10/10/2024 Alexander Ludwig MD          Follow-up Information       Follow up With Specialties Details Why Contact Info    Tao Orozco MD Family Medicine Schedule an appointment as soon as possible for a visit   [address] [facility] [address]  [phone]               Alexander Ludwig MD  09/27/24 2000

## 2024-09-26 NOTE — FIRST PROVIDER EVALUATION
Emergency Department TeleTriage Encounter Note      CHIEF COMPLAINT    Chief Complaint   Patient presents with    Headache     States she went to the ER on Tuesday with a headache.  States she has not had an relief from headache.         VITAL SIGNS   Initial Vitals [09/26/24 1523]   BP Pulse Resp Temp SpO2   (!) 194/95 80 18 98.4 °F (36.9 °C) (!) 94 %      MAP       --            ALLERGIES    Review of patient's allergies indicates:   Allergen Reactions    Hydrocodone Hallucinations    Oxycodone-acetaminophen Hallucinations and Other (See Comments)       PROVIDER TRIAGE NOTE  Patient was seen in the ED yesterday and is still having severe headache which she thinks is coming from a pinched nerve/muscle spasm in neck. She had CT of the head and work-up yesterday. She reports she had some improvement in the headache when she left the ED initially. .      ORDERS  Labs Reviewed - No data to display    ED Orders (720h ago, onward)      None              Virtual Visit Note: The provider triage portion of this emergency department evaluation and documentation was performed via [employer], a HIPAA-compliant telemedicine application, in concert with a tele-presenter in the room. A face to face patient evaluation with one of my colleagues will occur once the patient is placed in an emergency department room.      DISCLAIMER: This note was prepared with [employer] voice recognition transcription software. Garbled syntax, mangled pronouns, and other bizarre constructions may be attributed to that software system.

## 2024-09-30 ENCOUNTER — TELEPHONE (OUTPATIENT)
Facility: CLINIC | Age: 84
End: 2024-09-30
Payer: MEDICARE

## 2024-09-30 DIAGNOSIS — G62.9 NEUROPATHY: Primary | ICD-10-CM

## 2024-09-30 RX ORDER — GABAPENTIN 600 MG/1
600 TABLET ORAL 3 TIMES DAILY
Qty: 90 TABLET | Refills: 0 | Status: SHIPPED | OUTPATIENT
Start: 2024-09-30 | End: 2024-10-01 | Stop reason: SDUPTHER

## 2024-09-30 NOTE — PROGRESS NOTES
PROGRESS NOTE    Subjective:       Patient ID: Karlie Hess is a 84 y.o. female.    History of Left RCC, treated [id number] further treatment     History of JAX lung cancer-treated with lobectomy-7/2/2015-no chemo/xrt-Path c/w mucin producing adenocarcinoma. [id number]     History of left hilar mass, 9/2020 EBUS negative     PET 10/18/2023:  Nodular densities are as outlined below:  -21 x 17 mm right infrahilar nodule with SUV max 2.5 (image 119), previously measuring 17 x 16 mm (August 20, 2023)  -27 x 27 mm marginated nodule in the posterior inferior left hilum with SUV max 2.7 (image 109)  -13 x 7 mm nodular density along the right posterior pararenal space with SUV max 0.8 (image 179), seen on studies dating back to 1/12/2021.     11/9/2023-EBUS  1. LUNG, RIGHT LOWER LOBE MASS, FINE NEEDLE ASPIRATION   - NEGATIVE FOR MALIGNANT CELLS.     2. LUNG, RIGHT LOWER LOBE MASS, BRUSHING   - RARE ATYPICAL CELLS PRESENT     3. LUNG, RIGHT LOWER LOBE MASS, BIOPSY TOUCH PREP   - POSITIVE FOR CARCINOMA.   - SEE CONCURRENT SURGICAL BIOPSY ([id number])     4. LUNG, RIGHT LOWER LOBE, BRONCHOALVEOLAR LAVAGE   - RARE ATYPICAL CELLS PRESENT.     5. LUNG, LEFT LOWER LOBE MASS, FINE NEEDLE ASPIRATION   - RARE ATYPICAL CELLS PRESENT     6. LYMPH NODE, STATION 11RS, FINE NEEDLE ASPIRATION   - NEGATIVE FOR MALIGNANT CELLS.   - SCANT KATARZYNA MATERIAL PRESENT      Karnofsky performance status score <80   Time from original diagnosis to initiation of targeted therapy <1 year   Hemoglobin less than the lower limit of normal   Serum calcium greater than the upper limit of normal   Neutrophil count greater than the upper limit of normal   Platelet count greater than the upper limit of normal   Favorable risk: None of the above risk factors present.  Intermediate risk: 1 or 2 of the above risk factors present.  Poor risk: 3 or more risk factors present.     1/5/2024:  RIGHT  PARARENAL MASS, CT GUIDED CORE BIOPSY:   - CLEAR CELL RENAL CELL CARCINOMA     12/27/2023-1/5/2024  RLL SBRT    1/30/2024-2/7/2024  XRT to Rt. Renal bed lesion    5/3/2024-MRI Abd:--Progressive Disease  Right kidney mass  2nd mass in lower pole of right kidney  Pancreatic Tail mass    5/13/2024-Bx:  PANCREATIC TAIL MASS, BIOPSY:   - METASTATIC CLEAR CELL RENAL CELL CARCINOMA.     7/31/2024-Patient went to ED with severe HA, MRI Brain:  No metastatic disease    6/7/2024-Pembro/Axitinib:  Cycle 1: 6/7/2024  Cycle 2: 6/28/2024  Cycle 3: 7/19/2024  Cycle 4: 8/7/2024- Hold d/t persistent headaches      No apparent drug interactions with topirimate/Axitinib    Prednisone and nurtec    Chief Complaint:  Metastatic Renal Cell Carcinoma follow up    History of Present Illness:   Karlie Hess is a 84 y.o. female who presents for follow up of above.      On prednisone and HA has resolved with Gabapentin addition. /68 patient states she is consistently taking her BP meds. She is having increased GERD and is seeing Dr. Riggins 10/3/2024.     BP regimen  Lebtolol 100mg bid  Lisinopril 20mg bid  Amlodipine 5mg qhs    Family and Social history reviewed and is unchanged from 11/21/2023      Current Outpatient Medications:     acetaminophen (TYLENOL) 500 MG tablet, Take 1,000 mg by mouth 2 (two) times daily as needed., Disp: , Rfl:     amLODIPine (NORVASC) 5 MG tablet, Take 1 tablet (5 mg total) by mouth 2 (two) times daily. (Patient taking differently: Take 5 mg by mouth every evening.), Disp: 180 tablet, Rfl: 3    aspirin (ECOTRIN) 81 MG EC tablet, Take 1 tablet (81 mg total) by mouth once daily., Disp: , Rfl:     axitinib (INLYTA) 5 mg Tab, Take 1 tablet (5 mg) by mouth 2 (two) times a day., Disp: 60 tablet, Rfl: 6    butalbital-acetaminophen-caffeine -40 mg (FIORICET, ESGIC) -40 mg per tablet, Take 1 tablet by mouth every 6 (six) hours as needed for Pain or Headaches., Disp: 12 tablet, Rfl: 0     cloNIDine (CATAPRES) 0.1 MG tablet, Take 1 tablet (0.1 mg total) by mouth 3 (three) times daily as needed. For systolic BP over 180, Disp: 90 tablet, Rfl: 0    cyanocobalamin (VITAMIN B-12) 1000 MCG tablet, Take 100 mcg by mouth once daily., Disp: , Rfl:     duloxetine (CYMBALTA) 60 MG capsule, Take 60 mg by mouth once daily., Disp: , Rfl:     gabapentin (NEURONTIN) 600 MG tablet, Take 1 tablet (600 mg total) by mouth 3 (three) times daily., Disp: 90 tablet, Rfl: 5    hydroCHLOROthiazide (HYDRODIURIL) 12.5 MG Tab, Take 1 tablet (12.5 mg total) by mouth daily as needed (Leg swelling)., Disp: 30 tablet, Rfl: 11    labetaloL (NORMODYNE) 200 MG tablet, TAKE 1 TABLET BY MOUTH TWICE  DAILY, Disp: 180 tablet, Rfl: 3    lisinopriL (PRINIVIL,ZESTRIL) 20 MG tablet, TAKE 1 TABLET BY MOUTH TWICE  DAILY, Disp: 180 tablet, Rfl: 3    ondansetron (ZOFRAN) 8 MG tablet, Take 1 tablet (8 mg total) by mouth every 8 (eight) hours as needed for Nausea., Disp: 30 tablet, Rfl: 5    pantoprazole (PROTONIX) 40 MG tablet, Take 40 mg by mouth once daily., Disp: , Rfl:     predniSONE (DELTASONE) 20 MG tablet, Take 1 tablet (20 mg total) by mouth once daily., Disp: 14 tablet, Rfl: 0    promethazine (PHENERGAN) 25 MG tablet, Take 1 tablet (25 mg total) by mouth every 6 (six) hours as needed for Nausea., Disp: 30 tablet, Rfl: 5    rosuvastatin (CRESTOR) 10 MG tablet, Take 10 mg by mouth every evening., Disp: , Rfl:     topiramate (TOPAMAX) 25 MG tablet, Take 1 tablet (25 mg total) by mouth every evening. for 14 doses, Disp: 14 tablet, Rfl: 0  No current facility-administered medications for this visit.    Facility-Administered Medications Ordered in Other Visits:     LIDOcaine (PF) 10 mg/ml (1%) injection 10 mg, 1 mL, Intradermal, Once, Gabriel Loaiza MD        Objective:       Physical Examination:     /68   Pulse 88   Temp 97.2 °F (36.2 °C)   Resp 16   Wt 63.4 kg (139 lb 11.2 oz)   SpO2 96%   BMI 27.28 kg/m²     Physical  Exam  Constitutional:       Appearance: Normal appearance.   HENT:      Head: Normocephalic and atraumatic.      Right Ear: External ear normal.      Left Ear: External ear normal.      Nose: Nose normal.      Mouth/Throat:      Mouth: Mucous membranes are moist.   Eyes:      General: No scleral icterus.     Conjunctiva/sclera: Conjunctivae normal.   Cardiovascular:      Rate and Rhythm: Normal rate.   Pulmonary:      Effort: Pulmonary effort is normal.   Abdominal:      General: Abdomen is flat.   Skin:     General: Skin is warm and dry.   Neurological:      General: No focal deficit present.      Mental Status: She is alert and oriented to person, place, and time.   Psychiatric:         Mood and Affect: Mood normal.         Behavior: Behavior normal.         Labs:   Recent Results (from the past 2 weeks)   CBC auto differential    Collection Time: 09/26/24  7:13 PM   Result Value Ref Range    WBC 7.38 3.90 - 12.70 K/uL    Hemoglobin 12.5 12.0 - 16.0 g/dL    Hematocrit 38.7 37.0 - 48.5 %    Platelets 165 150 - 450 K/uL   CBC auto differential    Collection Time: 09/25/24  2:28 AM   Result Value Ref Range    WBC 10.94 3.90 - 12.70 K/uL    Hemoglobin 12.7 12.0 - 16.0 g/dL    Hematocrit 39.6 37.0 - 48.5 %    Platelets 172 150 - 450 K/uL       CMP  Sodium   Date Value Ref Range Status   09/26/2024 142 136 - 145 mmol/L Final     Potassium   Date Value Ref Range Status   09/26/2024 3.4 (L) 3.5 - 5.1 mmol/L Final     Chloride   Date Value Ref Range Status   09/26/2024 105 95 - 110 mmol/L Final     CO2   Date Value Ref Range Status   09/26/2024 29 23 - 29 mmol/L Final     Glucose   Date Value Ref Range Status   09/26/2024 103 70 - 110 mg/dL Final     BUN   Date Value Ref Range Status   09/26/2024 16 8 - 23 mg/dL Final     Creatinine   Date Value Ref Range Status   09/26/2024 1.0 0.5 - 1.4 mg/dL Final   12/31/2012 1.2 0.5 - 1.4 mg/dL Final     Calcium   Date Value Ref Range Status   09/26/2024 8.3 (L) 8.7 - 10.5 mg/dL Final  "  12/31/2012 9.7 8.7 - 10.5 mg/dL Final     Total Protein   Date Value Ref Range Status   09/26/2024 5.5 (L) 6.0 - 8.4 g/dL Final     Albumin   Date Value Ref Range Status   09/26/2024 3.3 (L) 3.5 - 5.2 g/dL Final     Total Bilirubin   Date Value Ref Range Status   09/26/2024 0.5 0.1 - 1.0 mg/dL Final     Comment:     For infants and newborns, interpretation of results should be based  on gestational age, weight and in agreement with clinical  observations.    Premature Infant recommended reference ranges:  Up to 24 hours.............<8.0 mg/dL  Up to 48 hours............<12.0 mg/dL  3-5 days..................<15.0 mg/dL  6-29 days.................<15.0 mg/dL       Alkaline Phosphatase   Date Value Ref Range Status   09/26/2024 51 (L) 55 - 135 U/L Final     AST   Date Value Ref Range Status   09/26/2024 15 10 - 40 U/L Final     ALT   Date Value Ref Range Status   09/26/2024 11 10 - 44 U/L Final     Anion Gap   Date Value Ref Range Status   09/26/2024 8 8 - 16 mmol/L Final   12/31/2012 14 5 - 15 meq/L Final     eGFR if    Date Value Ref Range Status   07/18/2022 >60.0 >60 mL/min/1.73 m^2 Final     eGFR if non    Date Value Ref Range Status   07/18/2022 52.6 (A) >60 mL/min/1.73 m^2 Final     Comment:     Calculation used to obtain the estimated glomerular filtration  rate (eGFR) is the CKD-EPI equation.        No results found for: "CEA"    Assessment/Plan:     Problem List Items Addressed This Visit          Oncology    Metastatic renal cell carcinoma to lung, right - Primary    Relevant Orders    CT Chest Abdomen Pelvis W W/O Contrast (XPD)     Other Visit Diagnoses       Neuropathy        Relevant Medications    gabapentin (NEURONTIN) 600 MG tablet    Nonintractable headache, unspecified chronicity pattern, unspecified headache type        Hypertension, unspecified type                RCC- Continue Inlyta; CT CAP now  HTN- Continue follow up with PCP  HA- Continue- Gabapentin PRN  4. "    Neuropathy- Continue Gabapentin TID    Discussion:     Follow up in about 3 weeks (around 10/22/2024) for with Dr. Wasserman and 6 weeks with me.      Electronically signed by Chiquita Almazan, MSN, APRN, AGNP-C, OCN

## 2024-09-30 NOTE — TELEPHONE ENCOUNTER
Called patient on regard to request to take gabapentin for neuritic pain, patient has a prescription of 600 mg PO 3 times a day, per Chiquita Almazan NP, patient can restart taking Gabapentin. Patient will need to schedule appointment with pain management to follow current low extremities and right arm pain, A new prescription will be sent to patient's pharmacy, patient stated understanding.

## 2024-10-01 ENCOUNTER — OFFICE VISIT (OUTPATIENT)
Facility: CLINIC | Age: 84
End: 2024-10-01
Payer: MEDICARE

## 2024-10-01 VITALS
TEMPERATURE: 97 F | RESPIRATION RATE: 16 BRPM | WEIGHT: 139.69 LBS | SYSTOLIC BLOOD PRESSURE: 139 MMHG | BODY MASS INDEX: 27.28 KG/M2 | DIASTOLIC BLOOD PRESSURE: 68 MMHG | OXYGEN SATURATION: 96 % | HEART RATE: 88 BPM

## 2024-10-01 DIAGNOSIS — R51.9 NONINTRACTABLE HEADACHE, UNSPECIFIED CHRONICITY PATTERN, UNSPECIFIED HEADACHE TYPE: ICD-10-CM

## 2024-10-01 DIAGNOSIS — G62.9 NEUROPATHY: ICD-10-CM

## 2024-10-01 DIAGNOSIS — C78.01 METASTATIC RENAL CELL CARCINOMA TO LUNG, RIGHT: Primary | ICD-10-CM

## 2024-10-01 DIAGNOSIS — I10 HYPERTENSION, UNSPECIFIED TYPE: ICD-10-CM

## 2024-10-01 DIAGNOSIS — C64.9 METASTATIC RENAL CELL CARCINOMA TO LUNG, RIGHT: Primary | ICD-10-CM

## 2024-10-01 PROCEDURE — G2211 COMPLEX E/M VISIT ADD ON: HCPCS | Mod: S$PBB,,, | Performed by: NURSE PRACTITIONER

## 2024-10-01 PROCEDURE — 99999 PR PBB SHADOW E&M-EST. PATIENT-LVL III: CPT | Mod: PBBFAC,,, | Performed by: NURSE PRACTITIONER

## 2024-10-01 PROCEDURE — 99215 OFFICE O/P EST HI 40 MIN: CPT | Mod: S$PBB,,, | Performed by: NURSE PRACTITIONER

## 2024-10-01 PROCEDURE — 99213 OFFICE O/P EST LOW 20 MIN: CPT | Mod: PBBFAC,PN | Performed by: NURSE PRACTITIONER

## 2024-10-01 RX ORDER — GABAPENTIN 600 MG/1
600 TABLET ORAL 3 TIMES DAILY
Qty: 90 TABLET | Refills: 5 | Status: SHIPPED | OUTPATIENT
Start: 2024-10-01 | End: 2024-10-31

## 2024-10-02 ENCOUNTER — HOSPITAL ENCOUNTER (OUTPATIENT)
Dept: RADIOLOGY | Facility: HOSPITAL | Age: 84
Discharge: HOME | End: 2024-10-02
Attending: NURSE PRACTITIONER
Payer: MEDICARE

## 2024-10-02 DIAGNOSIS — C78.01 METASTATIC RENAL CELL CARCINOMA TO LUNG, RIGHT: ICD-10-CM

## 2024-10-02 DIAGNOSIS — C64.9 METASTATIC RENAL CELL CARCINOMA TO LUNG, RIGHT: ICD-10-CM

## 2024-10-02 PROCEDURE — 71250 CT THORAX DX C-: CPT | Mod: TC,PO

## 2024-10-02 PROCEDURE — 71250 CT THORAX DX C-: CPT | Mod: 26,,, | Performed by: RADIOLOGY

## 2024-10-02 PROCEDURE — 74176 CT ABD & PELVIS W/O CONTRAST: CPT | Mod: 26,,, | Performed by: RADIOLOGY

## 2024-10-02 PROCEDURE — 74176 CT ABD & PELVIS W/O CONTRAST: CPT | Mod: TC,PO

## 2024-10-03 ENCOUNTER — TELEPHONE (OUTPATIENT)
Facility: CLINIC | Age: 84
End: 2024-10-03
Payer: MEDICARE

## 2024-10-03 NOTE — PROGRESS NOTES
Please call and notify patient her CT shows a good response to treatment. Continue oral chemo as discussed.  Continue current treatment plan and follow up as scheduled.

## 2024-10-03 NOTE — TELEPHONE ENCOUNTER
----- Message from MAY Ba sent at 10/3/2024  3:11 PM CDT -----  Please call and notify patient her CT shows a good response to treatment. Continue oral chemo as discussed.  Continue current treatment plan and follow up as scheduled.

## 2024-10-15 DIAGNOSIS — I10 PRIMARY HYPERTENSION: ICD-10-CM

## 2024-10-16 RX ORDER — AMLODIPINE BESYLATE 5 MG/1
5 TABLET ORAL NIGHTLY
Qty: 90 TABLET | Refills: 3 | Status: SHIPPED | OUTPATIENT
Start: 2024-10-16

## 2024-10-17 ENCOUNTER — TELEPHONE (OUTPATIENT)
Facility: CLINIC | Age: 84
End: 2024-10-17
Payer: MEDICARE

## 2024-10-17 DIAGNOSIS — I10 HYPERTENSION, UNSPECIFIED TYPE: ICD-10-CM

## 2024-10-17 DIAGNOSIS — C78.01 METASTATIC RENAL CELL CARCINOMA TO LUNG, RIGHT: Primary | ICD-10-CM

## 2024-10-17 DIAGNOSIS — C64.9 METASTATIC RENAL CELL CARCINOMA TO LUNG, RIGHT: Primary | ICD-10-CM

## 2024-10-17 DIAGNOSIS — E03.2 HYPOTHYROIDISM DUE TO DRUGS: ICD-10-CM

## 2024-10-17 NOTE — TELEPHONE ENCOUNTER
Called patient on regard to above instructions, new laboratory orders and uranalysis will be ordered, Patient states understanding.

## 2024-10-17 NOTE — TELEPHONE ENCOUNTER
----- Message from MAY Ba sent at 10/17/2024  1:19 PM CDT -----  Regarding: FW: Inlyta  Let her know to hold the chemo pills until she sees Dr. Nathan due to the BP and 3+ protein in the urine  ----- Message -----  From: Del Nathan MD  Sent: 10/17/2024   1:01 PM CDT  To: MAY Ba; Aftab Khan, Urvashi  Subject: RE: Inlyta                                       Hold the inlyta please  ----- Message -----  From: Aftab Khan, Urvashi  Sent: 10/16/2024  12:12 PM CDT  To: Del Nathan MD; MAY Ba  Subject: Inlyta                                           Good afternoon,    Patient's Urinalysis on 9/26 from her emergency room visit showed a protein UA of 3+, but it does not look further testing was ordered. It is recommended to withhold Inlyta for Proteinuria (>=2 grams proteinuria per 24 hours). I wanted to confirm how you would like to proceed.    Thank you,  Aftab Khan, PharmD, BCPS [facility]  [phone]

## 2024-10-22 ENCOUNTER — LAB VISIT (OUTPATIENT)
Dept: LAB | Facility: HOSPITAL | Age: 84
End: 2024-10-22
Attending: INTERNAL MEDICINE
Payer: MEDICARE

## 2024-10-22 DIAGNOSIS — C78.01 METASTATIC RENAL CELL CARCINOMA TO LUNG, RIGHT: ICD-10-CM

## 2024-10-22 DIAGNOSIS — C64.9 METASTATIC RENAL CELL CARCINOMA TO LUNG, RIGHT: ICD-10-CM

## 2024-10-22 DIAGNOSIS — E03.2 HYPOTHYROIDISM DUE TO DRUGS: ICD-10-CM

## 2024-10-22 DIAGNOSIS — I10 HYPERTENSION, UNSPECIFIED TYPE: ICD-10-CM

## 2024-10-22 LAB
ALBUMIN SERPL BCP-MCNC: 3.6 G/DL (ref 3.5–5.2)
ALP SERPL-CCNC: 58 U/L (ref 55–135)
ALT SERPL W/O P-5'-P-CCNC: 6 U/L (ref 10–44)
ANION GAP SERPL CALC-SCNC: 8 MMOL/L (ref 8–16)
AST SERPL-CCNC: 10 U/L (ref 10–40)
BACTERIA #/AREA URNS HPF: NORMAL /HPF
BASOPHILS # BLD AUTO: 0.06 K/UL (ref 0–0.2)
BASOPHILS NFR BLD: 0.7 % (ref 0–1.9)
BILIRUB SERPL-MCNC: 0.3 MG/DL (ref 0.1–1)
BILIRUB UR QL STRIP: NEGATIVE
BUN SERPL-MCNC: 20 MG/DL (ref 8–23)
CALCIUM SERPL-MCNC: 9.1 MG/DL (ref 8.7–10.5)
CHLORIDE SERPL-SCNC: 108 MMOL/L (ref 95–110)
CLARITY UR: CLEAR
CO2 SERPL-SCNC: 26 MMOL/L (ref 23–29)
COLOR UR: YELLOW
CREAT SERPL-MCNC: 1.1 MG/DL (ref 0.5–1.4)
DIFFERENTIAL METHOD BLD: ABNORMAL
EOSINOPHIL # BLD AUTO: 0.6 K/UL (ref 0–0.5)
EOSINOPHIL NFR BLD: 7.1 % (ref 0–8)
ERYTHROCYTE [DISTWIDTH] IN BLOOD BY AUTOMATED COUNT: 14.5 % (ref 11.5–14.5)
EST. GFR  (NO RACE VARIABLE): 49.2 ML/MIN/1.73 M^2
GLUCOSE SERPL-MCNC: 117 MG/DL (ref 70–110)
GLUCOSE UR QL STRIP: NEGATIVE
HCT VFR BLD AUTO: 38.1 % (ref 37–48.5)
HGB BLD-MCNC: 11.6 G/DL (ref 12–16)
HGB UR QL STRIP: NEGATIVE
HYALINE CASTS #/AREA URNS LPF: 0 /LPF
IMM GRANULOCYTES # BLD AUTO: 0.06 K/UL (ref 0–0.04)
IMM GRANULOCYTES NFR BLD AUTO: 0.7 % (ref 0–0.5)
KETONES UR QL STRIP: NEGATIVE
LEUKOCYTE ESTERASE UR QL STRIP: NEGATIVE
LYMPHOCYTES # BLD AUTO: 1.5 K/UL (ref 1–4.8)
LYMPHOCYTES NFR BLD: 16.8 % (ref 18–48)
MCH RBC QN AUTO: 28.3 PG (ref 27–31)
MCHC RBC AUTO-ENTMCNC: 30.4 G/DL (ref 32–36)
MCV RBC AUTO: 93 FL (ref 82–98)
MICROSCOPIC COMMENT: NORMAL
MONOCYTES # BLD AUTO: 0.9 K/UL (ref 0.3–1)
MONOCYTES NFR BLD: 9.8 % (ref 4–15)
NEUTROPHILS # BLD AUTO: 5.6 K/UL (ref 1.8–7.7)
NEUTROPHILS NFR BLD: 64.9 % (ref 38–73)
NITRITE UR QL STRIP: NEGATIVE
NRBC BLD-RTO: 0 /100 WBC
PH UR STRIP: 6 [PH] (ref 5–8)
PLATELET # BLD AUTO: 373 K/UL (ref 150–450)
PMV BLD AUTO: 9.8 FL (ref 9.2–12.9)
POTASSIUM SERPL-SCNC: 4.4 MMOL/L (ref 3.5–5.1)
PROT SERPL-MCNC: 6.1 G/DL (ref 6–8.4)
PROT UR QL STRIP: ABNORMAL
RBC # BLD AUTO: 4.1 M/UL (ref 4–5.4)
RBC #/AREA URNS HPF: 0 /HPF (ref 0–4)
SODIUM SERPL-SCNC: 142 MMOL/L (ref 136–145)
SP GR UR STRIP: 1.01 (ref 1–1.03)
T4 FREE SERPL-MCNC: 0.67 NG/DL (ref 0.71–1.51)
TSH SERPL DL<=0.005 MIU/L-ACNC: 7.53 UIU/ML (ref 0.34–5.6)
URN SPEC COLLECT METH UR: ABNORMAL
UROBILINOGEN UR STRIP-ACNC: NEGATIVE EU/DL
WBC # BLD AUTO: 8.69 K/UL (ref 3.9–12.7)
WBC #/AREA URNS HPF: 1 /HPF (ref 0–5)

## 2024-10-22 PROCEDURE — 81001 URINALYSIS AUTO W/SCOPE: CPT | Performed by: INTERNAL MEDICINE

## 2024-10-22 PROCEDURE — 84443 ASSAY THYROID STIM HORMONE: CPT | Performed by: INTERNAL MEDICINE

## 2024-10-22 PROCEDURE — 84439 ASSAY OF FREE THYROXINE: CPT | Performed by: INTERNAL MEDICINE

## 2024-10-22 PROCEDURE — 36415 COLL VENOUS BLD VENIPUNCTURE: CPT | Performed by: INTERNAL MEDICINE

## 2024-10-22 PROCEDURE — 85025 COMPLETE CBC W/AUTO DIFF WBC: CPT | Performed by: INTERNAL MEDICINE

## 2024-10-22 PROCEDURE — 80053 COMPREHEN METABOLIC PANEL: CPT | Performed by: INTERNAL MEDICINE

## 2024-10-23 ENCOUNTER — OFFICE VISIT (OUTPATIENT)
Facility: CLINIC | Age: 84
End: 2024-10-23
Payer: MEDICARE

## 2024-10-23 VITALS
TEMPERATURE: 97 F | BODY MASS INDEX: 27.15 KG/M2 | HEART RATE: 101 BPM | DIASTOLIC BLOOD PRESSURE: 69 MMHG | SYSTOLIC BLOOD PRESSURE: 147 MMHG | WEIGHT: 139 LBS | RESPIRATION RATE: 17 BRPM

## 2024-10-23 DIAGNOSIS — C78.01 METASTATIC RENAL CELL CARCINOMA TO LUNG, RIGHT: Primary | ICD-10-CM

## 2024-10-23 DIAGNOSIS — C78.01 METASTATIC RENAL CELL CARCINOMA TO LUNG, RIGHT: ICD-10-CM

## 2024-10-23 DIAGNOSIS — C64.9 METASTATIC RENAL CELL CARCINOMA TO LUNG, RIGHT: ICD-10-CM

## 2024-10-23 DIAGNOSIS — C64.9 METASTATIC RENAL CELL CARCINOMA TO LUNG, RIGHT: Primary | ICD-10-CM

## 2024-10-23 PROCEDURE — 99215 OFFICE O/P EST HI 40 MIN: CPT | Mod: S$PBB,,, | Performed by: INTERNAL MEDICINE

## 2024-10-23 PROCEDURE — G2211 COMPLEX E/M VISIT ADD ON: HCPCS | Mod: S$PBB,,, | Performed by: INTERNAL MEDICINE

## 2024-10-23 PROCEDURE — 99213 OFFICE O/P EST LOW 20 MIN: CPT | Mod: PBBFAC,PN | Performed by: INTERNAL MEDICINE

## 2024-10-23 PROCEDURE — 99999 PR PBB SHADOW E&M-EST. PATIENT-LVL III: CPT | Mod: PBBFAC,,, | Performed by: INTERNAL MEDICINE

## 2024-10-23 RX ORDER — AXITINIB 1 MG/1
3 TABLET, FILM COATED ORAL 2 TIMES DAILY
Qty: 60 TABLET | Refills: 6 | Status: SHIPPED | OUTPATIENT
Start: 2024-10-23 | End: 2024-10-24 | Stop reason: SDUPTHER

## 2024-10-23 NOTE — PROGRESS NOTES
PROGRESS NOTE    Subjective:       Patient ID: Karlie Hess is a 84 y.o. female.    History of Left RCC, treated [id number] further treatment     History of JAX lung cancer-treated with lobectomy-7/2/2015-no chemo/xrt-Path c/w mucin producing adenocarcinoma. [id number]     History of left hilar mass, 9/2020 EBUS negative     PET 10/18/2023:  Nodular densities are as outlined below:  -21 x 17 mm right infrahilar nodule with SUV max 2.5 (image 119), previously measuring 17 x 16 mm (August 20, 2023)  -27 x 27 mm marginated nodule in the posterior inferior left hilum with SUV max 2.7 (image 109)  -13 x 7 mm nodular density along the right posterior pararenal space with SUV max 0.8 (image 179), seen on studies dating back to 1/12/2021.     11/9/2023-EBUS  1. LUNG, RIGHT LOWER LOBE MASS, FINE NEEDLE ASPIRATION   - NEGATIVE FOR MALIGNANT CELLS.     2. LUNG, RIGHT LOWER LOBE MASS, BRUSHING   - RARE ATYPICAL CELLS PRESENT     3. LUNG, RIGHT LOWER LOBE MASS, BIOPSY TOUCH PREP   - POSITIVE FOR CARCINOMA.   - SEE CONCURRENT SURGICAL BIOPSY ([id number])     4. LUNG, RIGHT LOWER LOBE, BRONCHOALVEOLAR LAVAGE   - RARE ATYPICAL CELLS PRESENT.     5. LUNG, LEFT LOWER LOBE MASS, FINE NEEDLE ASPIRATION   - RARE ATYPICAL CELLS PRESENT     6. LYMPH NODE, STATION 11RS, FINE NEEDLE ASPIRATION   - NEGATIVE FOR MALIGNANT CELLS.   - SCANT KATARZYNA MATERIAL PRESENT      Karnofsky performance status score <80   Time from original diagnosis to initiation of targeted therapy <1 year   Hemoglobin less than the lower limit of normal   Serum calcium greater than the upper limit of normal   Neutrophil count greater than the upper limit of normal   Platelet count greater than the upper limit of normal   Favorable risk: None of the above risk factors present.  Intermediate risk: 1 or 2 of the above risk factors present.  Poor risk: 3 or more risk factors present.     1/5/2024:  RIGHT  PARARENAL MASS, CT GUIDED CORE BIOPSY:   - CLEAR CELL RENAL CELL CARCINOMA     12/27/2023-1/5/2024  RLL SBRT    1/30/2024-2/7/2024  XRT to Rt. Renal bed lesion    5/3/2024-MRI Abd:--Progressive Disease  Right kidney mass  2nd mass in lower pole of right kidney  Pancreatic Tail mass    5/13/2024-Bx:  PANCREATIC TAIL MASS, BIOPSY:   - METASTATIC CLEAR CELL RENAL CELL CARCINOMA.     7/31/2024-Patient went to ED with severe HA, MRI Brain:  No metastatic disease    6/7/2024-Pembro/Axitinib:  Cycle 1: 6/7/2024  Cycle 2: 6/28/2024  Cycle 3: 7/19/2024  Cycle 4: 8/7/2024-due    No apparent drug interactions with topirimate/Axitinib    Prednisone and nurtec    Held Axitinib 10/17/2024 due to proteinuria    Chief Complaint:  No chief complaint on file.  Metastatic Renal Cell Carcinoma follow up    History of Present Illness:   Karlie Hess is a 84 y.o. female who presents for follow up of above.      Patient was on axitinib and was profoundly fatigued with this.  This was held due to proteinuria.  She is feeling back to normal now.      BP regimen  Lebtolol 100mg bid  Lisinopril 20mg bid  Amlodipine 5mg qhs    Family and Social history reviewed and is unchanged from 11/21/2023             Current Outpatient Medications:     acetaminophen (TYLENOL) 500 MG tablet, Take 1,000 mg by mouth 2 (two) times daily as needed., Disp: , Rfl:     amLODIPine (NORVASC) 5 MG tablet, TAKE 1 TABLET BY MOUTH EVERY  EVENING, Disp: 90 tablet, Rfl: 3    aspirin (ECOTRIN) 81 MG EC tablet, Take 1 tablet (81 mg total) by mouth once daily., Disp: , Rfl:     axitinib (INLYTA) 1 mg Tab, Take 3 tablets (3 mg total) by mouth 2 (two) times a day., Disp: 60 tablet, Rfl: 6    cloNIDine (CATAPRES) 0.1 MG tablet, Take 1 tablet (0.1 mg total) by mouth 3 (three) times daily as needed. For systolic BP over 180, Disp: 90 tablet, Rfl: 0    cyanocobalamin (VITAMIN B-12) 1000 MCG tablet, Take 100 mcg by mouth once daily., Disp: , Rfl:     duloxetine (CYMBALTA)  60 MG capsule, Take 60 mg by mouth once daily., Disp: , Rfl:     gabapentin (NEURONTIN) 600 MG tablet, Take 1 tablet (600 mg total) by mouth 3 (three) times daily., Disp: 90 tablet, Rfl: 5    hydroCHLOROthiazide (HYDRODIURIL) 12.5 MG Tab, Take 1 tablet (12.5 mg total) by mouth daily as needed (Leg swelling)., Disp: 30 tablet, Rfl: 11    labetaloL (NORMODYNE) 200 MG tablet, TAKE 1 TABLET BY MOUTH TWICE  DAILY, Disp: 180 tablet, Rfl: 3    lisinopriL (PRINIVIL,ZESTRIL) 20 MG tablet, TAKE 1 TABLET BY MOUTH TWICE  DAILY, Disp: 180 tablet, Rfl: 3    ondansetron (ZOFRAN) 8 MG tablet, Take 1 tablet (8 mg total) by mouth every 8 (eight) hours as needed for Nausea., Disp: 30 tablet, Rfl: 5    pantoprazole (PROTONIX) 40 MG tablet, Take 40 mg by mouth once daily., Disp: , Rfl:     predniSONE (DELTASONE) 20 MG tablet, Take 1 tablet (20 mg total) by mouth once daily., Disp: 14 tablet, Rfl: 0    promethazine (PHENERGAN) 25 MG tablet, Take 1 tablet (25 mg total) by mouth every 6 (six) hours as needed for Nausea., Disp: 30 tablet, Rfl: 5    rosuvastatin (CRESTOR) 10 MG tablet, Take 10 mg by mouth every evening., Disp: , Rfl:     topiramate (TOPAMAX) 25 MG tablet, Take 1 tablet (25 mg total) by mouth every evening. for 14 doses, Disp: 14 tablet, Rfl: 0  No current facility-administered medications for this visit.    Facility-Administered Medications Ordered in Other Visits:     LIDOcaine (PF) 10 mg/ml (1%) injection 10 mg, 1 mL, Intradermal, Once, Gabriel Loaiza MD        Objective:       Physical Examination:     BP (!) 147/69   Pulse 101   Temp 97.4 °F (36.3 °C)   Resp 17   Wt 63 kg (139 lb)   BMI 27.15 kg/m²     Physical Exam  Constitutional:       Appearance: Normal appearance.   HENT:      Head: Normocephalic and atraumatic.   Eyes:      General: No scleral icterus.     Conjunctiva/sclera: Conjunctivae normal.   Cardiovascular:      Rate and Rhythm: Normal rate.   Pulmonary:      Effort: Pulmonary effort is normal.    Abdominal:      General: Abdomen is flat.   Neurological:      General: No focal deficit present.      Mental Status: She is alert and oriented to person, place, and time.   Psychiatric:         Mood and Affect: Mood normal.         Behavior: Behavior normal.         Labs:   Recent Results (from the past 2 weeks)   CBC W/ AUTO DIFFERENTIAL    Collection Time: 10/22/24  8:05 AM   Result Value Ref Range    WBC 8.69 3.90 - 12.70 K/uL    Hemoglobin 11.6 (L) 12.0 - 16.0 g/dL    Hematocrit 38.1 37.0 - 48.5 %    Platelets 373 150 - 450 K/uL       CMP  Sodium   Date Value Ref Range Status   10/22/2024 142 136 - 145 mmol/L Final     Potassium   Date Value Ref Range Status   10/22/2024 4.4 3.5 - 5.1 mmol/L Final     Chloride   Date Value Ref Range Status   10/22/2024 108 95 - 110 mmol/L Final     CO2   Date Value Ref Range Status   10/22/2024 26 23 - 29 mmol/L Final     Glucose   Date Value Ref Range Status   10/22/2024 117 (H) 70 - 110 mg/dL Final     BUN   Date Value Ref Range Status   10/22/2024 20 8 - 23 mg/dL Final     Creatinine   Date Value Ref Range Status   10/22/2024 1.1 0.5 - 1.4 mg/dL Final   12/31/2012 1.2 0.5 - 1.4 mg/dL Final     Calcium   Date Value Ref Range Status   10/22/2024 9.1 8.7 - 10.5 mg/dL Final   12/31/2012 9.7 8.7 - 10.5 mg/dL Final     Total Protein   Date Value Ref Range Status   10/22/2024 6.1 6.0 - 8.4 g/dL Final     Albumin   Date Value Ref Range Status   10/22/2024 3.6 3.5 - 5.2 g/dL Final     Total Bilirubin   Date Value Ref Range Status   10/22/2024 0.3 0.1 - 1.0 mg/dL Final     Comment:     For infants and newborns, interpretation of results should be based  on gestational age, weight and in agreement with clinical  observations.    Premature Infant recommended reference ranges:  Up to 24 hours.............<8.0 mg/dL  Up to 48 hours............<12.0 mg/dL  3-5 days..................<15.0 mg/dL  6-29 days.................<15.0 mg/dL       Alkaline Phosphatase   Date Value Ref Range Status  "  10/22/2024 58 55 - 135 U/L Final     AST   Date Value Ref Range Status   10/22/2024 10 10 - 40 U/L Final     ALT   Date Value Ref Range Status   10/22/2024 6 (L) 10 - 44 U/L Final     Anion Gap   Date Value Ref Range Status   10/22/2024 8 8 - 16 mmol/L Final   12/31/2012 14 5 - 15 meq/L Final     eGFR if    Date Value Ref Range Status   07/18/2022 >60.0 >60 mL/min/1.73 m^2 Final     eGFR if non    Date Value Ref Range Status   07/18/2022 52.6 (A) >60 mL/min/1.73 m^2 Final     Comment:     Calculation used to obtain the estimated glomerular filtration  rate (eGFR) is the CKD-EPI equation.        No results found for: "CEA"  No results found for: "PSA"        Assessment/Plan:     Problem List Items Addressed This Visit       Metastatic renal cell carcinoma to lung, right - Primary     Patient developed 3+ Proteinuria seen in September 26th and went down to 1+ on 10/22.  She is feeling much better as well.  CT scan does show her disease was improving and I feel the axitinib did contribute to this.  Will need to dose reduce to 3mg bid but will not resume until urine is clear.      She has also been off pembro since August.  I was concerned her HAs were AI related.  She feels this is due to a pinched nerve and is doing well currently with no HAs.  Can consider resuming this once we are ready to begin the Axi again.          Relevant Medications    axitinib (INLYTA) 1 mg Tab    Other Relevant Orders    Urinalysis                   Discussion:     Follow up in about 4 weeks (around 11/20/2024).      Electronically signed by Del Nathan      "

## 2024-10-23 NOTE — ASSESSMENT & PLAN NOTE
Patient developed 3+ Proteinuria seen in September 26th and went down to 1+ on 10/22.  She is feeling much better as well.  CT scan does show her disease was improving and I feel the axitinib did contribute to this.  Will need to dose reduce to 3mg bid but will not resume until urine is clear.      She has also been off pembro since August.  I was concerned her HAs were AI related.  She feels this is due to a pinched nerve and is doing well currently with no HAs.  Can consider resuming this once we are ready to begin the Axi again.

## 2024-10-23 NOTE — TELEPHONE ENCOUNTER
----- Message from Pharmacist Aftab sent at 10/23/2024  3:14 PM CDT -----  Regarding: Inlyta  Good afternoon,    Can I adjust the order to a quantity of 180 to satisfy a 30 day supply?    Thank you,  Aftab Khan, PharmD, [facility]  [facility]  [phone]

## 2024-10-24 RX ORDER — AXITINIB 1 MG/1
3 TABLET, FILM COATED ORAL 2 TIMES DAILY
Qty: 180 TABLET | Refills: 6 | Status: ACTIVE | OUTPATIENT
Start: 2024-10-24

## 2024-11-07 ENCOUNTER — LAB VISIT (OUTPATIENT)
Dept: LAB | Facility: HOSPITAL | Age: 84
End: 2024-11-07
Attending: INTERNAL MEDICINE
Payer: MEDICARE

## 2024-11-07 DIAGNOSIS — C64.9 METASTATIC RENAL CELL CARCINOMA TO LUNG, RIGHT: ICD-10-CM

## 2024-11-07 DIAGNOSIS — C78.01 METASTATIC RENAL CELL CARCINOMA TO LUNG, RIGHT: ICD-10-CM

## 2024-11-07 LAB
BACTERIA #/AREA URNS HPF: ABNORMAL /HPF
BILIRUB UR QL STRIP: NEGATIVE
CLARITY UR: CLEAR
COLOR UR: YELLOW
GLUCOSE UR QL STRIP: NEGATIVE
HGB UR QL STRIP: NEGATIVE
HYALINE CASTS #/AREA URNS LPF: 0 /LPF
KETONES UR QL STRIP: NEGATIVE
LEUKOCYTE ESTERASE UR QL STRIP: NEGATIVE
MICROSCOPIC COMMENT: ABNORMAL
NITRITE UR QL STRIP: NEGATIVE
NON-SQ EPI CELLS #/AREA URNS HPF: 1 /HPF
PH UR STRIP: 6 [PH] (ref 5–8)
PROT UR QL STRIP: ABNORMAL
RBC #/AREA URNS HPF: 0 /HPF (ref 0–4)
SP GR UR STRIP: 1.02 (ref 1–1.03)
SQUAMOUS #/AREA URNS HPF: 1 /HPF
URN SPEC COLLECT METH UR: ABNORMAL
UROBILINOGEN UR STRIP-ACNC: NEGATIVE EU/DL
WBC #/AREA URNS HPF: 2 /HPF (ref 0–5)

## 2024-11-07 PROCEDURE — 81001 URINALYSIS AUTO W/SCOPE: CPT | Performed by: INTERNAL MEDICINE

## 2024-11-11 NOTE — PROGRESS NOTES
PROGRESS NOTE    Subjective:       Patient ID: Karlie Hess is a 84 y.o. female.    History of Left RCC, treated [id number] further treatment     History of JAX lung cancer-treated with lobectomy-7/2/2015-no chemo/xrt-Path c/w mucin producing adenocarcinoma. [id number]     History of left hilar mass, 9/2020 EBUS negative     PET 10/18/2023:  Nodular densities are as outlined below:  -21 x 17 mm right infrahilar nodule with SUV max 2.5 (image 119), previously measuring 17 x 16 mm (August 20, 2023)  -27 x 27 mm marginated nodule in the posterior inferior left hilum with SUV max 2.7 (image 109)  -13 x 7 mm nodular density along the right posterior pararenal space with SUV max 0.8 (image 179), seen on studies dating back to 1/12/2021.     11/9/2023-EBUS  1. LUNG, RIGHT LOWER LOBE MASS, FINE NEEDLE ASPIRATION   - NEGATIVE FOR MALIGNANT CELLS.     2. LUNG, RIGHT LOWER LOBE MASS, BRUSHING   - RARE ATYPICAL CELLS PRESENT     3. LUNG, RIGHT LOWER LOBE MASS, BIOPSY TOUCH PREP   - POSITIVE FOR CARCINOMA.   - SEE CONCURRENT SURGICAL BIOPSY ([id number])     4. LUNG, RIGHT LOWER LOBE, BRONCHOALVEOLAR LAVAGE   - RARE ATYPICAL CELLS PRESENT.     5. LUNG, LEFT LOWER LOBE MASS, FINE NEEDLE ASPIRATION   - RARE ATYPICAL CELLS PRESENT     6. LYMPH NODE, STATION 11RS, FINE NEEDLE ASPIRATION   - NEGATIVE FOR MALIGNANT CELLS.   - SCANT KATARZYNA MATERIAL PRESENT      Karnofsky performance status score <80   Time from original diagnosis to initiation of targeted therapy <1 year   Hemoglobin less than the lower limit of normal   Serum calcium greater than the upper limit of normal   Neutrophil count greater than the upper limit of normal   Platelet count greater than the upper limit of normal   Favorable risk: None of the above risk factors present.  Intermediate risk: 1 or 2 of the above risk factors present.  Poor risk: 3 or more risk factors present.     1/5/2024:  RIGHT  PARARENAL MASS, CT GUIDED CORE BIOPSY:   - CLEAR CELL RENAL CELL CARCINOMA     12/27/2023-1/5/2024  RLL SBRT    1/30/2024-2/7/2024  XRT to Rt. Renal bed lesion    5/3/2024-MRI Abd:--Progressive Disease  Right kidney mass  2nd mass in lower pole of right kidney  Pancreatic Tail mass    5/13/2024-Bx:  PANCREATIC TAIL MASS, BIOPSY:   - METASTATIC CLEAR CELL RENAL CELL CARCINOMA.     7/31/2024-Patient went to ED with severe HA, MRI Brain:  No metastatic disease    6/7/2024-Pembro/Axitinib:  Cycle 1: 6/7/2024  Cycle 2: 6/28/2024  Cycle 3: 7/19/2024  Cycle 4: 8/7/2024-due    No apparent drug interactions with topirimate/Axitinib    Prednisone and nurtec    10/2/2024- CT CAP shows good response to treatment    Held Axitinib 10/17/2024 due to proteinuria    Chief Complaint:  Metastatic Renal Cell Carcinoma follow up    History of Present Illness:   Karlie Hess is a 84 y.o. female who presents for follow up of above.      Patient was on axitinib and was profoundly fatigued with this.  This was held due to proteinuria.  She is feeling back to normal now.  Last urine protein was 1+. BP today is 139/73.     She did have a tooth pulled today due to a broken tooth. Will plan on repeat urine and labs next week and restart Inlyta at 3mg BID.    BP regimen  Lebtolol 100mg bid  Lisinopril 20mg bid  Amlodipine 5mg qhs    Family and Social history reviewed and is unchanged from 11/21/2023        Current Outpatient Medications:     axitinib (INLYTA) 1 mg Tab, Take 3 tablets (3 mg total) by mouth 2 (two) times a day., Disp: 180 tablet, Rfl: 6    acetaminophen (TYLENOL) 500 MG tablet, Take 1,000 mg by mouth 2 (two) times daily as needed., Disp: , Rfl:     amLODIPine (NORVASC) 5 MG tablet, TAKE 1 TABLET BY MOUTH EVERY  EVENING, Disp: 90 tablet, Rfl: 3    aspirin (ECOTRIN) 81 MG EC tablet, Take 1 tablet (81 mg total) by mouth once daily., Disp: , Rfl:     cloNIDine (CATAPRES) 0.1 MG tablet, Take 1 tablet (0.1 mg total) by mouth 3  (three) times daily as needed. For systolic BP over 180, Disp: 90 tablet, Rfl: 0    cyanocobalamin (VITAMIN B-12) 1000 MCG tablet, Take 100 mcg by mouth once daily., Disp: , Rfl:     duloxetine (CYMBALTA) 60 MG capsule, Take 60 mg by mouth once daily., Disp: , Rfl:     gabapentin (NEURONTIN) 600 MG tablet, Take 1 tablet (600 mg total) by mouth 3 (three) times daily., Disp: 90 tablet, Rfl: 5    hydroCHLOROthiazide (HYDRODIURIL) 12.5 MG Tab, Take 1 tablet (12.5 mg total) by mouth daily as needed (Leg swelling)., Disp: 30 tablet, Rfl: 11    labetaloL (NORMODYNE) 200 MG tablet, TAKE 1 TABLET BY MOUTH TWICE  DAILY, Disp: 180 tablet, Rfl: 3    lisinopriL (PRINIVIL,ZESTRIL) 20 MG tablet, TAKE 1 TABLET BY MOUTH TWICE  DAILY (Patient not taking: Reported on 11/13/2024), Disp: 180 tablet, Rfl: 3    ondansetron (ZOFRAN) 8 MG tablet, Take 1 tablet (8 mg total) by mouth every 8 (eight) hours as needed for Nausea., Disp: 30 tablet, Rfl: 5    pantoprazole (PROTONIX) 40 MG tablet, Take 40 mg by mouth once daily., Disp: , Rfl:     promethazine (PHENERGAN) 25 MG tablet, Take 1 tablet (25 mg total) by mouth every 6 (six) hours as needed for Nausea., Disp: 30 tablet, Rfl: 5    rosuvastatin (CRESTOR) 10 MG tablet, Take 10 mg by mouth every evening., Disp: , Rfl:     topiramate (TOPAMAX) 25 MG tablet, Take 1 tablet (25 mg total) by mouth every evening. for 14 doses, Disp: 14 tablet, Rfl: 0  No current facility-administered medications for this visit.    Facility-Administered Medications Ordered in Other Visits:     LIDOcaine (PF) 10 mg/ml (1%) injection 10 mg, 1 mL, Intradermal, Once, Gabriel Loaiza MD    Review of Systems   Constitutional:  Positive for malaise/fatigue. Negative for fever.   HENT:  Negative for hearing loss.    Respiratory:  Negative for cough and shortness of breath.    Cardiovascular:  Negative for chest pain and leg swelling.   Gastrointestinal:  Negative for constipation, diarrhea and nausea.   Genitourinary:   Negative for urgency.   Musculoskeletal:  Positive for back pain and neck pain. Negative for joint pain and myalgias.   Skin:  Negative for rash.   Neurological:  Negative for dizziness and weakness.   Psychiatric/Behavioral:  Negative for depression.          Objective:       Physical Examination:     /73   Pulse 93   Temp 97.6 °F (36.4 °C)   Resp 18   Wt 62.6 kg (138 lb)   BMI 26.95 kg/m²     Physical Exam  Constitutional:       Appearance: Normal appearance.   HENT:      Head: Normocephalic and atraumatic.      Mouth/Throat:      Comments: Tooth pulled today  Eyes:      General: No scleral icterus.     Conjunctiva/sclera: Conjunctivae normal.   Cardiovascular:      Rate and Rhythm: Normal rate.   Pulmonary:      Effort: Pulmonary effort is normal.   Abdominal:      General: Abdomen is flat.   Skin:     General: Skin is warm and dry.   Neurological:      General: No focal deficit present.      Mental Status: She is alert and oriented to person, place, and time.   Psychiatric:         Mood and Affect: Mood normal.         Behavior: Behavior normal.         Labs:   No results found for this or any previous visit (from the past 2 weeks).      CMP  Sodium   Date Value Ref Range Status   10/22/2024 142 136 - 145 mmol/L Final     Potassium   Date Value Ref Range Status   10/22/2024 4.4 3.5 - 5.1 mmol/L Final     Chloride   Date Value Ref Range Status   10/22/2024 108 95 - 110 mmol/L Final     CO2   Date Value Ref Range Status   10/22/2024 26 23 - 29 mmol/L Final     Glucose   Date Value Ref Range Status   10/22/2024 117 (H) 70 - 110 mg/dL Final     BUN   Date Value Ref Range Status   10/22/2024 20 8 - 23 mg/dL Final     Creatinine   Date Value Ref Range Status   10/22/2024 1.1 0.5 - 1.4 mg/dL Final   12/31/2012 1.2 0.5 - 1.4 mg/dL Final     Calcium   Date Value Ref Range Status   10/22/2024 9.1 8.7 - 10.5 mg/dL Final   12/31/2012 9.7 8.7 - 10.5 mg/dL Final     Total Protein   Date Value Ref Range Status  "  10/22/2024 6.1 6.0 - 8.4 g/dL Final     Albumin   Date Value Ref Range Status   10/22/2024 3.6 3.5 - 5.2 g/dL Final     Total Bilirubin   Date Value Ref Range Status   10/22/2024 0.3 0.1 - 1.0 mg/dL Final     Comment:     For infants and newborns, interpretation of results should be based  on gestational age, weight and in agreement with clinical  observations.    Premature Infant recommended reference ranges:  Up to 24 hours.............<8.0 mg/dL  Up to 48 hours............<12.0 mg/dL  3-5 days..................<15.0 mg/dL  6-29 days.................<15.0 mg/dL       Alkaline Phosphatase   Date Value Ref Range Status   10/22/2024 58 55 - 135 U/L Final     AST   Date Value Ref Range Status   10/22/2024 10 10 - 40 U/L Final     ALT   Date Value Ref Range Status   10/22/2024 6 (L) 10 - 44 U/L Final     Anion Gap   Date Value Ref Range Status   10/22/2024 8 8 - 16 mmol/L Final   12/31/2012 14 5 - 15 meq/L Final     eGFR if    Date Value Ref Range Status   07/18/2022 >60.0 >60 mL/min/1.73 m^2 Final     eGFR if non    Date Value Ref Range Status   07/18/2022 52.6 (A) >60 mL/min/1.73 m^2 Final     Comment:     Calculation used to obtain the estimated glomerular filtration  rate (eGFR) is the CKD-EPI equation.        No results found for: "CEA"    Assessment/Plan:     Problem List Items Addressed This Visit          Cardiac/Vascular    Primary hypertension     Better since stopping Inlyta; BP today 139/73            Renal/    Persistent proteinuria     Urine protein down to 1+; Repeat UA next week.            Oncology    Metastatic renal cell carcinoma to lung, right - Primary     Patient had tooth pulled today. BP better and urine protein down to 1+. Discussed with Dr. Wasserman and will plan on repeat labs and restart Inlyta at 3mg BID next week.         Relevant Orders    Urinalysis    CBC W/ AUTO DIFFERENTIAL    CMP    Cancer associated pain     Patient denies any headache or next " pain today.          Discussion:     Follow up in about 2 weeks (around 11/27/2024) for with Dr. Wasserman and 4 weeks with me.    Electronically signed by Chiquita Almazan, MSN, APRN, AGNP-C, OCN

## 2024-11-13 ENCOUNTER — OFFICE VISIT (OUTPATIENT)
Facility: CLINIC | Age: 84
End: 2024-11-13
Payer: MEDICARE

## 2024-11-13 VITALS
RESPIRATION RATE: 18 BRPM | TEMPERATURE: 98 F | DIASTOLIC BLOOD PRESSURE: 73 MMHG | SYSTOLIC BLOOD PRESSURE: 139 MMHG | HEART RATE: 93 BPM | WEIGHT: 138 LBS | BODY MASS INDEX: 26.95 KG/M2

## 2024-11-13 DIAGNOSIS — C78.01 METASTATIC RENAL CELL CARCINOMA TO LUNG, RIGHT: Primary | ICD-10-CM

## 2024-11-13 DIAGNOSIS — R80.1 PERSISTENT PROTEINURIA: ICD-10-CM

## 2024-11-13 DIAGNOSIS — I10 PRIMARY HYPERTENSION: ICD-10-CM

## 2024-11-13 DIAGNOSIS — G89.3 CANCER ASSOCIATED PAIN: ICD-10-CM

## 2024-11-13 DIAGNOSIS — C64.9 METASTATIC RENAL CELL CARCINOMA TO LUNG, RIGHT: Primary | ICD-10-CM

## 2024-11-13 PROCEDURE — 99999 PR PBB SHADOW E&M-EST. PATIENT-LVL III: CPT | Mod: PBBFAC,,, | Performed by: NURSE PRACTITIONER

## 2024-11-13 PROCEDURE — 99215 OFFICE O/P EST HI 40 MIN: CPT | Mod: S$PBB,,, | Performed by: NURSE PRACTITIONER

## 2024-11-13 PROCEDURE — G2211 COMPLEX E/M VISIT ADD ON: HCPCS | Mod: S$PBB,,, | Performed by: NURSE PRACTITIONER

## 2024-11-13 PROCEDURE — 99213 OFFICE O/P EST LOW 20 MIN: CPT | Mod: PBBFAC,PN | Performed by: NURSE PRACTITIONER

## 2024-11-13 NOTE — ASSESSMENT & PLAN NOTE
Patient had tooth pulled today. BP better and urine protein down to 1+. Discussed with Dr. Wasserman and will plan on repeat labs and restart Inlyta at 3mg BID next week.

## 2024-11-20 ENCOUNTER — TELEPHONE (OUTPATIENT)
Facility: CLINIC | Age: 84
End: 2024-11-20
Payer: MEDICARE

## 2024-11-20 ENCOUNTER — LAB VISIT (OUTPATIENT)
Dept: LAB | Facility: HOSPITAL | Age: 84
End: 2024-11-20
Attending: NURSE PRACTITIONER
Payer: MEDICARE

## 2024-11-20 DIAGNOSIS — C78.01 METASTATIC RENAL CELL CARCINOMA TO LUNG, RIGHT: ICD-10-CM

## 2024-11-20 DIAGNOSIS — C64.9 METASTATIC RENAL CELL CARCINOMA TO LUNG, RIGHT: ICD-10-CM

## 2024-11-20 DIAGNOSIS — R53.83 FATIGUE, UNSPECIFIED TYPE: ICD-10-CM

## 2024-11-20 LAB
ALBUMIN SERPL BCP-MCNC: 3.9 G/DL (ref 3.5–5.2)
ALP SERPL-CCNC: 48 U/L (ref 55–135)
ALT SERPL W/O P-5'-P-CCNC: 6 U/L (ref 10–44)
ANION GAP SERPL CALC-SCNC: 6 MMOL/L (ref 8–16)
AST SERPL-CCNC: 11 U/L (ref 10–40)
BASOPHILS # BLD AUTO: 0.03 K/UL (ref 0–0.2)
BASOPHILS NFR BLD: 0.5 % (ref 0–1.9)
BILIRUB SERPL-MCNC: 0.4 MG/DL (ref 0.1–1)
BUN SERPL-MCNC: 21 MG/DL (ref 8–23)
CALCIUM SERPL-MCNC: 9.1 MG/DL (ref 8.7–10.5)
CHLORIDE SERPL-SCNC: 106 MMOL/L (ref 95–110)
CO2 SERPL-SCNC: 32 MMOL/L (ref 23–29)
CREAT SERPL-MCNC: 1.2 MG/DL (ref 0.5–1.4)
DIFFERENTIAL METHOD BLD: ABNORMAL
EOSINOPHIL # BLD AUTO: 0.5 K/UL (ref 0–0.5)
EOSINOPHIL NFR BLD: 7.7 % (ref 0–8)
ERYTHROCYTE [DISTWIDTH] IN BLOOD BY AUTOMATED COUNT: 13.6 % (ref 11.5–14.5)
EST. GFR  (NO RACE VARIABLE): 44.4 ML/MIN/1.73 M^2
GLUCOSE SERPL-MCNC: 92 MG/DL (ref 70–110)
HCT VFR BLD AUTO: 35.4 % (ref 37–48.5)
HGB BLD-MCNC: 10.7 G/DL (ref 12–16)
IMM GRANULOCYTES # BLD AUTO: 0.02 K/UL (ref 0–0.04)
IMM GRANULOCYTES NFR BLD AUTO: 0.3 % (ref 0–0.5)
LYMPHOCYTES # BLD AUTO: 1.3 K/UL (ref 1–4.8)
LYMPHOCYTES NFR BLD: 20.8 % (ref 18–48)
MCH RBC QN AUTO: 27.8 PG (ref 27–31)
MCHC RBC AUTO-ENTMCNC: 30.2 G/DL (ref 32–36)
MCV RBC AUTO: 92 FL (ref 82–98)
MONOCYTES # BLD AUTO: 0.6 K/UL (ref 0.3–1)
MONOCYTES NFR BLD: 9.1 % (ref 4–15)
NEUTROPHILS # BLD AUTO: 3.9 K/UL (ref 1.8–7.7)
NEUTROPHILS NFR BLD: 61.6 % (ref 38–73)
NRBC BLD-RTO: 0 /100 WBC
PLATELET # BLD AUTO: 294 K/UL (ref 150–450)
PMV BLD AUTO: 9.7 FL (ref 9.2–12.9)
POTASSIUM SERPL-SCNC: 4.1 MMOL/L (ref 3.5–5.1)
PROT SERPL-MCNC: 6.2 G/DL (ref 6–8.4)
RBC # BLD AUTO: 3.85 M/UL (ref 4–5.4)
SODIUM SERPL-SCNC: 144 MMOL/L (ref 136–145)
TSH SERPL DL<=0.005 MIU/L-ACNC: 4.42 UIU/ML (ref 0.34–5.6)
WBC # BLD AUTO: 6.36 K/UL (ref 3.9–12.7)

## 2024-11-20 PROCEDURE — 84443 ASSAY THYROID STIM HORMONE: CPT | Performed by: NURSE PRACTITIONER

## 2024-11-20 PROCEDURE — 80053 COMPREHEN METABOLIC PANEL: CPT | Performed by: NURSE PRACTITIONER

## 2024-11-20 PROCEDURE — 85025 COMPLETE CBC W/AUTO DIFF WBC: CPT | Performed by: NURSE PRACTITIONER

## 2024-11-20 NOTE — TELEPHONE ENCOUNTER
Called patient with above information, patient did not answer, LVM.     Called patient back, patient started Inlyta 11/20/2024.  Raven LEE RN.

## 2024-11-20 NOTE — TELEPHONE ENCOUNTER
----- Message from MAY Ba sent at 11/20/2024  4:28 PM CST -----  Please notify the patient that their labs look stable. Urine protein is 1+. She can restart her Inlyta this week.

## 2024-11-21 ENCOUNTER — INFUSION (OUTPATIENT)
Dept: INFUSION THERAPY | Facility: HOSPITAL | Age: 84
End: 2024-11-21
Attending: INTERNAL MEDICINE
Payer: MEDICARE

## 2024-11-21 VITALS
HEART RATE: 80 BPM | WEIGHT: 139.81 LBS | RESPIRATION RATE: 15 BRPM | HEIGHT: 60 IN | DIASTOLIC BLOOD PRESSURE: 75 MMHG | OXYGEN SATURATION: 99 % | SYSTOLIC BLOOD PRESSURE: 170 MMHG | BODY MASS INDEX: 27.45 KG/M2 | TEMPERATURE: 98 F

## 2024-11-21 DIAGNOSIS — C64.9 METASTATIC RENAL CELL CARCINOMA TO LUNG, RIGHT: Primary | ICD-10-CM

## 2024-11-21 DIAGNOSIS — C78.01 METASTATIC RENAL CELL CARCINOMA TO LUNG, RIGHT: Primary | ICD-10-CM

## 2024-11-21 PROCEDURE — A4216 STERILE WATER/SALINE, 10 ML: HCPCS | Performed by: INTERNAL MEDICINE

## 2024-11-21 PROCEDURE — 96413 CHEMO IV INFUSION 1 HR: CPT

## 2024-11-21 PROCEDURE — 25000003 PHARM REV CODE 250: Performed by: INTERNAL MEDICINE

## 2024-11-21 PROCEDURE — 63600175 PHARM REV CODE 636 W HCPCS: Performed by: INTERNAL MEDICINE

## 2024-11-21 RX ORDER — EPINEPHRINE 0.3 MG/.3ML
0.3 INJECTION SUBCUTANEOUS ONCE AS NEEDED
Status: DISCONTINUED | OUTPATIENT
Start: 2024-11-21 | End: 2024-11-21 | Stop reason: HOSPADM

## 2024-11-21 RX ORDER — DIPHENHYDRAMINE HYDROCHLORIDE 50 MG/ML
50 INJECTION INTRAMUSCULAR; INTRAVENOUS ONCE AS NEEDED
Status: DISCONTINUED | OUTPATIENT
Start: 2024-11-21 | End: 2024-11-21 | Stop reason: HOSPADM

## 2024-11-21 RX ORDER — HEPARIN 100 UNIT/ML
500 SYRINGE INTRAVENOUS
Status: DISCONTINUED | OUTPATIENT
Start: 2024-11-21 | End: 2024-11-21 | Stop reason: HOSPADM

## 2024-11-21 RX ORDER — SODIUM CHLORIDE 0.9 % (FLUSH) 0.9 %
10 SYRINGE (ML) INJECTION
Status: DISCONTINUED | OUTPATIENT
Start: 2024-11-21 | End: 2024-11-21 | Stop reason: HOSPADM

## 2024-11-21 RX ADMIN — HEPARIN 500 UNITS: 100 SYRINGE at 11:11

## 2024-11-21 RX ADMIN — SODIUM CHLORIDE 200 MG: 9 INJECTION, SOLUTION INTRAVENOUS at 11:11

## 2024-11-21 RX ADMIN — SODIUM CHLORIDE, PRESERVATIVE FREE 10 ML: 5 INJECTION INTRAVENOUS at 11:11

## 2024-11-21 NOTE — PLAN OF CARE
Problem: Fall Injury Risk  Goal: Absence of Fall and Fall-Related Injury  Outcome: Progressing  Intervention: Identify and Manage Contributors  Flowsheets (Taken 11/21/2024 1055)  Self-Care Promotion:   independence encouraged   BADL personal objects within reach   adaptive equipment use encouraged  Medication Review/Management: medications reviewed  Intervention: Promote Injury-Free Environment  Flowsheets (Taken 11/21/2024 1055)  Safety Promotion/Fall Prevention: medications reviewed

## 2024-11-21 NOTE — NURSING
Per Tha, NP ok to treat today. Notified NP of pt new rash on vagina and pt seeing GYN who started her on steroid cream. Educated pt to notify NP or GYN if cream does not help rash. Pt stated understanding.

## 2024-11-25 ENCOUNTER — OFFICE VISIT (OUTPATIENT)
Facility: CLINIC | Age: 84
End: 2024-11-25
Payer: MEDICARE

## 2024-11-25 VITALS
WEIGHT: 138.81 LBS | HEART RATE: 89 BPM | DIASTOLIC BLOOD PRESSURE: 79 MMHG | TEMPERATURE: 98 F | RESPIRATION RATE: 17 BRPM | BODY MASS INDEX: 27.11 KG/M2 | SYSTOLIC BLOOD PRESSURE: 165 MMHG

## 2024-11-25 DIAGNOSIS — C64.9 METASTATIC RENAL CELL CARCINOMA TO LUNG, RIGHT: Primary | ICD-10-CM

## 2024-11-25 DIAGNOSIS — C78.01 METASTATIC RENAL CELL CARCINOMA TO LUNG, RIGHT: Primary | ICD-10-CM

## 2024-11-25 PROCEDURE — 99213 OFFICE O/P EST LOW 20 MIN: CPT | Mod: PBBFAC,PN | Performed by: INTERNAL MEDICINE

## 2024-11-25 PROCEDURE — 99999 PR PBB SHADOW E&M-EST. PATIENT-LVL III: CPT | Mod: PBBFAC,,, | Performed by: INTERNAL MEDICINE

## 2024-11-25 PROCEDURE — 99215 OFFICE O/P EST HI 40 MIN: CPT | Mod: S$PBB,,, | Performed by: INTERNAL MEDICINE

## 2024-11-25 PROCEDURE — G2211 COMPLEX E/M VISIT ADD ON: HCPCS | Mod: S$PBB,,, | Performed by: INTERNAL MEDICINE

## 2024-11-25 RX ORDER — DIPHENHYDRAMINE HYDROCHLORIDE 50 MG/ML
50 INJECTION INTRAMUSCULAR; INTRAVENOUS ONCE AS NEEDED
OUTPATIENT
Start: 2024-12-06

## 2024-11-25 RX ORDER — EPINEPHRINE 0.3 MG/.3ML
0.3 INJECTION SUBCUTANEOUS ONCE AS NEEDED
OUTPATIENT
Start: 2024-12-06

## 2024-11-25 RX ORDER — SODIUM CHLORIDE 0.9 % (FLUSH) 0.9 %
10 SYRINGE (ML) INJECTION
OUTPATIENT
Start: 2024-12-06

## 2024-11-25 RX ORDER — HEPARIN 100 UNIT/ML
500 SYRINGE INTRAVENOUS
OUTPATIENT
Start: 2024-12-06

## 2024-11-25 NOTE — ASSESSMENT & PLAN NOTE
Patient is doing ok now back on the pembro and the axitinib.  She has no side effects at this point and plan will be to continue both of these for now.  She has no HAs at this point.  Will continue to see her every 3 weeks and continue aggressive lab monitoring.  Discussed this today.

## 2024-11-25 NOTE — PROGRESS NOTES
PROGRESS NOTE    Subjective:       Patient ID: Karlie Hess is a 84 y.o. female.    History of Left RCC, treated [id number] further treatment     History of [facility] lung cancer-treated with lobectomy-7/2/2015-no chemo/xrt-Path c/w mucin producing adenocarcinoma. [id number]     History of left hilar mass, 9/2020 EBUS negative     PET 10/18/2023:  Nodular densities are as outlined below:  -21 x 17 mm right infrahilar nodule with SUV max 2.5 (image 119), previously measuring 17 x 16 mm (August 20, 2023)  -27 x 27 mm marginated nodule in the posterior inferior left hilum with SUV max 2.7 (image 109)  -13 x 7 mm nodular density along the right posterior pararenal space with SUV max 0.8 (image 179), seen on studies dating back to 1/12/2021.     11/9/2023-EBUS  1. LUNG, RIGHT LOWER LOBE MASS, FINE NEEDLE ASPIRATION   - NEGATIVE FOR MALIGNANT CELLS.     2. LUNG, RIGHT LOWER LOBE MASS, BRUSHING   - RARE ATYPICAL CELLS PRESENT     3. LUNG, RIGHT LOWER LOBE MASS, BIOPSY TOUCH PREP   - POSITIVE FOR CARCINOMA.   - SEE CONCURRENT SURGICAL BIOPSY ([id number])     4. LUNG, RIGHT LOWER LOBE, BRONCHOALVEOLAR LAVAGE   - RARE ATYPICAL CELLS PRESENT.     5. LUNG, LEFT LOWER LOBE MASS, FINE NEEDLE ASPIRATION   - RARE ATYPICAL CELLS PRESENT     6. LYMPH NODE, STATION 11RS, FINE NEEDLE ASPIRATION   - NEGATIVE FOR MALIGNANT CELLS.   - SCANT KATARZYNA MATERIAL PRESENT      Karnofsky performance status score <80   Time from original diagnosis to initiation of targeted therapy <1 year   Hemoglobin less than the lower limit of normal   Serum calcium greater than the upper limit of normal   Neutrophil count greater than the upper limit of normal   Platelet count greater than the upper limit of normal   Favorable risk: None of the above risk factors present.  Intermediate risk: 1 or 2 of the above risk factors present.  Poor risk: 3 or more risk factors present.     1/5/2024:  RIGHT  PARARENAL MASS, CT GUIDED CORE BIOPSY:   - CLEAR CELL RENAL CELL CARCINOMA     12/27/2023-1/5/2024  RLL SBRT    1/30/2024-2/7/2024  XRT to Rt. Renal bed lesion    5/3/2024-MRI Abd:--Progressive Disease  Right kidney mass  2nd mass in lower pole of right kidney  Pancreatic Tail mass    5/13/2024-Bx:  PANCREATIC TAIL MASS, BIOPSY:   - METASTATIC CLEAR CELL RENAL CELL CARCINOMA.     7/31/2024-Patient went to ED with severe HA, MRI Brain:  No metastatic disease    6/7/2024-Pembro/Axitinib:  Cycle 1: 6/7/2024  Cycle 2: 6/28/2024  Cycle 3: 7/19/2024  Cycle 4: 11/15/2024  Cycle 5: 12/6/2024-DUE    No apparent drug interactions with topirimate/Axitinib    Prednisone and nurtec    Held Axitinib 10/17/2024 due to proteinuria    Chief Complaint:  No chief complaint on file.  Metastatic Renal Cell Carcinoma follow up    History of Present Illness:   Karlie Hess is a 84 y.o. female who presents for follow up of above.      She resumed the Axitinib on 11/20/204 at 3mg bid.   Also back on pembro.      She is doing ok at this point with no side effects.  Feeling good.     BP regimen  Lebtolol 100mg bid  Lisinopril 20mg bid  Amlodipine 5mg qhs    Family and Social history reviewed and is unchanged from 11/21/2023             Current Outpatient Medications:     acetaminophen (TYLENOL) 500 MG tablet, Take 1,000 mg by mouth 2 (two) times daily as needed., Disp: , Rfl:     amLODIPine (NORVASC) 5 MG tablet, TAKE 1 TABLET BY MOUTH EVERY  EVENING, Disp: 90 tablet, Rfl: 3    aspirin (ECOTRIN) 81 MG EC tablet, Take 1 tablet (81 mg total) by mouth once daily., Disp: , Rfl:     axitinib (INLYTA) 1 mg Tab, Take 3 tablets (3 mg total) by mouth 2 (two) times a day., Disp: 180 tablet, Rfl: 6    cloNIDine (CATAPRES) 0.1 MG tablet, Take 1 tablet (0.1 mg total) by mouth 3 (three) times daily as needed. For systolic BP over 180, Disp: 90 tablet, Rfl: 0    cyanocobalamin (VITAMIN B-12) 1000 MCG tablet, Take 100 mcg by mouth once daily., Disp: ,  Rfl:     duloxetine (CYMBALTA) 60 MG capsule, Take 60 mg by mouth once daily., Disp: , Rfl:     hydroCHLOROthiazide (HYDRODIURIL) 12.5 MG Tab, Take 1 tablet (12.5 mg total) by mouth daily as needed (Leg swelling)., Disp: 30 tablet, Rfl: 11    labetaloL (NORMODYNE) 200 MG tablet, TAKE 1 TABLET BY MOUTH TWICE  DAILY, Disp: 180 tablet, Rfl: 3    ondansetron (ZOFRAN) 8 MG tablet, Take 1 tablet (8 mg total) by mouth every 8 (eight) hours as needed for Nausea., Disp: 30 tablet, Rfl: 5    pantoprazole (PROTONIX) 40 MG tablet, Take 40 mg by mouth once daily., Disp: , Rfl:     promethazine (PHENERGAN) 25 MG tablet, Take 1 tablet (25 mg total) by mouth every 6 (six) hours as needed for Nausea., Disp: 30 tablet, Rfl: 5    rosuvastatin (CRESTOR) 10 MG tablet, Take 10 mg by mouth every evening., Disp: , Rfl:     topiramate (TOPAMAX) 25 MG tablet, Take 1 tablet (25 mg total) by mouth every evening. for 14 doses, Disp: 14 tablet, Rfl: 0  No current facility-administered medications for this visit.    Facility-Administered Medications Ordered in Other Visits:     LIDOcaine (PF) 10 mg/ml (1%) injection 10 mg, 1 mL, Intradermal, Once, Gabriel Loaiza MD        Objective:       Physical Examination:     BP (!) 165/79   Pulse 89   Temp 98.2 °F (36.8 °C)   Resp 17   Wt 63 kg (138 lb 12.8 oz)   BMI 27.11 kg/m²     Physical Exam  Constitutional:       Appearance: Normal appearance.   HENT:      Head: Normocephalic and atraumatic.   Eyes:      General: No scleral icterus.     Conjunctiva/sclera: Conjunctivae normal.   Cardiovascular:      Rate and Rhythm: Normal rate.   Pulmonary:      Effort: Pulmonary effort is normal.   Abdominal:      General: Abdomen is flat.   Neurological:      General: No focal deficit present.      Mental Status: She is alert and oriented to person, place, and time.   Psychiatric:         Mood and Affect: Mood normal.         Behavior: Behavior normal.         Labs:   Recent Results (from the past 2  weeks)   CBC W/ AUTO DIFFERENTIAL    Collection Time: 11/20/24  9:45 AM   Result Value Ref Range    WBC 6.36 3.90 - 12.70 K/uL    Hemoglobin 10.7 (L) 12.0 - 16.0 g/dL    Hematocrit 35.4 (L) 37.0 - 48.5 %    Platelets 294 150 - 450 K/uL       CMP  Sodium   Date Value Ref Range Status   11/20/2024 144 136 - 145 mmol/L Final     Potassium   Date Value Ref Range Status   11/20/2024 4.1 3.5 - 5.1 mmol/L Final     Chloride   Date Value Ref Range Status   11/20/2024 106 95 - 110 mmol/L Final     CO2   Date Value Ref Range Status   11/20/2024 32 (H) 23 - 29 mmol/L Final     Glucose   Date Value Ref Range Status   11/20/2024 92 70 - 110 mg/dL Final     BUN   Date Value Ref Range Status   11/20/2024 21 8 - 23 mg/dL Final     Creatinine   Date Value Ref Range Status   11/20/2024 1.2 0.5 - 1.4 mg/dL Final   12/31/2012 1.2 0.5 - 1.4 mg/dL Final     Calcium   Date Value Ref Range Status   11/20/2024 9.1 8.7 - 10.5 mg/dL Final   12/31/2012 9.7 8.7 - 10.5 mg/dL Final     Total Protein   Date Value Ref Range Status   11/20/2024 6.2 6.0 - 8.4 g/dL Final     Albumin   Date Value Ref Range Status   11/20/2024 3.9 3.5 - 5.2 g/dL Final     Total Bilirubin   Date Value Ref Range Status   11/20/2024 0.4 0.1 - 1.0 mg/dL Final     Comment:     For infants and newborns, interpretation of results should be based  on gestational age, weight and in agreement with clinical  observations.    Premature Infant recommended reference ranges:  Up to 24 hours.............<8.0 mg/dL  Up to 48 hours............<12.0 mg/dL  3-5 days..................<15.0 mg/dL  6-29 days.................<15.0 mg/dL       Alkaline Phosphatase   Date Value Ref Range Status   11/20/2024 48 (L) 55 - 135 U/L Final     AST   Date Value Ref Range Status   11/20/2024 11 10 - 40 U/L Final     ALT   Date Value Ref Range Status   11/20/2024 6 (L) 10 - 44 U/L Final     Anion Gap   Date Value Ref Range Status   11/20/2024 6 (L) 8 - 16 mmol/L Final   12/31/2012 14 5 - 15 meq/L Final  "    eGFR if    Date Value Ref Range Status   07/18/2022 >60.0 >60 mL/min/1.73 m^2 Final     eGFR if non    Date Value Ref Range Status   07/18/2022 52.6 (A) >60 mL/min/1.73 m^2 Final     Comment:     Calculation used to obtain the estimated glomerular filtration  rate (eGFR) is the CKD-EPI equation.        No results found for: "CEA"  No results found for: "PSA"        Assessment/Plan:     Problem List Items Addressed This Visit       Metastatic renal cell carcinoma to lung, right - Primary     Patient is doing ok now back on the pembro and the axitinib.  She has no side effects at this point and plan will be to continue both of these for now.  She has no HAs at this point.  Will continue to see her every 3 weeks and continue aggressive lab monitoring.  Discussed this today.                           Discussion:     Follow up in about 3 weeks (around 12/16/2024) for with NP and 6 with me. .      Electronically signed by Del Nathan      "

## 2024-12-02 ENCOUNTER — TELEPHONE (OUTPATIENT)
Facility: CLINIC | Age: 84
End: 2024-12-02
Payer: MEDICARE

## 2024-12-05 NOTE — PROGRESS NOTES
PROGRESS NOTE    Subjective:       Patient ID: Karlie Hess is a 84 y.o. female.    History of Left RCC, treated [id number] further treatment     History of JAX lung cancer-treated with lobectomy-7/2/2015-no chemo/xrt-Path c/w mucin producing adenocarcinoma. [id number]     History of left hilar mass, 9/2020 EBUS negative     PET 10/18/2023:  Nodular densities are as outlined below:  -21 x 17 mm right infrahilar nodule with SUV max 2.5 (image 119), previously measuring 17 x 16 mm (August 20, 2023)  -27 x 27 mm marginated nodule in the posterior inferior left hilum with SUV max 2.7 (image 109)  -13 x 7 mm nodular density along the right posterior pararenal space with SUV max 0.8 (image 179), seen on studies dating back to 1/12/2021.     11/9/2023-EBUS  1. LUNG, RIGHT LOWER LOBE MASS, FINE NEEDLE ASPIRATION   - NEGATIVE FOR MALIGNANT CELLS.     2. LUNG, RIGHT LOWER LOBE MASS, BRUSHING   - RARE ATYPICAL CELLS PRESENT     3. LUNG, RIGHT LOWER LOBE MASS, BIOPSY TOUCH PREP   - POSITIVE FOR CARCINOMA.   - SEE CONCURRENT SURGICAL BIOPSY ([id number])     4. LUNG, RIGHT LOWER LOBE, BRONCHOALVEOLAR LAVAGE   - RARE ATYPICAL CELLS PRESENT.     5. LUNG, LEFT LOWER LOBE MASS, FINE NEEDLE ASPIRATION   - RARE ATYPICAL CELLS PRESENT     6. LYMPH NODE, STATION 11RS, FINE NEEDLE ASPIRATION   - NEGATIVE FOR MALIGNANT CELLS.   - SCANT KATARZYNA MATERIAL PRESENT      Karnofsky performance status score <80   Time from original diagnosis to initiation of targeted therapy <1 year   Hemoglobin less than the lower limit of normal   Serum calcium greater than the upper limit of normal   Neutrophil count greater than the upper limit of normal   Platelet count greater than the upper limit of normal   Favorable risk: None of the above risk factors present.  Intermediate risk: 1 or 2 of the above risk factors present.  Poor risk: 3 or more risk factors present.     1/5/2024:  RIGHT  PARARENAL MASS, CT GUIDED CORE BIOPSY:   - CLEAR CELL RENAL CELL CARCINOMA     12/27/2023-1/5/2024  RLL SBRT    1/30/2024-2/7/2024  XRT to Rt. Renal bed lesion    5/3/2024-MRI Abd:--Progressive Disease  Right kidney mass  2nd mass in lower pole of right kidney  Pancreatic Tail mass    5/13/2024-Bx:  PANCREATIC TAIL MASS, BIOPSY:   - METASTATIC CLEAR CELL RENAL CELL CARCINOMA.     7/31/2024-Patient went to ED with severe HA, MRI Brain:  No metastatic disease    6/7/2024-Pembro/Axitinib:  Cycle 1: 6/7/2024  Cycle 2: 6/28/2024  Cycle 3: 7/19/2024  Cycle 4: 11/15/2024  Cycle 5: 12/12/2024- Due  Cycle 6: 1/2/2024- Due  No apparent drug interactions with topirimate/Axitinib    Prednisone and nurtec    Held Axitinib 10/17/2024 due to proteinuria    Chief Complaint:  Metastatic Renal Cell Carcinoma follow up    History of Present Illness:   Karlie Hess is a 84 y.o. female who presents for follow up of above.      She resumed the Axitinib on 11/20/204 at 3mg bid and is tolerating it well. BP today is high but she thought she had to fast for her labs and didn't take her BP meds. Took Clonidine here in the office from home meds.  Also back on pembro and denies any headaches.    She is doing ok at this point with no side effects.  Feeling good.     BP regimen  Lebtolol 100mg bid  Lisinopril 20mg bid  Amlodipine 5mg qhs    Family and Social history reviewed and is unchanged from 11/21/2023         Current Outpatient Medications:     acetaminophen (TYLENOL) 500 MG tablet, Take 1,000 mg by mouth 2 (two) times daily as needed., Disp: , Rfl:     amLODIPine (NORVASC) 5 MG tablet, TAKE 1 TABLET BY MOUTH EVERY  EVENING, Disp: 90 tablet, Rfl: 3    aspirin (ECOTRIN) 81 MG EC tablet, Take 1 tablet (81 mg total) by mouth once daily., Disp: , Rfl:     axitinib (INLYTA) 1 mg Tab, Take 3 tablets (3 mg total) by mouth 2 (two) times a day., Disp: 180 tablet, Rfl: 6    cloNIDine (CATAPRES) 0.1 MG tablet, Take 1 tablet (0.1 mg total) by  mouth 3 (three) times daily as needed. For systolic BP over 180, Disp: 90 tablet, Rfl: 0    cyanocobalamin (VITAMIN B-12) 1000 MCG tablet, Take 100 mcg by mouth once daily., Disp: , Rfl:     duloxetine (CYMBALTA) 60 MG capsule, Take 60 mg by mouth once daily., Disp: , Rfl:     hydroCHLOROthiazide (HYDRODIURIL) 12.5 MG Tab, Take 1 tablet (12.5 mg total) by mouth daily as needed (Leg swelling)., Disp: 30 tablet, Rfl: 11    labetaloL (NORMODYNE) 200 MG tablet, TAKE 1 TABLET BY MOUTH TWICE  DAILY, Disp: 180 tablet, Rfl: 3    ondansetron (ZOFRAN) 8 MG tablet, Take 1 tablet (8 mg total) by mouth every 8 (eight) hours as needed for Nausea., Disp: 30 tablet, Rfl: 5    pantoprazole (PROTONIX) 40 MG tablet, Take 40 mg by mouth once daily., Disp: , Rfl:     promethazine (PHENERGAN) 25 MG tablet, Take 1 tablet (25 mg total) by mouth every 6 (six) hours as needed for Nausea., Disp: 30 tablet, Rfl: 5    rosuvastatin (CRESTOR) 10 MG tablet, Take 10 mg by mouth every evening., Disp: , Rfl:     topiramate (TOPAMAX) 25 MG tablet, Take 1 tablet (25 mg total) by mouth every evening. for 14 doses, Disp: 14 tablet, Rfl: 0  No current facility-administered medications for this visit.    Facility-Administered Medications Ordered in Other Visits:     LIDOcaine (PF) 10 mg/ml (1%) injection 10 mg, 1 mL, Intradermal, Once, Gabriel Loaiza MD    Review of Systems   Constitutional:  Negative for malaise/fatigue.   Respiratory:  Positive for cough. Negative for shortness of breath.    Cardiovascular:  Negative for chest pain.   Gastrointestinal:  Negative for abdominal pain and diarrhea.   Genitourinary:  Positive for frequency.   Musculoskeletal:  Negative for back pain.   Skin:  Negative for rash.   Neurological:  Negative for headaches.   Psychiatric/Behavioral:  The patient is not nervous/anxious.        Objective:       Physical Examination:     BP (!) 204/91   Pulse 99   Temp 97.5 °F (36.4 °C)   Resp 17   Wt 61.7 kg (136 lb)   BMI  26.56 kg/m²     Physical Exam  Constitutional:       Appearance: Normal appearance.   HENT:      Head: Normocephalic and atraumatic.      Right Ear: External ear normal.      Left Ear: External ear normal.      Nose: Nose normal.      Mouth/Throat:      Mouth: Mucous membranes are moist.   Eyes:      General: No scleral icterus.     Conjunctiva/sclera: Conjunctivae normal.   Cardiovascular:      Rate and Rhythm: Normal rate.   Pulmonary:      Effort: Pulmonary effort is normal.   Abdominal:      General: Abdomen is flat.   Skin:     General: Skin is warm and dry.   Neurological:      General: No focal deficit present.      Mental Status: She is alert and oriented to person, place, and time.   Psychiatric:         Mood and Affect: Mood normal.         Behavior: Behavior normal.         Labs:   Recent Results (from the past 2 weeks)   CBC W/ AUTO DIFFERENTIAL    Collection Time: 12/09/24 11:06 AM   Result Value Ref Range    WBC 7.48 3.90 - 12.70 K/uL    Hemoglobin 11.8 (L) 12.0 - 16.0 g/dL    Hematocrit 36.7 (L) 37.0 - 48.5 %    Platelets 223 150 - 450 K/uL         CMP  Sodium   Date Value Ref Range Status   11/20/2024 144 136 - 145 mmol/L Final     Potassium   Date Value Ref Range Status   11/20/2024 4.1 3.5 - 5.1 mmol/L Final     Chloride   Date Value Ref Range Status   11/20/2024 106 95 - 110 mmol/L Final     CO2   Date Value Ref Range Status   11/20/2024 32 (H) 23 - 29 mmol/L Final     Glucose   Date Value Ref Range Status   11/20/2024 92 70 - 110 mg/dL Final     BUN   Date Value Ref Range Status   11/20/2024 21 8 - 23 mg/dL Final     Creatinine   Date Value Ref Range Status   11/20/2024 1.2 0.5 - 1.4 mg/dL Final   12/31/2012 1.2 0.5 - 1.4 mg/dL Final     Calcium   Date Value Ref Range Status   11/20/2024 9.1 8.7 - 10.5 mg/dL Final   12/31/2012 9.7 8.7 - 10.5 mg/dL Final     Total Protein   Date Value Ref Range Status   11/20/2024 6.2 6.0 - 8.4 g/dL Final     Albumin   Date Value Ref Range Status   11/20/2024 3.9  "3.5 - 5.2 g/dL Final     Total Bilirubin   Date Value Ref Range Status   11/20/2024 0.4 0.1 - 1.0 mg/dL Final     Comment:     For infants and newborns, interpretation of results should be based  on gestational age, weight and in agreement with clinical  observations.    Premature Infant recommended reference ranges:  Up to 24 hours.............<8.0 mg/dL  Up to 48 hours............<12.0 mg/dL  3-5 days..................<15.0 mg/dL  6-29 days.................<15.0 mg/dL       Alkaline Phosphatase   Date Value Ref Range Status   11/20/2024 48 (L) 55 - 135 U/L Final     AST   Date Value Ref Range Status   11/20/2024 11 10 - 40 U/L Final     ALT   Date Value Ref Range Status   11/20/2024 6 (L) 10 - 44 U/L Final     Anion Gap   Date Value Ref Range Status   11/20/2024 6 (L) 8 - 16 mmol/L Final   12/31/2012 14 5 - 15 meq/L Final     eGFR if    Date Value Ref Range Status   07/18/2022 >60.0 >60 mL/min/1.73 m^2 Final     eGFR if non    Date Value Ref Range Status   07/18/2022 52.6 (A) >60 mL/min/1.73 m^2 Final     Comment:     Calculation used to obtain the estimated glomerular filtration  rate (eGFR) is the CKD-EPI equation.        No results found for: "CEA"      Assessment/Plan:     Problem List Items Addressed This Visit          Cardiac/Vascular    Primary hypertension     Patient repeated BP much better. Patient reminded to take BP meds daily and she does not have to hold for our labs.            Renal/    Persistent proteinuria     Repeat UA today         Relevant Orders    CBC W/ AUTO DIFFERENTIAL (Completed)    CMP    Urinalysis       Oncology    Metastatic renal cell carcinoma to lung, right - Primary    Relevant Orders    CBC W/ AUTO DIFFERENTIAL (Completed)    CMP    Urinalysis         Discussion:     Follow up in about 3 weeks (around 12/30/2024) for with me and 6 weeks with Dr. Wasserman.      Electronically signed by Chiquita Almazan, MSN, APRN, AGNP-C, OCN    "

## 2024-12-09 ENCOUNTER — OFFICE VISIT (OUTPATIENT)
Facility: CLINIC | Age: 84
End: 2024-12-09
Payer: MEDICARE

## 2024-12-09 ENCOUNTER — LAB VISIT (OUTPATIENT)
Dept: LAB | Facility: HOSPITAL | Age: 84
End: 2024-12-09
Attending: NURSE PRACTITIONER
Payer: MEDICARE

## 2024-12-09 VITALS
SYSTOLIC BLOOD PRESSURE: 204 MMHG | RESPIRATION RATE: 17 BRPM | TEMPERATURE: 98 F | DIASTOLIC BLOOD PRESSURE: 91 MMHG | WEIGHT: 136 LBS | BODY MASS INDEX: 26.56 KG/M2 | HEART RATE: 99 BPM

## 2024-12-09 DIAGNOSIS — C78.01 METASTATIC RENAL CELL CARCINOMA TO LUNG, RIGHT: ICD-10-CM

## 2024-12-09 DIAGNOSIS — C78.01 METASTATIC RENAL CELL CARCINOMA TO LUNG, RIGHT: Primary | ICD-10-CM

## 2024-12-09 DIAGNOSIS — R80.1 PERSISTENT PROTEINURIA: ICD-10-CM

## 2024-12-09 DIAGNOSIS — C64.9 METASTATIC RENAL CELL CARCINOMA TO LUNG, RIGHT: ICD-10-CM

## 2024-12-09 DIAGNOSIS — I10 PRIMARY HYPERTENSION: ICD-10-CM

## 2024-12-09 DIAGNOSIS — C64.9 METASTATIC RENAL CELL CARCINOMA TO LUNG, RIGHT: Primary | ICD-10-CM

## 2024-12-09 LAB
ALBUMIN SERPL BCP-MCNC: 4 G/DL (ref 3.5–5.2)
ALP SERPL-CCNC: 60 U/L (ref 55–135)
ALT SERPL W/O P-5'-P-CCNC: 10 U/L (ref 10–44)
ANION GAP SERPL CALC-SCNC: 5 MMOL/L (ref 8–16)
AST SERPL-CCNC: 14 U/L (ref 10–40)
BASOPHILS # BLD AUTO: 0.02 K/UL (ref 0–0.2)
BASOPHILS NFR BLD: 0.3 % (ref 0–1.9)
BILIRUB SERPL-MCNC: 0.4 MG/DL (ref 0.1–1)
BUN SERPL-MCNC: 18 MG/DL (ref 8–23)
CALCIUM SERPL-MCNC: 8.9 MG/DL (ref 8.7–10.5)
CHLORIDE SERPL-SCNC: 105 MMOL/L (ref 95–110)
CO2 SERPL-SCNC: 31 MMOL/L (ref 23–29)
CREAT SERPL-MCNC: 1.2 MG/DL (ref 0.5–1.4)
DIFFERENTIAL METHOD BLD: ABNORMAL
EOSINOPHIL # BLD AUTO: 0.3 K/UL (ref 0–0.5)
EOSINOPHIL NFR BLD: 3.6 % (ref 0–8)
ERYTHROCYTE [DISTWIDTH] IN BLOOD BY AUTOMATED COUNT: 13.2 % (ref 11.5–14.5)
EST. GFR  (NO RACE VARIABLE): 44.4 ML/MIN/1.73 M^2
GLUCOSE SERPL-MCNC: 107 MG/DL (ref 70–110)
HCT VFR BLD AUTO: 36.7 % (ref 37–48.5)
HGB BLD-MCNC: 11.8 G/DL (ref 12–16)
IMM GRANULOCYTES # BLD AUTO: 0.02 K/UL (ref 0–0.04)
IMM GRANULOCYTES NFR BLD AUTO: 0.3 % (ref 0–0.5)
LYMPHOCYTES # BLD AUTO: 1.4 K/UL (ref 1–4.8)
LYMPHOCYTES NFR BLD: 18.4 % (ref 18–48)
MCH RBC QN AUTO: 28 PG (ref 27–31)
MCHC RBC AUTO-ENTMCNC: 32.2 G/DL (ref 32–36)
MCV RBC AUTO: 87 FL (ref 82–98)
MONOCYTES # BLD AUTO: 0.6 K/UL (ref 0.3–1)
MONOCYTES NFR BLD: 8.3 % (ref 4–15)
NEUTROPHILS # BLD AUTO: 5.2 K/UL (ref 1.8–7.7)
NEUTROPHILS NFR BLD: 69.1 % (ref 38–73)
NRBC BLD-RTO: 0 /100 WBC
PLATELET # BLD AUTO: 223 K/UL (ref 150–450)
PMV BLD AUTO: 8.9 FL (ref 9.2–12.9)
POTASSIUM SERPL-SCNC: 4.3 MMOL/L (ref 3.5–5.1)
PROT SERPL-MCNC: 6.7 G/DL (ref 6–8.4)
RBC # BLD AUTO: 4.21 M/UL (ref 4–5.4)
SODIUM SERPL-SCNC: 141 MMOL/L (ref 136–145)
WBC # BLD AUTO: 7.48 K/UL (ref 3.9–12.7)

## 2024-12-09 PROCEDURE — 85025 COMPLETE CBC W/AUTO DIFF WBC: CPT | Performed by: NURSE PRACTITIONER

## 2024-12-09 PROCEDURE — 36415 COLL VENOUS BLD VENIPUNCTURE: CPT | Performed by: NURSE PRACTITIONER

## 2024-12-09 PROCEDURE — 99215 OFFICE O/P EST HI 40 MIN: CPT | Mod: S$PBB,,, | Performed by: NURSE PRACTITIONER

## 2024-12-09 PROCEDURE — 99213 OFFICE O/P EST LOW 20 MIN: CPT | Mod: PBBFAC,PN | Performed by: NURSE PRACTITIONER

## 2024-12-09 PROCEDURE — 80053 COMPREHEN METABOLIC PANEL: CPT | Performed by: NURSE PRACTITIONER

## 2024-12-09 PROCEDURE — G2211 COMPLEX E/M VISIT ADD ON: HCPCS | Mod: S$PBB,,, | Performed by: NURSE PRACTITIONER

## 2024-12-09 PROCEDURE — 99999 PR PBB SHADOW E&M-EST. PATIENT-LVL III: CPT | Mod: PBBFAC,,, | Performed by: NURSE PRACTITIONER

## 2024-12-09 NOTE — ASSESSMENT & PLAN NOTE
Patient repeated BP much better. Patient reminded to take BP meds daily and she does not have to hold for our labs.

## 2024-12-12 ENCOUNTER — INFUSION (OUTPATIENT)
Dept: INFUSION THERAPY | Facility: HOSPITAL | Age: 84
End: 2024-12-12
Attending: INTERNAL MEDICINE
Payer: MEDICARE

## 2024-12-12 VITALS
RESPIRATION RATE: 18 BRPM | BODY MASS INDEX: 22.82 KG/M2 | HEIGHT: 66 IN | WEIGHT: 142 LBS | OXYGEN SATURATION: 95 % | DIASTOLIC BLOOD PRESSURE: 83 MMHG | HEART RATE: 87 BPM | SYSTOLIC BLOOD PRESSURE: 152 MMHG | TEMPERATURE: 98 F

## 2024-12-12 DIAGNOSIS — C78.01 METASTATIC RENAL CELL CARCINOMA TO LUNG, RIGHT: Primary | ICD-10-CM

## 2024-12-12 DIAGNOSIS — C64.9 METASTATIC RENAL CELL CARCINOMA TO LUNG, RIGHT: Primary | ICD-10-CM

## 2024-12-12 PROCEDURE — 63600175 PHARM REV CODE 636 W HCPCS: Performed by: INTERNAL MEDICINE

## 2024-12-12 PROCEDURE — 25000003 PHARM REV CODE 250: Performed by: INTERNAL MEDICINE

## 2024-12-12 PROCEDURE — A4216 STERILE WATER/SALINE, 10 ML: HCPCS | Performed by: INTERNAL MEDICINE

## 2024-12-12 PROCEDURE — 96413 CHEMO IV INFUSION 1 HR: CPT

## 2024-12-12 RX ORDER — DIPHENHYDRAMINE HYDROCHLORIDE 50 MG/ML
50 INJECTION INTRAMUSCULAR; INTRAVENOUS ONCE AS NEEDED
Status: DISCONTINUED | OUTPATIENT
Start: 2024-12-12 | End: 2024-12-12 | Stop reason: HOSPADM

## 2024-12-12 RX ORDER — EPINEPHRINE 0.3 MG/.3ML
0.3 INJECTION SUBCUTANEOUS ONCE AS NEEDED
Status: DISCONTINUED | OUTPATIENT
Start: 2024-12-12 | End: 2024-12-12 | Stop reason: HOSPADM

## 2024-12-12 RX ORDER — SODIUM CHLORIDE 0.9 % (FLUSH) 0.9 %
10 SYRINGE (ML) INJECTION
Status: DISCONTINUED | OUTPATIENT
Start: 2024-12-12 | End: 2024-12-12 | Stop reason: HOSPADM

## 2024-12-12 RX ORDER — HEPARIN 100 UNIT/ML
500 SYRINGE INTRAVENOUS
Status: DISCONTINUED | OUTPATIENT
Start: 2024-12-12 | End: 2024-12-12 | Stop reason: HOSPADM

## 2024-12-12 RX ADMIN — Medication 10 ML: at 11:12

## 2024-12-12 RX ADMIN — SODIUM CHLORIDE 200 MG: 9 INJECTION, SOLUTION INTRAVENOUS at 11:12

## 2024-12-12 RX ADMIN — HEPARIN 500 UNITS: 100 SYRINGE at 11:12

## 2024-12-12 NOTE — PLAN OF CARE
Problem: Fall Injury Risk  Goal: Absence of Fall and Fall-Related Injury  Outcome: Progressing  Intervention: Identify and Manage Contributors  Flowsheets (Taken 12/12/2024 1059)  Self-Care Promotion: independence encouraged  Medication Review/Management: medications reviewed  Intervention: Promote Injury-Free Environment  Flowsheets (Taken 12/12/2024 1059)  Safety Promotion/Fall Prevention: medications reviewed

## 2024-12-24 RX ORDER — DIPHENHYDRAMINE HYDROCHLORIDE 50 MG/ML
50 INJECTION INTRAMUSCULAR; INTRAVENOUS ONCE AS NEEDED
OUTPATIENT
Start: 2024-12-27

## 2024-12-24 RX ORDER — EPINEPHRINE 0.3 MG/.3ML
0.3 INJECTION SUBCUTANEOUS ONCE AS NEEDED
OUTPATIENT
Start: 2024-12-27

## 2024-12-24 RX ORDER — SODIUM CHLORIDE 0.9 % (FLUSH) 0.9 %
10 SYRINGE (ML) INJECTION
OUTPATIENT
Start: 2024-12-27

## 2024-12-24 RX ORDER — HEPARIN 100 UNIT/ML
500 SYRINGE INTRAVENOUS
OUTPATIENT
Start: 2024-12-27

## 2024-12-27 NOTE — TELEPHONE ENCOUNTER
----- Message from Sherry sent at 12/27/2024  4:35 PM CST -----  Contact: pt  Type:  RX Refill Request    PT IS OUT of med    Who Called:  pt    Refill or New Rx: RENEWAL    RX Name and Strength:losartan (COZAAR) 50 MG tablet    How is the patient currently taking it? (ex. 1XDay): as directed    Is this a 30 day or 90 day RX: 30    Preferred Pharmacy with phone number:     [id number] [address] & [address] [address]  Phone: [phone] Fax: [phone]    Local or Mail Order: local    Ordering Provider: Candi    Would the patient rather a call back or a response via [facility]? Call if questions     Best Call Back Number: [phone]    Additional Information:  please RENEW script    Pt is OUT of meds (has enough for the weekend)    Thanks

## 2024-12-31 RX ORDER — LOSARTAN POTASSIUM 50 MG/1
50 TABLET ORAL 2 TIMES DAILY
Qty: 180 TABLET | Refills: 3 | Status: SHIPPED | OUTPATIENT
Start: 2024-12-31

## 2025-01-02 ENCOUNTER — LAB VISIT (OUTPATIENT)
Dept: LAB | Facility: HOSPITAL | Age: 85
End: 2025-01-02
Attending: NURSE PRACTITIONER
Payer: MEDICARE

## 2025-01-02 DIAGNOSIS — C64.9 METASTATIC RENAL CELL CARCINOMA TO LUNG, RIGHT: ICD-10-CM

## 2025-01-02 DIAGNOSIS — R53.83 FATIGUE, UNSPECIFIED TYPE: ICD-10-CM

## 2025-01-02 DIAGNOSIS — R80.1 PERSISTENT PROTEINURIA: ICD-10-CM

## 2025-01-02 DIAGNOSIS — C78.01 METASTATIC RENAL CELL CARCINOMA TO LUNG, RIGHT: ICD-10-CM

## 2025-01-02 LAB
ALBUMIN SERPL BCP-MCNC: 4 G/DL (ref 3.5–5.2)
ALP SERPL-CCNC: 61 U/L (ref 55–135)
ALT SERPL W/O P-5'-P-CCNC: 9 U/L (ref 10–44)
ANION GAP SERPL CALC-SCNC: 7 MMOL/L (ref 8–16)
AST SERPL-CCNC: 14 U/L (ref 10–40)
BACTERIA #/AREA URNS HPF: NORMAL /HPF
BASOPHILS # BLD AUTO: 0.03 K/UL (ref 0–0.2)
BASOPHILS NFR BLD: 0.5 % (ref 0–1.9)
BILIRUB SERPL-MCNC: 0.4 MG/DL (ref 0.1–1)
BILIRUB UR QL STRIP: NEGATIVE
BUN SERPL-MCNC: 19 MG/DL (ref 8–23)
CALCIUM SERPL-MCNC: 8.8 MG/DL (ref 8.7–10.5)
CHLORIDE SERPL-SCNC: 106 MMOL/L (ref 95–110)
CLARITY UR: CLEAR
CO2 SERPL-SCNC: 29 MMOL/L (ref 23–29)
COLOR UR: YELLOW
CREAT SERPL-MCNC: 1.3 MG/DL (ref 0.5–1.4)
DIFFERENTIAL METHOD BLD: ABNORMAL
EOSINOPHIL # BLD AUTO: 0.4 K/UL (ref 0–0.5)
EOSINOPHIL NFR BLD: 5.5 % (ref 0–8)
ERYTHROCYTE [DISTWIDTH] IN BLOOD BY AUTOMATED COUNT: 13.8 % (ref 11.5–14.5)
EST. GFR  (NO RACE VARIABLE): 40.3 ML/MIN/1.73 M^2
GLUCOSE SERPL-MCNC: 102 MG/DL (ref 70–110)
GLUCOSE UR QL STRIP: NEGATIVE
HCT VFR BLD AUTO: 36.2 % (ref 37–48.5)
HGB BLD-MCNC: 11.5 G/DL (ref 12–16)
HGB UR QL STRIP: NEGATIVE
HYALINE CASTS #/AREA URNS LPF: 0 /LPF
IMM GRANULOCYTES # BLD AUTO: 0.02 K/UL (ref 0–0.04)
IMM GRANULOCYTES NFR BLD AUTO: 0.3 % (ref 0–0.5)
KETONES UR QL STRIP: NEGATIVE
LEUKOCYTE ESTERASE UR QL STRIP: ABNORMAL
LYMPHOCYTES # BLD AUTO: 1.7 K/UL (ref 1–4.8)
LYMPHOCYTES NFR BLD: 27.4 % (ref 18–48)
MCH RBC QN AUTO: 28 PG (ref 27–31)
MCHC RBC AUTO-ENTMCNC: 31.8 G/DL (ref 32–36)
MCV RBC AUTO: 88 FL (ref 82–98)
MICROSCOPIC COMMENT: NORMAL
MONOCYTES # BLD AUTO: 0.5 K/UL (ref 0.3–1)
MONOCYTES NFR BLD: 8.1 % (ref 4–15)
NEUTROPHILS # BLD AUTO: 3.7 K/UL (ref 1.8–7.7)
NEUTROPHILS NFR BLD: 58.2 % (ref 38–73)
NITRITE UR QL STRIP: NEGATIVE
NRBC BLD-RTO: 0 /100 WBC
PH UR STRIP: 6 [PH] (ref 5–8)
PLATELET # BLD AUTO: 225 K/UL (ref 150–450)
PMV BLD AUTO: 9.1 FL (ref 9.2–12.9)
POTASSIUM SERPL-SCNC: 3.7 MMOL/L (ref 3.5–5.1)
PROT SERPL-MCNC: 6.2 G/DL (ref 6–8.4)
PROT UR QL STRIP: ABNORMAL
RBC # BLD AUTO: 4.11 M/UL (ref 4–5.4)
RBC #/AREA URNS HPF: 0 /HPF (ref 0–4)
SODIUM SERPL-SCNC: 142 MMOL/L (ref 136–145)
SP GR UR STRIP: 1.01 (ref 1–1.03)
TSH SERPL DL<=0.005 MIU/L-ACNC: 4.84 UIU/ML (ref 0.34–5.6)
URN SPEC COLLECT METH UR: ABNORMAL
UROBILINOGEN UR STRIP-ACNC: NEGATIVE EU/DL
WBC # BLD AUTO: 6.32 K/UL (ref 3.9–12.7)
WBC #/AREA URNS HPF: 1 /HPF (ref 0–5)
YEAST URNS QL MICRO: NORMAL

## 2025-01-02 PROCEDURE — 81001 URINALYSIS AUTO W/SCOPE: CPT | Performed by: NURSE PRACTITIONER

## 2025-01-02 PROCEDURE — 80053 COMPREHEN METABOLIC PANEL: CPT | Performed by: NURSE PRACTITIONER

## 2025-01-02 PROCEDURE — 81003 URINALYSIS AUTO W/O SCOPE: CPT | Performed by: NURSE PRACTITIONER

## 2025-01-02 PROCEDURE — 84443 ASSAY THYROID STIM HORMONE: CPT | Performed by: NURSE PRACTITIONER

## 2025-01-02 PROCEDURE — 85025 COMPLETE CBC W/AUTO DIFF WBC: CPT | Performed by: NURSE PRACTITIONER

## 2025-01-02 PROCEDURE — 36415 COLL VENOUS BLD VENIPUNCTURE: CPT | Performed by: NURSE PRACTITIONER

## 2025-01-03 ENCOUNTER — INFUSION (OUTPATIENT)
Dept: INFUSION THERAPY | Facility: HOSPITAL | Age: 85
End: 2025-01-03
Attending: INTERNAL MEDICINE
Payer: MEDICARE

## 2025-01-03 VITALS
HEART RATE: 83 BPM | TEMPERATURE: 98 F | WEIGHT: 142.38 LBS | OXYGEN SATURATION: 99 % | HEIGHT: 66 IN | RESPIRATION RATE: 16 BRPM | SYSTOLIC BLOOD PRESSURE: 134 MMHG | DIASTOLIC BLOOD PRESSURE: 85 MMHG | BODY MASS INDEX: 22.88 KG/M2

## 2025-01-03 DIAGNOSIS — C64.9 METASTATIC RENAL CELL CARCINOMA TO LUNG, RIGHT: Primary | ICD-10-CM

## 2025-01-03 DIAGNOSIS — C78.01 METASTATIC RENAL CELL CARCINOMA TO LUNG, RIGHT: Primary | ICD-10-CM

## 2025-01-03 PROCEDURE — A4216 STERILE WATER/SALINE, 10 ML: HCPCS | Performed by: INTERNAL MEDICINE

## 2025-01-03 PROCEDURE — 25000003 PHARM REV CODE 250: Performed by: INTERNAL MEDICINE

## 2025-01-03 PROCEDURE — 63600175 PHARM REV CODE 636 W HCPCS: Performed by: INTERNAL MEDICINE

## 2025-01-03 PROCEDURE — 96413 CHEMO IV INFUSION 1 HR: CPT

## 2025-01-03 RX ORDER — DIPHENHYDRAMINE HYDROCHLORIDE 50 MG/ML
50 INJECTION INTRAMUSCULAR; INTRAVENOUS ONCE AS NEEDED
Status: DISCONTINUED | OUTPATIENT
Start: 2025-01-03 | End: 2025-01-03 | Stop reason: HOSPADM

## 2025-01-03 RX ORDER — SODIUM CHLORIDE 0.9 % (FLUSH) 0.9 %
10 SYRINGE (ML) INJECTION
Status: DISCONTINUED | OUTPATIENT
Start: 2025-01-03 | End: 2025-01-03 | Stop reason: HOSPADM

## 2025-01-03 RX ORDER — EPINEPHRINE 0.3 MG/.3ML
0.3 INJECTION SUBCUTANEOUS ONCE AS NEEDED
Status: DISCONTINUED | OUTPATIENT
Start: 2025-01-03 | End: 2025-01-03 | Stop reason: HOSPADM

## 2025-01-03 RX ORDER — HEPARIN 100 UNIT/ML
500 SYRINGE INTRAVENOUS
Status: DISCONTINUED | OUTPATIENT
Start: 2025-01-03 | End: 2025-01-03 | Stop reason: HOSPADM

## 2025-01-03 RX ADMIN — SODIUM CHLORIDE 200 MG: 9 INJECTION, SOLUTION INTRAVENOUS at 10:01

## 2025-01-03 RX ADMIN — SODIUM CHLORIDE, PRESERVATIVE FREE 10 ML: 5 INJECTION INTRAVENOUS at 10:01

## 2025-01-03 RX ADMIN — HEPARIN 500 UNITS: 100 SYRINGE at 10:01

## 2025-01-03 NOTE — PLAN OF CARE
Problem: Fall Injury Risk  Goal: Absence of Fall and Fall-Related Injury  Outcome: Progressing  Intervention: Promote Injury-Free Environment  Flowsheets (Taken 1/3/2025 1031)  Safety Promotion/Fall Prevention:   Fall Risk reviewed with patient/family   patient expresses understanding of fall risk and prevention   in recliner, wheels locked   supervised activity

## 2025-01-08 ENCOUNTER — TELEPHONE (OUTPATIENT)
Facility: CLINIC | Age: 85
End: 2025-01-08
Payer: MEDICARE

## 2025-01-10 ENCOUNTER — TELEPHONE (OUTPATIENT)
Facility: CLINIC | Age: 85
End: 2025-01-10
Payer: MEDICARE

## 2025-01-10 ENCOUNTER — HOSPITAL ENCOUNTER (EMERGENCY)
Facility: HOSPITAL | Age: 85
Discharge: HOME | End: 2025-01-10
Attending: EMERGENCY MEDICINE
Payer: MEDICARE

## 2025-01-10 VITALS
RESPIRATION RATE: 18 BRPM | SYSTOLIC BLOOD PRESSURE: 184 MMHG | OXYGEN SATURATION: 97 % | HEIGHT: 66 IN | DIASTOLIC BLOOD PRESSURE: 84 MMHG | TEMPERATURE: 98 F | BODY MASS INDEX: 22.18 KG/M2 | WEIGHT: 138 LBS | HEART RATE: 85 BPM

## 2025-01-10 DIAGNOSIS — I10 HYPERTENSION, UNSPECIFIED TYPE: ICD-10-CM

## 2025-01-10 DIAGNOSIS — R42 DIZZINESS: Primary | ICD-10-CM

## 2025-01-10 LAB
ALBUMIN SERPL BCP-MCNC: 4.2 G/DL (ref 3.5–5.2)
ALP SERPL-CCNC: 66 U/L (ref 55–135)
ALT SERPL W/O P-5'-P-CCNC: 12 U/L (ref 10–44)
ANION GAP SERPL CALC-SCNC: 7 MMOL/L (ref 8–16)
AST SERPL-CCNC: 16 U/L (ref 10–40)
BACTERIA #/AREA URNS HPF: ABNORMAL /HPF
BASOPHILS # BLD AUTO: 0.04 K/UL (ref 0–0.2)
BASOPHILS NFR BLD: 0.6 % (ref 0–1.9)
BILIRUB SERPL-MCNC: 0.5 MG/DL (ref 0.1–1)
BILIRUB UR QL STRIP: NEGATIVE
BUN SERPL-MCNC: 20 MG/DL (ref 8–23)
CALCIUM SERPL-MCNC: 8.9 MG/DL (ref 8.7–10.5)
CHLORIDE SERPL-SCNC: 106 MMOL/L (ref 95–110)
CLARITY UR: CLEAR
CO2 SERPL-SCNC: 31 MMOL/L (ref 23–29)
COLOR UR: YELLOW
CREAT SERPL-MCNC: 1.3 MG/DL (ref 0.5–1.4)
DIFFERENTIAL METHOD BLD: ABNORMAL
EOSINOPHIL # BLD AUTO: 0.3 K/UL (ref 0–0.5)
EOSINOPHIL NFR BLD: 5.1 % (ref 0–8)
ERYTHROCYTE [DISTWIDTH] IN BLOOD BY AUTOMATED COUNT: 14.2 % (ref 11.5–14.5)
EST. GFR  (NO RACE VARIABLE): 40.3 ML/MIN/1.73 M^2
GLUCOSE SERPL-MCNC: 88 MG/DL (ref 70–110)
GLUCOSE UR QL STRIP: NEGATIVE
HCT VFR BLD AUTO: 41.5 % (ref 37–48.5)
HGB BLD-MCNC: 12.8 G/DL (ref 12–16)
HGB UR QL STRIP: NEGATIVE
HYALINE CASTS #/AREA URNS LPF: 1 /LPF
IMM GRANULOCYTES # BLD AUTO: 0.02 K/UL (ref 0–0.04)
IMM GRANULOCYTES NFR BLD AUTO: 0.3 % (ref 0–0.5)
KETONES UR QL STRIP: NEGATIVE
LEUKOCYTE ESTERASE UR QL STRIP: NEGATIVE
LIPASE SERPL-CCNC: 39 U/L (ref 4–60)
LYMPHOCYTES # BLD AUTO: 1.5 K/UL (ref 1–4.8)
LYMPHOCYTES NFR BLD: 22.4 % (ref 18–48)
MCH RBC QN AUTO: 27.5 PG (ref 27–31)
MCHC RBC AUTO-ENTMCNC: 30.8 G/DL (ref 32–36)
MCV RBC AUTO: 89 FL (ref 82–98)
MICROSCOPIC COMMENT: ABNORMAL
MONOCYTES # BLD AUTO: 0.5 K/UL (ref 0.3–1)
MONOCYTES NFR BLD: 7.5 % (ref 4–15)
NEUTROPHILS # BLD AUTO: 4.2 K/UL (ref 1.8–7.7)
NEUTROPHILS NFR BLD: 64.1 % (ref 38–73)
NITRITE UR QL STRIP: NEGATIVE
NRBC BLD-RTO: 0 /100 WBC
PH UR STRIP: 7 [PH] (ref 5–8)
PLATELET # BLD AUTO: 249 K/UL (ref 150–450)
PMV BLD AUTO: 9.6 FL (ref 9.2–12.9)
POTASSIUM SERPL-SCNC: 4.1 MMOL/L (ref 3.5–5.1)
PROT SERPL-MCNC: 6.8 G/DL (ref 6–8.4)
PROT UR QL STRIP: ABNORMAL
RBC # BLD AUTO: 4.65 M/UL (ref 4–5.4)
RBC #/AREA URNS HPF: 3 /HPF (ref 0–4)
SODIUM SERPL-SCNC: 144 MMOL/L (ref 136–145)
SP GR UR STRIP: 1.02 (ref 1–1.03)
URN SPEC COLLECT METH UR: ABNORMAL
UROBILINOGEN UR STRIP-ACNC: ABNORMAL EU/DL
WBC # BLD AUTO: 6.53 K/UL (ref 3.9–12.7)
WBC #/AREA URNS HPF: 7 /HPF (ref 0–5)

## 2025-01-10 PROCEDURE — 83690 ASSAY OF LIPASE: CPT | Performed by: PHYSICIAN ASSISTANT

## 2025-01-10 PROCEDURE — 80053 COMPREHEN METABOLIC PANEL: CPT | Performed by: PHYSICIAN ASSISTANT

## 2025-01-10 PROCEDURE — 93005 ELECTROCARDIOGRAM TRACING: CPT | Performed by: INTERNAL MEDICINE

## 2025-01-10 PROCEDURE — 85025 COMPLETE CBC W/AUTO DIFF WBC: CPT | Performed by: PHYSICIAN ASSISTANT

## 2025-01-10 PROCEDURE — 81001 URINALYSIS AUTO W/SCOPE: CPT | Performed by: PHYSICIAN ASSISTANT

## 2025-01-10 PROCEDURE — 93010 ELECTROCARDIOGRAM REPORT: CPT | Mod: ,,, | Performed by: INTERNAL MEDICINE

## 2025-01-10 PROCEDURE — 99285 EMERGENCY DEPT VISIT HI MDM: CPT | Mod: 25

## 2025-01-10 NOTE — TELEPHONE ENCOUNTER
"Patient called in reporting dizzy spells x 3 in the past 2 weeks, with one today that last about 1 hour. Patient reports the dizzy spells are "Staggering". Patient denies other symptoms including but not limited to vision changes, slurred speech, or headache. Reviewed with Dr Wasserman and per his verbal order, patient to report to the ER for evaluation. Attempted to return call to make patient aware of above, no answer. Voicemail left with above and instructions to call back with any questions.     Patient returned call and above reviewed with her. She verbalized understanding of above.   "

## 2025-01-10 NOTE — FIRST PROVIDER EVALUATION
Emergency Department TeleTriage Encounter Note      CHIEF COMPLAINT    Chief Complaint   Patient presents with    DIZZY SPELLS     AND HEAD FEELS FUNNY OFF/ON X FEW WEEKS       VITAL SIGNS   Initial Vitals [01/10/25 1111]   BP Pulse Resp Temp SpO2   (!) 187/102 97 17 98.7 °F (37.1 °C) 98 %      MAP       --            ALLERGIES    Review of patient's allergies indicates:   Allergen Reactions    Hydrocodone Hallucinations    Oxycodone-acetaminophen Hallucinations and Other (See Comments)       PROVIDER TRIAGE NOTE  Patient presents with complaint of intermittent dizziness worse with change in position. Denied feeling light headed. Reports multiple episodes over the last few weeks. Reports no CP or SOB. No nausea.  Reports changed BP meds 3 months ago.      Phy:   Constitutional: well nourished, well developed, appearing stated age, NAD        Initial orders will be placed and care will be transferred to an alternate provider when patient is roomed for a full evaluation. Any additional orders and the final disposition will be determined by that provider.        ORDERS  Labs Reviewed - No data to display    ED Orders (720h ago, onward)      None              Virtual Visit Note: The provider triage portion of this emergency department evaluation and documentation was performed via [employer], a HIPAA-compliant telemedicine application, in concert with a tele-presenter in the room. A face to face patient evaluation with one of my colleagues will occur once the patient is placed in an emergency department room.      DISCLAIMER: This note was prepared with [employer] voice recognition transcription software. Garbled syntax, mangled pronouns, and other bizarre constructions may be attributed to that software system.

## 2025-01-10 NOTE — ED PROVIDER NOTES
Encounter Date: 1/10/2025       History     Chief Complaint   Patient presents with    DIZZY SPELLS     AND HEAD FEELS FUNNY OFF/ON X FEW WEEKS     84-year-old female with a past medical history heart failure, reflux disease, hypertension, hyperlipidemia, and fibromyalgia presents for evaluation of dizzy spells.  She reports that she has had approximately 4 episodes of dizziness over the last 3 weeks.  She is unable to describe her dizziness but denies any room spinning.  She reports her dizziness occurs with position changes and when she jumps up from sitting fast.  She reports that the episodes resolved spontaneously.  There is no associated chest pain, shortness of breath, headache, changes in vision, changes speech, nausea/vomiting, abdominal pain, hematochezia, or melena.  She does report head feels funny at times with it.      Review of patient's allergies indicates:   Allergen Reactions    Hydrocodone Hallucinations    Oxycodone-acetaminophen Hallucinations and Other (See Comments)     Past Medical History:   Diagnosis Date    Aortic insufficiency     Cancer 2005    renal ca, L kidney removed    COVID-19 2022    Diastolic heart failure     Diffusion capacity of lung (dl), decreased     Encounter for blood transfusion 1963    with miscarriage    Fibromyalgia     GERD (gastroesophageal reflux disease)     Hypercholesterolemia     Hyperlipidemia     Hypertension     Lung cancer 2015    adenocarcinoma ; upper left lobe    Renal disorder 2005    Left Kidney cancer    Sinusitis      Past Surgical History:   Procedure Laterality Date    ADENOIDECTOMY      BACK SURGERY  2013    laminectomy    ENDOBRONCHIAL ULTRASOUND Left 07/15/2022    Procedure: ENDOBRONCHIAL ULTRASOUND (EBUS);  Surgeon: Cm Freitas MD;  Location: [facility];  Service: Pulmonary;  Laterality: Left;    ENDOBRONCHIAL ULTRASOUND Bilateral 11/09/2023    Procedure: ENDOBRONCHIAL ULTRASOUND (EBUS);  Surgeon: Cm Freitas MD;  Location: [facility] OR;   Service: Pulmonary;  Laterality: Bilateral;    ENDOSCOPIC ULTRASOUND OF UPPER GASTROINTESTINAL TRACT N/A 05/13/2024    Procedure: ULTRASOUND, UPPER GI TRACT, ENDOSCOPIC;  Surgeon: Andi Bazzi III, MD;  Location: [facility] ENDO;  Service: Endoscopy;  Laterality: N/A;    HYSTERECTOMY  1975    CARY/BSO. Due to endometriosis.    INJECTION OF JOINT Right 05/27/2019    Procedure: Injection, Joint, Shoulder, Hip, Or Knee;  Surgeon: Zach Widler MD;  Location: [facility] OR;  Service: Pain Management;  Laterality: Right;  right hip intra-articular injection     INSERTION OF TUNNELED CENTRAL VENOUS CATHETER (CVC) WITH SUBCUTANEOUS PORT N/A 5/31/2024    Procedure: [id number];  Surgeon: Terry Kim MD;  Location: [facility] OR;  Service: General;  Laterality: N/A;    LUNG LOBECTOMY Left     LOWER LOBE    NEPHRECTOMY Left 2005    Entire kidney due to cancer.    ROBOTIC BRONCHOSCOPY Bilateral 11/09/2023    Procedure: ROBOTIC BRONCHOSCOPY;  Surgeon: Cm Freitas MD;  Location: [facility] OR;  Service: Pulmonary;  Laterality: Bilateral;    TONSILLECTOMY       Family History   Problem Relation Name Age of Onset    Hypertension Mother      Breast cancer Maternal Aunt      Breast cancer Maternal Aunt       Social History     Tobacco Use    Smoking status: Never    Smokeless tobacco: Never   Substance Use Topics    Alcohol use: No    Drug use: No     Review of Systems   Constitutional:  Negative for chills and fever.   HENT:  Negative for congestion.    Respiratory:  Negative for cough and shortness of breath.    Cardiovascular:  Negative for chest pain.   Gastrointestinal:  Negative for abdominal pain, nausea and vomiting.   Genitourinary:  Negative for dysuria.   Musculoskeletal:  Negative for gait problem.   Skin:  Negative for color change.   Neurological:  Positive for dizziness. Negative for numbness.   Psychiatric/Behavioral:  Negative for agitation.        Physical Exam     Initial Vitals [01/10/25 1111]   BP Pulse Resp  Temp SpO2   (!) 187/102 97 17 98.7 °F (37.1 °C) 98 %      MAP       --         Physical Exam    Nursing note and vitals reviewed.  Constitutional: She appears well-developed and well-nourished.   HENT:   Head: Atraumatic.   Eyes: EOM are normal. Pupils are equal, round, and reactive to light.   Neck:   Normal range of motion.  Cardiovascular:  Normal rate and regular rhythm.           Pulmonary/Chest: Breath sounds normal.   Abdominal: Abdomen is soft. Bowel sounds are normal. She exhibits no distension. There is no abdominal tenderness. There is no rebound and no guarding.   Musculoskeletal:         General: Normal range of motion.      Right shoulder: Normal.      Left shoulder: Normal.      Cervical back: Normal range of motion.     Neurological: She is alert and oriented to person, place, and time. She has normal strength. No cranial nerve deficit or sensory deficit. GCS score is 15. GCS eye subscore is 4. GCS verbal subscore is 5. GCS motor subscore is 6.   Skin: Skin is warm and dry.   Psychiatric: She has a normal mood and affect.         ED Course   Procedures  Labs Reviewed   CBC W/ AUTO DIFFERENTIAL - Abnormal       Result Value    WBC 6.53      RBC 4.65      Hemoglobin 12.8      Hematocrit 41.5      MCV 89      MCH 27.5      MCHC 30.8 (*)     RDW 14.2      Platelets 249      MPV 9.6      Immature Granulocytes 0.3      Gran # (ANC) 4.2      Immature Grans (Abs) 0.02      Lymph # 1.5      Mono # 0.5      Eos # 0.3      Baso # 0.04      nRBC 0      Gran % 64.1      Lymph % 22.4      Mono % 7.5      Eosinophil % 5.1      Basophil % 0.6      Differential Method Automated     COMPREHENSIVE METABOLIC PANEL - Abnormal    Sodium 144      Potassium 4.1      Chloride 106      CO2 31 (*)     Glucose 88      BUN 20      Creatinine 1.3      Calcium 8.9      Total Protein 6.8      Albumin 4.2      Total Bilirubin 0.5      Alkaline Phosphatase 66      AST 16      ALT 12      eGFR 40.3 (*)     Anion Gap 7 (*)     URINALYSIS, REFLEX TO URINE CULTURE - Abnormal    Specimen UA Urine, Clean Catch      Color, UA Yellow      Appearance, UA Clear      pH, UA 7.0      Specific Gravity, UA 1.020      Protein, UA 2+ (*)     Glucose, UA Negative      Ketones, UA Negative      Bilirubin (UA) Negative      Occult Blood UA Negative      Nitrite, UA Negative      Urobilinogen, UA 2.0-3.0 (*)     Leukocytes, UA Negative      Narrative:     Specimen Source->Urine   URINALYSIS MICROSCOPIC - Abnormal    RBC, UA 3      WBC, UA 7 (*)     Bacteria Rare      Hyaline Casts, UA 1      Microscopic Comment SEE COMMENT      Narrative:     Specimen Source->Urine   LIPASE    Lipase 39       EKG Readings: (Independently Interpreted)   Initial Reading: No STEMI. Rhythm: Normal Sinus Rhythm. Heart Rate: 91. Ectopy: No Ectopy. Conduction: Normal. ST Segments: Normal ST Segments. T Waves: Normal. Clinical Impression: Normal Sinus Rhythm       Imaging Results              CT Head Without Contrast (Final result)  Result time 01/10/25 12:15:15      Final result by Jase Bergman MD (01/10/25 12:15:15)                   Impression:      1. No acute intracranial abnormality or significant change since 09/25/2024.  2. 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  3. Unchanged very mild chronic small vessel ischemic changes.      Electronically signed by: Jase Bergman  Date:    01/10/2025  Time:    12:15               Narrative:    EXAMINATION:  CT HEAD WITHOUT CONTRAST    CLINICAL HISTORY:  Dizziness, persistent/recurrent, cardiac or vascular cause suspected;    TECHNIQUE:  Head CT without IV contrast.    COMPARISON:  09/25/2024    FINDINGS:  Gray-white differentiation is maintained without hemorrhage, midline shift, or mass effect.    Mild cerebral and cerebellar atrophy is present.  Old lacunar infarcts affecting the right caudate body and left lentiform nucleus unchanged.  Very mild chronic small-vessel ischemic changes  affect periventricular white matter, unchanged.    The ventricles and cisterns are maintained.    Calvarium is intact. Visualized sinuses are clear.                                       Medications - No data to display  Medical Decision Making  84-year-old female presents for episodes of dizziness.    Initial differential diagnosis included but not limited to medication reaction, intracranial hemorrhage, and benign positional vertigo.    Amount and/or Complexity of Data Reviewed  Labs: ordered.  Radiology: ordered.    Risk  Risk Details: The patient was emergently evaluated in the emergency department, her evaluation was significant for an elderly female with a normal neurologic exam noted.  The patient's CT scan of her head showed no acute abnormalities per Radiology.  The patient's labs showed no acute abnormalities.  The etiology of the patient's intermittent dizziness is likely her poorly-controlled blood pressure.  The patient's blood pressure has improved here in the emergency department and she will keep an eye on it at home.  She is stable for discharge to home.  She is to follow up with her PCP for any medication adjustments for her blood pressure.                                      Clinical Impression:  Final diagnoses:  [R42] Dizziness (Primary)  [I10] Hypertension, unspecified type          ED Disposition Condition    Discharge Stable          ED Prescriptions    None       Follow-up Information       Follow up With Specialties Details Why Contact Info    Tao Orozco MD Family Medicine Schedule an appointment as soon as possible for a visit   [address] [facility] [address]  [phone]               Alexander Ludwig MD  01/10/25 8111

## 2025-01-13 LAB
OHS QRS DURATION: 82 MS
OHS QTC CALCULATION: 467 MS

## 2025-01-15 ENCOUNTER — OFFICE VISIT (OUTPATIENT)
Facility: CLINIC | Age: 85
End: 2025-01-15
Payer: MEDICARE

## 2025-01-15 VITALS
WEIGHT: 137 LBS | BODY MASS INDEX: 22.11 KG/M2 | SYSTOLIC BLOOD PRESSURE: 216 MMHG | DIASTOLIC BLOOD PRESSURE: 101 MMHG | RESPIRATION RATE: 17 BRPM | HEART RATE: 91 BPM | TEMPERATURE: 98 F

## 2025-01-15 DIAGNOSIS — C78.01 METASTATIC RENAL CELL CARCINOMA TO LUNG, RIGHT: Primary | ICD-10-CM

## 2025-01-15 DIAGNOSIS — C64.9 METASTATIC RENAL CELL CARCINOMA TO LUNG, RIGHT: Primary | ICD-10-CM

## 2025-01-15 PROCEDURE — 99999 PR PBB SHADOW E&M-EST. PATIENT-LVL III: CPT | Mod: PBBFAC,,, | Performed by: INTERNAL MEDICINE

## 2025-01-15 PROCEDURE — G2211 COMPLEX E/M VISIT ADD ON: HCPCS | Mod: S$PBB,,, | Performed by: INTERNAL MEDICINE

## 2025-01-15 PROCEDURE — 99213 OFFICE O/P EST LOW 20 MIN: CPT | Mod: PBBFAC,PN | Performed by: INTERNAL MEDICINE

## 2025-01-15 PROCEDURE — 99215 OFFICE O/P EST HI 40 MIN: CPT | Mod: S$PBB,,, | Performed by: INTERNAL MEDICINE

## 2025-01-15 RX ORDER — DIPHENHYDRAMINE HYDROCHLORIDE 50 MG/ML
50 INJECTION INTRAMUSCULAR; INTRAVENOUS ONCE AS NEEDED
Status: CANCELLED | OUTPATIENT
Start: 2025-01-27

## 2025-01-15 RX ORDER — EPINEPHRINE 0.3 MG/.3ML
0.3 INJECTION SUBCUTANEOUS ONCE AS NEEDED
Status: CANCELLED | OUTPATIENT
Start: 2025-01-27

## 2025-01-15 RX ORDER — SODIUM CHLORIDE 0.9 % (FLUSH) 0.9 %
10 SYRINGE (ML) INJECTION
Status: CANCELLED | OUTPATIENT
Start: 2025-01-27

## 2025-01-15 RX ORDER — HEPARIN 100 UNIT/ML
500 SYRINGE INTRAVENOUS
Status: CANCELLED | OUTPATIENT
Start: 2025-01-27

## 2025-01-15 NOTE — ASSESSMENT & PLAN NOTE
Patient remains on Axi/Pembro and has been doing well with this so far.  She has no sign of AI disease and labs look ok.  Note is made of her very high BP today.  She forgot her medications this morning but will take them as soon as she gets home.  No cardiac symptoms at this time.      Continue therapy as planned and continue to aggressively monitor labs and patient.

## 2025-01-15 NOTE — PROGRESS NOTES
PROGRESS NOTE    Subjective:       Patient ID: Karlie Hess is a 84 y.o. female.    History of Left RCC, treated [id number] further treatment     History of JAX lung cancer-treated with lobectomy-7/2/2015-no chemo/xrt-Path c/w mucin producing adenocarcinoma. [id number]     History of left hilar mass, 9/2020 EBUS negative     PET 10/18/2023:  Nodular densities are as outlined below:  -21 x 17 mm right infrahilar nodule with SUV max 2.5 (image 119), previously measuring 17 x 16 mm (August 20, 2023)  -27 x 27 mm marginated nodule in the posterior inferior left hilum with SUV max 2.7 (image 109)  -13 x 7 mm nodular density along the right posterior pararenal space with SUV max 0.8 (image 179), seen on studies dating back to 1/12/2021.     11/9/2023-EBUS  1. LUNG, RIGHT LOWER LOBE MASS, FINE NEEDLE ASPIRATION   - NEGATIVE FOR MALIGNANT CELLS.     2. LUNG, RIGHT LOWER LOBE MASS, BRUSHING   - RARE ATYPICAL CELLS PRESENT     3. LUNG, RIGHT LOWER LOBE MASS, BIOPSY TOUCH PREP   - POSITIVE FOR CARCINOMA.   - SEE CONCURRENT SURGICAL BIOPSY ([id number])     4. LUNG, RIGHT LOWER LOBE, BRONCHOALVEOLAR LAVAGE   - RARE ATYPICAL CELLS PRESENT.     5. LUNG, LEFT LOWER LOBE MASS, FINE NEEDLE ASPIRATION   - RARE ATYPICAL CELLS PRESENT     6. LYMPH NODE, STATION 11RS, FINE NEEDLE ASPIRATION   - NEGATIVE FOR MALIGNANT CELLS.   - SCANT KATARZYNA MATERIAL PRESENT      Karnofsky performance status score <80   Time from original diagnosis to initiation of targeted therapy <1 year   Hemoglobin less than the lower limit of normal   Serum calcium greater than the upper limit of normal   Neutrophil count greater than the upper limit of normal   Platelet count greater than the upper limit of normal   Favorable risk: None of the above risk factors present.  Intermediate risk: 1 or 2 of the above risk factors present.  Poor risk: 3 or more risk factors present.     1/5/2024:  RIGHT  PARARENAL MASS, CT GUIDED CORE BIOPSY:   - CLEAR CELL RENAL CELL CARCINOMA     12/27/2023-1/5/2024  RLL SBRT    1/30/2024-2/7/2024  XRT to Rt. Renal bed lesion    5/3/2024-MRI Abd:--Progressive Disease  Right kidney mass  2nd mass in lower pole of right kidney  Pancreatic Tail mass    5/13/2024-Bx:  PANCREATIC TAIL MASS, BIOPSY:   - METASTATIC CLEAR CELL RENAL CELL CARCINOMA.     7/31/2024-Patient went to ED with severe HA, MRI Brain:  No metastatic disease    6/7/2024-Pembro/Axitinib:  Cycle 1: 6/7/2024  Cycle 2: 6/28/2024  Cycle 3: 7/19/2024  Cycle 4: 11/15/2024  Cycle 5: 12/6/2024  Cycle 6: 12/27/2024  Cycle 7: 1/17/2024-due    No apparent drug interactions with topirimate/Axitinib    Prednisone and nurtec    Held Axitinib 10/17/2024 due to proteinuria    Chief Complaint:  No chief complaint on file.  Metastatic Renal Cell Carcinoma follow up    History of Present Illness:   Karlie Hess is a 84 y.o. female who presents for follow up of above.      She resumed the Axitinib on 1/15/2024 at 3mg bid.   Also back on pembro.      Patient's BP is high today.  No CP or sob at this time.  She forgot to take her medications this morning and will take these as soon as she leaves here.      BP regimen  Lebtolol 100mg bid  Lisinopril 20mg bid  Amlodipine 5mg qhs    Family and Social history reviewed and is unchanged from 11/21/2023             Current Outpatient Medications:     acetaminophen (TYLENOL) 500 MG tablet, Take 1,000 mg by mouth 2 (two) times daily as needed., Disp: , Rfl:     amLODIPine (NORVASC) 5 MG tablet, TAKE 1 TABLET BY MOUTH EVERY  EVENING, Disp: 90 tablet, Rfl: 3    aspirin (ECOTRIN) 81 MG EC tablet, Take 1 tablet (81 mg total) by mouth once daily., Disp: , Rfl:     axitinib (INLYTA) 1 mg Tab, Take 3 tablets (3 mg total) by mouth 2 (two) times a day., Disp: 180 tablet, Rfl: 6    cloNIDine (CATAPRES) 0.1 MG tablet, Take 1 tablet (0.1 mg total) by mouth 3 (three) times daily as needed. For systolic  BP over 180, Disp: 90 tablet, Rfl: 0    cyanocobalamin (VITAMIN B-12) 1000 MCG tablet, Take 100 mcg by mouth once daily., Disp: , Rfl:     duloxetine (CYMBALTA) 60 MG capsule, Take 60 mg by mouth once daily., Disp: , Rfl:     labetaloL (NORMODYNE) 200 MG tablet, TAKE 1 TABLET BY MOUTH TWICE  DAILY, Disp: 180 tablet, Rfl: 3    losartan (COZAAR) 50 MG tablet, Take 1 tablet (50 mg total) by mouth 2 (two) times a day., Disp: 180 tablet, Rfl: 3    ondansetron (ZOFRAN) 8 MG tablet, Take 1 tablet (8 mg total) by mouth every 8 (eight) hours as needed for Nausea., Disp: 30 tablet, Rfl: 5    pantoprazole (PROTONIX) 40 MG tablet, Take 40 mg by mouth once daily., Disp: , Rfl:     promethazine (PHENERGAN) 25 MG tablet, Take 1 tablet (25 mg total) by mouth every 6 (six) hours as needed for Nausea., Disp: 30 tablet, Rfl: 5    rosuvastatin (CRESTOR) 10 MG tablet, Take 10 mg by mouth every evening., Disp: , Rfl:     hydroCHLOROthiazide (HYDRODIURIL) 12.5 MG Tab, Take 1 tablet (12.5 mg total) by mouth daily as needed (Leg swelling)., Disp: 30 tablet, Rfl: 11    topiramate (TOPAMAX) 25 MG tablet, Take 1 tablet (25 mg total) by mouth every evening. for 14 doses, Disp: 14 tablet, Rfl: 0  No current facility-administered medications for this visit.    Facility-Administered Medications Ordered in Other Visits:     LIDOcaine (PF) 10 mg/ml (1%) injection 10 mg, 1 mL, Intradermal, Once, Gabriel Loaiza MD        Objective:       Physical Examination:     BP (!) 216/101   Pulse 91   Temp 97.7 °F (36.5 °C)   Resp 17   Wt 62.1 kg (137 lb)   BMI 22.11 kg/m²     Physical Exam  Constitutional:       Appearance: Normal appearance.   HENT:      Head: Normocephalic and atraumatic.   Eyes:      General: No scleral icterus.     Conjunctiva/sclera: Conjunctivae normal.   Cardiovascular:      Rate and Rhythm: Normal rate.   Pulmonary:      Effort: Pulmonary effort is normal.   Abdominal:      General: Abdomen is flat.   Neurological:       General: No focal deficit present.      Mental Status: She is alert and oriented to person, place, and time.   Psychiatric:         Mood and Affect: Mood normal.         Behavior: Behavior normal.         Labs:   Recent Results (from the past 2 weeks)   CBC W/ AUTO DIFFERENTIAL    Collection Time: 01/10/25 11:46 AM   Result Value Ref Range    WBC 6.53 3.90 - 12.70 K/uL    Hemoglobin 12.8 12.0 - 16.0 g/dL    Hematocrit 41.5 37.0 - 48.5 %    Platelets 249 150 - 450 K/uL   CBC Auto Differential    Collection Time: 01/02/25  2:54 PM   Result Value Ref Range    WBC 6.32 3.90 - 12.70 K/uL    Hemoglobin 11.5 (L) 12.0 - 16.0 g/dL    Hematocrit 36.2 (L) 37.0 - 48.5 %    Platelets 225 150 - 450 K/uL       CMP  Sodium   Date Value Ref Range Status   01/10/2025 144 136 - 145 mmol/L Final     Potassium   Date Value Ref Range Status   01/10/2025 4.1 3.5 - 5.1 mmol/L Final     Chloride   Date Value Ref Range Status   01/10/2025 106 95 - 110 mmol/L Final     CO2   Date Value Ref Range Status   01/10/2025 31 (H) 23 - 29 mmol/L Final     Glucose   Date Value Ref Range Status   01/10/2025 88 70 - 110 mg/dL Final     BUN   Date Value Ref Range Status   01/10/2025 20 8 - 23 mg/dL Final     Creatinine   Date Value Ref Range Status   01/10/2025 1.3 0.5 - 1.4 mg/dL Final   12/31/2012 1.2 0.5 - 1.4 mg/dL Final     Calcium   Date Value Ref Range Status   01/10/2025 8.9 8.7 - 10.5 mg/dL Final   12/31/2012 9.7 8.7 - 10.5 mg/dL Final     Total Protein   Date Value Ref Range Status   01/10/2025 6.8 6.0 - 8.4 g/dL Final     Albumin   Date Value Ref Range Status   01/10/2025 4.2 3.5 - 5.2 g/dL Final     Total Bilirubin   Date Value Ref Range Status   01/10/2025 0.5 0.1 - 1.0 mg/dL Final     Comment:     For infants and newborns, interpretation of results should be based  on gestational age, weight and in agreement with clinical  observations.    Premature Infant recommended reference ranges:  Up to 24 hours.............<8.0 mg/dL  Up to 48  "hours............<12.0 mg/dL  3-5 days..................<15.0 mg/dL  6-29 days.................<15.0 mg/dL       Alkaline Phosphatase   Date Value Ref Range Status   01/10/2025 66 55 - 135 U/L Final     AST   Date Value Ref Range Status   01/10/2025 16 10 - 40 U/L Final     ALT   Date Value Ref Range Status   01/10/2025 12 10 - 44 U/L Final     Anion Gap   Date Value Ref Range Status   01/10/2025 7 (L) 8 - 16 mmol/L Final   12/31/2012 14 5 - 15 meq/L Final     eGFR if    Date Value Ref Range Status   07/18/2022 >60.0 >60 mL/min/1.73 m^2 Final     eGFR if non    Date Value Ref Range Status   07/18/2022 52.6 (A) >60 mL/min/1.73 m^2 Final     Comment:     Calculation used to obtain the estimated glomerular filtration  rate (eGFR) is the CKD-EPI equation.        No results found for: "CEA"  No results found for: "PSA"        Assessment/Plan:     Problem List Items Addressed This Visit       Metastatic renal cell carcinoma to lung, right - Primary     Patient remains on Axi/Pembro and has been doing well with this so far.  She has no sign of AI disease and labs look ok.  Note is made of her very high BP today.  She forgot her medications this morning but will take them as soon as she gets home.  No cardiac symptoms at this time.      Continue therapy as planned and continue to aggressively monitor labs and patient.                             Discussion:     Follow up in about 3 weeks (around 2/5/2025) for NP visit and 6 weeks with me.      Electronically signed by Del Nathan      "

## 2025-01-27 ENCOUNTER — INFUSION (OUTPATIENT)
Dept: INFUSION THERAPY | Facility: HOSPITAL | Age: 85
End: 2025-01-27
Attending: INTERNAL MEDICINE
Payer: MEDICARE

## 2025-01-27 VITALS
BODY MASS INDEX: 27.64 KG/M2 | HEART RATE: 92 BPM | RESPIRATION RATE: 18 BRPM | DIASTOLIC BLOOD PRESSURE: 68 MMHG | WEIGHT: 140.81 LBS | HEIGHT: 60 IN | OXYGEN SATURATION: 95 % | TEMPERATURE: 98 F | SYSTOLIC BLOOD PRESSURE: 149 MMHG

## 2025-01-27 DIAGNOSIS — C78.01 METASTATIC RENAL CELL CARCINOMA TO LUNG, RIGHT: Primary | ICD-10-CM

## 2025-01-27 DIAGNOSIS — C64.9 METASTATIC RENAL CELL CARCINOMA TO LUNG, RIGHT: Primary | ICD-10-CM

## 2025-01-27 PROCEDURE — 63600175 PHARM REV CODE 636 W HCPCS: Mod: JZ,TB | Performed by: INTERNAL MEDICINE

## 2025-01-27 PROCEDURE — 96413 CHEMO IV INFUSION 1 HR: CPT

## 2025-01-27 PROCEDURE — 25000003 PHARM REV CODE 250: Performed by: INTERNAL MEDICINE

## 2025-01-27 PROCEDURE — A4216 STERILE WATER/SALINE, 10 ML: HCPCS | Performed by: INTERNAL MEDICINE

## 2025-01-27 RX ORDER — SODIUM CHLORIDE 0.9 % (FLUSH) 0.9 %
10 SYRINGE (ML) INJECTION
Status: DISCONTINUED | OUTPATIENT
Start: 2025-01-27 | End: 2025-01-27 | Stop reason: HOSPADM

## 2025-01-27 RX ORDER — DIPHENHYDRAMINE HYDROCHLORIDE 50 MG/ML
50 INJECTION INTRAMUSCULAR; INTRAVENOUS ONCE AS NEEDED
Status: DISCONTINUED | OUTPATIENT
Start: 2025-01-27 | End: 2025-01-27 | Stop reason: HOSPADM

## 2025-01-27 RX ORDER — HEPARIN 100 UNIT/ML
500 SYRINGE INTRAVENOUS
Status: DISCONTINUED | OUTPATIENT
Start: 2025-01-27 | End: 2025-01-27 | Stop reason: HOSPADM

## 2025-01-27 RX ORDER — EPINEPHRINE 0.3 MG/.3ML
0.3 INJECTION SUBCUTANEOUS ONCE AS NEEDED
Status: DISCONTINUED | OUTPATIENT
Start: 2025-01-27 | End: 2025-01-27 | Stop reason: HOSPADM

## 2025-01-27 RX ADMIN — HEPARIN 500 UNITS: 100 SYRINGE at 03:01

## 2025-01-27 RX ADMIN — Medication 10 ML: at 03:01

## 2025-01-27 RX ADMIN — SODIUM CHLORIDE 200 MG: 9 INJECTION, SOLUTION INTRAVENOUS at 03:01

## 2025-01-27 NOTE — PLAN OF CARE
Problem: Fall Injury Risk  Goal: Absence of Fall and Fall-Related Injury  Outcome: Progressing  Intervention: Identify and Manage Contributors  Flowsheets (Taken 1/27/2025 5244)  Self-Care Promotion: independence encouraged  Medication Review/Management: medications reviewed  Intervention: Promote Injury-Free Environment  Flowsheets (Taken 1/27/2025 9289)  Safety Promotion/Fall Prevention: medications reviewed

## 2025-01-31 NOTE — PROGRESS NOTES
PROGRESS NOTE    Subjective:       Patient ID: Karlie Hess is a 84 y.o. female.    History of Left RCC, treated [id number] further treatment     History of JAX lung cancer-treated with lobectomy-7/2/2015-no chemo/xrt-Path c/w mucin producing adenocarcinoma. [id number]     History of left hilar mass, 9/2020 EBUS negative     PET 10/18/2023:  Nodular densities are as outlined below:  -21 x 17 mm right infrahilar nodule with SUV max 2.5 (image 119), previously measuring 17 x 16 mm (August 20, 2023)  -27 x 27 mm marginated nodule in the posterior inferior left hilum with SUV max 2.7 (image 109)  -13 x 7 mm nodular density along the right posterior pararenal space with SUV max 0.8 (image 179), seen on studies dating back to 1/12/2021.     11/9/2023-EBUS  1. LUNG, RIGHT LOWER LOBE MASS, FINE NEEDLE ASPIRATION   - NEGATIVE FOR MALIGNANT CELLS.     2. LUNG, RIGHT LOWER LOBE MASS, BRUSHING   - RARE ATYPICAL CELLS PRESENT     3. LUNG, RIGHT LOWER LOBE MASS, BIOPSY TOUCH PREP   - POSITIVE FOR CARCINOMA.   - SEE CONCURRENT SURGICAL BIOPSY ([id number])     4. LUNG, RIGHT LOWER LOBE, BRONCHOALVEOLAR LAVAGE   - RARE ATYPICAL CELLS PRESENT.     5. LUNG, LEFT LOWER LOBE MASS, FINE NEEDLE ASPIRATION   - RARE ATYPICAL CELLS PRESENT     6. LYMPH NODE, STATION 11RS, FINE NEEDLE ASPIRATION   - NEGATIVE FOR MALIGNANT CELLS.   - SCANT KATARZYNA MATERIAL PRESENT      Karnofsky performance status score <80   Time from original diagnosis to initiation of targeted therapy <1 year   Hemoglobin less than the lower limit of normal   Serum calcium greater than the upper limit of normal   Neutrophil count greater than the upper limit of normal   Platelet count greater than the upper limit of normal   Favorable risk: None of the above risk factors present.  Intermediate risk: 1 or 2 of the above risk factors present.  Poor risk: 3 or more risk factors present.     1/5/2024:  RIGHT  PARARENAL MASS, CT GUIDED CORE BIOPSY:   - CLEAR CELL RENAL CELL CARCINOMA     12/27/2023-1/5/2024  RLL SBRT    1/30/2024-2/7/2024  XRT to Rt. Renal bed lesion    5/3/2024-MRI Abd:--Progressive Disease  Right kidney mass  2nd mass in lower pole of right kidney  Pancreatic Tail mass    5/13/2024-Bx:  PANCREATIC TAIL MASS, BIOPSY:   - METASTATIC CLEAR CELL RENAL CELL CARCINOMA.     7/31/2024-Patient went to ED with severe HA, MRI Brain:  No metastatic disease    6/7/2024-Pembro/Axitinib:  Cycle 1: 6/7/2024  Cycle 2: 6/28/2024  Cycle 3: 7/19/2024  Cycle 4: 11/15/2024  Cycle 5: 12/6/2024  Cycle 6: 12/27/2024  Cycle 7: 1/17/2025-due    No apparent drug interactions with topirimate/Axitinib    Prednisone and nurtec    Held Axitinib 10/17/2024 due to proteinuria    Chief Complaint:  Metastatic Renal Cell Carcinoma follow up    History of Present Illness:   Karlie Hess is a 84 y.o. female who presents for follow up of above.      She resumed the Axitinib at 3mg bid and Pembro and is tolerating it well. She denies an CP, SOB or rash.    BP has been better today 158/77    BP regimen  Lebtolol 100mg bid  Lisinopril 20mg bid  Amlodipine 5mg qhs    Family and Social history reviewed and is unchanged from 11/21/2023     Current Outpatient Medications:     acetaminophen (TYLENOL) 500 MG tablet, Take 1,000 mg by mouth 2 (two) times daily as needed., Disp: , Rfl:     amLODIPine (NORVASC) 5 MG tablet, TAKE 1 TABLET BY MOUTH EVERY  EVENING, Disp: 90 tablet, Rfl: 3    aspirin (ECOTRIN) 81 MG EC tablet, Take 1 tablet (81 mg total) by mouth once daily., Disp: , Rfl:     axitinib (INLYTA) 1 mg Tab, Take 3 tablets (3 mg total) by mouth 2 (two) times a day., Disp: 180 tablet, Rfl: 6    cloNIDine (CATAPRES) 0.1 MG tablet, Take 1 tablet (0.1 mg total) by mouth 3 (three) times daily as needed. For systolic BP over 180, Disp: 90 tablet, Rfl: 0    cyanocobalamin (VITAMIN B-12) 1000 MCG tablet, Take 100 mcg by mouth once daily., Disp: ,  Rfl:     duloxetine (CYMBALTA) 60 MG capsule, Take 60 mg by mouth once daily., Disp: , Rfl:     labetaloL (NORMODYNE) 200 MG tablet, TAKE 1 TABLET BY MOUTH TWICE  DAILY, Disp: 180 tablet, Rfl: 3    losartan (COZAAR) 50 MG tablet, Take 1 tablet (50 mg total) by mouth 2 (two) times a day., Disp: 180 tablet, Rfl: 3    ondansetron (ZOFRAN) 8 MG tablet, Take 1 tablet (8 mg total) by mouth every 8 (eight) hours as needed for Nausea., Disp: 30 tablet, Rfl: 5    pantoprazole (PROTONIX) 40 MG tablet, Take 40 mg by mouth once daily., Disp: , Rfl:     promethazine (PHENERGAN) 25 MG tablet, Take 1 tablet (25 mg total) by mouth every 6 (six) hours as needed for Nausea., Disp: 30 tablet, Rfl: 5    rosuvastatin (CRESTOR) 10 MG tablet, Take 10 mg by mouth every evening., Disp: , Rfl:     hydroCHLOROthiazide (HYDRODIURIL) 12.5 MG Tab, Take 1 tablet (12.5 mg total) by mouth daily as needed (Leg swelling)., Disp: 30 tablet, Rfl: 11    topiramate (TOPAMAX) 25 MG tablet, Take 1 tablet (25 mg total) by mouth every evening. for 14 doses, Disp: 14 tablet, Rfl: 0  No current facility-administered medications for this visit.    Facility-Administered Medications Ordered in Other Visits:     LIDOcaine (PF) 10 mg/ml (1%) injection 10 mg, 1 mL, Intradermal, Once, Gabriel Loaiza MD    Review of Systems   Constitutional:  Negative for fever and malaise/fatigue.   HENT:  Negative for hearing loss.    Respiratory:  Negative for cough and shortness of breath.    Cardiovascular:  Negative for chest pain and leg swelling.   Gastrointestinal:  Negative for constipation, diarrhea and nausea.   Genitourinary:  Negative for dysuria and hematuria.   Musculoskeletal:  Negative for joint pain and myalgias.   Skin:  Negative for rash.   Neurological:  Negative for weakness and headaches.   Psychiatric/Behavioral:  Negative for depression.        Objective:       Physical Examination:     BP (!) 158/77   Pulse 86   Temp 97.3 °F (36.3 °C)   Resp 17    Wt 62.6 kg (138 lb)   BMI 26.95 kg/m²     Physical Exam  Constitutional:       Appearance: Normal appearance.   HENT:      Head: Normocephalic and atraumatic.      Right Ear: External ear normal.      Left Ear: External ear normal.      Nose: Nose normal.      Mouth/Throat:      Mouth: Mucous membranes are moist.   Eyes:      General: No scleral icterus.     Conjunctiva/sclera: Conjunctivae normal.   Cardiovascular:      Rate and Rhythm: Normal rate.   Pulmonary:      Effort: Pulmonary effort is normal.   Abdominal:      General: Abdomen is flat.   Musculoskeletal:         General: Normal range of motion.      Right lower leg: No edema.      Left lower leg: No edema.   Skin:     General: Skin is warm and dry.   Neurological:      General: No focal deficit present.      Mental Status: She is alert and oriented to person, place, and time.   Psychiatric:         Mood and Affect: Mood normal.         Behavior: Behavior normal.         Labs:   No results found for this or any previous visit (from the past 2 weeks).      CMP  Sodium   Date Value Ref Range Status   01/10/2025 144 136 - 145 mmol/L Final     Potassium   Date Value Ref Range Status   01/10/2025 4.1 3.5 - 5.1 mmol/L Final     Chloride   Date Value Ref Range Status   01/10/2025 106 95 - 110 mmol/L Final     CO2   Date Value Ref Range Status   01/10/2025 31 (H) 23 - 29 mmol/L Final     Glucose   Date Value Ref Range Status   01/10/2025 88 70 - 110 mg/dL Final     BUN   Date Value Ref Range Status   01/10/2025 20 8 - 23 mg/dL Final     Creatinine   Date Value Ref Range Status   01/10/2025 1.3 0.5 - 1.4 mg/dL Final   12/31/2012 1.2 0.5 - 1.4 mg/dL Final     Calcium   Date Value Ref Range Status   01/10/2025 8.9 8.7 - 10.5 mg/dL Final   12/31/2012 9.7 8.7 - 10.5 mg/dL Final     Total Protein   Date Value Ref Range Status   01/10/2025 6.8 6.0 - 8.4 g/dL Final     Albumin   Date Value Ref Range Status   01/10/2025 4.2 3.5 - 5.2 g/dL Final     Total Bilirubin   Date  "Value Ref Range Status   01/10/2025 0.5 0.1 - 1.0 mg/dL Final     Comment:     For infants and newborns, interpretation of results should be based  on gestational age, weight and in agreement with clinical  observations.    Premature Infant recommended reference ranges:  Up to 24 hours.............<8.0 mg/dL  Up to 48 hours............<12.0 mg/dL  3-5 days..................<15.0 mg/dL  6-29 days.................<15.0 mg/dL       Alkaline Phosphatase   Date Value Ref Range Status   01/10/2025 66 55 - 135 U/L Final     AST   Date Value Ref Range Status   01/10/2025 16 10 - 40 U/L Final     ALT   Date Value Ref Range Status   01/10/2025 12 10 - 44 U/L Final     Anion Gap   Date Value Ref Range Status   01/10/2025 7 (L) 8 - 16 mmol/L Final   12/31/2012 14 5 - 15 meq/L Final     eGFR if    Date Value Ref Range Status   07/18/2022 >60.0 >60 mL/min/1.73 m^2 Final     eGFR if non    Date Value Ref Range Status   07/18/2022 52.6 (A) >60 mL/min/1.73 m^2 Final     Comment:     Calculation used to obtain the estimated glomerular filtration  rate (eGFR) is the CKD-EPI equation.        No results found for: "CEA"  No results found for: "PSA"        Assessment/Plan:     Problem List Items Addressed This Visit          Renal/    Persistent proteinuria     Repeat UA next week. Last UA showed 2+ proteinuria            Oncology    Metastatic renal cell carcinoma to lung, right - Primary     Patient continues on Axitinib and Keytruda and is tolerating it well. Will plan CT scan in 6 weeks.         Relevant Orders    CT Chest Abdomen Pelvis W W/O Contrast (XPD)    CT Chest Abdomen Pelvis Without Contrast (XPD)       GI    Nausea     Patient has intermittent nausea using Zofran PRN with relief          Discussion:     Follow up in about 6 weeks (around 3/17/2025) for with Dr. Wasserman and 12 weeks with me.    Electronically signed by Chiquita Almazan, MSN, APRN, AGNP-C, OCN        "

## 2025-02-03 ENCOUNTER — OFFICE VISIT (OUTPATIENT)
Facility: CLINIC | Age: 85
End: 2025-02-03
Payer: MEDICARE

## 2025-02-03 VITALS
HEART RATE: 86 BPM | SYSTOLIC BLOOD PRESSURE: 158 MMHG | RESPIRATION RATE: 17 BRPM | WEIGHT: 138 LBS | TEMPERATURE: 97 F | DIASTOLIC BLOOD PRESSURE: 77 MMHG | BODY MASS INDEX: 26.95 KG/M2

## 2025-02-03 DIAGNOSIS — C78.01 METASTATIC RENAL CELL CARCINOMA TO LUNG, RIGHT: Primary | ICD-10-CM

## 2025-02-03 DIAGNOSIS — R11.0 NAUSEA: ICD-10-CM

## 2025-02-03 DIAGNOSIS — C64.9 METASTATIC RENAL CELL CARCINOMA TO LUNG, RIGHT: Primary | ICD-10-CM

## 2025-02-03 DIAGNOSIS — R80.1 PERSISTENT PROTEINURIA: ICD-10-CM

## 2025-02-03 PROCEDURE — 99999 PR PBB SHADOW E&M-EST. PATIENT-LVL IV: CPT | Mod: PBBFAC,,, | Performed by: NURSE PRACTITIONER

## 2025-02-03 PROCEDURE — G2211 COMPLEX E/M VISIT ADD ON: HCPCS | Mod: S$PBB,,, | Performed by: NURSE PRACTITIONER

## 2025-02-03 PROCEDURE — 99215 OFFICE O/P EST HI 40 MIN: CPT | Mod: S$PBB,,, | Performed by: NURSE PRACTITIONER

## 2025-02-03 PROCEDURE — 99214 OFFICE O/P EST MOD 30 MIN: CPT | Mod: PBBFAC,PN | Performed by: NURSE PRACTITIONER

## 2025-02-03 NOTE — ASSESSMENT & PLAN NOTE
Patient continues on Axitinib and Keytruda and is tolerating it well. Will plan CT scan in 6 weeks.

## 2025-02-13 ENCOUNTER — TELEPHONE (OUTPATIENT)
Facility: CLINIC | Age: 85
End: 2025-02-13
Payer: MEDICARE

## 2025-02-13 DIAGNOSIS — C64.9 METASTATIC RENAL CELL CARCINOMA TO LUNG, RIGHT: Primary | ICD-10-CM

## 2025-02-13 DIAGNOSIS — R53.83 FATIGUE, UNSPECIFIED TYPE: ICD-10-CM

## 2025-02-13 DIAGNOSIS — C78.01 METASTATIC RENAL CELL CARCINOMA TO LUNG, RIGHT: Primary | ICD-10-CM

## 2025-02-14 ENCOUNTER — LAB VISIT (OUTPATIENT)
Dept: LAB | Facility: HOSPITAL | Age: 85
End: 2025-02-14
Attending: NURSE PRACTITIONER
Payer: MEDICARE

## 2025-02-14 DIAGNOSIS — R53.83 FATIGUE, UNSPECIFIED TYPE: ICD-10-CM

## 2025-02-14 DIAGNOSIS — C78.01 METASTATIC RENAL CELL CARCINOMA TO LUNG, RIGHT: ICD-10-CM

## 2025-02-14 DIAGNOSIS — C64.9 METASTATIC RENAL CELL CARCINOMA TO LUNG, RIGHT: ICD-10-CM

## 2025-02-14 LAB
ALBUMIN SERPL BCP-MCNC: 4 G/DL (ref 3.5–5.2)
ALP SERPL-CCNC: 65 U/L (ref 55–135)
ALT SERPL W/O P-5'-P-CCNC: 15 U/L (ref 10–44)
ANION GAP SERPL CALC-SCNC: 7 MMOL/L (ref 8–16)
AST SERPL-CCNC: 21 U/L (ref 10–40)
BACTERIA #/AREA URNS HPF: NORMAL /HPF
BASOPHILS # BLD AUTO: 0.04 K/UL (ref 0–0.2)
BASOPHILS NFR BLD: 0.6 % (ref 0–1.9)
BILIRUB SERPL-MCNC: 0.6 MG/DL (ref 0.1–1)
BILIRUB UR QL STRIP: NEGATIVE
BUN SERPL-MCNC: 19 MG/DL (ref 8–23)
CALCIUM SERPL-MCNC: 9.5 MG/DL (ref 8.7–10.5)
CHLORIDE SERPL-SCNC: 104 MMOL/L (ref 95–110)
CLARITY UR: CLEAR
CO2 SERPL-SCNC: 33 MMOL/L (ref 23–29)
COLOR UR: YELLOW
CREAT SERPL-MCNC: 1.2 MG/DL (ref 0.5–1.4)
DIFFERENTIAL METHOD BLD: ABNORMAL
EOSINOPHIL # BLD AUTO: 0.8 K/UL (ref 0–0.5)
EOSINOPHIL NFR BLD: 11.7 % (ref 0–8)
ERYTHROCYTE [DISTWIDTH] IN BLOOD BY AUTOMATED COUNT: 13.8 % (ref 11.5–14.5)
EST. GFR  (NO RACE VARIABLE): 44.4 ML/MIN/1.73 M^2
GLUCOSE SERPL-MCNC: 85 MG/DL (ref 70–110)
GLUCOSE UR QL STRIP: NEGATIVE
HCT VFR BLD AUTO: 39.8 % (ref 37–48.5)
HGB BLD-MCNC: 12.4 G/DL (ref 12–16)
HGB UR QL STRIP: NEGATIVE
HYALINE CASTS #/AREA URNS LPF: 0 /LPF
IMM GRANULOCYTES # BLD AUTO: 0.02 K/UL (ref 0–0.04)
IMM GRANULOCYTES NFR BLD AUTO: 0.3 % (ref 0–0.5)
KETONES UR QL STRIP: NEGATIVE
LEUKOCYTE ESTERASE UR QL STRIP: NEGATIVE
LYMPHOCYTES # BLD AUTO: 1.5 K/UL (ref 1–4.8)
LYMPHOCYTES NFR BLD: 21.4 % (ref 18–48)
MCH RBC QN AUTO: 28 PG (ref 27–31)
MCHC RBC AUTO-ENTMCNC: 31.2 G/DL (ref 32–36)
MCV RBC AUTO: 90 FL (ref 82–98)
MICROSCOPIC COMMENT: NORMAL
MONOCYTES # BLD AUTO: 0.5 K/UL (ref 0.3–1)
MONOCYTES NFR BLD: 7.5 % (ref 4–15)
NEUTROPHILS # BLD AUTO: 4.2 K/UL (ref 1.8–7.7)
NEUTROPHILS NFR BLD: 58.5 % (ref 38–73)
NITRITE UR QL STRIP: NEGATIVE
NRBC BLD-RTO: 0 /100 WBC
PH UR STRIP: 6 [PH] (ref 5–8)
PLATELET # BLD AUTO: 240 K/UL (ref 150–450)
PMV BLD AUTO: 10 FL (ref 9.2–12.9)
POTASSIUM SERPL-SCNC: 3.9 MMOL/L (ref 3.5–5.1)
PROT SERPL-MCNC: 6.8 G/DL (ref 6–8.4)
PROT UR QL STRIP: ABNORMAL
RBC # BLD AUTO: 4.43 M/UL (ref 4–5.4)
RBC #/AREA URNS HPF: 1 /HPF (ref 0–4)
SODIUM SERPL-SCNC: 144 MMOL/L (ref 136–145)
SP GR UR STRIP: 1.01 (ref 1–1.03)
T4 FREE SERPL-MCNC: 0.7 NG/DL (ref 0.71–1.51)
TSH SERPL DL<=0.005 MIU/L-ACNC: 6.81 UIU/ML (ref 0.34–5.6)
URN SPEC COLLECT METH UR: ABNORMAL
UROBILINOGEN UR STRIP-ACNC: NEGATIVE EU/DL
WBC # BLD AUTO: 7.09 K/UL (ref 3.9–12.7)
WBC #/AREA URNS HPF: 2 /HPF (ref 0–5)

## 2025-02-14 PROCEDURE — 36415 COLL VENOUS BLD VENIPUNCTURE: CPT | Performed by: NURSE PRACTITIONER

## 2025-02-14 PROCEDURE — 84439 ASSAY OF FREE THYROXINE: CPT | Performed by: NURSE PRACTITIONER

## 2025-02-14 PROCEDURE — 84443 ASSAY THYROID STIM HORMONE: CPT | Performed by: NURSE PRACTITIONER

## 2025-02-14 PROCEDURE — 81001 URINALYSIS AUTO W/SCOPE: CPT | Performed by: NURSE PRACTITIONER

## 2025-02-14 PROCEDURE — 85025 COMPLETE CBC W/AUTO DIFF WBC: CPT | Performed by: NURSE PRACTITIONER

## 2025-02-14 PROCEDURE — 80053 COMPREHEN METABOLIC PANEL: CPT | Performed by: NURSE PRACTITIONER

## 2025-02-14 RX ORDER — HEPARIN 100 UNIT/ML
500 SYRINGE INTRAVENOUS
OUTPATIENT
Start: 2025-02-17

## 2025-02-14 RX ORDER — EPINEPHRINE 0.3 MG/.3ML
0.3 INJECTION SUBCUTANEOUS ONCE AS NEEDED
OUTPATIENT
Start: 2025-02-17

## 2025-02-14 RX ORDER — DIPHENHYDRAMINE HYDROCHLORIDE 50 MG/ML
50 INJECTION INTRAMUSCULAR; INTRAVENOUS ONCE AS NEEDED
OUTPATIENT
Start: 2025-02-17

## 2025-02-14 RX ORDER — SODIUM CHLORIDE 0.9 % (FLUSH) 0.9 %
10 SYRINGE (ML) INJECTION
OUTPATIENT
Start: 2025-02-17

## 2025-02-17 ENCOUNTER — INFUSION (OUTPATIENT)
Dept: INFUSION THERAPY | Facility: HOSPITAL | Age: 85
End: 2025-02-17
Attending: INTERNAL MEDICINE
Payer: MEDICARE

## 2025-02-17 VITALS
OXYGEN SATURATION: 100 % | SYSTOLIC BLOOD PRESSURE: 150 MMHG | DIASTOLIC BLOOD PRESSURE: 83 MMHG | HEART RATE: 73 BPM | BODY MASS INDEX: 27.29 KG/M2 | RESPIRATION RATE: 18 BRPM | HEIGHT: 60 IN | WEIGHT: 139 LBS | TEMPERATURE: 97 F

## 2025-02-17 DIAGNOSIS — C78.01 METASTATIC RENAL CELL CARCINOMA TO LUNG, RIGHT: Primary | ICD-10-CM

## 2025-02-17 DIAGNOSIS — C64.9 METASTATIC RENAL CELL CARCINOMA TO LUNG, RIGHT: Primary | ICD-10-CM

## 2025-02-17 PROCEDURE — A4216 STERILE WATER/SALINE, 10 ML: HCPCS | Performed by: INTERNAL MEDICINE

## 2025-02-17 PROCEDURE — 96413 CHEMO IV INFUSION 1 HR: CPT

## 2025-02-17 PROCEDURE — 25000003 PHARM REV CODE 250: Performed by: INTERNAL MEDICINE

## 2025-02-17 PROCEDURE — 63600175 PHARM REV CODE 636 W HCPCS: Performed by: INTERNAL MEDICINE

## 2025-02-17 RX ORDER — HEPARIN 100 UNIT/ML
500 SYRINGE INTRAVENOUS
Status: DISCONTINUED | OUTPATIENT
Start: 2025-02-17 | End: 2025-02-17 | Stop reason: HOSPADM

## 2025-02-17 RX ORDER — DIPHENHYDRAMINE HYDROCHLORIDE 50 MG/ML
50 INJECTION INTRAMUSCULAR; INTRAVENOUS ONCE AS NEEDED
Status: DISCONTINUED | OUTPATIENT
Start: 2025-02-17 | End: 2025-02-17 | Stop reason: HOSPADM

## 2025-02-17 RX ORDER — SODIUM CHLORIDE 0.9 % (FLUSH) 0.9 %
10 SYRINGE (ML) INJECTION
Status: DISCONTINUED | OUTPATIENT
Start: 2025-02-17 | End: 2025-02-17 | Stop reason: HOSPADM

## 2025-02-17 RX ORDER — EPINEPHRINE 0.3 MG/.3ML
0.3 INJECTION SUBCUTANEOUS ONCE AS NEEDED
Status: DISCONTINUED | OUTPATIENT
Start: 2025-02-17 | End: 2025-02-17 | Stop reason: HOSPADM

## 2025-02-17 RX ADMIN — SODIUM CHLORIDE 200 MG: 9 INJECTION, SOLUTION INTRAVENOUS at 10:02

## 2025-02-17 RX ADMIN — HEPARIN 500 UNITS: 100 SYRINGE at 11:02

## 2025-02-17 RX ADMIN — SODIUM CHLORIDE: 9 INJECTION, SOLUTION INTRAVENOUS at 10:02

## 2025-02-17 RX ADMIN — SODIUM CHLORIDE, PRESERVATIVE FREE 10 ML: 5 INJECTION INTRAVENOUS at 10:02

## 2025-02-17 NOTE — PLAN OF CARE
Problem: Fatigue  Goal: Improved Activity Tolerance  Outcome: Progressing  Intervention: Promote Improved Energy  Flowsheets (Taken 2/17/2025 1003)  Fatigue Management: fatigue-related activity identified  Sleep/Rest Enhancement: noise level reduced  Activity Management: Up in chair - L3  Environmental Support: environmental consistency promoted

## 2025-03-06 ENCOUNTER — LAB VISIT (OUTPATIENT)
Dept: LAB | Facility: HOSPITAL | Age: 85
End: 2025-03-06
Attending: NURSE PRACTITIONER
Payer: MEDICARE

## 2025-03-06 DIAGNOSIS — C78.01 METASTATIC RENAL CELL CARCINOMA TO LUNG, RIGHT: ICD-10-CM

## 2025-03-06 DIAGNOSIS — C64.9 METASTATIC RENAL CELL CARCINOMA TO LUNG, RIGHT: ICD-10-CM

## 2025-03-06 LAB
BACTERIA #/AREA URNS HPF: NORMAL /HPF
BASOPHILS # BLD AUTO: 0.02 K/UL (ref 0–0.2)
BASOPHILS NFR BLD: 0.3 % (ref 0–1.9)
BILIRUB UR QL STRIP: NEGATIVE
CLARITY UR: CLEAR
COLOR UR: YELLOW
DIFFERENTIAL METHOD BLD: ABNORMAL
EOSINOPHIL # BLD AUTO: 0.6 K/UL (ref 0–0.5)
EOSINOPHIL NFR BLD: 10 % (ref 0–8)
ERYTHROCYTE [DISTWIDTH] IN BLOOD BY AUTOMATED COUNT: 13.5 % (ref 11.5–14.5)
GLUCOSE UR QL STRIP: NEGATIVE
HCT VFR BLD AUTO: 35.2 % (ref 37–48.5)
HGB BLD-MCNC: 11.3 G/DL (ref 12–16)
HGB UR QL STRIP: ABNORMAL
HYALINE CASTS #/AREA URNS LPF: 0 /LPF
IMM GRANULOCYTES # BLD AUTO: 0.02 K/UL (ref 0–0.04)
IMM GRANULOCYTES NFR BLD AUTO: 0.3 % (ref 0–0.5)
KETONES UR QL STRIP: NEGATIVE
LEUKOCYTE ESTERASE UR QL STRIP: NEGATIVE
LYMPHOCYTES # BLD AUTO: 1.5 K/UL (ref 1–4.8)
LYMPHOCYTES NFR BLD: 23.6 % (ref 18–48)
MCH RBC QN AUTO: 28.3 PG (ref 27–31)
MCHC RBC AUTO-ENTMCNC: 32.1 G/DL (ref 32–36)
MCV RBC AUTO: 88 FL (ref 82–98)
MICROSCOPIC COMMENT: NORMAL
MONOCYTES # BLD AUTO: 0.6 K/UL (ref 0.3–1)
MONOCYTES NFR BLD: 9.1 % (ref 4–15)
NEUTROPHILS # BLD AUTO: 3.6 K/UL (ref 1.8–7.7)
NEUTROPHILS NFR BLD: 56.7 % (ref 38–73)
NITRITE UR QL STRIP: NEGATIVE
NRBC BLD-RTO: 0 /100 WBC
PH UR STRIP: 6 [PH] (ref 5–8)
PLATELET # BLD AUTO: 168 K/UL (ref 150–450)
PMV BLD AUTO: 9 FL (ref 9.2–12.9)
PROT UR QL STRIP: ABNORMAL
RBC # BLD AUTO: 3.99 M/UL (ref 4–5.4)
RBC #/AREA URNS HPF: 0 /HPF (ref 0–4)
SP GR UR STRIP: 1.01 (ref 1–1.03)
SQUAMOUS #/AREA URNS HPF: 0 /HPF
URN SPEC COLLECT METH UR: ABNORMAL
UROBILINOGEN UR STRIP-ACNC: NEGATIVE EU/DL
WBC # BLD AUTO: 6.39 K/UL (ref 3.9–12.7)
WBC #/AREA URNS HPF: 2 /HPF (ref 0–5)

## 2025-03-06 PROCEDURE — 85025 COMPLETE CBC W/AUTO DIFF WBC: CPT | Performed by: NURSE PRACTITIONER

## 2025-03-06 PROCEDURE — 80053 COMPREHEN METABOLIC PANEL: CPT | Performed by: NURSE PRACTITIONER

## 2025-03-06 PROCEDURE — 36415 COLL VENOUS BLD VENIPUNCTURE: CPT | Performed by: NURSE PRACTITIONER

## 2025-03-06 PROCEDURE — 81001 URINALYSIS AUTO W/SCOPE: CPT | Performed by: NURSE PRACTITIONER

## 2025-03-07 LAB
ALBUMIN SERPL BCP-MCNC: 3.8 G/DL (ref 3.5–5.2)
ALP SERPL-CCNC: 62 U/L (ref 55–135)
ALT SERPL W/O P-5'-P-CCNC: 16 U/L (ref 10–44)
ANION GAP SERPL CALC-SCNC: 7 MMOL/L (ref 8–16)
AST SERPL-CCNC: 18 U/L (ref 10–40)
BILIRUB SERPL-MCNC: 0.4 MG/DL (ref 0.1–1)
BUN SERPL-MCNC: 21 MG/DL (ref 8–23)
CALCIUM SERPL-MCNC: 8.6 MG/DL (ref 8.7–10.5)
CHLORIDE SERPL-SCNC: 105 MMOL/L (ref 95–110)
CO2 SERPL-SCNC: 28 MMOL/L (ref 23–29)
CREAT SERPL-MCNC: 1.2 MG/DL (ref 0.5–1.4)
EST. GFR  (NO RACE VARIABLE): 44.4 ML/MIN/1.73 M^2
GLUCOSE SERPL-MCNC: 104 MG/DL (ref 70–110)
POTASSIUM SERPL-SCNC: 4.1 MMOL/L (ref 3.5–5.1)
PROT SERPL-MCNC: 6 G/DL (ref 6–8.4)
SODIUM SERPL-SCNC: 140 MMOL/L (ref 136–145)

## 2025-03-07 RX ORDER — DIPHENHYDRAMINE HYDROCHLORIDE 50 MG/ML
50 INJECTION INTRAMUSCULAR; INTRAVENOUS ONCE AS NEEDED
OUTPATIENT
Start: 2025-03-10

## 2025-03-07 RX ORDER — EPINEPHRINE 0.3 MG/.3ML
0.3 INJECTION SUBCUTANEOUS ONCE AS NEEDED
OUTPATIENT
Start: 2025-03-10

## 2025-03-07 RX ORDER — SODIUM CHLORIDE 0.9 % (FLUSH) 0.9 %
10 SYRINGE (ML) INJECTION
OUTPATIENT
Start: 2025-03-10

## 2025-03-07 RX ORDER — HEPARIN 100 UNIT/ML
500 SYRINGE INTRAVENOUS
OUTPATIENT
Start: 2025-03-10

## 2025-03-10 ENCOUNTER — INFUSION (OUTPATIENT)
Dept: INFUSION THERAPY | Facility: HOSPITAL | Age: 85
End: 2025-03-10
Attending: INTERNAL MEDICINE
Payer: MEDICARE

## 2025-03-10 VITALS
HEIGHT: 60 IN | HEART RATE: 88 BPM | BODY MASS INDEX: 27.68 KG/M2 | SYSTOLIC BLOOD PRESSURE: 102 MMHG | TEMPERATURE: 98 F | WEIGHT: 141 LBS | OXYGEN SATURATION: 97 % | DIASTOLIC BLOOD PRESSURE: 56 MMHG | RESPIRATION RATE: 18 BRPM

## 2025-03-10 DIAGNOSIS — C64.9 METASTATIC RENAL CELL CARCINOMA TO LUNG, RIGHT: Primary | ICD-10-CM

## 2025-03-10 DIAGNOSIS — C78.01 METASTATIC RENAL CELL CARCINOMA TO LUNG, RIGHT: Primary | ICD-10-CM

## 2025-03-10 PROCEDURE — 96413 CHEMO IV INFUSION 1 HR: CPT

## 2025-03-10 PROCEDURE — A4216 STERILE WATER/SALINE, 10 ML: HCPCS | Performed by: INTERNAL MEDICINE

## 2025-03-10 PROCEDURE — 25000003 PHARM REV CODE 250: Performed by: INTERNAL MEDICINE

## 2025-03-10 PROCEDURE — 63600175 PHARM REV CODE 636 W HCPCS: Mod: JZ,TB | Performed by: INTERNAL MEDICINE

## 2025-03-10 RX ORDER — HEPARIN 100 UNIT/ML
500 SYRINGE INTRAVENOUS
Status: DISCONTINUED | OUTPATIENT
Start: 2025-03-10 | End: 2025-03-10 | Stop reason: HOSPADM

## 2025-03-10 RX ORDER — SODIUM CHLORIDE 0.9 % (FLUSH) 0.9 %
10 SYRINGE (ML) INJECTION
Status: DISCONTINUED | OUTPATIENT
Start: 2025-03-10 | End: 2025-03-10 | Stop reason: HOSPADM

## 2025-03-10 RX ORDER — DIPHENHYDRAMINE HYDROCHLORIDE 50 MG/ML
50 INJECTION, SOLUTION INTRAMUSCULAR; INTRAVENOUS ONCE AS NEEDED
Status: DISCONTINUED | OUTPATIENT
Start: 2025-03-10 | End: 2025-03-10 | Stop reason: HOSPADM

## 2025-03-10 RX ORDER — EPINEPHRINE 0.3 MG/.3ML
0.3 INJECTION SUBCUTANEOUS ONCE AS NEEDED
Status: DISCONTINUED | OUTPATIENT
Start: 2025-03-10 | End: 2025-03-10 | Stop reason: HOSPADM

## 2025-03-10 RX ADMIN — SODIUM CHLORIDE 200 MG: 9 INJECTION, SOLUTION INTRAVENOUS at 03:03

## 2025-03-10 RX ADMIN — HEPARIN 500 UNITS: 100 SYRINGE at 03:03

## 2025-03-10 RX ADMIN — Medication 10 ML: at 03:03

## 2025-03-10 NOTE — PLAN OF CARE
Problem: Fall Injury Risk  Goal: Absence of Fall and Fall-Related Injury  Outcome: Progressing  Intervention: Identify and Manage Contributors  Flowsheets (Taken 3/10/2025 1457)  Self-Care Promotion: independence encouraged  Medication Review/Management: medications reviewed  Intervention: Promote Injury-Free Environment  Flowsheets (Taken 3/10/2025 5917)  Safety Promotion/Fall Prevention: medications reviewed

## 2025-03-14 ENCOUNTER — TELEPHONE (OUTPATIENT)
Facility: CLINIC | Age: 85
End: 2025-03-14
Payer: MEDICARE

## 2025-03-14 ENCOUNTER — HOSPITAL ENCOUNTER (OUTPATIENT)
Dept: RADIOLOGY | Facility: HOSPITAL | Age: 85
Discharge: HOME | End: 2025-03-14
Attending: NURSE PRACTITIONER
Payer: MEDICARE

## 2025-03-14 DIAGNOSIS — C78.01 METASTATIC RENAL CELL CARCINOMA TO LUNG, RIGHT: ICD-10-CM

## 2025-03-14 DIAGNOSIS — C64.9 METASTATIC RENAL CELL CARCINOMA TO LUNG, RIGHT: ICD-10-CM

## 2025-03-14 PROCEDURE — 74176 CT ABD & PELVIS W/O CONTRAST: CPT | Mod: TC,PO

## 2025-03-14 PROCEDURE — 71250 CT THORAX DX C-: CPT | Mod: 26,,, | Performed by: RADIOLOGY

## 2025-03-14 PROCEDURE — 74176 CT ABD & PELVIS W/O CONTRAST: CPT | Mod: 26,,, | Performed by: RADIOLOGY

## 2025-03-14 NOTE — TELEPHONE ENCOUNTER
Called patient on regard to CT CAP. Per Chiquita Almazan CT needs to be without contrast, patient schedule 03/14/2025 at 3 pm for CT CAP w/o contrast. Patient stated understanding.

## 2025-03-17 ENCOUNTER — RESULTS FOLLOW-UP (OUTPATIENT)
Facility: CLINIC | Age: 85
End: 2025-03-17

## 2025-03-17 ENCOUNTER — TELEPHONE (OUTPATIENT)
Facility: CLINIC | Age: 85
End: 2025-03-17
Payer: MEDICARE

## 2025-03-17 DIAGNOSIS — C64.9 METASTATIC RENAL CELL CARCINOMA TO LUNG, RIGHT: Primary | ICD-10-CM

## 2025-03-17 DIAGNOSIS — C78.01 METASTATIC RENAL CELL CARCINOMA TO LUNG, RIGHT: Primary | ICD-10-CM

## 2025-03-17 DIAGNOSIS — K81.9 CHOLECYSTITIS: ICD-10-CM

## 2025-03-17 NOTE — PROGRESS NOTES
Please notify the patient that their CT was stable other than possible cholecystitis. Refer to gen surg for eval

## 2025-03-17 NOTE — TELEPHONE ENCOUNTER
----- Message from MAY Ba sent at 3/17/2025  2:37 PM CDT -----  Please notify the patient that their CT was stable other than possible cholecystitis. Refer to gen surg for eval  ----- Message -----  From: Interface, Rad Results In  Sent: 3/14/2025   4:32 PM CDT  To: MAY Ba

## 2025-03-17 NOTE — TELEPHONE ENCOUNTER
Called patient on regard to above instructions. A referral to general surgery will be place, patient stated understanding.

## 2025-03-20 ENCOUNTER — OFFICE VISIT (OUTPATIENT)
Facility: CLINIC | Age: 85
End: 2025-03-20
Payer: MEDICARE

## 2025-03-20 VITALS
HEART RATE: 81 BPM | HEIGHT: 66 IN | OXYGEN SATURATION: 99 % | TEMPERATURE: 98 F | WEIGHT: 139.13 LBS | SYSTOLIC BLOOD PRESSURE: 164 MMHG | DIASTOLIC BLOOD PRESSURE: 90 MMHG | BODY MASS INDEX: 22.36 KG/M2

## 2025-03-20 DIAGNOSIS — C78.01 METASTATIC RENAL CELL CARCINOMA TO LUNG, RIGHT: Primary | ICD-10-CM

## 2025-03-20 DIAGNOSIS — C64.9 METASTATIC RENAL CELL CARCINOMA TO LUNG, RIGHT: Primary | ICD-10-CM

## 2025-03-20 PROCEDURE — G2211 COMPLEX E/M VISIT ADD ON: HCPCS | Mod: S$PBB,,, | Performed by: INTERNAL MEDICINE

## 2025-03-20 PROCEDURE — 99999 PR PBB SHADOW E&M-EST. PATIENT-LVL III: CPT | Mod: PBBFAC,,, | Performed by: INTERNAL MEDICINE

## 2025-03-20 PROCEDURE — 99215 OFFICE O/P EST HI 40 MIN: CPT | Mod: S$PBB,,, | Performed by: INTERNAL MEDICINE

## 2025-03-20 PROCEDURE — 99213 OFFICE O/P EST LOW 20 MIN: CPT | Mod: PBBFAC,PN | Performed by: INTERNAL MEDICINE

## 2025-03-20 NOTE — ASSESSMENT & PLAN NOTE
Patient has stable disease on imaging and I discussed this today.  She continues to tolerate the therapy well and has no sign of AI disease.  Will continue therapy as planned and as labs allow and will continue close monitoring of patient overall.      Note is made of GB changes on CT.  She has no clear signs or symptoms of cholecystitis at this time.  Will continue to monitor.

## 2025-03-20 NOTE — PROGRESS NOTES
PROGRESS NOTE    Subjective:       Patient ID: Karlie Hess is a 84 y.o. female.    History of Left RCC, treated [id number] further treatment     History of [facility] lung cancer-treated with lobectomy-7/2/2015-no chemo/xrt-Path c/w mucin producing adenocarcinoma. [id number]     History of left hilar mass, 9/2020 EBUS negative     PET 10/18/2023:  Nodular densities are as outlined below:  -21 x 17 mm right infrahilar nodule with SUV max 2.5 (image 119), previously measuring 17 x 16 mm (August 20, 2023)  -27 x 27 mm marginated nodule in the posterior inferior left hilum with SUV max 2.7 (image 109)  -13 x 7 mm nodular density along the right posterior pararenal space with SUV max 0.8 (image 179), seen on studies dating back to 1/12/2021.     11/9/2023-EBUS  1. LUNG, RIGHT LOWER LOBE MASS, FINE NEEDLE ASPIRATION   - NEGATIVE FOR MALIGNANT CELLS.     2. LUNG, RIGHT LOWER LOBE MASS, BRUSHING   - RARE ATYPICAL CELLS PRESENT     3. LUNG, RIGHT LOWER LOBE MASS, BIOPSY TOUCH PREP   - POSITIVE FOR CARCINOMA.   - SEE CONCURRENT SURGICAL BIOPSY ([id number])     4. LUNG, RIGHT LOWER LOBE, BRONCHOALVEOLAR LAVAGE   - RARE ATYPICAL CELLS PRESENT.     5. LUNG, LEFT LOWER LOBE MASS, FINE NEEDLE ASPIRATION   - RARE ATYPICAL CELLS PRESENT     6. LYMPH NODE, STATION 11RS, FINE NEEDLE ASPIRATION   - NEGATIVE FOR MALIGNANT CELLS.   - SCANT KATARZYNA MATERIAL PRESENT      Karnofsky performance status score <80   Time from original diagnosis to initiation of targeted therapy <1 year   Hemoglobin less than the lower limit of normal   Serum calcium greater than the upper limit of normal   Neutrophil count greater than the upper limit of normal   Platelet count greater than the upper limit of normal   Favorable risk: None of the above risk factors present.  Intermediate risk: 1 or 2 of the above risk factors present.  Poor risk: 3 or more risk factors present.     1/5/2024:  RIGHT  PARARENAL MASS, CT GUIDED CORE BIOPSY:   - CLEAR CELL RENAL CELL CARCINOMA     12/27/2023-1/5/2024  RLL SBRT    1/30/2024-2/7/2024  XRT to Rt. Renal bed lesion    5/3/2024-MRI Abd:--Progressive Disease  Right kidney mass  2nd mass in lower pole of right kidney  Pancreatic Tail mass    5/13/2024-Bx:  PANCREATIC TAIL MASS, BIOPSY:   - METASTATIC CLEAR CELL RENAL CELL CARCINOMA.     7/31/2024-Patient went to ED with severe HA, MRI Brain:  No metastatic disease    6/7/2024-Pembro/Axitinib:  Cycle 1: 6/7/2024  Cycle 2: 6/28/2024  Cycle 3: 7/19/2024  Cycle 4: 11/15/2024  Cycle 5: 12/6/2024  Cycle 6: 12/27/2024  Cycle 7: 1/17/2025  Cycle 8: 2/17/2025  Cycle 9: 3/10/2025  Cycle 10: 3/31/2025-due    No apparent drug interactions with topirimate/Axitinib    Prednisone and nurtec    Held Axitinib 10/17/2024 due to proteinuria    Chief Complaint:  No chief complaint on file.  Metastatic Renal Cell Carcinoma follow up    History of Present Illness:   Karlie Hess is a 84 y.o. female who presents for follow up of above.      She resumed the Axitinib on 1/15/2024 at 3mg bid.   Also back on pembro.      Patient is doing ok today.  No new complaints at this time.  She notes some RLQ pain which is episodic and has been present for years.  CT shows stable disease and ? Choly but she has no clear symptoms of this.     BP regimen  Lebtolol 100mg bid  Lisinopril 20mg bid  Amlodipine 5mg qhs    Family and Social history reviewed and is unchanged from 11/21/2023             Current Outpatient Medications:     acetaminophen (TYLENOL) 500 MG tablet, Take 1,000 mg by mouth 2 (two) times daily as needed., Disp: , Rfl:     amLODIPine (NORVASC) 5 MG tablet, TAKE 1 TABLET BY MOUTH EVERY  EVENING, Disp: 90 tablet, Rfl: 3    aspirin (ECOTRIN) 81 MG EC tablet, Take 1 tablet (81 mg total) by mouth once daily., Disp: , Rfl:     axitinib (INLYTA) 1 mg Tab, Take 3 tablets (3 mg total) by mouth 2 (two) times a day., Disp: 180 tablet, Rfl: 6     "cloNIDine (CATAPRES) 0.1 MG tablet, Take 1 tablet (0.1 mg total) by mouth 3 (three) times daily as needed. For systolic BP over 180, Disp: 90 tablet, Rfl: 0    cyanocobalamin (VITAMIN B-12) 1000 MCG tablet, Take 100 mcg by mouth once daily., Disp: , Rfl:     duloxetine (CYMBALTA) 60 MG capsule, Take 60 mg by mouth once daily., Disp: , Rfl:     labetaloL (NORMODYNE) 200 MG tablet, TAKE 1 TABLET BY MOUTH TWICE  DAILY, Disp: 180 tablet, Rfl: 3    losartan (COZAAR) 50 MG tablet, Take 1 tablet (50 mg total) by mouth 2 (two) times a day., Disp: 180 tablet, Rfl: 3    ondansetron (ZOFRAN) 8 MG tablet, Take 1 tablet (8 mg total) by mouth every 8 (eight) hours as needed for Nausea., Disp: 30 tablet, Rfl: 5    pantoprazole (PROTONIX) 40 MG tablet, Take 40 mg by mouth once daily., Disp: , Rfl:     promethazine (PHENERGAN) 25 MG tablet, Take 1 tablet (25 mg total) by mouth every 6 (six) hours as needed for Nausea., Disp: 30 tablet, Rfl: 5    rosuvastatin (CRESTOR) 10 MG tablet, Take 10 mg by mouth every evening., Disp: , Rfl:     hydroCHLOROthiazide (HYDRODIURIL) 12.5 MG Tab, Take 1 tablet (12.5 mg total) by mouth daily as needed (Leg swelling)., Disp: 30 tablet, Rfl: 11    topiramate (TOPAMAX) 25 MG tablet, Take 1 tablet (25 mg total) by mouth every evening. for 14 doses, Disp: 14 tablet, Rfl: 0  No current facility-administered medications for this visit.    Facility-Administered Medications Ordered in Other Visits:     LIDOcaine (PF) 10 mg/ml (1%) injection 10 mg, 1 mL, Intradermal, Once, Gabriel Loaiza MD        Objective:       Physical Examination:     BP (!) 188/88 (BP Location: Left arm, Patient Position: Sitting)   Pulse 81   Temp 97.8 °F (36.6 °C) (Temporal)   Ht 5' 6" (1.676 m) Comment: per the pt  Wt 63.1 kg (139 lb 1.6 oz)   SpO2 99%   BMI 22.45 kg/m²     Physical Exam  Constitutional:       Appearance: Normal appearance.   HENT:      Head: Normocephalic and atraumatic.   Eyes:      General: No scleral " icterus.     Conjunctiva/sclera: Conjunctivae normal.   Cardiovascular:      Rate and Rhythm: Normal rate.   Pulmonary:      Effort: Pulmonary effort is normal.   Abdominal:      General: Abdomen is flat.   Neurological:      General: No focal deficit present.      Mental Status: She is alert and oriented to person, place, and time.   Psychiatric:         Mood and Affect: Mood normal.         Behavior: Behavior normal.         Labs:   Recent Results (from the past 2 weeks)   CBC W/ AUTO DIFFERENTIAL    Collection Time: 03/06/25  1:27 PM   Result Value Ref Range    WBC 6.39 3.90 - 12.70 K/uL    Hemoglobin 11.3 (L) 12.0 - 16.0 g/dL    Hematocrit 35.2 (L) 37.0 - 48.5 %    Platelets 168 150 - 450 K/uL       CMP  Sodium   Date Value Ref Range Status   03/06/2025 140 136 - 145 mmol/L Final     Potassium   Date Value Ref Range Status   03/06/2025 4.1 3.5 - 5.1 mmol/L Final     Chloride   Date Value Ref Range Status   03/06/2025 105 95 - 110 mmol/L Final     CO2   Date Value Ref Range Status   03/06/2025 28 23 - 29 mmol/L Final     Glucose   Date Value Ref Range Status   03/06/2025 104 70 - 110 mg/dL Final     BUN   Date Value Ref Range Status   03/06/2025 21 8 - 23 mg/dL Final     Creatinine   Date Value Ref Range Status   03/06/2025 1.2 0.5 - 1.4 mg/dL Final   12/31/2012 1.2 0.5 - 1.4 mg/dL Final     Calcium   Date Value Ref Range Status   03/06/2025 8.6 (L) 8.7 - 10.5 mg/dL Final   12/31/2012 9.7 8.7 - 10.5 mg/dL Final     Total Protein   Date Value Ref Range Status   03/06/2025 6.0 6.0 - 8.4 g/dL Final     Albumin   Date Value Ref Range Status   03/06/2025 3.8 3.5 - 5.2 g/dL Final     Total Bilirubin   Date Value Ref Range Status   03/06/2025 0.4 0.1 - 1.0 mg/dL Final     Comment:     For infants and newborns, interpretation of results should be based  on gestational age, weight and in agreement with clinical  observations.    Premature Infant recommended reference ranges:  Up to 24 hours.............<8.0 mg/dL  Up to  "48 hours............<12.0 mg/dL  3-5 days..................<15.0 mg/dL  6-29 days.................<15.0 mg/dL       Alkaline Phosphatase   Date Value Ref Range Status   03/06/2025 62 55 - 135 U/L Final     AST   Date Value Ref Range Status   03/06/2025 18 10 - 40 U/L Final     ALT   Date Value Ref Range Status   03/06/2025 16 10 - 44 U/L Final     Anion Gap   Date Value Ref Range Status   03/06/2025 7 (L) 8 - 16 mmol/L Final   12/31/2012 14 5 - 15 meq/L Final     eGFR if    Date Value Ref Range Status   07/18/2022 >60.0 >60 mL/min/1.73 m^2 Final     eGFR if non    Date Value Ref Range Status   07/18/2022 52.6 (A) >60 mL/min/1.73 m^2 Final     Comment:     Calculation used to obtain the estimated glomerular filtration  rate (eGFR) is the CKD-EPI equation.        No results found for: "CEA"  No results found for: "PSA"        Assessment/Plan:     Problem List Items Addressed This Visit       Metastatic renal cell carcinoma to lung, right - Primary    Patient has stable disease on imaging and I discussed this today.  She continues to tolerate the therapy well and has no sign of AI disease.  Will continue therapy as planned and as labs allow and will continue close monitoring of patient overall.      Note is made of GB changes on CT.  She has no clear signs or symptoms of cholecystitis at this time.  Will continue to monitor.                               Discussion:     Follow up in about 6 weeks (around 5/1/2025).      Electronically signed by Del Nathan      "

## 2025-03-28 ENCOUNTER — TELEPHONE (OUTPATIENT)
Dept: INFUSION THERAPY | Facility: HOSPITAL | Age: 85
End: 2025-03-28

## 2025-03-28 RX ORDER — SODIUM CHLORIDE 0.9 % (FLUSH) 0.9 %
10 SYRINGE (ML) INJECTION
OUTPATIENT
Start: 2025-03-31

## 2025-03-28 RX ORDER — EPINEPHRINE 0.3 MG/.3ML
0.3 INJECTION SUBCUTANEOUS ONCE AS NEEDED
OUTPATIENT
Start: 2025-03-31

## 2025-03-28 RX ORDER — HEPARIN 100 UNIT/ML
500 SYRINGE INTRAVENOUS
OUTPATIENT
Start: 2025-03-31

## 2025-03-28 RX ORDER — DIPHENHYDRAMINE HYDROCHLORIDE 50 MG/ML
50 INJECTION, SOLUTION INTRAMUSCULAR; INTRAVENOUS ONCE AS NEEDED
OUTPATIENT
Start: 2025-03-31

## 2025-03-28 NOTE — TELEPHONE ENCOUNTER
Left  VM for patient regarding need for updated lab work prior to infusion scheduled for 3/31/2025.    Called and spoke with patient's son regarding need for lab work as well. Son verbalized understanding and will give the message to patient.

## 2025-03-29 ENCOUNTER — LAB VISIT (OUTPATIENT)
Dept: LAB | Facility: HOSPITAL | Age: 85
End: 2025-03-29
Attending: NURSE PRACTITIONER
Payer: MEDICARE

## 2025-03-29 DIAGNOSIS — C78.01 METASTATIC RENAL CELL CARCINOMA TO LUNG, RIGHT: ICD-10-CM

## 2025-03-29 DIAGNOSIS — C64.9 METASTATIC RENAL CELL CARCINOMA TO LUNG, RIGHT: ICD-10-CM

## 2025-03-29 LAB
ABSOLUTE EOSINOPHIL (SMH): 0.44 K/UL
ABSOLUTE MONOCYTE (SMH): 0.44 K/UL (ref 0.3–1)
ABSOLUTE NEUTROPHIL COUNT (SMH): 3.5 K/UL (ref 1.8–7.7)
ALBUMIN SERPL-MCNC: 3.8 G/DL (ref 3.5–5.2)
ALP SERPL-CCNC: 68 UNIT/L (ref 55–135)
ALT SERPL-CCNC: 17 UNIT/L (ref 10–44)
ANION GAP (SMH): 12 MMOL/L (ref 8–16)
AST SERPL-CCNC: 20 UNIT/L (ref 10–40)
BACTERIA #/AREA URNS AUTO: ABNORMAL /HPF
BASOPHILS # BLD AUTO: 0.03 K/UL
BASOPHILS NFR BLD AUTO: 0.5 %
BILIRUB SERPL-MCNC: 0.5 MG/DL (ref 0.1–1)
BILIRUB UR QL STRIP.AUTO: ABNORMAL
BUN SERPL-MCNC: 20 MG/DL (ref 8–23)
CALCIUM SERPL-MCNC: 8.7 MG/DL (ref 8.7–10.5)
CHLORIDE SERPL-SCNC: 107 MMOL/L (ref 95–110)
CLARITY UR: CLEAR
CO2 SERPL-SCNC: 26 MMOL/L (ref 23–29)
COLOR UR AUTO: YELLOW
CREAT SERPL-MCNC: 1.4 MG/DL (ref 0.5–1.4)
ERYTHROCYTE [DISTWIDTH] IN BLOOD BY AUTOMATED COUNT: 13.7 % (ref 11.5–14.5)
GFR SERPLBLD CREATININE-BSD FMLA CKD-EPI: 37 ML/MIN/1.73/M2
GLUCOSE SERPL-MCNC: 96 MG/DL (ref 70–110)
GLUCOSE UR QL STRIP: NEGATIVE
HCT VFR BLD AUTO: 37.4 % (ref 37–48.5)
HGB BLD-MCNC: 11.7 GM/DL (ref 12–16)
HGB UR QL STRIP: NEGATIVE
IMM GRANULOCYTES # BLD AUTO: 0.01 K/UL (ref 0–0.04)
IMM GRANULOCYTES NFR BLD AUTO: 0.2 % (ref 0–0.5)
KETONES UR QL STRIP: NEGATIVE
LEUKOCYTE ESTERASE UR QL STRIP: NEGATIVE
LYMPHOCYTES # BLD AUTO: 1.39 K/UL (ref 1–4.8)
MCH RBC QN AUTO: 27.6 PG (ref 27–31)
MCHC RBC AUTO-ENTMCNC: 31.3 G/DL (ref 32–36)
MCV RBC AUTO: 88 FL (ref 82–98)
MICROSCOPIC COMMENT: ABNORMAL
NITRITE UR QL STRIP: POSITIVE
NUCLEATED RBC (/100WBC) (SMH): 0 /100 WBC
PH UR STRIP: 6 [PH]
PLATELET # BLD AUTO: 214 K/UL (ref 150–450)
PMV BLD AUTO: 9.5 FL (ref 9.2–12.9)
POTASSIUM SERPL-SCNC: 4 MMOL/L (ref 3.5–5.1)
PROT SERPL-MCNC: 6.4 GM/DL (ref 6–8.4)
PROT UR QL STRIP: ABNORMAL
RBC # BLD AUTO: 4.24 M/UL (ref 4–5.4)
RBC #/AREA URNS AUTO: 1 /HPF
RELATIVE EOSINOPHIL (SMH): 7.6 % (ref 0–8)
RELATIVE LYMPHOCYTE (SMH): 24.1 % (ref 18–48)
RELATIVE MONOCYTE (SMH): 7.6 % (ref 4–15)
RELATIVE NEUTROPHIL (SMH): 60 % (ref 38–73)
SODIUM SERPL-SCNC: 145 MMOL/L (ref 136–145)
SP GR UR STRIP: 1.02
UROBILINOGEN UR STRIP-ACNC: ABNORMAL EU/DL
WBC # BLD AUTO: 5.77 K/UL (ref 3.9–12.7)
WBC #/AREA URNS AUTO: 1 /HPF

## 2025-03-29 PROCEDURE — 85025 COMPLETE CBC W/AUTO DIFF WBC: CPT

## 2025-03-29 PROCEDURE — 82040 ASSAY OF SERUM ALBUMIN: CPT

## 2025-03-29 PROCEDURE — 36415 COLL VENOUS BLD VENIPUNCTURE: CPT

## 2025-03-29 PROCEDURE — 81003 URINALYSIS AUTO W/O SCOPE: CPT

## 2025-03-31 ENCOUNTER — INFUSION (OUTPATIENT)
Dept: INFUSION THERAPY | Facility: HOSPITAL | Age: 85
End: 2025-03-31
Attending: INTERNAL MEDICINE
Payer: MEDICARE

## 2025-03-31 ENCOUNTER — RESULTS FOLLOW-UP (OUTPATIENT)
Facility: CLINIC | Age: 85
End: 2025-03-31

## 2025-03-31 VITALS
RESPIRATION RATE: 18 BRPM | WEIGHT: 138.88 LBS | HEIGHT: 66 IN | TEMPERATURE: 98 F | HEART RATE: 77 BPM | BODY MASS INDEX: 22.32 KG/M2 | SYSTOLIC BLOOD PRESSURE: 164 MMHG | OXYGEN SATURATION: 97 % | DIASTOLIC BLOOD PRESSURE: 79 MMHG

## 2025-03-31 DIAGNOSIS — C64.9 METASTATIC RENAL CELL CARCINOMA TO LUNG, RIGHT: Primary | ICD-10-CM

## 2025-03-31 DIAGNOSIS — C78.01 METASTATIC RENAL CELL CARCINOMA TO LUNG, RIGHT: Primary | ICD-10-CM

## 2025-03-31 DIAGNOSIS — N39.0 URINARY TRACT INFECTION WITHOUT HEMATURIA, SITE UNSPECIFIED: Primary | ICD-10-CM

## 2025-03-31 DIAGNOSIS — R53.83 FATIGUE, UNSPECIFIED TYPE: ICD-10-CM

## 2025-03-31 LAB
T4 FREE SERPL-MCNC: 0.71 NG/DL (ref 0.71–1.51)
TSH SERPL-ACNC: 7.25 UIU/ML (ref 0.34–5.6)

## 2025-03-31 PROCEDURE — 63600175 PHARM REV CODE 636 W HCPCS: Performed by: INTERNAL MEDICINE

## 2025-03-31 PROCEDURE — 36415 COLL VENOUS BLD VENIPUNCTURE: CPT

## 2025-03-31 PROCEDURE — 96413 CHEMO IV INFUSION 1 HR: CPT

## 2025-03-31 PROCEDURE — 84439 ASSAY OF FREE THYROXINE: CPT | Performed by: NURSE PRACTITIONER

## 2025-03-31 PROCEDURE — 25000003 PHARM REV CODE 250: Performed by: INTERNAL MEDICINE

## 2025-03-31 PROCEDURE — 84443 ASSAY THYROID STIM HORMONE: CPT | Performed by: NURSE PRACTITIONER

## 2025-03-31 PROCEDURE — A4216 STERILE WATER/SALINE, 10 ML: HCPCS | Performed by: INTERNAL MEDICINE

## 2025-03-31 RX ORDER — EPINEPHRINE 0.3 MG/.3ML
0.3 INJECTION SUBCUTANEOUS ONCE AS NEEDED
Status: DISCONTINUED | OUTPATIENT
Start: 2025-03-31 | End: 2025-03-31 | Stop reason: HOSPADM

## 2025-03-31 RX ORDER — HEPARIN 100 UNIT/ML
500 SYRINGE INTRAVENOUS
Status: DISCONTINUED | OUTPATIENT
Start: 2025-03-31 | End: 2025-03-31 | Stop reason: HOSPADM

## 2025-03-31 RX ORDER — DIPHENHYDRAMINE HYDROCHLORIDE 50 MG/ML
50 INJECTION, SOLUTION INTRAMUSCULAR; INTRAVENOUS ONCE AS NEEDED
Status: DISCONTINUED | OUTPATIENT
Start: 2025-03-31 | End: 2025-03-31 | Stop reason: HOSPADM

## 2025-03-31 RX ORDER — SULFAMETHOXAZOLE AND TRIMETHOPRIM 800; 160 MG/1; MG/1
1 TABLET ORAL 2 TIMES DAILY
Qty: 10 TABLET | Refills: 0 | Status: SHIPPED | OUTPATIENT
Start: 2025-03-31

## 2025-03-31 RX ORDER — SODIUM CHLORIDE 0.9 % (FLUSH) 0.9 %
10 SYRINGE (ML) INJECTION
Status: DISCONTINUED | OUTPATIENT
Start: 2025-03-31 | End: 2025-03-31 | Stop reason: HOSPADM

## 2025-03-31 RX ADMIN — Medication 10 ML: at 03:03

## 2025-03-31 RX ADMIN — SODIUM CHLORIDE 200 MG: 9 INJECTION, SOLUTION INTRAVENOUS at 03:03

## 2025-03-31 RX ADMIN — HEPARIN 500 UNITS: 100 SYRINGE at 03:03

## 2025-03-31 NOTE — PROGRESS NOTES
Please see if she is having uti symptoms. UA is abnormal. See how her BP is running as well. Her Protein went up too.

## 2025-03-31 NOTE — TELEPHONE ENCOUNTER
----- Message from MAY Ba sent at 3/31/2025  9:00 AM CDT -----  Please see if she is having uti symptoms. UA is abnormal. See how her BP is running as well. Her Protein went up too.  ----- Message -----  From: Lab, Background User  Sent: 3/29/2025   9:23 AM CDT  To: MAY Ba

## 2025-03-31 NOTE — PLAN OF CARE
Problem: Fatigue  Goal: Improved Activity Tolerance  Outcome: Progressing  Intervention: Promote Improved Energy  Flowsheets (Taken 3/31/2025 8179)  Fatigue Management:   fatigue-related activity identified   paced activity encouraged   frequent rest breaks encouraged  Sleep/Rest Enhancement:   noise level reduced   relaxation techniques promoted   regular sleep/rest pattern promoted  Activity Management:   Ambulated -L4   Up in chair - L3  Environmental Support:   calm environment promoted   distractions minimized   rest periods encouraged

## 2025-03-31 NOTE — TELEPHONE ENCOUNTER
Called patient on regard to uranalysis results, per patient she has not had symptoms, per Chiquita Almazan NP UA is abnormal. See how her BP is running as well. Her Protein went up too. Patient to start Bactrim twice a day for 5 days. Patient stated he BP is better with a reading of 130/78 patient will start treatment and verbalized understanding.

## 2025-04-08 ENCOUNTER — OFFICE VISIT (OUTPATIENT)
Dept: CARDIOLOGY | Facility: CLINIC | Age: 85
End: 2025-04-08
Payer: MEDICARE

## 2025-04-08 VITALS
OXYGEN SATURATION: 97 % | WEIGHT: 137.56 LBS | DIASTOLIC BLOOD PRESSURE: 80 MMHG | HEART RATE: 84 BPM | BODY MASS INDEX: 22.11 KG/M2 | SYSTOLIC BLOOD PRESSURE: 160 MMHG | HEIGHT: 66 IN

## 2025-04-08 DIAGNOSIS — E78.5 HYPERLIPIDEMIA, UNSPECIFIED HYPERLIPIDEMIA TYPE: ICD-10-CM

## 2025-04-08 DIAGNOSIS — I70.0 AORTIC ATHEROSCLEROSIS: ICD-10-CM

## 2025-04-08 DIAGNOSIS — I10 PRIMARY HYPERTENSION: Primary | ICD-10-CM

## 2025-04-08 DIAGNOSIS — C64.9 METASTATIC RENAL CELL CARCINOMA TO LUNG, RIGHT: ICD-10-CM

## 2025-04-08 DIAGNOSIS — C78.01 METASTATIC RENAL CELL CARCINOMA TO LUNG, RIGHT: ICD-10-CM

## 2025-04-08 DIAGNOSIS — I35.1 NONRHEUMATIC AORTIC VALVE INSUFFICIENCY: ICD-10-CM

## 2025-04-08 PROCEDURE — 99213 OFFICE O/P EST LOW 20 MIN: CPT | Mod: S$PBB,,, | Performed by: INTERNAL MEDICINE

## 2025-04-08 PROCEDURE — 99213 OFFICE O/P EST LOW 20 MIN: CPT | Mod: PBBFAC,PN | Performed by: INTERNAL MEDICINE

## 2025-04-08 PROCEDURE — 99999 PR PBB SHADOW E&M-EST. PATIENT-LVL III: CPT | Mod: PBBFAC,,, | Performed by: INTERNAL MEDICINE

## 2025-04-08 NOTE — PROGRESS NOTES
Patient ID:  Karlie Hess is a 84 y.o. female who presents for follow-up of Hypertension and Congestive Heart Failure      Primary hypertension  Controlled on amlodipine, labetalol and lisinopril.     Nonrheumatic aortic valve insufficiency  The echocardiogram to assess the aortic valve.     Metastatic renal cell carcinoma to lung, right  Aware followed by Oncology     Aortic atherosclerosis  Noted on CT scan.    7/8/24   She has metastatic renal cell CA.  She is receiving immunotherapy.  She takes 2 pills for that.  She has been taking labetalol 100 mg in the morning and at noon.  She has been working as an .  Blood pressure taking by me 140/80   04/08/2025.    She has been on losartan 50 mg p.o. b.I.d. her blood pressure has been doing much better.  She has renal cell CA she has been on immunotherapy.  She has white coat syndrome her blood pressure is usually elevated at the doctor's office.  At home her blood pressure has been doing very well.  She continues to work as an  full-time.             Past Medical History:   Diagnosis Date    Aortic insufficiency     Cancer 2005    renal ca, L kidney removed    COVID-19 2022    Diastolic heart failure     Diffusion capacity of lung (dl), decreased     Encounter for blood transfusion 1963    with miscarriage    Fibromyalgia     GERD (gastroesophageal reflux disease)     Hypercholesterolemia     Hyperlipidemia     Hypertension     Lung cancer 2015    adenocarcinoma ; upper left lobe    Renal disorder 2005    Left Kidney cancer    Sinusitis         Past Surgical History:   Procedure Laterality Date    ADENOIDECTOMY      BACK SURGERY  2013    laminectomy    ENDOBRONCHIAL ULTRASOUND Left 07/15/2022    Procedure: ENDOBRONCHIAL ULTRASOUND (EBUS);  Surgeon: Cm Freitas MD;  Location: [facility];  Service: Pulmonary;  Laterality: Left;    ENDOBRONCHIAL ULTRASOUND Bilateral 11/09/2023    Procedure: ENDOBRONCHIAL ULTRASOUND (EBUS);  Surgeon:  Cm Freitas MD;  Location: [facility] OR;  Service: Pulmonary;  Laterality: Bilateral;    ENDOSCOPIC ULTRASOUND OF UPPER GASTROINTESTINAL TRACT N/A 05/13/2024    Procedure: ULTRASOUND, UPPER GI TRACT, ENDOSCOPIC;  Surgeon: Andi Bazzi III, MD;  Location: [facility] ENDO;  Service: Endoscopy;  Laterality: N/A;    HYSTERECTOMY  1975    CARY/BSO. Due to endometriosis.    INJECTION OF JOINT Right 05/27/2019    Procedure: Injection, Joint, Shoulder, Hip, Or Knee;  Surgeon: Zach Wilder MD;  Location: [facility] OR;  Service: Pain Management;  Laterality: Right;  right hip intra-articular injection     INSERTION OF TUNNELED CENTRAL VENOUS CATHETER (CVC) WITH SUBCUTANEOUS PORT N/A 5/31/2024    Procedure: [id number];  Surgeon: Terry Kim MD;  Location: [facility] OR;  Service: General;  Laterality: N/A;    LUNG LOBECTOMY Left     LOWER LOBE    NEPHRECTOMY Left 2005    Entire kidney due to cancer.    ROBOTIC BRONCHOSCOPY Bilateral 11/09/2023    Procedure: ROBOTIC BRONCHOSCOPY;  Surgeon: Cm Freitas MD;  Location: [facility] OR;  Service: Pulmonary;  Laterality: Bilateral;    TONSILLECTOMY            Current Outpatient Medications   Medication Instructions    acetaminophen (TYLENOL) 1,000 mg, 2 times daily PRN    amLODIPine (NORVASC) 5 mg, Oral, Nightly    aspirin (ECOTRIN) 81 mg, Oral, Daily    cloNIDine (CATAPRES) 0.1 mg, Oral, 3 times daily PRN, For systolic BP over 180    DULoxetine (CYMBALTA) 60 mg, Daily    INLYTA 3 mg, Oral, 2 times daily    labetaloL (NORMODYNE) 200 mg, Oral, 2 times daily    levothyroxine (SYNTHROID) 25 mcg, Oral, Before breakfast    losartan (COZAAR) 50 mg, Oral, 2 times daily    ondansetron (ZOFRAN) 8 mg, Oral, Every 8 hours PRN    pantoprazole (PROTONIX) 40 mg, Daily    rosuvastatin (CRESTOR) 10 mg, Nightly        Review of patient's allergies indicates:   Allergen Reactions    Hydrocodone Hallucinations    Oxycodone-acetaminophen Hallucinations and Other (See Comments)    Amoxicillin Rash  "    Pt reports not an allergy        Review of Systems   Cardiovascular:  Negative for chest pain, dyspnea on exertion and palpitations.   Respiratory:  Negative for cough and shortness of breath.         Objective:     Vitals:    04/08/25 1511   BP: (!) 160/80   BP Location: Left arm   Patient Position: Sitting   Pulse: 84   SpO2: 97%   Weight: 62.4 kg (137 lb 9.1 oz)   Height: 5' 6" (1.676 m)       Physical Exam  Vitals and nursing note reviewed.   Constitutional:       Appearance: She is well-developed.   HENT:      Head: Normocephalic and atraumatic.   Eyes:      Conjunctiva/sclera: Conjunctivae normal.   Cardiovascular:      Rate and Rhythm: Normal rate and regular rhythm.      Heart sounds: Normal heart sounds.   Pulmonary:      Effort: Pulmonary effort is normal.      Breath sounds: Normal breath sounds.   Abdominal:      General: Bowel sounds are normal.      Palpations: Abdomen is soft.   Musculoskeletal:         General: Normal range of motion.   Skin:     General: Skin is warm and dry.   Neurological:      Mental Status: She is alert and oriented to person, place, and time.   Psychiatric:         Behavior: Behavior normal.         Thought Content: Thought content normal.         Judgment: Judgment normal.       CMP  Sodium   Date Value Ref Range Status   04/16/2025 141 136 - 145 mmol/L Final     Potassium   Date Value Ref Range Status   04/16/2025 4.0 3.5 - 5.1 mmol/L Final     Chloride   Date Value Ref Range Status   03/06/2025 105 95 - 110 mmol/L Final     CO2   Date Value Ref Range Status   04/16/2025 28 23 - 29 mmol/L Final     Glucose   Date Value Ref Range Status   03/06/2025 104 70 - 110 mg/dL Final     BUN   Date Value Ref Range Status   04/16/2025 17 8 - 23 mg/dL Final     Creatinine   Date Value Ref Range Status   04/16/2025 1.3 0.5 - 1.4 mg/dL Final   12/31/2012 1.2 0.5 - 1.4 mg/dL Final     Calcium   Date Value Ref Range Status   04/16/2025 8.9 8.7 - 10.5 mg/dL Final   12/31/2012 9.7 8.7 - " 10.5 mg/dL Final     Total Protein   Date Value Ref Range Status   03/06/2025 6.0 6.0 - 8.4 g/dL Final     Albumin   Date Value Ref Range Status   04/16/2025 3.8 3.5 - 5.2 g/dL Final     Bilirubin Total   Date Value Ref Range Status   04/16/2025 0.5 0.1 - 1.0 mg/dL Final     Comment:     For infants and newborns, interpretation of results should be based   on gestational age, weight and in agreement with clinical   observations.    Premature Infant recommended reference ranges:   0-24 hours:  <8.0 mg/dL   24-48 hours: <12.0 mg/dL   3-5 days:    <15.0 mg/dL   6-29 days:   <15.0 mg/dL     ALP   Date Value Ref Range Status   04/16/2025 63 55 - 135 unit/L Final     AST   Date Value Ref Range Status   04/16/2025 17 10 - 40 unit/L Final     ALT   Date Value Ref Range Status   04/16/2025 14 10 - 44 unit/L Final     Anion Gap   Date Value Ref Range Status   03/06/2025 7 (L) 8 - 16 mmol/L Final   12/31/2012 14 5 - 15 meq/L Final     eGFR if    Date Value Ref Range Status   07/18/2022 >60.0 >60 mL/min/1.73 m^2 Final     eGFR if non    Date Value Ref Range Status   07/18/2022 52.6 (A) >60 mL/min/1.73 m^2 Final     Comment:     Calculation used to obtain the estimated glomerular filtration  rate (eGFR) is the CKD-EPI equation.         BMP  Lab Results   Component Value Date     04/16/2025    K 4.0 04/16/2025     03/06/2025    CO2 28 04/16/2025    BUN 17 04/16/2025    CREATININE 1.3 04/16/2025    CALCIUM 8.9 04/16/2025    ANIONGAP 7 (L) 03/06/2025    ESTGFRAFRICA >60.0 07/18/2022    EGFRNONAA 52.6 (A) 07/18/2022      BNP  @LABRCNTIP(BNP,BNPTRIAGEBLO)@   Lab Results   Component Value Date    CHOL 176 07/30/2024    CHOL 157 09/02/2020    CHOL 285 (H) 05/04/2020     Lab Results   Component Value Date    HDL 40 07/30/2024    HDL 42 09/02/2020    HDL 37 (L) 05/04/2020     Lab Results   Component Value Date    LDLCALC Invalid, Trig>400.0 07/30/2024    LDLCALC 84.8 09/02/2020    LDLCALC 192.0  (H) 05/04/2020     Lab Results   Component Value Date    TRIG 417 (H) 07/30/2024    TRIG 151 (H) 09/02/2020    TRIG 280 (H) 05/04/2020     Lab Results   Component Value Date    CHOLHDL 22.7 07/30/2024    CHOLHDL 26.8 09/02/2020    CHOLHDL 13.0 (L) 05/04/2020      Lab Results   Component Value Date    TSH 8.093 (H) 04/16/2025    FREET4 0.63 (L) 04/16/2025     Lab Results   Component Value Date    HGBA1C 5.7 07/31/2024     Lab Results   Component Value Date    WBC 5.77 04/16/2025    HGB 11.6 (L) 04/16/2025    HCT 37.4 04/16/2025    MCV 91 04/16/2025     04/16/2025         Results for orders placed during the hospital encounter of 07/30/24    Echo    Interpretation Summary    Left Ventricle: The left ventricle is normal in size. Normal wall thickness. There is normal systolic function with a visually estimated ejection fraction of 60 - 65%. There is normal diastolic function.    Right Ventricle: Normal right ventricular cavity size. Wall thickness is normal. Systolic function is normal.    Aortic Valve: There is mild to moderate aortic regurgitation.    IVC/SVC: Normal venous pressure at 3 mmHg.     No results found for this or any previous visit.     EKG  Results for orders placed or performed during the hospital encounter of 01/10/25   EKG 12-lead    Collection Time: 01/10/25 12:27 PM   Result Value Ref Range    QRS Duration 82 ms    [facility] QTC Calculation 467 ms    Narrative    Test Reason : R42,    Vent. Rate :  91 BPM     Atrial Rate :  91 BPM     P-R Int : 156 ms          QRS Dur :  82 ms      QT Int : 380 ms       P-R-T Axes :  66  70  62 degrees    QTcB Int : 467 ms    Normal sinus rhythm  Normal ECG  Confirmed by Shaka Rice ([id number]) on 1/13/2025 5:01:29 PM    Referred By: AAAREFERRAL SELF           Confirmed By: Shaka Rice      Stress  Results for orders placed during the hospital encounter of 10/18/22    Nuclear Stress - Cardiology Interpreted    Interpretation Summary    Normal  myocardial perfusion scan. There is no evidence of myocardial ischemia or infarction.    The gated perfusion images showed an ejection fraction of 85% post stress. Normal ejection fraction is greater than 47%.    There is normal wall motion post stress.    The EKG portion of this study is negative for ischemia.    The patient reported no chest pain during the stress test.    There were no arrhythmias during stress.    T.i.d. 0.93             Assessment:       Primary hypertension    She is to bring her blood pressure machine to have a check.  She has white coat syndrome.    Nonrheumatic aortic valve insufficiency    Will attempt to control her blood pressure.  She is to bring the blood pressure machine    Metastatic renal cell carcinoma to lung, right   Followed by Dr. Wasserman on therapy    Aortic atherosclerosis  On antihypertensive as well as a statin    Hyperlipidemia   On a statin therapy.  Triglycerides markedly elevated will follow       Plan:        She is to maintain a low triglycerides, low carbohydrate diet.  Follow-up with her primary care physician.  She is to bring her blood pressure machine.  She will be seen in the office in 6 months.

## 2025-04-16 ENCOUNTER — LAB VISIT (OUTPATIENT)
Dept: LAB | Facility: HOSPITAL | Age: 85
End: 2025-04-16
Attending: NURSE PRACTITIONER
Payer: MEDICARE

## 2025-04-16 DIAGNOSIS — C78.01 METASTATIC RENAL CELL CARCINOMA TO LUNG, RIGHT: ICD-10-CM

## 2025-04-16 DIAGNOSIS — R53.83 FATIGUE, UNSPECIFIED TYPE: ICD-10-CM

## 2025-04-16 DIAGNOSIS — C64.9 METASTATIC RENAL CELL CARCINOMA TO LUNG, RIGHT: ICD-10-CM

## 2025-04-16 LAB
ABSOLUTE EOSINOPHIL (SMH): 0.21 K/UL
ABSOLUTE MONOCYTE (SMH): 0.54 K/UL (ref 0.3–1)
ABSOLUTE NEUTROPHIL COUNT (SMH): 3.9 K/UL (ref 1.8–7.7)
ALBUMIN SERPL-MCNC: 3.8 G/DL (ref 3.5–5.2)
ALP SERPL-CCNC: 63 UNIT/L (ref 55–135)
ALT SERPL-CCNC: 14 UNIT/L (ref 10–44)
ANION GAP (SMH): 6 MMOL/L (ref 8–16)
AST SERPL-CCNC: 17 UNIT/L (ref 10–40)
BACTERIA #/AREA URNS AUTO: ABNORMAL /HPF
BASOPHILS # BLD AUTO: 0.03 K/UL
BASOPHILS NFR BLD AUTO: 0.5 %
BILIRUB SERPL-MCNC: 0.5 MG/DL (ref 0.1–1)
BILIRUB UR QL STRIP.AUTO: NEGATIVE
BUN SERPL-MCNC: 17 MG/DL (ref 8–23)
CALCIUM SERPL-MCNC: 8.9 MG/DL (ref 8.7–10.5)
CHLORIDE SERPL-SCNC: 107 MMOL/L (ref 95–110)
CLARITY UR: CLEAR
CO2 SERPL-SCNC: 28 MMOL/L (ref 23–29)
COLOR UR AUTO: YELLOW
CREAT SERPL-MCNC: 1.3 MG/DL (ref 0.5–1.4)
ERYTHROCYTE [DISTWIDTH] IN BLOOD BY AUTOMATED COUNT: 14.2 % (ref 11.5–14.5)
GFR SERPLBLD CREATININE-BSD FMLA CKD-EPI: 40 ML/MIN/1.73/M2
GLUCOSE SERPL-MCNC: 83 MG/DL (ref 70–110)
GLUCOSE UR QL STRIP: NEGATIVE
HCT VFR BLD AUTO: 37.4 % (ref 37–48.5)
HGB BLD-MCNC: 11.6 GM/DL (ref 12–16)
HGB UR QL STRIP: NEGATIVE
HYALINE CASTS UR QL AUTO: 7 /LPF (ref 0–1)
IMM GRANULOCYTES # BLD AUTO: 0.02 K/UL (ref 0–0.04)
IMM GRANULOCYTES NFR BLD AUTO: 0.3 % (ref 0–0.5)
KETONES UR QL STRIP: NEGATIVE
LEUKOCYTE ESTERASE UR QL STRIP: NEGATIVE
LYMPHOCYTES # BLD AUTO: 1.12 K/UL (ref 1–4.8)
MCH RBC QN AUTO: 28.2 PG (ref 27–31)
MCHC RBC AUTO-ENTMCNC: 31 G/DL (ref 32–36)
MCV RBC AUTO: 91 FL (ref 82–98)
MICROSCOPIC COMMENT: ABNORMAL
NITRITE UR QL STRIP: NEGATIVE
NUCLEATED RBC (/100WBC) (SMH): 0 /100 WBC
PH UR STRIP: 6 [PH]
PLATELET # BLD AUTO: 206 K/UL (ref 150–450)
PMV BLD AUTO: 10.1 FL (ref 9.2–12.9)
POTASSIUM SERPL-SCNC: 4 MMOL/L (ref 3.5–5.1)
PROT SERPL-MCNC: 6.1 GM/DL (ref 6–8.4)
PROT UR QL STRIP: ABNORMAL
RBC # BLD AUTO: 4.11 M/UL (ref 4–5.4)
RBC #/AREA URNS AUTO: 1 /HPF
RELATIVE EOSINOPHIL (SMH): 3.6 % (ref 0–8)
RELATIVE LYMPHOCYTE (SMH): 19.4 % (ref 18–48)
RELATIVE MONOCYTE (SMH): 9.4 % (ref 4–15)
RELATIVE NEUTROPHIL (SMH): 66.8 % (ref 38–73)
SODIUM SERPL-SCNC: 141 MMOL/L (ref 136–145)
SP GR UR STRIP: 1.02
SQUAMOUS #/AREA URNS AUTO: <1 /HPF
T4 FREE SERPL-MCNC: 0.63 NG/DL (ref 0.71–1.51)
TSH SERPL-ACNC: 8.09 UIU/ML (ref 0.34–5.6)
UROBILINOGEN UR STRIP-ACNC: ABNORMAL EU/DL
WBC # BLD AUTO: 5.77 K/UL (ref 3.9–12.7)
WBC #/AREA URNS AUTO: 4 /HPF

## 2025-04-16 PROCEDURE — 36415 COLL VENOUS BLD VENIPUNCTURE: CPT | Mod: PO

## 2025-04-16 PROCEDURE — 85025 COMPLETE CBC W/AUTO DIFF WBC: CPT

## 2025-04-16 PROCEDURE — 84439 ASSAY OF FREE THYROXINE: CPT

## 2025-04-16 PROCEDURE — 84443 ASSAY THYROID STIM HORMONE: CPT

## 2025-04-16 PROCEDURE — 80053 COMPREHEN METABOLIC PANEL: CPT

## 2025-04-16 PROCEDURE — 81003 URINALYSIS AUTO W/O SCOPE: CPT

## 2025-04-17 ENCOUNTER — TELEPHONE (OUTPATIENT)
Facility: CLINIC | Age: 85
End: 2025-04-17
Payer: MEDICARE

## 2025-04-17 ENCOUNTER — DOCUMENTATION ONLY (OUTPATIENT)
Dept: INFUSION THERAPY | Facility: HOSPITAL | Age: 85
End: 2025-04-17

## 2025-04-17 DIAGNOSIS — K59.00 CONSTIPATION: Primary | ICD-10-CM

## 2025-04-17 DIAGNOSIS — R93.3 ABNORMAL CT SCAN, GASTROINTESTINAL TRACT: ICD-10-CM

## 2025-04-17 DIAGNOSIS — E03.2 HYPOTHYROIDISM DUE TO MEDICATION: Primary | ICD-10-CM

## 2025-04-17 RX ORDER — HEPARIN 100 UNIT/ML
500 SYRINGE INTRAVENOUS
OUTPATIENT
Start: 2025-04-21

## 2025-04-17 RX ORDER — LEVOTHYROXINE SODIUM 25 UG/1
25 TABLET ORAL
Qty: 30 TABLET | Refills: 11 | Status: SHIPPED | OUTPATIENT
Start: 2025-04-17 | End: 2026-04-17

## 2025-04-17 RX ORDER — SODIUM CHLORIDE 0.9 % (FLUSH) 0.9 %
10 SYRINGE (ML) INJECTION
OUTPATIENT
Start: 2025-04-21

## 2025-04-17 RX ORDER — DIPHENHYDRAMINE HYDROCHLORIDE 50 MG/ML
50 INJECTION, SOLUTION INTRAMUSCULAR; INTRAVENOUS ONCE AS NEEDED
OUTPATIENT
Start: 2025-04-21

## 2025-04-17 RX ORDER — EPINEPHRINE 0.3 MG/.3ML
0.3 INJECTION SUBCUTANEOUS ONCE AS NEEDED
OUTPATIENT
Start: 2025-04-21

## 2025-04-17 NOTE — TELEPHONE ENCOUNTER
Patient started on synthroid due to elevated TSH and drop in t 4 on keytruda.     Patient aware dose is 25mcg.

## 2025-04-21 ENCOUNTER — INFUSION (OUTPATIENT)
Dept: INFUSION THERAPY | Facility: HOSPITAL | Age: 85
End: 2025-04-21
Attending: INTERNAL MEDICINE
Payer: MEDICARE

## 2025-04-21 VITALS
RESPIRATION RATE: 16 BRPM | WEIGHT: 137.69 LBS | BODY MASS INDEX: 22.13 KG/M2 | HEART RATE: 86 BPM | DIASTOLIC BLOOD PRESSURE: 85 MMHG | OXYGEN SATURATION: 97 % | SYSTOLIC BLOOD PRESSURE: 152 MMHG | TEMPERATURE: 97 F | HEIGHT: 66 IN

## 2025-04-21 DIAGNOSIS — C64.9 METASTATIC RENAL CELL CARCINOMA TO LUNG, RIGHT: Primary | ICD-10-CM

## 2025-04-21 DIAGNOSIS — C78.01 METASTATIC RENAL CELL CARCINOMA TO LUNG, RIGHT: Primary | ICD-10-CM

## 2025-04-21 PROCEDURE — 25000003 PHARM REV CODE 250: Performed by: INTERNAL MEDICINE

## 2025-04-21 PROCEDURE — 63600175 PHARM REV CODE 636 W HCPCS: Mod: JZ,TB | Performed by: INTERNAL MEDICINE

## 2025-04-21 PROCEDURE — 96413 CHEMO IV INFUSION 1 HR: CPT

## 2025-04-21 PROCEDURE — A4216 STERILE WATER/SALINE, 10 ML: HCPCS | Performed by: INTERNAL MEDICINE

## 2025-04-21 RX ORDER — SODIUM CHLORIDE 0.9 % (FLUSH) 0.9 %
10 SYRINGE (ML) INJECTION
Status: DISCONTINUED | OUTPATIENT
Start: 2025-04-21 | End: 2025-04-21 | Stop reason: HOSPADM

## 2025-04-21 RX ORDER — DIPHENHYDRAMINE HYDROCHLORIDE 50 MG/ML
50 INJECTION, SOLUTION INTRAMUSCULAR; INTRAVENOUS ONCE AS NEEDED
Status: DISCONTINUED | OUTPATIENT
Start: 2025-04-21 | End: 2025-04-21 | Stop reason: HOSPADM

## 2025-04-21 RX ORDER — EPINEPHRINE 0.3 MG/.3ML
0.3 INJECTION SUBCUTANEOUS ONCE AS NEEDED
Status: DISCONTINUED | OUTPATIENT
Start: 2025-04-21 | End: 2025-04-21 | Stop reason: HOSPADM

## 2025-04-21 RX ORDER — HEPARIN 100 UNIT/ML
500 SYRINGE INTRAVENOUS
Status: DISCONTINUED | OUTPATIENT
Start: 2025-04-21 | End: 2025-04-21 | Stop reason: HOSPADM

## 2025-04-21 RX ADMIN — SODIUM CHLORIDE 200 MG: 9 INJECTION, SOLUTION INTRAVENOUS at 03:04

## 2025-04-21 RX ADMIN — SODIUM CHLORIDE, PRESERVATIVE FREE 10 ML: 5 INJECTION INTRAVENOUS at 03:04

## 2025-04-21 RX ADMIN — HEPARIN 500 UNITS: 100 SYRINGE at 03:04

## 2025-04-21 NOTE — PLAN OF CARE
Problem: Fatigue  Goal: Improved Activity Tolerance  Outcome: Progressing  Intervention: Promote Improved Energy  Flowsheets (Taken 4/21/2025 1504)  Fatigue Management: frequent rest breaks encouraged  Activity Management: Ambulated -L4  Environmental Support: rest periods encouraged

## 2025-04-24 PROBLEM — I50.32 CHRONIC DIASTOLIC HEART FAILURE: Status: RESOLVED | Noted: 2024-01-12 | Resolved: 2025-04-24

## 2025-04-26 ENCOUNTER — HOSPITAL ENCOUNTER (EMERGENCY)
Facility: HOSPITAL | Age: 85
Discharge: HOME | End: 2025-04-26
Attending: EMERGENCY MEDICINE
Payer: MEDICARE

## 2025-04-26 VITALS
HEIGHT: 66 IN | RESPIRATION RATE: 17 BRPM | OXYGEN SATURATION: 98 % | HEART RATE: 90 BPM | WEIGHT: 135 LBS | TEMPERATURE: 98 F | DIASTOLIC BLOOD PRESSURE: 102 MMHG | BODY MASS INDEX: 21.69 KG/M2 | SYSTOLIC BLOOD PRESSURE: 202 MMHG

## 2025-04-26 DIAGNOSIS — S09.90XA INJURY OF HEAD, INITIAL ENCOUNTER: Primary | ICD-10-CM
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
## 2025-04-26 DIAGNOSIS — R03.0 ELEVATED BLOOD PRESSURE READING: ICD-10-CM

## 2025-04-26 PROCEDURE — 99284 EMERGENCY DEPT VISIT MOD MDM: CPT | Mod: 25

## 2025-04-26 PROCEDURE — 25000003 PHARM REV CODE 250

## 2025-04-26 RX ORDER — CLONIDINE HYDROCHLORIDE 0.1 MG/1
0.1 TABLET ORAL
Status: COMPLETED | OUTPATIENT
Start: 2025-04-26 | End: 2025-04-26

## 2025-04-26 RX ADMIN — CLONIDINE HYDROCHLORIDE 0.1 MG: 0.1 TABLET ORAL at 04:04

## 2025-04-26 NOTE — ED PROVIDER NOTES
Encounter Date: 4/26/2025       History     Chief Complaint   Patient presents with    Fall     TRIP AND FALL INTO A WALL. NO LOC    Head Injury     NO LOC. NOT ON BLOOD THINNER     Patient is a 84 y.o. female with past medical history of kidney cancer/lung cancer, hypertension, hyperlipidemia, diastolic heart failure who presents to ED via family for concern for trip and fall which began around 12:15 pm.  Patient reports she was visiting her brother in a nursing home when she got up and tripped over his Quijano catheter and the back of her head hit the wall.  She reports she hit the right side of her hip and leg on the ground.  Patient denies loss of consciousness or vomiting.  Patient denies headaches dizziness or syncope.  Patient denies chest pain or shortness breath.  Patient reports it is a trip and fall and denies any symptoms prior to the fall.  Patient is awake and alert in no acute distress..      Review of patient's allergies indicates:   Allergen Reactions    Codeine     Hydrocodone Hallucinations    Oxycodone-acetaminophen Hallucinations and Other (See Comments)    Amoxicillin Rash     Pt reports not an allergy     Past Medical History:   Diagnosis Date    Aortic insufficiency     Cancer 2005    renal ca, L kidney removed    COVID-19 2022    Diastolic heart failure     Diffusion capacity of lung (dl), decreased     Encounter for blood transfusion 1963    with miscarriage    Fibromyalgia     GERD (gastroesophageal reflux disease)     Hypercholesterolemia     Hyperlipidemia     Hypertension     Lung cancer 2015    adenocarcinoma ; upper left lobe    Renal disorder 2005    Left Kidney cancer    Sinusitis      Past Surgical History:   Procedure Laterality Date    ADENOIDECTOMY      BACK SURGERY  2013    laminectomy    ENDOBRONCHIAL ULTRASOUND Left 07/15/2022    Procedure: ENDOBRONCHIAL ULTRASOUND (EBUS);  Surgeon: Cm Freitas MD;  Location: [facility];  Service: Pulmonary;  Laterality: Left;     ENDOBRONCHIAL ULTRASOUND Bilateral 11/09/2023    Procedure: ENDOBRONCHIAL ULTRASOUND (EBUS);  Surgeon: Cm Freitas MD;  Location: [facility] OR;  Service: Pulmonary;  Laterality: Bilateral;    ENDOSCOPIC ULTRASOUND OF UPPER GASTROINTESTINAL TRACT N/A 05/13/2024    Procedure: ULTRASOUND, UPPER GI TRACT, ENDOSCOPIC;  Surgeon: Andi Bazzi III, MD;  Location: [facility] ENDO;  Service: Endoscopy;  Laterality: N/A;    HYSTERECTOMY  1975    CARY/BSO. Due to endometriosis.    INJECTION OF JOINT Right 05/27/2019    Procedure: Injection, Joint, Shoulder, Hip, Or Knee;  Surgeon: Zach Wilder MD;  Location: [facility];  Service: Pain Management;  Laterality: Right;  right hip intra-articular injection     INSERTION OF TUNNELED CENTRAL VENOUS CATHETER (CVC) WITH SUBCUTANEOUS PORT N/A 5/31/2024    Procedure: [id number];  Surgeon: Terry Kim MD;  Location: [facility] OR;  Service: General;  Laterality: N/A;    LUNG LOBECTOMY Left     LOWER LOBE    NEPHRECTOMY Left 2005    Entire kidney due to cancer.    ROBOTIC BRONCHOSCOPY Bilateral 11/09/2023    Procedure: ROBOTIC BRONCHOSCOPY;  Surgeon: Cm Freitas MD;  Location: [facility] OR;  Service: Pulmonary;  Laterality: Bilateral;    TONSILLECTOMY       Family History   Problem Relation Name Age of Onset    Hypertension Mother      Breast cancer Maternal Aunt      Breast cancer Maternal Aunt       Social History[1]  Review of Systems   Constitutional:  Negative for fever.   Respiratory:  Negative for shortness of breath.    Cardiovascular:  Negative for chest pain.   Gastrointestinal:  Negative for abdominal distention, abdominal pain, nausea and vomiting.   Musculoskeletal:  Negative for back pain, neck pain and neck stiffness.   Skin:  Negative for color change, pallor and rash.   Neurological: Negative.  Negative for dizziness, tremors, seizures, syncope, facial asymmetry, speech difficulty, weakness, light-headedness, numbness and headaches.   Hematological:  Does not  bruise/bleed easily.   Psychiatric/Behavioral: Negative.     All other systems reviewed and are negative.      Physical Exam     Initial Vitals [04/26/25 1358]   BP Pulse Resp Temp SpO2   (!) 198/107 93 17 98.2 °F (36.8 °C) 97 %      MAP       --         Physical Exam    Nursing note and vitals reviewed.  Constitutional: She appears well-developed and well-nourished. She is not diaphoretic. No distress.   HENT:   Head: Normocephalic and atraumatic.   Right Ear: External ear normal.   Left Ear: External ear normal.   Nose: Nose normal.   Eyes: EOM are normal.   Neck: There are no signs of injury. No crepitus.       Normal range of motion.  Cardiovascular:  Normal rate, regular rhythm, normal heart sounds and intact distal pulses.     Exam reveals no gallop and no friction rub.       No murmur heard.  Pulmonary/Chest: Breath sounds normal. No respiratory distress. She has no wheezes. She has no rhonchi. She has no rales. She exhibits no tenderness.   Musculoskeletal:         General: Normal range of motion.      Cervical back: Normal range of motion. Bony tenderness present. No swelling, edema, deformity, erythema, signs of trauma, lacerations, rigidity, tenderness or crepitus. Spinous process tenderness present. No pain with movement or muscular tenderness. Normal range of motion.      Thoracic back: Normal.      Lumbar back: Tenderness present. No swelling, edema, deformity, signs of trauma, lacerations, spasms or bony tenderness. Normal range of motion.        Back:      Neurological: She is alert and oriented to person, place, and time. She has normal strength. GCS score is 15. GCS eye subscore is 4. GCS verbal subscore is 5. GCS motor subscore is 6.   Skin: Skin is warm and dry. Capillary refill takes less than 2 seconds.   Psychiatric: She has a normal mood and affect. Her behavior is normal. Judgment and thought content normal.         ED Course   Procedures  Labs Reviewed - No data to display       Imaging  Results              CT Cervical Spine Without Contrast (Final result)  Result time 04/26/25 16:03:09      Final result by Patti Gandhi MD (04/26/25 16:03:09)                   Impression:      No evidence of acute fracture or traumatic malalignment of the cervical spine.    Multilevel stable degenerative disc disease and facet hypertrophy      Electronically signed by: aPtti Gandhi  Date:    04/26/2025  Time:    16:03               Narrative:    EXAMINATION:  CT CERVICAL SPINE WITHOUT CONTRAST    CLINICAL HISTORY:  FALL;    TECHNIQUE:  Low dose axial CT images through the cervical spine, with sagittal and coronal reformations.  Contrast was not administered.    COMPARISON:  12/08/2022    FINDINGS:  The vertebral bodies are normal in height and morphology without evidence of fracture or osseous destructive process.  There is stable mild anterolisthesis of C4 on C5 and C5 on C6.    The odontoid process is intact and the cranial cervical junction is normal.  The facet joints are aligned.    Multilevel degenerative disc disease from C4 through C7.  There is multilevel foraminal narrowing secondary to facet hypertrophy.  This is most significant at C3-4 C4-5 C5-6 and C6-7.  There is no significant central canal stenosis..    Limited evaluation of the intraspinal contents demonstrates no hematoma or mass.Paraspinal soft tissues exhibit no acute abnormalities.    There is a left IJ catheter in place                                       CT Head Without Contrast (Final result)  Result time 04/26/25 15:39:24      Final result by Patti Gandhi MD (04/26/25 15:39:24)                   Impression:      No acute intracranial abnormalities. Chronic findings as discussed above.      Electronically signed by: Patti Gandhi  Date:    04/26/2025  Time:    15:39               Narrative:    EXAMINATION:  CT HEAD WITHOUT CONTRAST    CLINICAL HISTORY:  FALL;.    TECHNIQUE:  Noninfusion images were obtained from the  skull base to the vertex.    All CT scans at this facility utilize dose modulation, iterative reconstruction, and/or weight based dosing when appropriate to reduce radiation dose to as low as reasonably achievable    CMS MANDATED QUALITY DATA - CT RADIATION - 436    COMPARISON:  01/10/2025    FINDINGS:  There is no intracranial mass, hemorrhage, or midline shift. Ventricles and sulci are mildly prominent. There are no pathologic extra-axial fluid collections.    There is no evidence of cortical ischemic change. Changes of mild chronic microvascular white matter disease are noted. Cerebellum and brainstem are normal. The calvarium is intact.                                       X-Ray Knee 3 View Right (Final result)  Result time 04/26/25 15:27:04      Final result by Patti Gandhi MD (04/26/25 15:27:04)                   Impression:      Negative exam.      Electronically signed by: Patti Gandhi  Date:    04/26/2025  Time:    15:27               Narrative:    EXAMINATION:  XR KNEE 4 VIEW RIGHT    CLINICAL HISTORY:  Unspecified fall, initial encounter    FINDINGS:  Four views right knee show no fractures, dislocations or acute osseous abnormalities.  The soft tissues are normal.                                       X-Ray Hip 2 or 3 views Right with Pelvis when performed (Final result)  Result time 04/26/25 15:22:40      Final result by Patti Gandhi MD (04/26/25 15:22:40)                   Impression:      Mild joint space narrowing of both hips with no acute osseous abnormality    Mild degenerative changes of the lumbar spine      Electronically signed by: Patti Gandhi  Date:    04/26/2025  Time:    15:22               Narrative:    CLINICAL HISTORY:  (MRN [id number] y/o  (1940) F    Unspecified fall, initial encounter    TECHNIQUE:  (A# [id number], Exam time 4/26/2025 15:19)    F[facility]715 XR HIP WITH PELVIS WHEN PERFORMED 2 OR 3 VIEWS RIGHT  view(s) obtained.    COMPARISON:  None  available.    FINDINGS:  AP pelvis and two views of the right hip demonstrates joint space narrowing of both hips.  There are no fractures, dislocations or acute osseous abnormalities.  The SI joints are congruent.  There is disc space narrowing at L3-4, L4-5 and L5-S1.  There are surgical clips in the left lower quadrant.  The soft tissues are normal.                                       Medications   cloNIDine tablet 0.1 mg (0.1 mg Oral Given 4/26/25 0443)     Medical Decision Making  MDM    Patient presents for emergent evaluation of acute fall that poses a possible threat to life and/or bodily function.    Differential diagnosis included but not limited to skull fracture, intracranial bleed, concussion, cervical fracture, cervical subluxation, musculoskeletal pain, strain, dislocation.  In the ED patient found to have acute clear lung sounds bilaterally with no increased work of breathing.  Patient is awake and alert in oriented x3.  Cranial nerves grossly intact.  Patient able to follow commands without difficulty.  Normal strength, sensation, and cap refill distally in bilateral upper and lower extremities.  Patient has a normal range of motion of the neck without difficulty.  Patient has some soreness to palpation over the cervical spine and right low back.  No pain to palpation over thoracic or lumbar spine.  Patient reports chronic low back pain.  Patient has some bruising seen to the right knee and right shin.  Patient has no reproducible pain to palpation of the areas.  Patient has a normal range of motion of bilateral knees and normal strength in his bilateral lower extremities.  Patient has a normal pulses, cap refill, sensation distally in bilateral lower extremities.  Patient denies saddle anesthesia, changes in bowel or bladder, or fever.  Patient has a current history of cancer but reports her low back pain is chronic and is not any new different or worse since the fall.    Imaging significant for  CT head significant for no acute intracranial abnormalities.  Chronic findings as discussed above.  CT cervical spine significant for no evidence of acute fracture or traumatic malalignment of the cervical spine.  Multilevel stable degenerative disc disease and facet hypertrophy.  X-ray right knee significant for negative exam.  X-ray right hip significant for mild joint space narrowing of both hips with no acute osseous abnormality.  Mild degenerative changes of the lumbar spine.    Patient denies pain while in the ED. patient's blood pressures are noted to be elevated while in the ED.  Patient denies headaches, dizziness, chest pain, or shortness of breath.  Patient endorses she has blood pressure medication that she takes when she gets nervous or anxious due to being in the hospital as well as her brother being very sick that her blood pressure will be elevated.  Patient endorses she would like to go home and monitor her blood pressure at home as she knows it will go down when she goes home.  Discussed that we could do an EKG and lab work to evaluate her cardiac enzymes and organ function however she declined stating this is normal for her.    Discharge MDM  I discussed the patient presentation, X-rays, CT findings with ED attending Dr. Stern.    Patient was managed in the ED with oral clonidine.    The response to treatment was good.    Patient was discharged in stable condition with close follow up with primary care.  Detailed return precautions discussed to return to the ED for any new or worsening concerns.  Patient verbalizes understanding.    NP uses Epic and voice recognition software prone to occasional and minor errors that may persist in the medical record.     Amount and/or Complexity of Data Reviewed  Independent Historian:      Details: Sister  Radiology: ordered. Decision-making details documented in ED Course.    Risk  OTC drugs.  Prescription drug management.               ED Course as of 04/26/25  2152   Sat Apr 26, 2025   1626 CT Head Without Contrast  Impression:     No acute intracranial abnormalities. Chronic findings as discussed above.   [MP]   1626 CT Cervical Spine Without Contrast  Impression:     No evidence of acute fracture or traumatic malalignment of the cervical spine.     Multilevel stable degenerative disc disease and facet hypertrophy   [MP]   1626 X-Ray Knee 3 View Right  Impression:     Negative exam.   [MP]   1627 X-Ray Hip 2 or 3 views Right with Pelvis when performed  Impression:     Mild joint space narrowing of both hips with no acute osseous abnormality     Mild degenerative changes of the lumbar spine   [MP]      ED Course User Index  [MP] Neelima Summers NP                           Clinical Impression:  Final diagnoses:  [W19.XXXA] Fall  [S09.90XA] Injury of head, initial encounter (Primary)  [R03.0] Elevated blood pressure reading          ED Disposition Condition    Discharge Stable          ED Prescriptions    None       Follow-up Information       Follow up With Specialties Details Why Contact Info Additional Information    Tao Orozco MD Family Medicine   [address] [address]  [phone]       [facility] - Emergency Dept Emergency Medicine  If symptoms worsen [address] [address]  [phone] 1st floor                 [1]   Social History  Tobacco Use    Smoking status: Never    Smokeless tobacco: Never   Substance Use Topics    Alcohol use: No    Drug use: No        Neelima Summers NP  04/26/25 2152

## 2025-04-26 NOTE — DISCHARGE INSTRUCTIONS
Keep a close eye on your blood pressure at home and take your medications as prescribed.  Take Tylenol as needed for pain.  Please be careful when getting up as you do not fall again.    Please return to the ED for headaches, dizziness, chest pain, shortness breath, worsening neck pain, back pain, inability to get up and walk, fever, or any new or worsening concerns.

## 2025-04-29 ENCOUNTER — HOSPITAL ENCOUNTER (OUTPATIENT)
Dept: RADIOLOGY | Facility: HOSPITAL | Age: 85
Discharge: HOME | End: 2025-04-29
Attending: NURSE PRACTITIONER
Payer: MEDICARE

## 2025-04-29 DIAGNOSIS — R93.3 ABNORMAL CT SCAN, GASTROINTESTINAL TRACT: ICD-10-CM

## 2025-04-29 DIAGNOSIS — K59.00 CONSTIPATION: ICD-10-CM

## 2025-04-30 ENCOUNTER — LAB VISIT (OUTPATIENT)
Dept: LAB | Facility: HOSPITAL | Age: 85
End: 2025-04-30
Attending: NURSE PRACTITIONER
Payer: MEDICARE

## 2025-04-30 DIAGNOSIS — C78.01 METASTATIC RENAL CELL CARCINOMA TO LUNG, RIGHT: ICD-10-CM

## 2025-04-30 DIAGNOSIS — C64.9 METASTATIC RENAL CELL CARCINOMA TO LUNG, RIGHT: ICD-10-CM

## 2025-04-30 DIAGNOSIS — R53.83 FATIGUE, UNSPECIFIED TYPE: ICD-10-CM

## 2025-04-30 LAB
ABSOLUTE EOSINOPHIL (SMH): 0.23 K/UL
ABSOLUTE MONOCYTE (SMH): 0.51 K/UL (ref 0.3–1)
ABSOLUTE NEUTROPHIL COUNT (SMH): 3 K/UL (ref 1.8–7.7)
ALBUMIN SERPL-MCNC: 3.7 G/DL (ref 3.5–5.2)
ALP SERPL-CCNC: 66 UNIT/L (ref 55–135)
ALT SERPL-CCNC: 18 UNIT/L (ref 10–44)
ANION GAP (SMH): 6 MMOL/L (ref 8–16)
AST SERPL-CCNC: 20 UNIT/L (ref 10–40)
BACTERIA #/AREA URNS AUTO: ABNORMAL /HPF
BASOPHILS # BLD AUTO: 0.02 K/UL
BASOPHILS NFR BLD AUTO: 0.4 %
BILIRUB SERPL-MCNC: 0.5 MG/DL (ref 0.1–1)
BILIRUB UR QL STRIP.AUTO: NEGATIVE
BUN SERPL-MCNC: 21 MG/DL (ref 8–23)
CALCIUM SERPL-MCNC: 8.6 MG/DL (ref 8.7–10.5)
CHLORIDE SERPL-SCNC: 104 MMOL/L (ref 95–110)
CLARITY UR: CLEAR
CO2 SERPL-SCNC: 30 MMOL/L (ref 23–29)
COLOR UR AUTO: ABNORMAL
CREAT SERPL-MCNC: 1.3 MG/DL (ref 0.5–1.4)
ERYTHROCYTE [DISTWIDTH] IN BLOOD BY AUTOMATED COUNT: 14.2 % (ref 11.5–14.5)
GFR SERPLBLD CREATININE-BSD FMLA CKD-EPI: 40 ML/MIN/1.73/M2
GLUCOSE SERPL-MCNC: 95 MG/DL (ref 70–110)
GLUCOSE UR QL STRIP: NEGATIVE
HCT VFR BLD AUTO: 35.2 % (ref 37–48.5)
HGB BLD-MCNC: 11.1 GM/DL (ref 12–16)
HGB UR QL STRIP: NEGATIVE
HYALINE CASTS UR QL AUTO: 1 /LPF (ref 0–1)
IMM GRANULOCYTES # BLD AUTO: 0.01 K/UL (ref 0–0.04)
IMM GRANULOCYTES NFR BLD AUTO: 0.2 % (ref 0–0.5)
KETONES UR QL STRIP: NEGATIVE
LEUKOCYTE ESTERASE UR QL STRIP: NEGATIVE
LYMPHOCYTES # BLD AUTO: 1.31 K/UL (ref 1–4.8)
MCH RBC QN AUTO: 28.5 PG (ref 27–31)
MCHC RBC AUTO-ENTMCNC: 31.5 G/DL (ref 32–36)
MCV RBC AUTO: 90 FL (ref 82–98)
MICROSCOPIC COMMENT: ABNORMAL
NITRITE UR QL STRIP: NEGATIVE
NON-SQ EPI CELLS #/AREA URNS AUTO: 2 /HPF
NUCLEATED RBC (/100WBC) (SMH): 0 /100 WBC
PH UR STRIP: 6 [PH]
PLATELET # BLD AUTO: 180 K/UL (ref 150–450)
PMV BLD AUTO: 8.8 FL (ref 9.2–12.9)
POTASSIUM SERPL-SCNC: 3.9 MMOL/L (ref 3.5–5.1)
PROT SERPL-MCNC: 5.9 GM/DL (ref 6–8.4)
PROT UR QL STRIP: ABNORMAL
RBC # BLD AUTO: 3.9 M/UL (ref 4–5.4)
RBC #/AREA URNS AUTO: 2 /HPF
RELATIVE EOSINOPHIL (SMH): 4.5 % (ref 0–8)
RELATIVE LYMPHOCYTE (SMH): 25.6 % (ref 18–48)
RELATIVE MONOCYTE (SMH): 10 % (ref 4–15)
RELATIVE NEUTROPHIL (SMH): 59.3 % (ref 38–73)
SODIUM SERPL-SCNC: 140 MMOL/L (ref 136–145)
SP GR UR STRIP: >=1.03
SQUAMOUS #/AREA URNS AUTO: <1 /HPF
TSH SERPL-ACNC: 3.04 UIU/ML (ref 0.34–5.6)
UROBILINOGEN UR STRIP-ACNC: ABNORMAL EU/DL
WBC # BLD AUTO: 5.11 K/UL (ref 3.9–12.7)
WBC #/AREA URNS AUTO: 4 /HPF

## 2025-04-30 PROCEDURE — 85025 COMPLETE CBC W/AUTO DIFF WBC: CPT

## 2025-04-30 PROCEDURE — 84443 ASSAY THYROID STIM HORMONE: CPT

## 2025-04-30 PROCEDURE — 81003 URINALYSIS AUTO W/O SCOPE: CPT

## 2025-04-30 PROCEDURE — 36415 COLL VENOUS BLD VENIPUNCTURE: CPT

## 2025-04-30 PROCEDURE — 80053 COMPREHEN METABOLIC PANEL: CPT

## 2025-05-01 ENCOUNTER — OFFICE VISIT (OUTPATIENT)
Facility: CLINIC | Age: 85
End: 2025-05-01
Payer: MEDICARE

## 2025-05-01 VITALS
HEART RATE: 87 BPM | RESPIRATION RATE: 17 BRPM | SYSTOLIC BLOOD PRESSURE: 156 MMHG | BODY MASS INDEX: 21.76 KG/M2 | WEIGHT: 134.81 LBS | TEMPERATURE: 98 F | DIASTOLIC BLOOD PRESSURE: 76 MMHG

## 2025-05-01 DIAGNOSIS — C64.9 METASTATIC RENAL CELL CARCINOMA TO LUNG, RIGHT: Primary | ICD-10-CM

## 2025-05-01 DIAGNOSIS — C78.01 METASTATIC RENAL CELL CARCINOMA TO LUNG, RIGHT: Primary | ICD-10-CM

## 2025-05-01 PROCEDURE — 99213 OFFICE O/P EST LOW 20 MIN: CPT | Mod: PBBFAC,PN | Performed by: INTERNAL MEDICINE

## 2025-05-01 PROCEDURE — 99999 PR PBB SHADOW E&M-EST. PATIENT-LVL III: CPT | Mod: PBBFAC,,, | Performed by: INTERNAL MEDICINE

## 2025-05-01 PROCEDURE — G2211 COMPLEX E/M VISIT ADD ON: HCPCS | Mod: S$PBB,,, | Performed by: INTERNAL MEDICINE

## 2025-05-01 PROCEDURE — 99215 OFFICE O/P EST HI 40 MIN: CPT | Mod: S$PBB,,, | Performed by: INTERNAL MEDICINE

## 2025-05-01 RX ORDER — DIPHENHYDRAMINE HYDROCHLORIDE 50 MG/ML
50 INJECTION, SOLUTION INTRAMUSCULAR; INTRAVENOUS ONCE AS NEEDED
OUTPATIENT
Start: 2025-05-12

## 2025-05-01 RX ORDER — EPINEPHRINE 0.3 MG/.3ML
0.3 INJECTION SUBCUTANEOUS ONCE AS NEEDED
OUTPATIENT
Start: 2025-05-12

## 2025-05-01 RX ORDER — HEPARIN 100 UNIT/ML
500 SYRINGE INTRAVENOUS
OUTPATIENT
Start: 2025-05-12

## 2025-05-01 RX ORDER — SODIUM CHLORIDE 0.9 % (FLUSH) 0.9 %
10 SYRINGE (ML) INJECTION
OUTPATIENT
Start: 2025-05-12

## 2025-05-01 NOTE — ASSESSMENT & PLAN NOTE
Patient is doing well on pembro/axi and has no sign of AI disease.  Her labs look ok and will continue therapy as planned, labs allowing. Last scans do show stable disease.      Also noted were GB changes but she has no clear other symptoms of tolu.  Will observe.      Continue to see her on a three week basis.  Discussed this today.

## 2025-05-01 NOTE — PROGRESS NOTES
PROGRESS NOTE    Subjective:       Patient ID: Karlie Hess is a 84 y.o. female.    History of Left RCC, treated [id number] further treatment     History of [facility] lung cancer-treated with lobectomy-7/2/2015-no chemo/xrt-Path c/w mucin producing adenocarcinoma. [id number]     History of left hilar mass, 9/2020 EBUS negative     PET 10/18/2023:  Nodular densities are as outlined below:  -21 x 17 mm right infrahilar nodule with SUV max 2.5 (image 119), previously measuring 17 x 16 mm (August 20, 2023)  -27 x 27 mm marginated nodule in the posterior inferior left hilum with SUV max 2.7 (image 109)  -13 x 7 mm nodular density along the right posterior pararenal space with SUV max 0.8 (image 179), seen on studies dating back to 1/12/2021.     11/9/2023-EBUS  1. LUNG, RIGHT LOWER LOBE MASS, FINE NEEDLE ASPIRATION   - NEGATIVE FOR MALIGNANT CELLS.     2. LUNG, RIGHT LOWER LOBE MASS, BRUSHING   - RARE ATYPICAL CELLS PRESENT     3. LUNG, RIGHT LOWER LOBE MASS, BIOPSY TOUCH PREP   - POSITIVE FOR CARCINOMA.   - SEE CONCURRENT SURGICAL BIOPSY ([id number])     4. LUNG, RIGHT LOWER LOBE, BRONCHOALVEOLAR LAVAGE   - RARE ATYPICAL CELLS PRESENT.     5. LUNG, LEFT LOWER LOBE MASS, FINE NEEDLE ASPIRATION   - RARE ATYPICAL CELLS PRESENT     6. LYMPH NODE, STATION 11RS, FINE NEEDLE ASPIRATION   - NEGATIVE FOR MALIGNANT CELLS.   - SCANT KATARZYNA MATERIAL PRESENT      Karnofsky performance status score <80   Time from original diagnosis to initiation of targeted therapy <1 year   Hemoglobin less than the lower limit of normal   Serum calcium greater than the upper limit of normal   Neutrophil count greater than the upper limit of normal   Platelet count greater than the upper limit of normal   Favorable risk: None of the above risk factors present.  Intermediate risk: 1 or 2 of the above risk factors present.  Poor risk: 3 or more risk factors present.     1/5/2024:  RIGHT  PARARENAL MASS, CT GUIDED CORE BIOPSY:   - CLEAR CELL RENAL CELL CARCINOMA     12/27/2023-1/5/2024  RLL SBRT    1/30/2024-2/7/2024  XRT to Rt. Renal bed lesion    5/3/2024-MRI Abd:--Progressive Disease  Right kidney mass  2nd mass in lower pole of right kidney  Pancreatic Tail mass    5/13/2024-Bx:  PANCREATIC TAIL MASS, BIOPSY:   - METASTATIC CLEAR CELL RENAL CELL CARCINOMA.     7/31/2024-Patient went to ED with severe HA, MRI Brain:  No metastatic disease    6/7/2024-Pembro/Axitinib:  Cycle 1: 6/7/2024  Cycle 2: 6/28/2024  Cycle 3: 7/19/2024  Cycle 4: 11/15/2024  Cycle 5: 12/6/2024  Cycle 6: 12/27/2024  Cycle 7: 1/17/2025  Cycle 8: 2/17/2025  Cycle 9: 3/10/2025  Cycle 10: 3/31/2025  Cycle 11: 4/21/2025  Cycle 12: 5/12/2025-due    No apparent drug interactions with topirimate/Axitinib    Prednisone and nurtec    Held Axitinib 10/17/2024 due to proteinuria    3/14/2025-CT CAP:  Stable Disease  ? Acute cholecystitis??---no clear symptoms or signs    Chief Complaint:  No chief complaint on file.  Metastatic Renal Cell Carcinoma follow up    History of Present Illness:   Karlie Hess is a 84 y.o. female who presents for follow up of above.      She resumed the Axitinib on 1/15/2024 at 3mg bid.   Also back on pembro.      Patient is doing ok today.  She notes a recent trip and fall but feeling ok now.  No new complaints o/w.     BP regimen  Lebtolol 100mg bid  Lisinopril 20mg bid  Amlodipine 5mg qhs    Family and Social history reviewed and is unchanged from 11/21/2023             Current Outpatient Medications:     acetaminophen (TYLENOL) 500 MG tablet, Take 1,000 mg by mouth 2 (two) times daily as needed., Disp: , Rfl:     amLODIPine (NORVASC) 5 MG tablet, TAKE 1 TABLET BY MOUTH EVERY  EVENING, Disp: 90 tablet, Rfl: 3    aspirin (ECOTRIN) 81 MG EC tablet, Take 1 tablet (81 mg total) by mouth once daily., Disp: , Rfl:     axitinib (INLYTA) 1 mg Tab, Take 3 tablets (3 mg total) by mouth 2 (two) times a day.,  Disp: 180 tablet, Rfl: 6    cloNIDine (CATAPRES) 0.1 MG tablet, Take 1 tablet (0.1 mg total) by mouth 3 (three) times daily as needed. For systolic BP over 180, Disp: 90 tablet, Rfl: 0    duloxetine (CYMBALTA) 60 MG capsule, Take 60 mg by mouth once daily., Disp: , Rfl:     labetaloL (NORMODYNE) 200 MG tablet, TAKE 1 TABLET BY MOUTH TWICE  DAILY, Disp: 180 tablet, Rfl: 3    levothyroxine (SYNTHROID) 25 MCG tablet, Take 1 tablet (25 mcg total) by mouth before breakfast., Disp: 30 tablet, Rfl: 11    losartan (COZAAR) 50 MG tablet, Take 1 tablet (50 mg total) by mouth 2 (two) times a day., Disp: 180 tablet, Rfl: 3    ondansetron (ZOFRAN) 8 MG tablet, Take 1 tablet (8 mg total) by mouth every 8 (eight) hours as needed for Nausea., Disp: 30 tablet, Rfl: 5    pantoprazole (PROTONIX) 40 MG tablet, Take 40 mg by mouth once daily., Disp: , Rfl:     rosuvastatin (CRESTOR) 10 MG tablet, Take 10 mg by mouth every evening., Disp: , Rfl:   No current facility-administered medications for this visit.    Facility-Administered Medications Ordered in Other Visits:     LIDOcaine (PF) 10 mg/ml (1%) injection 10 mg, 1 mL, Intradermal, Once, Gabriel Loaiza MD        Objective:       Physical Examination:     BP (!) 156/76   Pulse 87   Temp 97.8 °F (36.6 °C)   Resp 17   Wt 61.1 kg (134 lb 12.8 oz)   BMI 21.76 kg/m²     Physical Exam  Constitutional:       Appearance: Normal appearance.   HENT:      Head: Normocephalic and atraumatic.   Eyes:      General: No scleral icterus.     Conjunctiva/sclera: Conjunctivae normal.   Cardiovascular:      Rate and Rhythm: Normal rate.   Pulmonary:      Effort: Pulmonary effort is normal.   Abdominal:      General: Abdomen is flat.   Neurological:      General: No focal deficit present.      Mental Status: She is alert and oriented to person, place, and time.   Psychiatric:         Mood and Affect: Mood normal.         Behavior: Behavior normal.         Labs:   Recent Results (from the past 2  weeks)   CBC with Differential    Collection Time: 04/30/25  2:55 PM   Result Value Ref Range    WBC 5.11 3.90 - 12.70 K/uL    Hgb 11.1 (L) 12.0 - 16.0 gm/dL    Hct 35.2 (L) 37.0 - 48.5 %    Platelet Count 180 150 - 450 K/uL       CMP  Sodium   Date Value Ref Range Status   04/30/2025 140 136 - 145 mmol/L Final     Potassium   Date Value Ref Range Status   04/30/2025 3.9 3.5 - 5.1 mmol/L Final     Chloride   Date Value Ref Range Status   04/30/2025 104 95 - 110 mmol/L Final     CO2   Date Value Ref Range Status   04/30/2025 30 (H) 23 - 29 mmol/L Final     Glucose   Date Value Ref Range Status   04/30/2025 95 70 - 110 mg/dL Final     BUN   Date Value Ref Range Status   04/30/2025 21 8 - 23 mg/dL Final     Creatinine   Date Value Ref Range Status   04/30/2025 1.3 0.5 - 1.4 mg/dL Final   12/31/2012 1.2 0.5 - 1.4 mg/dL Final     Calcium   Date Value Ref Range Status   04/30/2025 8.6 (L) 8.7 - 10.5 mg/dL Final   12/31/2012 9.7 8.7 - 10.5 mg/dL Final     Protein Total   Date Value Ref Range Status   04/30/2025 5.9 (L) 6.0 - 8.4 gm/dL Final     Albumin   Date Value Ref Range Status   04/30/2025 3.7 3.5 - 5.2 g/dL Final     Bilirubin Total   Date Value Ref Range Status   04/30/2025 0.5 0.1 - 1.0 mg/dL Final     Comment:     For infants and newborns, interpretation of results should be based   on gestational age, weight and in agreement with clinical   observations.    Premature Infant recommended reference ranges:   0-24 hours:  <8.0 mg/dL   24-48 hours: <12.0 mg/dL   3-5 days:    <15.0 mg/dL   6-29 days:   <15.0 mg/dL     ALP   Date Value Ref Range Status   04/30/2025 66 55 - 135 unit/L Final     AST   Date Value Ref Range Status   04/30/2025 20 10 - 40 unit/L Final     ALT   Date Value Ref Range Status   04/30/2025 18 10 - 44 unit/L Final     Anion Gap   Date Value Ref Range Status   04/30/2025 6 (L) 8 - 16 mmol/L Final   12/31/2012 14 5 - 15 meq/L Final     eGFR if    Date Value Ref Range Status  "  07/18/2022 >60.0 >60 mL/min/1.73 m^2 Final     eGFR if non    Date Value Ref Range Status   07/18/2022 52.6 (A) >60 mL/min/1.73 m^2 Final     Comment:     Calculation used to obtain the estimated glomerular filtration  rate (eGFR) is the CKD-EPI equation.        No results found for: "CEA"  No results found for: "PSA"        Assessment/Plan:     Problem List Items Addressed This Visit       Metastatic renal cell carcinoma to lung, right - Primary    Patient is doing well on pembro/axi and has no sign of AI disease.  Her labs look ok and will continue therapy as planned, labs allowing. Last scans do show stable disease.      Also noted were GB changes but she has no clear other symptoms of tolu.  Will observe.      Continue to see her on a three week basis.  Discussed this today.                                 Discussion:     Follow up in about 3 weeks (around 5/22/2025).      Electronically signed by Del Nathan      "

## 2025-05-07 ENCOUNTER — RESULTS FOLLOW-UP (OUTPATIENT)
Facility: CLINIC | Age: 85
End: 2025-05-07

## 2025-05-07 ENCOUNTER — LAB VISIT (OUTPATIENT)
Dept: LAB | Facility: HOSPITAL | Age: 85
End: 2025-05-07
Attending: NURSE PRACTITIONER
Payer: MEDICARE

## 2025-05-07 DIAGNOSIS — C78.01 METASTATIC RENAL CELL CARCINOMA TO LUNG, RIGHT: ICD-10-CM

## 2025-05-07 DIAGNOSIS — C64.9 METASTATIC RENAL CELL CARCINOMA TO LUNG, RIGHT: ICD-10-CM

## 2025-05-07 LAB
ABSOLUTE EOSINOPHIL (SMH): 0.21 K/UL
ABSOLUTE MONOCYTE (SMH): 0.54 K/UL (ref 0.3–1)
ABSOLUTE NEUTROPHIL COUNT (SMH): 3 K/UL (ref 1.8–7.7)
ALBUMIN SERPL-MCNC: 4.1 G/DL (ref 3.5–5.2)
ALP SERPL-CCNC: 73 UNIT/L (ref 55–135)
ALT SERPL-CCNC: 15 UNIT/L (ref 10–44)
ANION GAP (SMH): 7 MMOL/L (ref 8–16)
AST SERPL-CCNC: 19 UNIT/L (ref 10–40)
BASOPHILS # BLD AUTO: 0.03 K/UL
BASOPHILS NFR BLD AUTO: 0.6 %
BILIRUB SERPL-MCNC: 0.5 MG/DL (ref 0.1–1)
BILIRUB UR QL STRIP.AUTO: NEGATIVE
BUN SERPL-MCNC: 20 MG/DL (ref 8–23)
CALCIUM SERPL-MCNC: 9.1 MG/DL (ref 8.7–10.5)
CHLORIDE SERPL-SCNC: 105 MMOL/L (ref 95–110)
CLARITY UR: CLEAR
CO2 SERPL-SCNC: 30 MMOL/L (ref 23–29)
COLOR UR AUTO: YELLOW
CREAT SERPL-MCNC: 1.5 MG/DL (ref 0.5–1.4)
ERYTHROCYTE [DISTWIDTH] IN BLOOD BY AUTOMATED COUNT: 13.9 % (ref 11.5–14.5)
GFR SERPLBLD CREATININE-BSD FMLA CKD-EPI: 34 ML/MIN/1.73/M2
GLUCOSE SERPL-MCNC: 94 MG/DL (ref 70–110)
GLUCOSE UR QL STRIP: NEGATIVE
HCT VFR BLD AUTO: 38.8 % (ref 37–48.5)
HGB BLD-MCNC: 12.1 GM/DL (ref 12–16)
HGB UR QL STRIP: NEGATIVE
IMM GRANULOCYTES # BLD AUTO: 0.01 K/UL (ref 0–0.04)
IMM GRANULOCYTES NFR BLD AUTO: 0.2 % (ref 0–0.5)
KETONES UR QL STRIP: NEGATIVE
LEUKOCYTE ESTERASE UR QL STRIP: NEGATIVE
LYMPHOCYTES # BLD AUTO: 1.45 K/UL (ref 1–4.8)
MCH RBC QN AUTO: 28.1 PG (ref 27–31)
MCHC RBC AUTO-ENTMCNC: 31.2 G/DL (ref 32–36)
MCV RBC AUTO: 90 FL (ref 82–98)
MICROSCOPIC COMMENT: NORMAL
NITRITE UR QL STRIP: NEGATIVE
NUCLEATED RBC (/100WBC) (SMH): 0 /100 WBC
PH UR STRIP: 7 [PH]
PLATELET # BLD AUTO: 227 K/UL (ref 150–450)
PMV BLD AUTO: 9.2 FL (ref 9.2–12.9)
POTASSIUM SERPL-SCNC: 4.1 MMOL/L (ref 3.5–5.1)
PROT SERPL-MCNC: 6.7 GM/DL (ref 6–8.4)
PROT UR QL STRIP: ABNORMAL
RBC # BLD AUTO: 4.3 M/UL (ref 4–5.4)
RBC #/AREA URNS AUTO: 1 /HPF
RELATIVE EOSINOPHIL (SMH): 4 % (ref 0–8)
RELATIVE LYMPHOCYTE (SMH): 27.6 % (ref 18–48)
RELATIVE MONOCYTE (SMH): 10.3 % (ref 4–15)
RELATIVE NEUTROPHIL (SMH): 57.3 % (ref 38–73)
SODIUM SERPL-SCNC: 142 MMOL/L (ref 136–145)
SP GR UR STRIP: 1.01
SQUAMOUS #/AREA URNS AUTO: <1 /HPF
UROBILINOGEN UR STRIP-ACNC: NEGATIVE EU/DL
WBC # BLD AUTO: 5.26 K/UL (ref 3.9–12.7)
WBC #/AREA URNS AUTO: 1 /HPF

## 2025-05-07 PROCEDURE — 36415 COLL VENOUS BLD VENIPUNCTURE: CPT

## 2025-05-07 PROCEDURE — 82040 ASSAY OF SERUM ALBUMIN: CPT

## 2025-05-07 PROCEDURE — 81003 URINALYSIS AUTO W/O SCOPE: CPT

## 2025-05-07 PROCEDURE — 85025 COMPLETE CBC W/AUTO DIFF WBC: CPT

## 2025-05-08 ENCOUNTER — TELEPHONE (OUTPATIENT)
Facility: CLINIC | Age: 85
End: 2025-05-08
Payer: MEDICARE

## 2025-05-08 NOTE — TELEPHONE ENCOUNTER
----- Message from MAY Ba sent at 5/7/2025  4:46 PM CDT -----  Please notify the patient that their creat is up to 1.5 make sure she is increasing her fluids  ----- Message -----  From: Lab, Background User  Sent: 5/7/2025   2:17 PM CDT  To: MAY Ba

## 2025-05-12 ENCOUNTER — INFUSION (OUTPATIENT)
Dept: INFUSION THERAPY | Facility: HOSPITAL | Age: 85
End: 2025-05-12
Attending: INTERNAL MEDICINE
Payer: MEDICARE

## 2025-05-12 VITALS
OXYGEN SATURATION: 99 % | HEART RATE: 83 BPM | RESPIRATION RATE: 18 BRPM | SYSTOLIC BLOOD PRESSURE: 151 MMHG | BODY MASS INDEX: 21.96 KG/M2 | HEIGHT: 66 IN | TEMPERATURE: 97 F | WEIGHT: 136.63 LBS | DIASTOLIC BLOOD PRESSURE: 93 MMHG

## 2025-05-12 DIAGNOSIS — C78.01 METASTATIC RENAL CELL CARCINOMA TO LUNG, RIGHT: Primary | ICD-10-CM

## 2025-05-12 DIAGNOSIS — C64.9 METASTATIC RENAL CELL CARCINOMA TO LUNG, RIGHT: Primary | ICD-10-CM

## 2025-05-12 PROCEDURE — 25000003 PHARM REV CODE 250: Performed by: INTERNAL MEDICINE

## 2025-05-12 PROCEDURE — 96413 CHEMO IV INFUSION 1 HR: CPT

## 2025-05-12 PROCEDURE — 63600175 PHARM REV CODE 636 W HCPCS: Performed by: INTERNAL MEDICINE

## 2025-05-12 RX ORDER — HEPARIN 100 UNIT/ML
500 SYRINGE INTRAVENOUS
Status: DISCONTINUED | OUTPATIENT
Start: 2025-05-12 | End: 2025-05-12 | Stop reason: HOSPADM

## 2025-05-12 RX ORDER — EPINEPHRINE 0.3 MG/.3ML
0.3 INJECTION SUBCUTANEOUS ONCE AS NEEDED
Status: DISCONTINUED | OUTPATIENT
Start: 2025-05-12 | End: 2025-05-12 | Stop reason: HOSPADM

## 2025-05-12 RX ORDER — DIPHENHYDRAMINE HYDROCHLORIDE 50 MG/ML
50 INJECTION, SOLUTION INTRAMUSCULAR; INTRAVENOUS ONCE AS NEEDED
Status: DISCONTINUED | OUTPATIENT
Start: 2025-05-12 | End: 2025-05-12 | Stop reason: HOSPADM

## 2025-05-12 RX ORDER — SODIUM CHLORIDE 0.9 % (FLUSH) 0.9 %
10 SYRINGE (ML) INJECTION
Status: DISCONTINUED | OUTPATIENT
Start: 2025-05-12 | End: 2025-05-12 | Stop reason: HOSPADM

## 2025-05-12 RX ADMIN — SODIUM CHLORIDE 200 MG: 9 INJECTION, SOLUTION INTRAVENOUS at 03:05

## 2025-05-12 RX ADMIN — HEPARIN 500 UNITS: 100 SYRINGE at 03:05

## 2025-05-12 NOTE — PLAN OF CARE
Problem: Fatigue  Goal: Improved Activity Tolerance  Outcome: Progressing  Intervention: Promote Improved Energy  Flowsheets (Taken 5/12/2025 5938)  Fatigue Management:   fatigue-related activity identified   paced activity encouraged   frequent rest breaks encouraged  Sleep/Rest Enhancement:   noise level reduced   relaxation techniques promoted   regular sleep/rest pattern promoted  Activity Management:   Ambulated -L4   Up in chair - L3  Environmental Support:   distractions minimized   rest periods encouraged   calm environment promoted

## 2025-05-27 ENCOUNTER — LAB VISIT (OUTPATIENT)
Dept: LAB | Facility: HOSPITAL | Age: 85
End: 2025-05-27
Attending: NURSE PRACTITIONER
Payer: MEDICARE

## 2025-05-27 DIAGNOSIS — C78.01 METASTATIC RENAL CELL CARCINOMA TO LUNG, RIGHT: ICD-10-CM

## 2025-05-27 DIAGNOSIS — C64.9 METASTATIC RENAL CELL CARCINOMA TO LUNG, RIGHT: ICD-10-CM

## 2025-05-27 LAB
ABSOLUTE EOSINOPHIL (SMH): 0.19 K/UL
ABSOLUTE MONOCYTE (SMH): 0.48 K/UL (ref 0.3–1)
ABSOLUTE NEUTROPHIL COUNT (SMH): 3 K/UL (ref 1.8–7.7)
ALBUMIN SERPL-MCNC: 3.7 G/DL (ref 3.5–5.2)
ALP SERPL-CCNC: 62 UNIT/L (ref 55–135)
ALT SERPL-CCNC: 11 UNIT/L (ref 10–44)
ANION GAP (SMH): 7 MMOL/L (ref 8–16)
AST SERPL-CCNC: 14 UNIT/L (ref 10–40)
BASOPHILS # BLD AUTO: 0.02 K/UL
BASOPHILS NFR BLD AUTO: 0.4 %
BILIRUB SERPL-MCNC: 0.5 MG/DL (ref 0.1–1)
BILIRUB UR QL STRIP.AUTO: NEGATIVE
BUN SERPL-MCNC: 20 MG/DL (ref 8–23)
CALCIUM SERPL-MCNC: 9 MG/DL (ref 8.7–10.5)
CHLORIDE SERPL-SCNC: 105 MMOL/L (ref 95–110)
CLARITY UR: CLEAR
CO2 SERPL-SCNC: 29 MMOL/L (ref 23–29)
COLOR UR AUTO: YELLOW
CREAT SERPL-MCNC: 1.3 MG/DL (ref 0.5–1.4)
ERYTHROCYTE [DISTWIDTH] IN BLOOD BY AUTOMATED COUNT: 13.6 % (ref 11.5–14.5)
GFR SERPLBLD CREATININE-BSD FMLA CKD-EPI: 40 ML/MIN/1.73/M2
GLUCOSE SERPL-MCNC: 91 MG/DL (ref 70–110)
GLUCOSE UR QL STRIP: NEGATIVE
HCT VFR BLD AUTO: 36.1 % (ref 37–48.5)
HGB BLD-MCNC: 11.3 GM/DL (ref 12–16)
HGB UR QL STRIP: NEGATIVE
IMM GRANULOCYTES # BLD AUTO: 0.02 K/UL (ref 0–0.04)
IMM GRANULOCYTES NFR BLD AUTO: 0.4 % (ref 0–0.5)
KETONES UR QL STRIP: NEGATIVE
LEUKOCYTE ESTERASE UR QL STRIP: NEGATIVE
LYMPHOCYTES # BLD AUTO: 1.24 K/UL (ref 1–4.8)
MCH RBC QN AUTO: 28.5 PG (ref 27–31)
MCHC RBC AUTO-ENTMCNC: 31.3 G/DL (ref 32–36)
MCV RBC AUTO: 91 FL (ref 82–98)
NITRITE UR QL STRIP: NEGATIVE
NUCLEATED RBC (/100WBC) (SMH): 0 /100 WBC
PH UR STRIP: 6 [PH]
PLATELET # BLD AUTO: 177 K/UL (ref 150–450)
PMV BLD AUTO: 9.3 FL (ref 9.2–12.9)
POTASSIUM SERPL-SCNC: 3.9 MMOL/L (ref 3.5–5.1)
PROT SERPL-MCNC: 6.1 GM/DL (ref 6–8.4)
PROT UR QL STRIP: ABNORMAL
RBC # BLD AUTO: 3.97 M/UL (ref 4–5.4)
RELATIVE EOSINOPHIL (SMH): 3.8 % (ref 0–8)
RELATIVE LYMPHOCYTE (SMH): 24.9 % (ref 18–48)
RELATIVE MONOCYTE (SMH): 9.6 % (ref 4–15)
RELATIVE NEUTROPHIL (SMH): 60.9 % (ref 38–73)
SODIUM SERPL-SCNC: 141 MMOL/L (ref 136–145)
SP GR UR STRIP: 1
UROBILINOGEN UR STRIP-ACNC: NEGATIVE EU/DL
WBC # BLD AUTO: 4.98 K/UL (ref 3.9–12.7)

## 2025-05-27 PROCEDURE — 84450 TRANSFERASE (AST) (SGOT): CPT

## 2025-05-27 PROCEDURE — 85025 COMPLETE CBC W/AUTO DIFF WBC: CPT

## 2025-05-27 PROCEDURE — 81003 URINALYSIS AUTO W/O SCOPE: CPT

## 2025-05-27 PROCEDURE — 36415 COLL VENOUS BLD VENIPUNCTURE: CPT

## 2025-05-28 ENCOUNTER — OFFICE VISIT (OUTPATIENT)
Facility: CLINIC | Age: 85
End: 2025-05-28
Payer: MEDICARE

## 2025-05-28 VITALS
SYSTOLIC BLOOD PRESSURE: 141 MMHG | BODY MASS INDEX: 22.19 KG/M2 | TEMPERATURE: 97 F | RESPIRATION RATE: 17 BRPM | DIASTOLIC BLOOD PRESSURE: 65 MMHG | WEIGHT: 137.5 LBS | HEART RATE: 88 BPM

## 2025-05-28 DIAGNOSIS — C64.9 METASTATIC RENAL CELL CARCINOMA TO LUNG, RIGHT: Primary | ICD-10-CM

## 2025-05-28 DIAGNOSIS — E03.2 HYPOTHYROIDISM DUE TO MEDICATION: ICD-10-CM

## 2025-05-28 DIAGNOSIS — C78.01 METASTATIC RENAL CELL CARCINOMA TO LUNG, RIGHT: Primary | ICD-10-CM

## 2025-05-28 DIAGNOSIS — R19.7 DIARRHEA, UNSPECIFIED TYPE: ICD-10-CM

## 2025-05-28 PROCEDURE — 99215 OFFICE O/P EST HI 40 MIN: CPT | Mod: S$PBB,,, | Performed by: NURSE PRACTITIONER

## 2025-05-28 PROCEDURE — G2211 COMPLEX E/M VISIT ADD ON: HCPCS | Mod: S$PBB,,, | Performed by: NURSE PRACTITIONER

## 2025-05-28 PROCEDURE — 99213 OFFICE O/P EST LOW 20 MIN: CPT | Mod: PBBFAC,PN | Performed by: NURSE PRACTITIONER

## 2025-05-28 PROCEDURE — 99999 PR PBB SHADOW E&M-EST. PATIENT-LVL III: CPT | Mod: PBBFAC,,, | Performed by: NURSE PRACTITIONER

## 2025-05-28 RX ORDER — HEPARIN 100 UNIT/ML
500 SYRINGE INTRAVENOUS
OUTPATIENT
Start: 2025-06-02

## 2025-05-28 RX ORDER — SODIUM CHLORIDE 0.9 % (FLUSH) 0.9 %
10 SYRINGE (ML) INJECTION
OUTPATIENT
Start: 2025-06-02

## 2025-05-28 RX ORDER — EPINEPHRINE 0.3 MG/.3ML
0.3 INJECTION SUBCUTANEOUS ONCE AS NEEDED
OUTPATIENT
Start: 2025-06-02

## 2025-05-28 RX ORDER — DIPHENHYDRAMINE HYDROCHLORIDE 50 MG/ML
50 INJECTION, SOLUTION INTRAMUSCULAR; INTRAVENOUS ONCE AS NEEDED
OUTPATIENT
Start: 2025-06-02

## 2025-05-28 NOTE — PROGRESS NOTES
PROGRESS NOTE    Subjective:       Patient ID: Karlie Hess is a 84 y.o. female.    History of Left RCC, treated [id number] further treatment     History of [facility] lung cancer-treated with lobectomy-7/2/2015-no chemo/xrt-Path c/w mucin producing adenocarcinoma. [id number]     History of left hilar mass, 9/2020 EBUS negative     PET 10/18/2023:  Nodular densities are as outlined below:  -21 x 17 mm right infrahilar nodule with SUV max 2.5 (image 119), previously measuring 17 x 16 mm (August 20, 2023)  -27 x 27 mm marginated nodule in the posterior inferior left hilum with SUV max 2.7 (image 109)  -13 x 7 mm nodular density along the right posterior pararenal space with SUV max 0.8 (image 179), seen on studies dating back to 1/12/2021.     11/9/2023-EBUS  1. LUNG, RIGHT LOWER LOBE MASS, FINE NEEDLE ASPIRATION   - NEGATIVE FOR MALIGNANT CELLS.     2. LUNG, RIGHT LOWER LOBE MASS, BRUSHING   - RARE ATYPICAL CELLS PRESENT     3. LUNG, RIGHT LOWER LOBE MASS, BIOPSY TOUCH PREP   - POSITIVE FOR CARCINOMA.   - SEE CONCURRENT SURGICAL BIOPSY ([id number])     4. LUNG, RIGHT LOWER LOBE, BRONCHOALVEOLAR LAVAGE   - RARE ATYPICAL CELLS PRESENT.     5. LUNG, LEFT LOWER LOBE MASS, FINE NEEDLE ASPIRATION   - RARE ATYPICAL CELLS PRESENT     6. LYMPH NODE, STATION 11RS, FINE NEEDLE ASPIRATION   - NEGATIVE FOR MALIGNANT CELLS.   - SCANT KATARZYNA MATERIAL PRESENT      Karnofsky performance status score <80   Time from original diagnosis to initiation of targeted therapy <1 year   Hemoglobin less than the lower limit of normal   Serum calcium greater than the upper limit of normal   Neutrophil count greater than the upper limit of normal   Platelet count greater than the upper limit of normal   Favorable risk: None of the above risk factors present.  Intermediate risk: 1 or 2 of the above risk factors present.  Poor risk: 3 or more risk factors present.     1/5/2024:  RIGHT  PARARENAL MASS, CT GUIDED CORE BIOPSY:   - CLEAR CELL RENAL CELL CARCINOMA     12/27/2023-1/5/2024  RLL SBRT    1/30/2024-2/7/2024  XRT to Rt. Renal bed lesion    5/3/2024-MRI Abd:--Progressive Disease  Right kidney mass  2nd mass in lower pole of right kidney  Pancreatic Tail mass    5/13/2024-Bx:  PANCREATIC TAIL MASS, BIOPSY:   - METASTATIC CLEAR CELL RENAL CELL CARCINOMA.     7/31/2024-Patient went to ED with severe HA, MRI Brain:  No metastatic disease    6/7/2024-Pembro/Axitinib:  Cycle 1: 6/7/2024  Cycle 2: 6/28/2024  Cycle 3: 7/19/2024  Cycle 4: 11/15/2024  Cycle 5: 12/6/2024  Cycle 6: 12/27/2024  Cycle 7: 1/17/2025  Cycle 8: 2/17/2025  Cycle 9: 3/10/2025  Cycle 10: 3/31/2025  Cycle 11: 4/21/2025  Cycle 12: 5/12/2025      No apparent drug interactions with topirimate/Axitinib    Prednisone and nurtec    Held Axitinib 10/17/2024 due to proteinuria    3/14/2025-CT CAP:  Stable Disease  ? Acute cholecystitis??---no clear symptoms or signs    Chief Complaint:  Metastatic Renal Cell Carcinoma follow up    History of Present Illness:   Karlie Hess is a 84 y.o. female who presents for follow up of above.      She resumed the Axitinib on 5/28/2024 at 3mg bid and Pembro and is tolerating treatment well.    Neck pain is stable. She takes Gabapentin periodically for the nerve pain.    BP today 141/65  BP regimen  Lebtolol 200mg bid  Losartan 50mg BID  Amlodipine 5mg qhs  Clonidine 0.1mg TID PRN    Family and Social history reviewed and is unchanged from 11/21/2023       Current Outpatient Medications:     acetaminophen (TYLENOL) 500 MG tablet, Take 1,000 mg by mouth 2 (two) times daily as needed., Disp: , Rfl:     amLODIPine (NORVASC) 5 MG tablet, TAKE 1 TABLET BY MOUTH EVERY  EVENING, Disp: 90 tablet, Rfl: 3    aspirin (ECOTRIN) 81 MG EC tablet, Take 1 tablet (81 mg total) by mouth once daily., Disp: , Rfl:     axitinib (INLYTA) 1 mg Tab, Take 3 tablets (3 mg total) by mouth 2 (two) times a day., Disp: 180  tablet, Rfl: 6    cloNIDine (CATAPRES) 0.1 MG tablet, Take 1 tablet (0.1 mg total) by mouth 3 (three) times daily as needed. For systolic BP over 180, Disp: 90 tablet, Rfl: 0    duloxetine (CYMBALTA) 60 MG capsule, Take 60 mg by mouth once daily., Disp: , Rfl:     labetaloL (NORMODYNE) 200 MG tablet, TAKE 1 TABLET BY MOUTH TWICE  DAILY, Disp: 180 tablet, Rfl: 3    levothyroxine (SYNTHROID) 25 MCG tablet, Take 1 tablet (25 mcg total) by mouth before breakfast., Disp: 30 tablet, Rfl: 11    losartan (COZAAR) 50 MG tablet, Take 1 tablet (50 mg total) by mouth 2 (two) times a day., Disp: 180 tablet, Rfl: 3    ondansetron (ZOFRAN) 8 MG tablet, Take 1 tablet (8 mg total) by mouth every 8 (eight) hours as needed for Nausea., Disp: 30 tablet, Rfl: 5    pantoprazole (PROTONIX) 40 MG tablet, Take 40 mg by mouth once daily., Disp: , Rfl:     rosuvastatin (CRESTOR) 10 MG tablet, Take 10 mg by mouth every evening., Disp: , Rfl:   No current facility-administered medications for this visit.    Facility-Administered Medications Ordered in Other Visits:     LIDOcaine (PF) 10 mg/ml (1%) injection 10 mg, 1 mL, Intradermal, Once, Gabriel Loaiza MD    Review of Systems   Constitutional:  Positive for malaise/fatigue.   Respiratory:  Negative for cough and shortness of breath.    Cardiovascular:  Negative for chest pain.   Gastrointestinal:  Positive for diarrhea. Negative for abdominal pain.   Genitourinary:  Positive for frequency.   Musculoskeletal:  Negative for back pain.   Skin:  Negative for rash.   Neurological:  Negative for headaches.   Psychiatric/Behavioral:  The patient is not nervous/anxious.          Objective:       Physical Examination:     BP (!) 141/65   Pulse 88   Temp 97.3 °F (36.3 °C)   Resp 17   Wt 62.4 kg (137 lb 8 oz)   BMI 22.19 kg/m²     Physical Exam  Constitutional:       Appearance: Normal appearance.   HENT:      Head: Normocephalic and atraumatic.   Eyes:      General: No scleral icterus.      Conjunctiva/sclera: Conjunctivae normal.   Cardiovascular:      Rate and Rhythm: Normal rate.   Pulmonary:      Effort: Pulmonary effort is normal.   Abdominal:      General: Abdomen is flat.   Neurological:      General: No focal deficit present.      Mental Status: She is alert and oriented to person, place, and time.   Psychiatric:         Mood and Affect: Mood normal.         Behavior: Behavior normal.         Labs:   Recent Results (from the past 2 weeks)   CBC with Differential    Collection Time: 05/27/25  2:50 PM   Result Value Ref Range    WBC 4.98 3.90 - 12.70 K/uL    Hgb 11.3 (L) 12.0 - 16.0 gm/dL    Hct 36.1 (L) 37.0 - 48.5 %    Platelet Count 177 150 - 450 K/uL       CMP  Sodium   Date Value Ref Range Status   05/27/2025 141 136 - 145 mmol/L Final     Potassium   Date Value Ref Range Status   05/27/2025 3.9 3.5 - 5.1 mmol/L Final     Chloride   Date Value Ref Range Status   05/27/2025 105 95 - 110 mmol/L Final     CO2   Date Value Ref Range Status   05/27/2025 29 23 - 29 mmol/L Final     Glucose   Date Value Ref Range Status   05/27/2025 91 70 - 110 mg/dL Final     BUN   Date Value Ref Range Status   05/27/2025 20 8 - 23 mg/dL Final     Creatinine   Date Value Ref Range Status   05/27/2025 1.3 0.5 - 1.4 mg/dL Final   12/31/2012 1.2 0.5 - 1.4 mg/dL Final     Calcium   Date Value Ref Range Status   05/27/2025 9.0 8.7 - 10.5 mg/dL Final   12/31/2012 9.7 8.7 - 10.5 mg/dL Final     Protein Total   Date Value Ref Range Status   05/27/2025 6.1 6.0 - 8.4 gm/dL Final     Albumin   Date Value Ref Range Status   05/27/2025 3.7 3.5 - 5.2 g/dL Final     Bilirubin Total   Date Value Ref Range Status   05/27/2025 0.5 0.1 - 1.0 mg/dL Final     Comment:     For infants and newborns, interpretation of results should be based   on gestational age, weight and in agreement with clinical   observations.    Premature Infant recommended reference ranges:   0-24 hours:  <8.0 mg/dL   24-48 hours: <12.0 mg/dL   3-5 days:    <15.0  "mg/dL   6-29 days:   <15.0 mg/dL     ALP   Date Value Ref Range Status   05/27/2025 62 55 - 135 unit/L Final     AST   Date Value Ref Range Status   05/27/2025 14 10 - 40 unit/L Final     ALT   Date Value Ref Range Status   05/27/2025 11 10 - 44 unit/L Final     Anion Gap   Date Value Ref Range Status   05/27/2025 7 (L) 8 - 16 mmol/L Final   12/31/2012 14 5 - 15 meq/L Final     eGFR if    Date Value Ref Range Status   07/18/2022 >60.0 >60 mL/min/1.73 m^2 Final     eGFR if non    Date Value Ref Range Status   07/18/2022 52.6 (A) >60 mL/min/1.73 m^2 Final     Comment:     Calculation used to obtain the estimated glomerular filtration  rate (eGFR) is the CKD-EPI equation.        No results found for: "CEA"  No results found for: "PSA"        Assessment/Plan:     Problem List Items Addressed This Visit          Oncology    Metastatic renal cell carcinoma to lung, right - Primary    Continue Keytruda and Inlyta. Repeat CT CAP mid July.         Relevant Orders    CT Chest Abdomen Pelvis Without Contrast (XPD)       Endocrine    Hypothyroidism due to medication    Add TSH Monday with treatment and continue Levothyroxine 25mcg daily            GI    Diarrhea    Imodium PRN            Discussion:     Follow up in about 6 weeks (around 7/9/2025) for with Dr. Wasserman and 12 weeks with me.      Electronically signed by Chiquita Almazan, MSN, APRN, AGNP-C, OCN        "

## 2025-06-02 ENCOUNTER — INFUSION (OUTPATIENT)
Dept: INFUSION THERAPY | Facility: HOSPITAL | Age: 85
End: 2025-06-02
Attending: INTERNAL MEDICINE
Payer: MEDICARE

## 2025-06-02 VITALS
DIASTOLIC BLOOD PRESSURE: 75 MMHG | OXYGEN SATURATION: 98 % | BODY MASS INDEX: 21.75 KG/M2 | HEART RATE: 81 BPM | WEIGHT: 135.31 LBS | SYSTOLIC BLOOD PRESSURE: 150 MMHG | RESPIRATION RATE: 17 BRPM | HEIGHT: 66 IN | TEMPERATURE: 97 F

## 2025-06-02 DIAGNOSIS — I35.1 NONRHEUMATIC AORTIC VALVE INSUFFICIENCY: ICD-10-CM

## 2025-06-02 DIAGNOSIS — C64.9 METASTATIC RENAL CELL CARCINOMA TO LUNG, RIGHT: Primary | ICD-10-CM

## 2025-06-02 DIAGNOSIS — C78.01 METASTATIC RENAL CELL CARCINOMA TO LUNG, RIGHT: Primary | ICD-10-CM

## 2025-06-02 DIAGNOSIS — I10 PRIMARY HYPERTENSION: ICD-10-CM

## 2025-06-02 DIAGNOSIS — R53.83 FATIGUE, UNSPECIFIED TYPE: ICD-10-CM

## 2025-06-02 LAB — TSH SERPL-ACNC: 4.89 UIU/ML (ref 0.34–5.6)

## 2025-06-02 PROCEDURE — 84443 ASSAY THYROID STIM HORMONE: CPT | Performed by: NURSE PRACTITIONER

## 2025-06-02 PROCEDURE — 63600175 PHARM REV CODE 636 W HCPCS: Mod: JZ,TB | Performed by: NURSE PRACTITIONER

## 2025-06-02 PROCEDURE — 25000003 PHARM REV CODE 250: Performed by: NURSE PRACTITIONER

## 2025-06-02 PROCEDURE — 96413 CHEMO IV INFUSION 1 HR: CPT

## 2025-06-02 RX ORDER — DIPHENHYDRAMINE HYDROCHLORIDE 50 MG/ML
50 INJECTION, SOLUTION INTRAMUSCULAR; INTRAVENOUS ONCE AS NEEDED
Status: DISCONTINUED | OUTPATIENT
Start: 2025-06-02 | End: 2025-06-02 | Stop reason: HOSPADM

## 2025-06-02 RX ORDER — EPINEPHRINE 0.3 MG/.3ML
0.3 INJECTION SUBCUTANEOUS ONCE AS NEEDED
Status: DISCONTINUED | OUTPATIENT
Start: 2025-06-02 | End: 2025-06-02 | Stop reason: HOSPADM

## 2025-06-02 RX ORDER — HEPARIN 100 UNIT/ML
500 SYRINGE INTRAVENOUS
Status: DISCONTINUED | OUTPATIENT
Start: 2025-06-02 | End: 2025-06-02 | Stop reason: HOSPADM

## 2025-06-02 RX ORDER — SODIUM CHLORIDE 0.9 % (FLUSH) 0.9 %
10 SYRINGE (ML) INJECTION
Status: DISCONTINUED | OUTPATIENT
Start: 2025-06-02 | End: 2025-06-02 | Stop reason: HOSPADM

## 2025-06-02 RX ADMIN — SODIUM CHLORIDE 200 MG: 9 INJECTION, SOLUTION INTRAVENOUS at 03:06

## 2025-06-02 RX ADMIN — HEPARIN 500 UNITS: 100 SYRINGE at 03:06

## 2025-06-03 RX ORDER — LABETALOL 200 MG/1
200 TABLET, FILM COATED ORAL 2 TIMES DAILY
Qty: 180 TABLET | Refills: 3 | Status: SHIPPED | OUTPATIENT
Start: 2025-06-03

## 2025-06-09 DIAGNOSIS — C64.9 METASTATIC RENAL CELL CARCINOMA TO LUNG, RIGHT: ICD-10-CM

## 2025-06-09 DIAGNOSIS — C78.01 METASTATIC RENAL CELL CARCINOMA TO LUNG, RIGHT: ICD-10-CM

## 2025-06-10 RX ORDER — AXITINIB 1 MG/1
3 TABLET, FILM COATED ORAL 2 TIMES DAILY
Qty: 180 TABLET | Refills: 6 | Status: ACTIVE | OUTPATIENT
Start: 2025-06-10

## 2025-06-19 RX ORDER — EPINEPHRINE 0.3 MG/.3ML
0.3 INJECTION SUBCUTANEOUS ONCE AS NEEDED
OUTPATIENT
Start: 2025-06-23

## 2025-06-19 RX ORDER — DIPHENHYDRAMINE HYDROCHLORIDE 50 MG/ML
50 INJECTION, SOLUTION INTRAMUSCULAR; INTRAVENOUS ONCE AS NEEDED
OUTPATIENT
Start: 2025-06-23

## 2025-06-19 RX ORDER — HEPARIN 100 UNIT/ML
500 SYRINGE INTRAVENOUS
OUTPATIENT
Start: 2025-06-23

## 2025-06-19 RX ORDER — SODIUM CHLORIDE 0.9 % (FLUSH) 0.9 %
10 SYRINGE (ML) INJECTION
OUTPATIENT
Start: 2025-06-23

## 2025-06-20 ENCOUNTER — LAB VISIT (OUTPATIENT)
Dept: LAB | Facility: HOSPITAL | Age: 85
End: 2025-06-20
Attending: NURSE PRACTITIONER
Payer: MEDICARE

## 2025-06-20 DIAGNOSIS — R53.83 FATIGUE, UNSPECIFIED TYPE: ICD-10-CM

## 2025-06-20 DIAGNOSIS — C64.9 METASTATIC RENAL CELL CARCINOMA TO LUNG, RIGHT: ICD-10-CM

## 2025-06-20 DIAGNOSIS — C78.01 METASTATIC RENAL CELL CARCINOMA TO LUNG, RIGHT: ICD-10-CM

## 2025-06-20 LAB
ABSOLUTE EOSINOPHIL (SMH): 0.17 K/UL
ABSOLUTE MONOCYTE (SMH): 0.53 K/UL (ref 0.3–1)
ABSOLUTE NEUTROPHIL COUNT (SMH): 3.3 K/UL (ref 1.8–7.7)
ALBUMIN SERPL-MCNC: 3.8 G/DL (ref 3.5–5.2)
ALP SERPL-CCNC: 63 UNIT/L (ref 55–135)
ALT SERPL-CCNC: 12 UNIT/L (ref 10–44)
ANION GAP (SMH): 8 MMOL/L (ref 8–16)
AST SERPL-CCNC: 15 UNIT/L (ref 10–40)
BACTERIA #/AREA URNS AUTO: NORMAL /HPF
BASOPHILS # BLD AUTO: 0.02 K/UL
BASOPHILS NFR BLD AUTO: 0.4 %
BILIRUB SERPL-MCNC: 0.4 MG/DL (ref 0.1–1)
BILIRUB UR QL STRIP.AUTO: NEGATIVE
BUN SERPL-MCNC: 24 MG/DL (ref 8–23)
CALCIUM SERPL-MCNC: 9.2 MG/DL (ref 8.7–10.5)
CHLORIDE SERPL-SCNC: 108 MMOL/L (ref 95–110)
CLARITY UR: CLEAR
CO2 SERPL-SCNC: 29 MMOL/L (ref 23–29)
COLOR UR AUTO: YELLOW
CREAT SERPL-MCNC: 1.2 MG/DL (ref 0.5–1.4)
ERYTHROCYTE [DISTWIDTH] IN BLOOD BY AUTOMATED COUNT: 13.2 % (ref 11.5–14.5)
GFR SERPLBLD CREATININE-BSD FMLA CKD-EPI: 44 ML/MIN/1.73/M2
GLUCOSE SERPL-MCNC: 73 MG/DL (ref 70–110)
GLUCOSE UR QL STRIP: NEGATIVE
HCT VFR BLD AUTO: 38.4 % (ref 37–48.5)
HGB BLD-MCNC: 11.8 GM/DL (ref 12–16)
HGB UR QL STRIP: NEGATIVE
IMM GRANULOCYTES # BLD AUTO: 0.03 K/UL (ref 0–0.04)
IMM GRANULOCYTES NFR BLD AUTO: 0.6 % (ref 0–0.5)
KETONES UR QL STRIP: NEGATIVE
LEUKOCYTE ESTERASE UR QL STRIP: NEGATIVE
LYMPHOCYTES # BLD AUTO: 1.07 K/UL (ref 1–4.8)
MCH RBC QN AUTO: 28.7 PG (ref 27–31)
MCHC RBC AUTO-ENTMCNC: 30.7 G/DL (ref 32–36)
MCV RBC AUTO: 93 FL (ref 82–98)
MICROSCOPIC COMMENT: NORMAL
NITRITE UR QL STRIP: NEGATIVE
NUCLEATED RBC (/100WBC) (SMH): 0 /100 WBC
PH UR STRIP: 6 [PH]
PLATELET # BLD AUTO: 212 K/UL (ref 150–450)
PMV BLD AUTO: 10.1 FL (ref 9.2–12.9)
POTASSIUM SERPL-SCNC: 3.8 MMOL/L (ref 3.5–5.1)
PROT SERPL-MCNC: 6.5 GM/DL (ref 6–8.4)
PROT UR QL STRIP: ABNORMAL
RBC # BLD AUTO: 4.11 M/UL (ref 4–5.4)
RBC #/AREA URNS AUTO: 1 /HPF
RELATIVE EOSINOPHIL (SMH): 3.3 % (ref 0–8)
RELATIVE LYMPHOCYTE (SMH): 20.9 % (ref 18–48)
RELATIVE MONOCYTE (SMH): 10.4 % (ref 4–15)
RELATIVE NEUTROPHIL (SMH): 64.4 % (ref 38–73)
SODIUM SERPL-SCNC: 145 MMOL/L (ref 136–145)
SP GR UR STRIP: 1.02
SQUAMOUS #/AREA URNS AUTO: 1 /HPF
TSH SERPL-ACNC: 4.67 UIU/ML (ref 0.34–5.6)
UROBILINOGEN UR STRIP-ACNC: NEGATIVE EU/DL
WBC # BLD AUTO: 5.12 K/UL (ref 3.9–12.7)
WBC #/AREA URNS AUTO: 2 /HPF

## 2025-06-20 PROCEDURE — 85025 COMPLETE CBC W/AUTO DIFF WBC: CPT

## 2025-06-20 PROCEDURE — 81003 URINALYSIS AUTO W/O SCOPE: CPT

## 2025-06-20 PROCEDURE — 36415 COLL VENOUS BLD VENIPUNCTURE: CPT

## 2025-06-20 PROCEDURE — 80053 COMPREHEN METABOLIC PANEL: CPT

## 2025-06-20 PROCEDURE — 84443 ASSAY THYROID STIM HORMONE: CPT

## 2025-06-23 ENCOUNTER — INFUSION (OUTPATIENT)
Dept: INFUSION THERAPY | Facility: HOSPITAL | Age: 85
End: 2025-06-23
Attending: INTERNAL MEDICINE
Payer: MEDICARE

## 2025-06-23 VITALS
DIASTOLIC BLOOD PRESSURE: 84 MMHG | HEIGHT: 66 IN | WEIGHT: 135.5 LBS | RESPIRATION RATE: 16 BRPM | TEMPERATURE: 98 F | SYSTOLIC BLOOD PRESSURE: 176 MMHG | OXYGEN SATURATION: 98 % | BODY MASS INDEX: 21.78 KG/M2 | HEART RATE: 80 BPM

## 2025-06-23 DIAGNOSIS — C64.9 METASTATIC RENAL CELL CARCINOMA TO LUNG, RIGHT: Primary | ICD-10-CM

## 2025-06-23 DIAGNOSIS — C78.01 METASTATIC RENAL CELL CARCINOMA TO LUNG, RIGHT: Primary | ICD-10-CM

## 2025-06-23 PROCEDURE — A4216 STERILE WATER/SALINE, 10 ML: HCPCS | Performed by: INTERNAL MEDICINE

## 2025-06-23 PROCEDURE — 25000003 PHARM REV CODE 250: Performed by: INTERNAL MEDICINE

## 2025-06-23 PROCEDURE — 96413 CHEMO IV INFUSION 1 HR: CPT

## 2025-06-23 PROCEDURE — 63600175 PHARM REV CODE 636 W HCPCS: Mod: JZ,TB | Performed by: INTERNAL MEDICINE

## 2025-06-23 RX ORDER — SODIUM CHLORIDE 0.9 % (FLUSH) 0.9 %
10 SYRINGE (ML) INJECTION
Status: DISCONTINUED | OUTPATIENT
Start: 2025-06-23 | End: 2025-06-23 | Stop reason: HOSPADM

## 2025-06-23 RX ORDER — DIPHENHYDRAMINE HYDROCHLORIDE 50 MG/ML
50 INJECTION, SOLUTION INTRAMUSCULAR; INTRAVENOUS ONCE AS NEEDED
Status: DISCONTINUED | OUTPATIENT
Start: 2025-06-23 | End: 2025-06-23 | Stop reason: HOSPADM

## 2025-06-23 RX ORDER — EPINEPHRINE 0.3 MG/.3ML
0.3 INJECTION SUBCUTANEOUS ONCE AS NEEDED
Status: DISCONTINUED | OUTPATIENT
Start: 2025-06-23 | End: 2025-06-23 | Stop reason: HOSPADM

## 2025-06-23 RX ORDER — HEPARIN 100 UNIT/ML
500 SYRINGE INTRAVENOUS
Status: DISCONTINUED | OUTPATIENT
Start: 2025-06-23 | End: 2025-06-23 | Stop reason: HOSPADM

## 2025-06-23 RX ADMIN — HEPARIN 500 UNITS: 100 SYRINGE at 04:06

## 2025-06-23 RX ADMIN — SODIUM CHLORIDE, PRESERVATIVE FREE 10 ML: 5 INJECTION INTRAVENOUS at 04:06

## 2025-06-23 RX ADMIN — SODIUM CHLORIDE 200 MG: 9 INJECTION, SOLUTION INTRAVENOUS at 03:06

## 2025-06-23 NOTE — PLAN OF CARE
Problem: Fatigue  Goal: Improved Activity Tolerance  Outcome: Progressing  Intervention: Promote Improved Energy  Flowsheets (Taken 6/23/2025 3184)  Sleep/Rest Enhancement: regular sleep/rest pattern promoted  Environmental Support: rest periods encouraged

## 2025-06-25 DIAGNOSIS — Z12.31 SCREENING MAMMOGRAM FOR BREAST CANCER: Primary | ICD-10-CM

## 2025-06-30 ENCOUNTER — HOSPITAL ENCOUNTER (OUTPATIENT)
Dept: RADIOLOGY | Facility: HOSPITAL | Age: 85
Discharge: HOME | End: 2025-06-30
Attending: NURSE PRACTITIONER
Payer: MEDICARE

## 2025-06-30 VITALS — WEIGHT: 135 LBS | HEIGHT: 66 IN | BODY MASS INDEX: 21.69 KG/M2

## 2025-06-30 DIAGNOSIS — Z12.31 SCREENING MAMMOGRAM FOR BREAST CANCER: ICD-10-CM

## 2025-06-30 PROCEDURE — 77067 SCR MAMMO BI INCL CAD: CPT | Mod: 26,,, | Performed by: RADIOLOGY

## 2025-06-30 PROCEDURE — 77063 BREAST TOMOSYNTHESIS BI: CPT | Mod: TC,PO

## 2025-06-30 PROCEDURE — 77063 BREAST TOMOSYNTHESIS BI: CPT | Mod: 26,,, | Performed by: RADIOLOGY

## 2025-07-07 ENCOUNTER — HOSPITAL ENCOUNTER (OUTPATIENT)
Dept: RADIOLOGY | Facility: HOSPITAL | Age: 85
Discharge: HOME | End: 2025-07-07
Attending: NURSE PRACTITIONER
Payer: MEDICARE

## 2025-07-07 DIAGNOSIS — C64.9 METASTATIC RENAL CELL CARCINOMA TO LUNG, RIGHT: ICD-10-CM

## 2025-07-07 DIAGNOSIS — C78.01 METASTATIC RENAL CELL CARCINOMA TO LUNG, RIGHT: ICD-10-CM

## 2025-07-07 PROCEDURE — 71250 CT THORAX DX C-: CPT | Mod: TC,PO

## 2025-07-07 PROCEDURE — 71250 CT THORAX DX C-: CPT | Mod: 26,,, | Performed by: RADIOLOGY

## 2025-07-07 PROCEDURE — 74176 CT ABD & PELVIS W/O CONTRAST: CPT | Mod: 26,,, | Performed by: RADIOLOGY

## 2025-07-08 DIAGNOSIS — C64.9 METASTATIC RENAL CELL CARCINOMA TO LUNG, RIGHT: ICD-10-CM

## 2025-07-08 DIAGNOSIS — C78.01 METASTATIC RENAL CELL CARCINOMA TO LUNG, RIGHT: ICD-10-CM

## 2025-07-09 ENCOUNTER — OFFICE VISIT (OUTPATIENT)
Facility: CLINIC | Age: 85
End: 2025-07-09
Payer: MEDICARE

## 2025-07-09 VITALS
WEIGHT: 133.69 LBS | HEART RATE: 90 BPM | RESPIRATION RATE: 18 BRPM | TEMPERATURE: 98 F | BODY MASS INDEX: 21.58 KG/M2 | SYSTOLIC BLOOD PRESSURE: 159 MMHG | DIASTOLIC BLOOD PRESSURE: 77 MMHG

## 2025-07-09 DIAGNOSIS — C64.9 METASTATIC RENAL CELL CARCINOMA TO LUNG, RIGHT: Primary | ICD-10-CM

## 2025-07-09 DIAGNOSIS — C78.01 METASTATIC RENAL CELL CARCINOMA TO LUNG, RIGHT: Primary | ICD-10-CM

## 2025-07-09 PROCEDURE — 99213 OFFICE O/P EST LOW 20 MIN: CPT | Mod: PBBFAC,PN | Performed by: INTERNAL MEDICINE

## 2025-07-09 PROCEDURE — G2211 COMPLEX E/M VISIT ADD ON: HCPCS | Mod: ,,, | Performed by: INTERNAL MEDICINE

## 2025-07-09 PROCEDURE — 99999 PR PBB SHADOW E&M-EST. PATIENT-LVL III: CPT | Mod: PBBFAC,,, | Performed by: INTERNAL MEDICINE

## 2025-07-09 PROCEDURE — 99215 OFFICE O/P EST HI 40 MIN: CPT | Mod: S$PBB,,, | Performed by: INTERNAL MEDICINE

## 2025-07-09 RX ORDER — HEPARIN 100 UNIT/ML
500 SYRINGE INTRAVENOUS
OUTPATIENT
Start: 2025-07-14

## 2025-07-09 RX ORDER — DIPHENHYDRAMINE HYDROCHLORIDE 50 MG/ML
50 INJECTION, SOLUTION INTRAMUSCULAR; INTRAVENOUS ONCE AS NEEDED
OUTPATIENT
Start: 2025-07-14

## 2025-07-09 RX ORDER — SODIUM CHLORIDE 0.9 % (FLUSH) 0.9 %
10 SYRINGE (ML) INJECTION
OUTPATIENT
Start: 2025-07-14

## 2025-07-09 RX ORDER — EPINEPHRINE 0.3 MG/.3ML
0.3 INJECTION SUBCUTANEOUS ONCE AS NEEDED
OUTPATIENT
Start: 2025-07-14

## 2025-07-09 NOTE — PROGRESS NOTES
PROGRESS NOTE    Subjective:       Patient ID: Karlie Hess is a 84 y.o. female.    History of Left RCC, treated [id number] further treatment     History of JAX lung cancer-treated with lobectomy-7/2/2015-no chemo/xrt-Path c/w mucin producing adenocarcinoma. [id number]     History of left hilar mass, 9/2020 EBUS negative     PET 10/18/2023:  Nodular densities are as outlined below:  -21 x 17 mm right infrahilar nodule with SUV max 2.5 (image 119), previously measuring 17 x 16 mm (August 20, 2023)  -27 x 27 mm marginated nodule in the posterior inferior left hilum with SUV max 2.7 (image 109)  -13 x 7 mm nodular density along the right posterior pararenal space with SUV max 0.8 (image 179), seen on studies dating back to 1/12/2021.     11/9/2023-EBUS  1. LUNG, RIGHT LOWER LOBE MASS, FINE NEEDLE ASPIRATION   - NEGATIVE FOR MALIGNANT CELLS.     2. LUNG, RIGHT LOWER LOBE MASS, BRUSHING   - RARE ATYPICAL CELLS PRESENT     3. LUNG, RIGHT LOWER LOBE MASS, BIOPSY TOUCH PREP   - POSITIVE FOR CARCINOMA.   - SEE CONCURRENT SURGICAL BIOPSY ([id number])     4. LUNG, RIGHT LOWER LOBE, BRONCHOALVEOLAR LAVAGE   - RARE ATYPICAL CELLS PRESENT.     5. LUNG, LEFT LOWER LOBE MASS, FINE NEEDLE ASPIRATION   - RARE ATYPICAL CELLS PRESENT     6. LYMPH NODE, STATION 11RS, FINE NEEDLE ASPIRATION   - NEGATIVE FOR MALIGNANT CELLS.   - SCANT KATARZYNA MATERIAL PRESENT      Karnofsky performance status score <80   Time from original diagnosis to initiation of targeted therapy <1 year   Hemoglobin less than the lower limit of normal   Serum calcium greater than the upper limit of normal   Neutrophil count greater than the upper limit of normal   Platelet count greater than the upper limit of normal   Favorable risk: None of the above risk factors present.  Intermediate risk: 1 or 2 of the above risk factors present.  Poor risk: 3 or more risk factors present.     1/5/2024:  RIGHT  PARARENAL MASS, CT GUIDED CORE BIOPSY:   - CLEAR CELL RENAL CELL CARCINOMA     12/27/2023-1/5/2024  RLL SBRT    1/30/2024-2/7/2024  XRT to Rt. Renal bed lesion    5/3/2024-MRI Abd:--Progressive Disease  Right kidney mass  2nd mass in lower pole of right kidney  Pancreatic Tail mass    5/13/2024-Bx:  PANCREATIC TAIL MASS, BIOPSY:   - METASTATIC CLEAR CELL RENAL CELL CARCINOMA.     7/31/2024-Patient went to ED with severe HA, MRI Brain:  No metastatic disease    6/7/2024-Pembro/Axitinib:  Cycle 1: 6/7/2024  Cycle 2: 6/28/2024  Cycle 3: 7/19/2024  Cycle 4: 11/15/2024  Cycle 5: 12/6/2024  Cycle 6: 12/27/2024  Cycle 7: 1/17/2025  Cycle 8: 2/17/2025  Cycle 9: 3/10/2025  Cycle 10: 3/31/2025  Cycle 11: 4/21/2025  Cycle 12: 5/12/2025-due    No apparent drug interactions with topirimate/Axitinib    Prednisone and nurtec    Held Axitinib 10/17/2024 due to proteinuria        Chief Complaint:  No chief complaint on file.  Metastatic Renal Cell Carcinoma follow up    History of Present Illness:   Karlie Hess is a 84 y.o. female who presents for follow up of above.      She resumed the Axitinib on 7/9/2025 at 3mg bid.   Also back on pembro.      Patient is doing ok today.  She notes a recent trip and fall but feeling ok now.  No new complaints o/w.     BP regimen  Lebtolol 100mg bid  Lisinopril 20mg bid  Amlodipine 5mg qhs    Family and Social history reviewed and is unchanged from 11/21/2023             Current Outpatient Medications:     acetaminophen (TYLENOL) 500 MG tablet, Take 1,000 mg by mouth 2 (two) times daily as needed., Disp: , Rfl:     amLODIPine (NORVASC) 5 MG tablet, TAKE 1 TABLET BY MOUTH EVERY  EVENING, Disp: 90 tablet, Rfl: 3    aspirin (ECOTRIN) 81 MG EC tablet, Take 1 tablet (81 mg total) by mouth once daily., Disp: , Rfl:     axitinib (INLYTA) 1 mg Tab, Take 3 tablets (3 mg total) by mouth 2 (two) times a day., Disp: 180 tablet, Rfl: 6    cloNIDine (CATAPRES) 0.1 MG tablet, Take 1 tablet (0.1 mg total)  by mouth 3 (three) times daily as needed. For systolic BP over 180, Disp: 90 tablet, Rfl: 0    duloxetine (CYMBALTA) 60 MG capsule, Take 60 mg by mouth once daily., Disp: , Rfl:     labetaloL (NORMODYNE) 200 MG tablet, TAKE 1 TABLET BY MOUTH TWICE  DAILY, Disp: 180 tablet, Rfl: 3    levothyroxine (SYNTHROID) 25 MCG tablet, Take 1 tablet (25 mcg total) by mouth before breakfast., Disp: 30 tablet, Rfl: 11    losartan (COZAAR) 50 MG tablet, Take 1 tablet (50 mg total) by mouth 2 (two) times a day., Disp: 180 tablet, Rfl: 3    ondansetron (ZOFRAN) 8 MG tablet, Take 1 tablet (8 mg total) by mouth every 8 (eight) hours as needed for Nausea., Disp: 30 tablet, Rfl: 5    pantoprazole (PROTONIX) 40 MG tablet, Take 40 mg by mouth once daily., Disp: , Rfl:     rosuvastatin (CRESTOR) 10 MG tablet, Take 10 mg by mouth every evening., Disp: , Rfl:   No current facility-administered medications for this visit.    Facility-Administered Medications Ordered in Other Visits:     LIDOcaine (PF) 10 mg/ml (1%) injection 10 mg, 1 mL, Intradermal, Once, Gabriel Loaiza MD        Objective:       Physical Examination:     BP (!) 159/77   Pulse 90   Temp 97.5 °F (36.4 °C)   Resp 18   Wt 60.6 kg (133 lb 11.2 oz)   BMI 21.58 kg/m²     Physical Exam  Constitutional:       Appearance: Normal appearance.   HENT:      Head: Normocephalic and atraumatic.   Eyes:      General: No scleral icterus.     Conjunctiva/sclera: Conjunctivae normal.   Cardiovascular:      Rate and Rhythm: Normal rate.   Pulmonary:      Effort: Pulmonary effort is normal.   Abdominal:      General: Abdomen is flat.   Neurological:      General: No focal deficit present.      Mental Status: She is alert and oriented to person, place, and time.   Psychiatric:         Mood and Affect: Mood normal.         Behavior: Behavior normal.         Labs:   Recent Results (from the past 2 weeks)   CBC with Differential    Collection Time: 07/07/25  8:43 AM   Result Value Ref  Range    WBC 4.56 3.90 - 12.70 K/uL    Hgb 11.4 (L) 12.0 - 16.0 gm/dL    Hct 36.7 (L) 37.0 - 48.5 %    Platelet Count 230 150 - 450 K/uL       CMP  Sodium   Date Value Ref Range Status   07/07/2025 144 136 - 145 mmol/L Final     Potassium   Date Value Ref Range Status   07/07/2025 4.4 3.5 - 5.1 mmol/L Final     Chloride   Date Value Ref Range Status   07/07/2025 108 95 - 110 mmol/L Final     CO2   Date Value Ref Range Status   07/07/2025 30 (H) 23 - 29 mmol/L Final     Glucose   Date Value Ref Range Status   07/07/2025 88 70 - 110 mg/dL Final     BUN   Date Value Ref Range Status   07/07/2025 18 8 - 23 mg/dL Final     Creatinine   Date Value Ref Range Status   07/07/2025 1.4 0.5 - 1.4 mg/dL Final   12/31/2012 1.2 0.5 - 1.4 mg/dL Final     Calcium   Date Value Ref Range Status   07/07/2025 9.1 8.7 - 10.5 mg/dL Final   12/31/2012 9.7 8.7 - 10.5 mg/dL Final     Protein Total   Date Value Ref Range Status   07/07/2025 6.2 6.0 - 8.4 gm/dL Final     Albumin   Date Value Ref Range Status   07/07/2025 3.8 3.5 - 5.2 g/dL Final     Bilirubin Total   Date Value Ref Range Status   07/07/2025 0.5 0.1 - 1.0 mg/dL Final     Comment:     For infants and newborns, interpretation of results should be based   on gestational age, weight and in agreement with clinical   observations.    Premature Infant recommended reference ranges:   0-24 hours:  <8.0 mg/dL   24-48 hours: <12.0 mg/dL   3-5 days:    <15.0 mg/dL   6-29 days:   <15.0 mg/dL     ALP   Date Value Ref Range Status   07/07/2025 60 55 - 135 unit/L Final     AST   Date Value Ref Range Status   07/07/2025 18 10 - 40 unit/L Final     ALT   Date Value Ref Range Status   07/07/2025 14 10 - 44 unit/L Final     Anion Gap   Date Value Ref Range Status   07/07/2025 6 (L) 8 - 16 mmol/L Final   12/31/2012 14 5 - 15 meq/L Final     eGFR if    Date Value Ref Range Status   07/18/2022 >60.0 >60 mL/min/1.73 m^2 Final     eGFR if non    Date Value Ref Range  "Status   07/18/2022 52.6 (A) >60 mL/min/1.73 m^2 Final     Comment:     Calculation used to obtain the estimated glomerular filtration  rate (eGFR) is the CKD-EPI equation.        No results found for: "CEA"  No results found for: "PSA"        Assessment/Plan:     Problem List Items Addressed This Visit       Metastatic renal cell carcinoma to lung, right - Primary    Patient continues on axitinib and pembro and scans show stability with no new cancer growth.  She is doing well on the therapy with no new issues and no sign of AI disease.  Will continue to monitor closely with labs and follow up and continue current therapy as planned.  Discussed this today.                                   Discussion:     Follow up in about 3 weeks (around 7/30/2025).      Electronically signed by Del Nathan      "

## 2025-07-09 NOTE — ASSESSMENT & PLAN NOTE
Patient continues on axitinib and pembro and scans show stability with no new cancer growth.  She is doing well on the therapy with no new issues and no sign of AI disease.  Will continue to monitor closely with labs and follow up and continue current therapy as planned.  Discussed this today.

## 2025-07-10 RX ORDER — ONDANSETRON 8 MG/1
8 TABLET, FILM COATED ORAL EVERY 8 HOURS PRN
Qty: 30 TABLET | Refills: 5 | Status: SHIPPED | OUTPATIENT
Start: 2025-07-10

## 2025-07-14 ENCOUNTER — INFUSION (OUTPATIENT)
Dept: INFUSION THERAPY | Facility: HOSPITAL | Age: 85
End: 2025-07-14
Attending: INTERNAL MEDICINE
Payer: MEDICARE

## 2025-07-14 ENCOUNTER — LAB VISIT (OUTPATIENT)
Dept: LAB | Facility: HOSPITAL | Age: 85
End: 2025-07-14
Attending: NURSE PRACTITIONER
Payer: MEDICARE

## 2025-07-14 VITALS
SYSTOLIC BLOOD PRESSURE: 163 MMHG | HEART RATE: 77 BPM | HEIGHT: 66 IN | BODY MASS INDEX: 21.67 KG/M2 | RESPIRATION RATE: 16 BRPM | TEMPERATURE: 98 F | OXYGEN SATURATION: 96 % | WEIGHT: 134.81 LBS | DIASTOLIC BLOOD PRESSURE: 73 MMHG

## 2025-07-14 DIAGNOSIS — C64.9 METASTATIC RENAL CELL CARCINOMA TO LUNG, RIGHT: ICD-10-CM

## 2025-07-14 DIAGNOSIS — C78.01 METASTATIC RENAL CELL CARCINOMA TO LUNG, RIGHT: Primary | ICD-10-CM

## 2025-07-14 DIAGNOSIS — C64.9 METASTATIC RENAL CELL CARCINOMA TO LUNG, RIGHT: Primary | ICD-10-CM

## 2025-07-14 DIAGNOSIS — C78.01 METASTATIC RENAL CELL CARCINOMA TO LUNG, RIGHT: ICD-10-CM

## 2025-07-14 LAB
ABSOLUTE EOSINOPHIL (SMH): 0.16 K/UL
ABSOLUTE MONOCYTE (SMH): 0.41 K/UL (ref 0.3–1)
ABSOLUTE NEUTROPHIL COUNT (SMH): 3.1 K/UL (ref 1.8–7.7)
ALBUMIN SERPL-MCNC: 3.8 G/DL (ref 3.5–5.2)
ALP SERPL-CCNC: 63 UNIT/L (ref 55–135)
ALT SERPL-CCNC: 13 UNIT/L (ref 10–44)
ANION GAP (SMH): 6 MMOL/L (ref 8–16)
AST SERPL-CCNC: 17 UNIT/L (ref 10–40)
BASOPHILS # BLD AUTO: 0.03 K/UL
BASOPHILS NFR BLD AUTO: 0.6 %
BILIRUB SERPL-MCNC: 0.5 MG/DL (ref 0.1–1)
BILIRUB UR QL STRIP.AUTO: NEGATIVE
BUN SERPL-MCNC: 18 MG/DL (ref 8–23)
CALCIUM SERPL-MCNC: 8.9 MG/DL (ref 8.7–10.5)
CHLORIDE SERPL-SCNC: 108 MMOL/L (ref 95–110)
CLARITY UR: CLEAR
CO2 SERPL-SCNC: 29 MMOL/L (ref 23–29)
COLOR UR AUTO: YELLOW
CREAT SERPL-MCNC: 1.2 MG/DL (ref 0.5–1.4)
ERYTHROCYTE [DISTWIDTH] IN BLOOD BY AUTOMATED COUNT: 13.1 % (ref 11.5–14.5)
GFR SERPLBLD CREATININE-BSD FMLA CKD-EPI: 44 ML/MIN/1.73/M2
GLUCOSE SERPL-MCNC: 83 MG/DL (ref 70–110)
GLUCOSE UR QL STRIP: NEGATIVE
HCT VFR BLD AUTO: 37.5 % (ref 37–48.5)
HGB BLD-MCNC: 11.9 GM/DL (ref 12–16)
HGB UR QL STRIP: NEGATIVE
IMM GRANULOCYTES # BLD AUTO: 0.03 K/UL (ref 0–0.04)
IMM GRANULOCYTES NFR BLD AUTO: 0.6 % (ref 0–0.5)
KETONES UR QL STRIP: NEGATIVE
LEUKOCYTE ESTERASE UR QL STRIP: NEGATIVE
LYMPHOCYTES # BLD AUTO: 0.95 K/UL (ref 1–4.8)
MCH RBC QN AUTO: 28.5 PG (ref 27–31)
MCHC RBC AUTO-ENTMCNC: 31.7 G/DL (ref 32–36)
MCV RBC AUTO: 90 FL (ref 82–98)
MICROSCOPIC COMMENT: NORMAL
NITRITE UR QL STRIP: NEGATIVE
NUCLEATED RBC (/100WBC) (SMH): 0 /100 WBC
PH UR STRIP: 6 [PH]
PLATELET # BLD AUTO: 221 K/UL (ref 150–450)
PMV BLD AUTO: 9.2 FL (ref 9.2–12.9)
POTASSIUM SERPL-SCNC: 3.6 MMOL/L (ref 3.5–5.1)
PROT SERPL-MCNC: 6.1 GM/DL (ref 6–8.4)
PROT UR QL STRIP: ABNORMAL
RBC # BLD AUTO: 4.17 M/UL (ref 4–5.4)
RBC #/AREA URNS AUTO: 1 /HPF
RELATIVE EOSINOPHIL (SMH): 3.4 % (ref 0–8)
RELATIVE LYMPHOCYTE (SMH): 20.2 % (ref 18–48)
RELATIVE MONOCYTE (SMH): 8.7 % (ref 4–15)
RELATIVE NEUTROPHIL (SMH): 66.5 % (ref 38–73)
SODIUM SERPL-SCNC: 143 MMOL/L (ref 136–145)
SP GR UR STRIP: 1.01
SQUAMOUS #/AREA URNS AUTO: <1 /HPF
UROBILINOGEN UR STRIP-ACNC: NEGATIVE EU/DL
WBC # BLD AUTO: 4.7 K/UL (ref 3.9–12.7)
WBC #/AREA URNS AUTO: 1 /HPF

## 2025-07-14 PROCEDURE — 85025 COMPLETE CBC W/AUTO DIFF WBC: CPT

## 2025-07-14 PROCEDURE — 63600175 PHARM REV CODE 636 W HCPCS: Performed by: INTERNAL MEDICINE

## 2025-07-14 PROCEDURE — 36415 COLL VENOUS BLD VENIPUNCTURE: CPT

## 2025-07-14 PROCEDURE — A4216 STERILE WATER/SALINE, 10 ML: HCPCS | Performed by: INTERNAL MEDICINE

## 2025-07-14 PROCEDURE — 81003 URINALYSIS AUTO W/O SCOPE: CPT

## 2025-07-14 PROCEDURE — 96413 CHEMO IV INFUSION 1 HR: CPT

## 2025-07-14 PROCEDURE — 80053 COMPREHEN METABOLIC PANEL: CPT

## 2025-07-14 PROCEDURE — 25000003 PHARM REV CODE 250: Performed by: INTERNAL MEDICINE

## 2025-07-14 RX ORDER — HEPARIN 100 UNIT/ML
500 SYRINGE INTRAVENOUS
Status: DISCONTINUED | OUTPATIENT
Start: 2025-07-14 | End: 2025-07-14 | Stop reason: HOSPADM

## 2025-07-14 RX ORDER — SODIUM CHLORIDE 0.9 % (FLUSH) 0.9 %
10 SYRINGE (ML) INJECTION
Status: DISCONTINUED | OUTPATIENT
Start: 2025-07-14 | End: 2025-07-14 | Stop reason: HOSPADM

## 2025-07-14 RX ORDER — EPINEPHRINE 0.3 MG/.3ML
0.3 INJECTION SUBCUTANEOUS ONCE AS NEEDED
Status: DISCONTINUED | OUTPATIENT
Start: 2025-07-14 | End: 2025-07-14 | Stop reason: HOSPADM

## 2025-07-14 RX ORDER — DIPHENHYDRAMINE HYDROCHLORIDE 50 MG/ML
50 INJECTION, SOLUTION INTRAMUSCULAR; INTRAVENOUS ONCE AS NEEDED
Status: DISCONTINUED | OUTPATIENT
Start: 2025-07-14 | End: 2025-07-14 | Stop reason: HOSPADM

## 2025-07-14 RX ADMIN — HEPARIN 500 UNITS: 100 SYRINGE at 03:07

## 2025-07-14 RX ADMIN — SODIUM CHLORIDE 200 MG: 9 INJECTION, SOLUTION INTRAVENOUS at 03:07

## 2025-07-14 RX ADMIN — SODIUM CHLORIDE, PRESERVATIVE FREE 10 ML: 5 INJECTION INTRAVENOUS at 03:07

## 2025-07-14 NOTE — PLAN OF CARE
Problem: Fatigue  Goal: Improved Activity Tolerance  Outcome: Progressing  Intervention: Promote Improved Energy  7/14/2025 1512 by Vanessa Childress, RN  Flowsheets (Taken 7/14/2025 1512)  Environmental Support: rest periods encouraged  7/14/2025 1511 by Vanessa Childress, RN  Flowsheets (Taken 7/14/2025 1511)  Environmental Support: rest periods encouraged

## 2025-07-14 NOTE — PLAN OF CARE
Problem: Fatigue  Goal: Improved Activity Tolerance  Outcome: Progressing  Intervention: Promote Improved Energy  Flowsheets (Taken 7/14/2025 1511)  Environmental Support: rest periods encouraged

## 2025-07-31 ENCOUNTER — LAB VISIT (OUTPATIENT)
Dept: LAB | Facility: HOSPITAL | Age: 85
End: 2025-07-31
Attending: NURSE PRACTITIONER
Payer: MEDICARE

## 2025-07-31 DIAGNOSIS — C78.01 METASTATIC RENAL CELL CARCINOMA TO LUNG, RIGHT: ICD-10-CM

## 2025-07-31 DIAGNOSIS — E03.2 HYPOTHYROIDISM DUE TO MEDICATION: ICD-10-CM

## 2025-07-31 DIAGNOSIS — C64.9 METASTATIC RENAL CELL CARCINOMA TO LUNG, RIGHT: ICD-10-CM

## 2025-07-31 LAB
ABSOLUTE EOSINOPHIL (SMH): 0.28 K/UL
ABSOLUTE MONOCYTE (SMH): 0.45 K/UL (ref 0.3–1)
ABSOLUTE NEUTROPHIL COUNT (SMH): 2.6 K/UL (ref 1.8–7.7)
ALBUMIN SERPL-MCNC: 3.8 G/DL (ref 3.5–5.2)
ALP SERPL-CCNC: 68 UNIT/L (ref 55–135)
ALT SERPL-CCNC: 13 UNIT/L (ref 10–44)
ANION GAP (SMH): 8 MMOL/L (ref 8–16)
AST SERPL-CCNC: 17 UNIT/L (ref 10–40)
BASOPHILS # BLD AUTO: 0.03 K/UL
BASOPHILS NFR BLD AUTO: 0.7 %
BILIRUB SERPL-MCNC: 0.6 MG/DL (ref 0.1–1)
BILIRUB UR QL STRIP.AUTO: NEGATIVE
BUN SERPL-MCNC: 20 MG/DL (ref 8–23)
CALCIUM SERPL-MCNC: 8.8 MG/DL (ref 8.7–10.5)
CHLORIDE SERPL-SCNC: 105 MMOL/L (ref 95–110)
CLARITY UR: CLEAR
CO2 SERPL-SCNC: 29 MMOL/L (ref 23–29)
COLOR UR AUTO: YELLOW
CREAT SERPL-MCNC: 1.1 MG/DL (ref 0.5–1.4)
ERYTHROCYTE [DISTWIDTH] IN BLOOD BY AUTOMATED COUNT: 13.3 % (ref 11.5–14.5)
GFR SERPLBLD CREATININE-BSD FMLA CKD-EPI: 49 ML/MIN/1.73/M2
GLUCOSE SERPL-MCNC: 89 MG/DL (ref 70–110)
GLUCOSE UR QL STRIP: NEGATIVE
HCT VFR BLD AUTO: 34.7 % (ref 37–48.5)
HGB BLD-MCNC: 11.2 GM/DL (ref 12–16)
HGB UR QL STRIP: NEGATIVE
HYALINE CASTS UR QL AUTO: 5 /LPF (ref 0–1)
IMM GRANULOCYTES # BLD AUTO: 0.01 K/UL (ref 0–0.04)
IMM GRANULOCYTES NFR BLD AUTO: 0.2 % (ref 0–0.5)
KETONES UR QL STRIP: NEGATIVE
LEUKOCYTE ESTERASE UR QL STRIP: NEGATIVE
LYMPHOCYTES # BLD AUTO: 1.17 K/UL (ref 1–4.8)
MCH RBC QN AUTO: 28.6 PG (ref 27–31)
MCHC RBC AUTO-ENTMCNC: 32.3 G/DL (ref 32–36)
MCV RBC AUTO: 89 FL (ref 82–98)
MICROSCOPIC COMMENT: ABNORMAL
NITRITE UR QL STRIP: NEGATIVE
NUCLEATED RBC (/100WBC) (SMH): 0 /100 WBC
PH UR STRIP: 6 [PH]
PLATELET # BLD AUTO: 192 K/UL (ref 150–450)
PMV BLD AUTO: 8.8 FL (ref 9.2–12.9)
POTASSIUM SERPL-SCNC: 4 MMOL/L (ref 3.5–5.1)
PROT SERPL-MCNC: 6.2 GM/DL (ref 6–8.4)
PROT UR QL STRIP: ABNORMAL
RBC # BLD AUTO: 3.91 M/UL (ref 4–5.4)
RBC #/AREA URNS AUTO: 1 /HPF
RELATIVE EOSINOPHIL (SMH): 6.2 % (ref 0–8)
RELATIVE LYMPHOCYTE (SMH): 25.7 % (ref 18–48)
RELATIVE MONOCYTE (SMH): 9.9 % (ref 4–15)
RELATIVE NEUTROPHIL (SMH): 57.3 % (ref 38–73)
SODIUM SERPL-SCNC: 142 MMOL/L (ref 136–145)
SP GR UR STRIP: 1.02
SQUAMOUS #/AREA URNS AUTO: <1 /HPF
UROBILINOGEN UR STRIP-ACNC: NEGATIVE EU/DL
WBC # BLD AUTO: 4.55 K/UL (ref 3.9–12.7)
WBC #/AREA URNS AUTO: 2 /HPF

## 2025-07-31 PROCEDURE — 81001 URINALYSIS AUTO W/SCOPE: CPT

## 2025-07-31 PROCEDURE — 85025 COMPLETE CBC W/AUTO DIFF WBC: CPT

## 2025-07-31 PROCEDURE — 80053 COMPREHEN METABOLIC PANEL: CPT

## 2025-07-31 PROCEDURE — 84443 ASSAY THYROID STIM HORMONE: CPT

## 2025-07-31 PROCEDURE — 36415 COLL VENOUS BLD VENIPUNCTURE: CPT

## 2025-08-01 ENCOUNTER — TELEPHONE (OUTPATIENT)
Facility: CLINIC | Age: 85
End: 2025-08-01
Payer: MEDICARE

## 2025-08-01 DIAGNOSIS — E03.2 HYPOTHYROIDISM DUE TO MEDICATION: Primary | ICD-10-CM

## 2025-08-01 LAB — TSH SERPL-ACNC: 3.98 UIU/ML (ref 0.34–5.6)

## 2025-08-01 RX ORDER — DIPHENHYDRAMINE HYDROCHLORIDE 50 MG/ML
50 INJECTION, SOLUTION INTRAMUSCULAR; INTRAVENOUS ONCE AS NEEDED
OUTPATIENT
Start: 2025-08-04

## 2025-08-01 RX ORDER — EPINEPHRINE 0.3 MG/.3ML
0.3 INJECTION SUBCUTANEOUS ONCE AS NEEDED
OUTPATIENT
Start: 2025-08-04

## 2025-08-01 RX ORDER — SODIUM CHLORIDE 0.9 % (FLUSH) 0.9 %
10 SYRINGE (ML) INJECTION
OUTPATIENT
Start: 2025-08-04

## 2025-08-01 RX ORDER — HEPARIN 100 UNIT/ML
500 SYRINGE INTRAVENOUS
OUTPATIENT
Start: 2025-08-04

## 2025-08-04 ENCOUNTER — INFUSION (OUTPATIENT)
Dept: INFUSION THERAPY | Facility: HOSPITAL | Age: 85
End: 2025-08-04
Attending: INTERNAL MEDICINE
Payer: MEDICARE

## 2025-08-04 VITALS
WEIGHT: 135.88 LBS | HEIGHT: 66 IN | RESPIRATION RATE: 16 BRPM | HEART RATE: 84 BPM | SYSTOLIC BLOOD PRESSURE: 140 MMHG | DIASTOLIC BLOOD PRESSURE: 73 MMHG | BODY MASS INDEX: 21.84 KG/M2 | TEMPERATURE: 98 F | OXYGEN SATURATION: 98 %

## 2025-08-04 DIAGNOSIS — C78.01 METASTATIC RENAL CELL CARCINOMA TO LUNG, RIGHT: Primary | ICD-10-CM

## 2025-08-04 DIAGNOSIS — C64.9 METASTATIC RENAL CELL CARCINOMA TO LUNG, RIGHT: Primary | ICD-10-CM

## 2025-08-04 PROCEDURE — 25000003 PHARM REV CODE 250: Performed by: INTERNAL MEDICINE

## 2025-08-04 PROCEDURE — 96413 CHEMO IV INFUSION 1 HR: CPT

## 2025-08-04 PROCEDURE — 63600175 PHARM REV CODE 636 W HCPCS: Performed by: INTERNAL MEDICINE

## 2025-08-04 RX ORDER — SODIUM CHLORIDE 0.9 % (FLUSH) 0.9 %
10 SYRINGE (ML) INJECTION
Status: DISCONTINUED | OUTPATIENT
Start: 2025-08-04 | End: 2025-08-04 | Stop reason: HOSPADM

## 2025-08-04 RX ORDER — EPINEPHRINE 0.3 MG/.3ML
0.3 INJECTION SUBCUTANEOUS ONCE AS NEEDED
Status: DISCONTINUED | OUTPATIENT
Start: 2025-08-04 | End: 2025-08-04 | Stop reason: HOSPADM

## 2025-08-04 RX ORDER — HEPARIN 100 UNIT/ML
500 SYRINGE INTRAVENOUS
Status: DISCONTINUED | OUTPATIENT
Start: 2025-08-04 | End: 2025-08-04 | Stop reason: HOSPADM

## 2025-08-04 RX ORDER — DIPHENHYDRAMINE HYDROCHLORIDE 50 MG/ML
50 INJECTION, SOLUTION INTRAMUSCULAR; INTRAVENOUS ONCE AS NEEDED
Status: DISCONTINUED | OUTPATIENT
Start: 2025-08-04 | End: 2025-08-04 | Stop reason: HOSPADM

## 2025-08-04 RX ADMIN — SODIUM CHLORIDE: 9 INJECTION, SOLUTION INTRAVENOUS at 03:08

## 2025-08-04 RX ADMIN — SODIUM CHLORIDE 200 MG: 9 INJECTION, SOLUTION INTRAVENOUS at 03:08

## 2025-08-04 RX ADMIN — HEPARIN 500 UNITS: 100 SYRINGE at 04:08

## 2025-08-04 NOTE — PLAN OF CARE
Problem: Fatigue  Goal: Improved Activity Tolerance  Outcome: Progressing  Intervention: Promote Improved Energy  Flowsheets (Taken 8/4/2025 1087)  Fatigue Management: paced activity encouraged  Sleep/Rest Enhancement: regular sleep/rest pattern promoted  Activity Management: Ambulated -L4  Environmental Support: environmental consistency promoted

## 2025-08-07 ENCOUNTER — OFFICE VISIT (OUTPATIENT)
Facility: CLINIC | Age: 85
End: 2025-08-07
Payer: MEDICARE

## 2025-08-07 VITALS
BODY MASS INDEX: 21.79 KG/M2 | RESPIRATION RATE: 17 BRPM | SYSTOLIC BLOOD PRESSURE: 177 MMHG | HEART RATE: 81 BPM | WEIGHT: 135 LBS | DIASTOLIC BLOOD PRESSURE: 82 MMHG | TEMPERATURE: 98 F

## 2025-08-07 DIAGNOSIS — C64.9 METASTATIC RENAL CELL CARCINOMA TO LUNG, RIGHT: Primary | ICD-10-CM

## 2025-08-07 DIAGNOSIS — C78.01 METASTATIC RENAL CELL CARCINOMA TO LUNG, RIGHT: Primary | ICD-10-CM

## 2025-08-07 PROCEDURE — 99215 OFFICE O/P EST HI 40 MIN: CPT | Mod: S$PBB,,, | Performed by: INTERNAL MEDICINE

## 2025-08-07 PROCEDURE — G2211 COMPLEX E/M VISIT ADD ON: HCPCS | Mod: ,,, | Performed by: INTERNAL MEDICINE

## 2025-08-07 PROCEDURE — 99213 OFFICE O/P EST LOW 20 MIN: CPT | Mod: PBBFAC,PN | Performed by: INTERNAL MEDICINE

## 2025-08-07 PROCEDURE — 99999 PR PBB SHADOW E&M-EST. PATIENT-LVL III: CPT | Mod: PBBFAC,,, | Performed by: INTERNAL MEDICINE

## 2025-08-07 RX ORDER — HEPARIN 100 UNIT/ML
500 SYRINGE INTRAVENOUS
OUTPATIENT
Start: 2025-08-25

## 2025-08-07 RX ORDER — EPINEPHRINE 0.3 MG/.3ML
0.3 INJECTION SUBCUTANEOUS ONCE AS NEEDED
OUTPATIENT
Start: 2025-08-25

## 2025-08-07 RX ORDER — SODIUM CHLORIDE 0.9 % (FLUSH) 0.9 %
10 SYRINGE (ML) INJECTION
OUTPATIENT
Start: 2025-08-25

## 2025-08-07 RX ORDER — DIPHENHYDRAMINE HYDROCHLORIDE 50 MG/ML
50 INJECTION, SOLUTION INTRAMUSCULAR; INTRAVENOUS ONCE AS NEEDED
OUTPATIENT
Start: 2025-08-25

## 2025-08-07 NOTE — PROGRESS NOTES
PROGRESS NOTE    Subjective:       Patient ID: Karlie Hess is a 84 y.o. female.    History of Left RCC, treated [id number] further treatment     History of JAX lung cancer-treated with lobectomy-7/2/2015-no chemo/xrt-Path c/w mucin producing adenocarcinoma. [id number]     History of left hilar mass, 9/2020 EBUS negative     PET 10/18/2023:  Nodular densities are as outlined below:  -21 x 17 mm right infrahilar nodule with SUV max 2.5 (image 119), previously measuring 17 x 16 mm (August 20, 2023)  -27 x 27 mm marginated nodule in the posterior inferior left hilum with SUV max 2.7 (image 109)  -13 x 7 mm nodular density along the right posterior pararenal space with SUV max 0.8 (image 179), seen on studies dating back to 1/12/2021.     11/9/2023-EBUS  1. LUNG, RIGHT LOWER LOBE MASS, FINE NEEDLE ASPIRATION   - NEGATIVE FOR MALIGNANT CELLS.     2. LUNG, RIGHT LOWER LOBE MASS, BRUSHING   - RARE ATYPICAL CELLS PRESENT     3. LUNG, RIGHT LOWER LOBE MASS, BIOPSY TOUCH PREP   - POSITIVE FOR CARCINOMA.   - SEE CONCURRENT SURGICAL BIOPSY ([id number])     4. LUNG, RIGHT LOWER LOBE, BRONCHOALVEOLAR LAVAGE   - RARE ATYPICAL CELLS PRESENT.     5. LUNG, LEFT LOWER LOBE MASS, FINE NEEDLE ASPIRATION   - RARE ATYPICAL CELLS PRESENT     6. LYMPH NODE, STATION 11RS, FINE NEEDLE ASPIRATION   - NEGATIVE FOR MALIGNANT CELLS.   - SCANT KATARZYNA MATERIAL PRESENT      Karnofsky performance status score <80   Time from original diagnosis to initiation of targeted therapy <1 year   Hemoglobin less than the lower limit of normal   Serum calcium greater than the upper limit of normal   Neutrophil count greater than the upper limit of normal   Platelet count greater than the upper limit of normal   Favorable risk: None of the above risk factors present.  Intermediate risk: 1 or 2 of the above risk factors present.  Poor risk: 3 or more risk factors present.     1/5/2024:  RIGHT  PARARENAL MASS, CT GUIDED CORE BIOPSY:   - CLEAR CELL RENAL CELL CARCINOMA     12/27/2023-1/5/2024  RLL SBRT    1/30/2024-2/7/2024  XRT to Rt. Renal bed lesion    5/3/2024-MRI Abd:--Progressive Disease  Right kidney mass  2nd mass in lower pole of right kidney  Pancreatic Tail mass    5/13/2024-Bx:  PANCREATIC TAIL MASS, BIOPSY:   - METASTATIC CLEAR CELL RENAL CELL CARCINOMA.     7/31/2024-Patient went to ED with severe HA, MRI Brain:  No metastatic disease    6/7/2024-Pembro/Axitinib:  Cycle 1: 6/7/2024  Cycle 2: 6/28/2024  Cycle 3: 7/19/2024  Cycle 4: 11/15/2024  Cycle 5: 12/6/2024  Cycle 6: 12/27/2024  Cycle 7: 1/17/2025  Cycle 8: 2/17/2025  Cycle 9: 3/10/2025  Cycle 10: 3/31/2025  Cycle 11: 4/21/2025  ----------------------------------------  Cycle 17: 8/25/2025-due    No apparent drug interactions with topirimate/Axitinib    Prednisone and nurtec    Held Axitinib 10/17/2024 due to proteinuria    7/7/2025-CT CAP:  No significant change/minimal stable disease      Chief Complaint:  No chief complaint on file.  Metastatic Renal Cell Carcinoma follow up    History of Present Illness:   Karlie Hess is a 84 y.o. female who presents for follow up of above.      She resumed the Axitinib on 7/9/2025 at 3mg bid.   Also back on pembro.  As of today, 8/7/2025.     Patient is feeling ok today.  No new complaints. No sob, occasional diarrhea.     BP regimen  Lebtolol 100mg bid  Lisinopril 20mg bid  Amlodipine 5mg qhs    Family and Social history reviewed and is unchanged from 11/21/2023             Current Outpatient Medications:     acetaminophen (TYLENOL) 500 MG tablet, Take 1,000 mg by mouth 2 (two) times daily as needed., Disp: , Rfl:     amLODIPine (NORVASC) 5 MG tablet, TAKE 1 TABLET BY MOUTH EVERY  EVENING, Disp: 90 tablet, Rfl: 3    aspirin (ECOTRIN) 81 MG EC tablet, Take 1 tablet (81 mg total) by mouth once daily., Disp: , Rfl:     axitinib (INLYTA) 1 mg Tab, Take 3 tablets (3 mg total) by mouth 2 (two)  times a day., Disp: 180 tablet, Rfl: 6    cloNIDine (CATAPRES) 0.1 MG tablet, Take 1 tablet (0.1 mg total) by mouth 3 (three) times daily as needed. For systolic BP over 180, Disp: 90 tablet, Rfl: 0    duloxetine (CYMBALTA) 60 MG capsule, Take 60 mg by mouth once daily., Disp: , Rfl:     labetaloL (NORMODYNE) 200 MG tablet, TAKE 1 TABLET BY MOUTH TWICE  DAILY, Disp: 180 tablet, Rfl: 3    levothyroxine (SYNTHROID) 25 MCG tablet, Take 1 tablet (25 mcg total) by mouth before breakfast., Disp: 30 tablet, Rfl: 11    losartan (COZAAR) 50 MG tablet, Take 1 tablet (50 mg total) by mouth 2 (two) times a day., Disp: 180 tablet, Rfl: 3    ondansetron (ZOFRAN) 8 MG tablet, Take 1 tablet (8 mg total) by mouth every 8 (eight) hours as needed for Nausea., Disp: 30 tablet, Rfl: 5    pantoprazole (PROTONIX) 40 MG tablet, Take 40 mg by mouth once daily., Disp: , Rfl:     rosuvastatin (CRESTOR) 10 MG tablet, Take 10 mg by mouth every evening., Disp: , Rfl:   No current facility-administered medications for this visit.    Facility-Administered Medications Ordered in Other Visits:     LIDOcaine (PF) 10 mg/ml (1%) injection 10 mg, 1 mL, Intradermal, Once, Gabriel Loaiza MD        Objective:       Physical Examination:     BP (!) 177/82   Pulse 81   Temp 97.6 °F (36.4 °C)   Resp 17   Wt 61.2 kg (135 lb)   BMI 21.79 kg/m²     Physical Exam  Constitutional:       Appearance: Normal appearance.   HENT:      Head: Normocephalic and atraumatic.   Eyes:      General: No scleral icterus.     Conjunctiva/sclera: Conjunctivae normal.   Cardiovascular:      Rate and Rhythm: Normal rate.   Pulmonary:      Effort: Pulmonary effort is normal.   Abdominal:      General: Abdomen is flat.   Neurological:      General: No focal deficit present.      Mental Status: She is alert and oriented to person, place, and time.   Psychiatric:         Mood and Affect: Mood normal.         Behavior: Behavior normal.         Labs:   Recent Results (from the  past 2 weeks)   CBC with Differential    Collection Time: 07/31/25  1:17 PM   Result Value Ref Range    WBC 4.55 3.90 - 12.70 K/uL    Hgb 11.2 (L) 12.0 - 16.0 gm/dL    Hct 34.7 (L) 37.0 - 48.5 %    Platelet Count 192 150 - 450 K/uL       CMP  Sodium   Date Value Ref Range Status   07/31/2025 142 136 - 145 mmol/L Final     Potassium   Date Value Ref Range Status   07/31/2025 4.0 3.5 - 5.1 mmol/L Final     Chloride   Date Value Ref Range Status   07/31/2025 105 95 - 110 mmol/L Final     CO2   Date Value Ref Range Status   07/31/2025 29 23 - 29 mmol/L Final     Glucose   Date Value Ref Range Status   07/31/2025 89 70 - 110 mg/dL Final     BUN   Date Value Ref Range Status   07/31/2025 20 8 - 23 mg/dL Final     Creatinine   Date Value Ref Range Status   07/31/2025 1.1 0.5 - 1.4 mg/dL Final   12/31/2012 1.2 0.5 - 1.4 mg/dL Final     Calcium   Date Value Ref Range Status   07/31/2025 8.8 8.7 - 10.5 mg/dL Final   12/31/2012 9.7 8.7 - 10.5 mg/dL Final     Protein Total   Date Value Ref Range Status   07/31/2025 6.2 6.0 - 8.4 gm/dL Final     Albumin   Date Value Ref Range Status   07/31/2025 3.8 3.5 - 5.2 g/dL Final     Bilirubin Total   Date Value Ref Range Status   07/31/2025 0.6 0.1 - 1.0 mg/dL Final     Comment:     For infants and newborns, interpretation of results should be based   on gestational age, weight and in agreement with clinical   observations.    Premature Infant recommended reference ranges:   0-24 hours:  <8.0 mg/dL   24-48 hours: <12.0 mg/dL   3-5 days:    <15.0 mg/dL   6-29 days:   <15.0 mg/dL     ALP   Date Value Ref Range Status   07/31/2025 68 55 - 135 unit/L Final     AST   Date Value Ref Range Status   07/31/2025 17 10 - 40 unit/L Final     ALT   Date Value Ref Range Status   07/31/2025 13 10 - 44 unit/L Final     Anion Gap   Date Value Ref Range Status   07/31/2025 8 8 - 16 mmol/L Final   12/31/2012 14 5 - 15 meq/L Final     eGFR if    Date Value Ref Range Status   07/18/2022 >60.0  ">60 mL/min/1.73 m^2 Final     eGFR if non    Date Value Ref Range Status   07/18/2022 52.6 (A) >60 mL/min/1.73 m^2 Final     Comment:     Calculation used to obtain the estimated glomerular filtration  rate (eGFR) is the CKD-EPI equation.        No results found for: "CEA"  No results found for: "PSA"        Assessment/Plan:     Problem List Items Addressed This Visit       Metastatic renal cell carcinoma to lung, right - Primary    Ms. Hess is doing well on therapy at this time.  She cotinues on Pembro and axi and doing well with this.  No sign of AI disease and overall she is looking well.  Will continue aggressive lab monitoring and follow up and continue therapy as planned.  Discussed this today.                                     Discussion:     Follow up in about 6 weeks (around 9/18/2025).      Electronically signed by Del Nathan      "

## 2025-08-19 ENCOUNTER — LAB VISIT (OUTPATIENT)
Dept: LAB | Facility: HOSPITAL | Age: 85
End: 2025-08-19
Attending: NURSE PRACTITIONER
Payer: MEDICARE

## 2025-08-19 DIAGNOSIS — C64.9 METASTATIC RENAL CELL CARCINOMA TO LUNG, RIGHT: ICD-10-CM

## 2025-08-19 DIAGNOSIS — C78.01 METASTATIC RENAL CELL CARCINOMA TO LUNG, RIGHT: ICD-10-CM

## 2025-08-19 LAB
ABSOLUTE EOSINOPHIL (SMH): 0.28 K/UL
ABSOLUTE MONOCYTE (SMH): 0.46 K/UL (ref 0.3–1)
ABSOLUTE NEUTROPHIL COUNT (SMH): 2.8 K/UL (ref 1.8–7.7)
ALBUMIN SERPL-MCNC: 3.9 G/DL (ref 3.5–5.2)
ALP SERPL-CCNC: 62 UNIT/L (ref 55–135)
ALT SERPL-CCNC: 14 UNIT/L (ref 10–44)
ANION GAP (SMH): 5 MMOL/L (ref 8–16)
AST SERPL-CCNC: 17 UNIT/L (ref 10–40)
BASOPHILS # BLD AUTO: 0.02 K/UL
BASOPHILS NFR BLD AUTO: 0.4 %
BILIRUB SERPL-MCNC: 0.5 MG/DL (ref 0.1–1)
BILIRUB UR QL STRIP.AUTO: NEGATIVE
BUN SERPL-MCNC: 22 MG/DL (ref 8–23)
CALCIUM SERPL-MCNC: 9 MG/DL (ref 8.7–10.5)
CHLORIDE SERPL-SCNC: 106 MMOL/L (ref 95–110)
CLARITY UR: CLEAR
CO2 SERPL-SCNC: 29 MMOL/L (ref 23–29)
COLOR UR AUTO: YELLOW
CREAT SERPL-MCNC: 1.2 MG/DL (ref 0.5–1.4)
ERYTHROCYTE [DISTWIDTH] IN BLOOD BY AUTOMATED COUNT: 13.2 % (ref 11.5–14.5)
GFR SERPLBLD CREATININE-BSD FMLA CKD-EPI: 44 ML/MIN/1.73/M2
GLUCOSE SERPL-MCNC: 84 MG/DL (ref 70–110)
GLUCOSE UR QL STRIP: NEGATIVE
HCT VFR BLD AUTO: 36.8 % (ref 37–48.5)
HGB BLD-MCNC: 11.5 GM/DL (ref 12–16)
HGB UR QL STRIP: NEGATIVE
IMM GRANULOCYTES # BLD AUTO: 0.01 K/UL (ref 0–0.04)
IMM GRANULOCYTES NFR BLD AUTO: 0.2 % (ref 0–0.5)
KETONES UR QL STRIP: NEGATIVE
LEUKOCYTE ESTERASE UR QL STRIP: NEGATIVE
LYMPHOCYTES # BLD AUTO: 1.13 K/UL (ref 1–4.8)
MCH RBC QN AUTO: 28.3 PG (ref 27–31)
MCHC RBC AUTO-ENTMCNC: 31.3 G/DL (ref 32–36)
MCV RBC AUTO: 91 FL (ref 82–98)
MICROSCOPIC COMMENT: NORMAL
NITRITE UR QL STRIP: NEGATIVE
NUCLEATED RBC (/100WBC) (SMH): 0 /100 WBC
PH UR STRIP: 6 [PH]
PLATELET # BLD AUTO: 196 K/UL (ref 150–450)
PMV BLD AUTO: 9.3 FL (ref 9.2–12.9)
POTASSIUM SERPL-SCNC: 4.1 MMOL/L (ref 3.5–5.1)
PROT SERPL-MCNC: 6.2 GM/DL (ref 6–8.4)
PROT UR QL STRIP: ABNORMAL
RBC # BLD AUTO: 4.06 M/UL (ref 4–5.4)
RBC #/AREA URNS AUTO: 1 /HPF
RELATIVE EOSINOPHIL (SMH): 5.9 % (ref 0–8)
RELATIVE LYMPHOCYTE (SMH): 23.9 % (ref 18–48)
RELATIVE MONOCYTE (SMH): 9.7 % (ref 4–15)
RELATIVE NEUTROPHIL (SMH): 59.9 % (ref 38–73)
SODIUM SERPL-SCNC: 140 MMOL/L (ref 136–145)
SP GR UR STRIP: 1.01
SQUAMOUS #/AREA URNS AUTO: <1 /HPF
UROBILINOGEN UR STRIP-ACNC: NEGATIVE EU/DL
WBC # BLD AUTO: 4.72 K/UL (ref 3.9–12.7)
WBC #/AREA URNS AUTO: 1 /HPF

## 2025-08-19 PROCEDURE — 36415 COLL VENOUS BLD VENIPUNCTURE: CPT

## 2025-08-19 PROCEDURE — 81003 URINALYSIS AUTO W/O SCOPE: CPT

## 2025-08-19 PROCEDURE — 85025 COMPLETE CBC W/AUTO DIFF WBC: CPT

## 2025-08-19 PROCEDURE — 84075 ASSAY ALKALINE PHOSPHATASE: CPT

## 2025-08-21 ENCOUNTER — OFFICE VISIT (OUTPATIENT)
Dept: HEMATOLOGY/ONCOLOGY | Facility: CLINIC | Age: 85
End: 2025-08-21
Payer: MEDICARE

## 2025-08-21 VITALS
WEIGHT: 134.25 LBS | HEIGHT: 66 IN | SYSTOLIC BLOOD PRESSURE: 171 MMHG | DIASTOLIC BLOOD PRESSURE: 78 MMHG | OXYGEN SATURATION: 97 % | BODY MASS INDEX: 21.57 KG/M2 | RESPIRATION RATE: 18 BRPM | TEMPERATURE: 98 F | HEART RATE: 86 BPM

## 2025-08-21 DIAGNOSIS — E03.2 HYPOTHYROIDISM DUE TO MEDICATION: ICD-10-CM

## 2025-08-21 DIAGNOSIS — R11.0 NAUSEA: ICD-10-CM

## 2025-08-21 DIAGNOSIS — C78.01 METASTATIC RENAL CELL CARCINOMA TO LUNG, RIGHT: Primary | ICD-10-CM

## 2025-08-21 DIAGNOSIS — C64.9 METASTATIC RENAL CELL CARCINOMA TO LUNG, RIGHT: Primary | ICD-10-CM

## 2025-08-21 DIAGNOSIS — R19.7 DIARRHEA, UNSPECIFIED TYPE: ICD-10-CM

## 2025-08-21 DIAGNOSIS — G89.3 CANCER ASSOCIATED PAIN: ICD-10-CM

## 2025-08-21 PROCEDURE — 99214 OFFICE O/P EST MOD 30 MIN: CPT | Mod: PBBFAC,PN | Performed by: NURSE PRACTITIONER

## 2025-08-21 PROCEDURE — G2211 COMPLEX E/M VISIT ADD ON: HCPCS | Mod: ,,, | Performed by: NURSE PRACTITIONER

## 2025-08-21 PROCEDURE — 99999 PR PBB SHADOW E&M-EST. PATIENT-LVL IV: CPT | Mod: PBBFAC,,, | Performed by: NURSE PRACTITIONER

## 2025-08-21 PROCEDURE — 99214 OFFICE O/P EST MOD 30 MIN: CPT | Mod: S$PBB,,, | Performed by: NURSE PRACTITIONER

## 2025-08-25 ENCOUNTER — INFUSION (OUTPATIENT)
Dept: INFUSION THERAPY | Facility: HOSPITAL | Age: 85
End: 2025-08-25
Attending: INTERNAL MEDICINE
Payer: MEDICARE

## 2025-08-25 VITALS
HEART RATE: 79 BPM | SYSTOLIC BLOOD PRESSURE: 128 MMHG | OXYGEN SATURATION: 97 % | TEMPERATURE: 98 F | DIASTOLIC BLOOD PRESSURE: 72 MMHG | HEIGHT: 66 IN | WEIGHT: 132.31 LBS | BODY MASS INDEX: 21.26 KG/M2 | RESPIRATION RATE: 18 BRPM

## 2025-08-25 DIAGNOSIS — C64.9 METASTATIC RENAL CELL CARCINOMA TO LUNG, RIGHT: Primary | ICD-10-CM

## 2025-08-25 DIAGNOSIS — C78.01 METASTATIC RENAL CELL CARCINOMA TO LUNG, RIGHT: Primary | ICD-10-CM

## 2025-08-25 PROCEDURE — 96413 CHEMO IV INFUSION 1 HR: CPT

## 2025-08-25 PROCEDURE — 25000003 PHARM REV CODE 250: Performed by: INTERNAL MEDICINE

## 2025-08-25 PROCEDURE — 63600175 PHARM REV CODE 636 W HCPCS: Performed by: INTERNAL MEDICINE

## 2025-08-25 RX ORDER — HEPARIN 100 UNIT/ML
500 SYRINGE INTRAVENOUS
Status: DISCONTINUED | OUTPATIENT
Start: 2025-08-25 | End: 2025-08-25 | Stop reason: HOSPADM

## 2025-08-25 RX ORDER — EPINEPHRINE 0.3 MG/.3ML
0.3 INJECTION SUBCUTANEOUS ONCE AS NEEDED
Status: DISCONTINUED | OUTPATIENT
Start: 2025-08-25 | End: 2025-08-25 | Stop reason: HOSPADM

## 2025-08-25 RX ORDER — DIPHENHYDRAMINE HYDROCHLORIDE 50 MG/ML
50 INJECTION, SOLUTION INTRAMUSCULAR; INTRAVENOUS ONCE AS NEEDED
Status: DISCONTINUED | OUTPATIENT
Start: 2025-08-25 | End: 2025-08-25 | Stop reason: HOSPADM

## 2025-08-25 RX ORDER — SODIUM CHLORIDE 0.9 % (FLUSH) 0.9 %
10 SYRINGE (ML) INJECTION
Status: DISCONTINUED | OUTPATIENT
Start: 2025-08-25 | End: 2025-08-25 | Stop reason: HOSPADM

## 2025-08-25 RX ADMIN — HEPARIN 500 UNITS: 100 SYRINGE at 03:08

## 2025-08-25 RX ADMIN — SODIUM CHLORIDE 200 MG: 9 INJECTION, SOLUTION INTRAVENOUS at 03:08

## 2025-09-02 DIAGNOSIS — I10 PRIMARY HYPERTENSION: ICD-10-CM

## 2025-09-04 RX ORDER — AMLODIPINE BESYLATE 5 MG/1
5 TABLET ORAL NIGHTLY
Qty: 90 TABLET | Refills: 3 | Status: SHIPPED | OUTPATIENT
Start: 2025-09-04

## (undated) DEVICE — SYR 10CC LUER LOCK

## (undated) DEVICE — GLOVE PI ULTRA TOUCH G SURGEON

## (undated) DEVICE — SUT MCRYL PLUS 4-0 PS2 27IN

## (undated) DEVICE — DECANTER FLUID TRNSF WHITE 9IN

## (undated) DEVICE — TUBING MINIBORE EXTENSION

## (undated) DEVICE — BLADE SURG CARBON STEEL SZ11

## (undated) DEVICE — ELECTRODE BLD 1 INCH TEFLON

## (undated) DEVICE — NDL SPINAL SPINOCAN 22GX3.5

## (undated) DEVICE — GLOVE BIOGEL PI MICRO SZ 7.5

## (undated) DEVICE — GLOVE PROTEXIS PI CLASSIC 7.5

## (undated) DEVICE — SOL NACL IRR 1000ML BTL

## (undated) DEVICE — NDL SAFETY 25G X 1.5 ECLIPSE

## (undated) DEVICE — APPLICATOR CHLORAPREP CLR 10.5

## (undated) DEVICE — NDL HYPODERMIC BLUNT 18G 1.5IN

## (undated) DEVICE — SYR DISP LL 5CC

## (undated) DEVICE — SUT VICRYL PLUS 3-0 SH 18IN

## (undated) DEVICE — SYS EXOFIN SKIN CLOSURE 22CM

## (undated) DEVICE — NDL ECLIPSE SAF REG 25GX1.5IN

## (undated) DEVICE — ELECTRODE REM PLYHSV RETURN 9

## (undated) DEVICE — DRAPE C ARM 42 X 120 10/BX

## (undated) DEVICE — DRAPE T TRNSVRS LAP 102X78X121

## (undated) DEVICE — SYS LABEL CORRECT MED